# Patient Record
Sex: FEMALE | Race: BLACK OR AFRICAN AMERICAN | NOT HISPANIC OR LATINO | URBAN - METROPOLITAN AREA
[De-identification: names, ages, dates, MRNs, and addresses within clinical notes are randomized per-mention and may not be internally consistent; named-entity substitution may affect disease eponyms.]

---

## 2017-07-17 ENCOUNTER — EMERGENCY (EMERGENCY)
Facility: HOSPITAL | Age: 67
LOS: 0 days | Discharge: HOME | End: 2017-07-17
Attending: STUDENT IN AN ORGANIZED HEALTH CARE EDUCATION/TRAINING PROGRAM
Payer: COMMERCIAL

## 2017-07-17 VITALS
DIASTOLIC BLOOD PRESSURE: 60 MMHG | TEMPERATURE: 98 F | OXYGEN SATURATION: 97 % | HEART RATE: 62 BPM | RESPIRATION RATE: 20 BRPM | SYSTOLIC BLOOD PRESSURE: 103 MMHG

## 2017-07-17 VITALS
HEART RATE: 60 BPM | DIASTOLIC BLOOD PRESSURE: 83 MMHG | OXYGEN SATURATION: 100 % | HEIGHT: 65 IN | TEMPERATURE: 99 F | RESPIRATION RATE: 18 BRPM | WEIGHT: 179.02 LBS | SYSTOLIC BLOOD PRESSURE: 128 MMHG

## 2017-07-17 LAB
ALBUMIN SERPL ELPH-MCNC: 3.9 G/DL — SIGNIFICANT CHANGE UP (ref 3.3–5)
ALP SERPL-CCNC: 94 U/L — SIGNIFICANT CHANGE UP (ref 40–120)
ALT FLD-CCNC: 34 U/L — SIGNIFICANT CHANGE UP (ref 12–78)
ANION GAP SERPL CALC-SCNC: 8 MMOL/L — SIGNIFICANT CHANGE UP (ref 5–17)
APTT BLD: 31.7 SEC — SIGNIFICANT CHANGE UP (ref 27.5–37.4)
AST SERPL-CCNC: 35 U/L — SIGNIFICANT CHANGE UP (ref 15–37)
BASOPHILS # BLD AUTO: 0.1 K/UL — SIGNIFICANT CHANGE UP (ref 0–0.2)
BASOPHILS NFR BLD AUTO: 1.4 % — SIGNIFICANT CHANGE UP (ref 0–2)
BILIRUB SERPL-MCNC: 0.4 MG/DL — SIGNIFICANT CHANGE UP (ref 0.2–1.2)
BUN SERPL-MCNC: 15 MG/DL — SIGNIFICANT CHANGE UP (ref 7–23)
CALCIUM SERPL-MCNC: 10.2 MG/DL — HIGH (ref 8.5–10.1)
CHLORIDE SERPL-SCNC: 106 MMOL/L — SIGNIFICANT CHANGE UP (ref 96–108)
CK MB BLD-MCNC: 1.1 % — SIGNIFICANT CHANGE UP (ref 0–3.5)
CK MB BLD-MCNC: 1.2 % — SIGNIFICANT CHANGE UP (ref 0–3.5)
CK MB CFR SERPL CALC: 3.3 NG/ML — SIGNIFICANT CHANGE UP (ref 0.5–3.6)
CK MB CFR SERPL CALC: 3.6 NG/ML — SIGNIFICANT CHANGE UP (ref 0.5–3.6)
CK SERPL-CCNC: 288 U/L — HIGH (ref 26–192)
CK SERPL-CCNC: 298 U/L — HIGH (ref 26–192)
CO2 SERPL-SCNC: 27 MMOL/L — SIGNIFICANT CHANGE UP (ref 22–31)
CREAT SERPL-MCNC: 0.73 MG/DL — SIGNIFICANT CHANGE UP (ref 0.5–1.3)
D DIMER BLD IA.RAPID-MCNC: <150 NG/ML DDU — SIGNIFICANT CHANGE UP
EOSINOPHIL # BLD AUTO: 0 K/UL — SIGNIFICANT CHANGE UP (ref 0–0.5)
EOSINOPHIL NFR BLD AUTO: 0.7 % — SIGNIFICANT CHANGE UP (ref 0–6)
GLUCOSE SERPL-MCNC: 93 MG/DL — SIGNIFICANT CHANGE UP (ref 70–99)
HCT VFR BLD CALC: 35.8 % — SIGNIFICANT CHANGE UP (ref 34.5–45)
HGB BLD-MCNC: 13.1 G/DL — SIGNIFICANT CHANGE UP (ref 11.5–15.5)
INR BLD: 0.99 RATIO — SIGNIFICANT CHANGE UP (ref 0.88–1.16)
LYMPHOCYTES # BLD AUTO: 1.8 K/UL — SIGNIFICANT CHANGE UP (ref 1–3.3)
LYMPHOCYTES # BLD AUTO: 24.4 % — SIGNIFICANT CHANGE UP (ref 13–44)
MCHC RBC-ENTMCNC: 31.2 PG — SIGNIFICANT CHANGE UP (ref 27–34)
MCHC RBC-ENTMCNC: 36.7 GM/DL — HIGH (ref 32–36)
MCV RBC AUTO: 85.1 FL — SIGNIFICANT CHANGE UP (ref 80–100)
MONOCYTES # BLD AUTO: 0.6 K/UL — SIGNIFICANT CHANGE UP (ref 0–0.9)
MONOCYTES NFR BLD AUTO: 8.2 % — SIGNIFICANT CHANGE UP (ref 2–14)
NEUTROPHILS # BLD AUTO: 4.7 K/UL — SIGNIFICANT CHANGE UP (ref 1.8–7.4)
NEUTROPHILS NFR BLD AUTO: 65.4 % — SIGNIFICANT CHANGE UP (ref 43–77)
PLATELET # BLD AUTO: 153 K/UL — SIGNIFICANT CHANGE UP (ref 150–400)
POTASSIUM SERPL-MCNC: 4.2 MMOL/L — SIGNIFICANT CHANGE UP (ref 3.5–5.3)
POTASSIUM SERPL-SCNC: 4.2 MMOL/L — SIGNIFICANT CHANGE UP (ref 3.5–5.3)
PROT SERPL-MCNC: 8 GM/DL — SIGNIFICANT CHANGE UP (ref 6–8.3)
PROTHROM AB SERPL-ACNC: 10.8 SEC — SIGNIFICANT CHANGE UP (ref 9.8–12.7)
RBC # BLD: 4.2 M/UL — SIGNIFICANT CHANGE UP (ref 3.8–5.2)
RBC # FLD: 12.4 % — SIGNIFICANT CHANGE UP (ref 11–15)
SODIUM SERPL-SCNC: 141 MMOL/L — SIGNIFICANT CHANGE UP (ref 135–145)
TROPONIN I SERPL-MCNC: <.015 NG/ML — SIGNIFICANT CHANGE UP (ref 0.01–0.04)
TROPONIN I SERPL-MCNC: <.015 NG/ML — SIGNIFICANT CHANGE UP (ref 0.01–0.04)
WBC # BLD: 7.3 K/UL — SIGNIFICANT CHANGE UP (ref 3.8–10.5)
WBC # FLD AUTO: 7.3 K/UL — SIGNIFICANT CHANGE UP (ref 3.8–10.5)

## 2017-07-17 PROCEDURE — 71010: CPT | Mod: 26

## 2017-07-17 PROCEDURE — 93010 ELECTROCARDIOGRAM REPORT: CPT

## 2017-07-17 PROCEDURE — 99285 EMERGENCY DEPT VISIT HI MDM: CPT

## 2017-07-17 PROCEDURE — 93970 EXTREMITY STUDY: CPT | Mod: 26

## 2017-07-17 RX ORDER — SODIUM CHLORIDE 9 MG/ML
3 INJECTION INTRAMUSCULAR; INTRAVENOUS; SUBCUTANEOUS ONCE
Qty: 0 | Refills: 0 | Status: COMPLETED | OUTPATIENT
Start: 2017-07-17 | End: 2017-07-17

## 2017-07-17 RX ORDER — OXYCODONE HYDROCHLORIDE 5 MG/1
1 TABLET ORAL
Qty: 12 | Refills: 0 | OUTPATIENT
Start: 2017-07-17 | End: 2017-07-20

## 2017-07-17 RX ADMIN — SODIUM CHLORIDE 3 MILLILITER(S): 9 INJECTION INTRAMUSCULAR; INTRAVENOUS; SUBCUTANEOUS at 14:01

## 2017-07-17 NOTE — ED PROVIDER NOTE - OBJECTIVE STATEMENT
67 year old female with h/o LLE DVT (three years ago, treated with anticoagulation for six months) presents today c/o intermittent episodes of palpitations for the last one week, today pt c/o three episodes lasting ten minutes, pt concerned for new DVT, (-) chest pain (-) sob (-) recent travel +calf tenderness (L>R)

## 2017-07-17 NOTE — ED ADULT TRIAGE NOTE - CHIEF COMPLAINT QUOTE
c/o palpitations x 15 mins denies palpitations or chest pain at present states intermittant episodes over past week

## 2017-07-17 NOTE — ED ADULT NURSE REASSESSMENT NOTE - NS ED NURSE REASSESS COMMENT FT1
rcvd pt aaox3, pt stable, denies palpitations at this time.  remains on HR monitor.  family at bedside

## 2017-07-17 NOTE — ED ADULT NURSE NOTE - OBJECTIVE STATEMENT
received er bed 10 c/o intermittant episodes of palpitations over past week states about 3 episodes lasting 10 mins each had episode this morning x 15 mins resolved prior to arrival denies chest pain or palpitations at present denies sob at present lungs clear b/l hx dvt l leg 3 years ago no leg pain at present denies recent prolonged air or car travel

## 2017-07-18 DIAGNOSIS — Z86.718 PERSONAL HISTORY OF OTHER VENOUS THROMBOSIS AND EMBOLISM: ICD-10-CM

## 2017-07-18 DIAGNOSIS — R00.2 PALPITATIONS: ICD-10-CM

## 2018-04-30 ENCOUNTER — EMERGENCY (EMERGENCY)
Facility: HOSPITAL | Age: 68
LOS: 0 days | Discharge: HOME | End: 2018-04-30
Attending: STUDENT IN AN ORGANIZED HEALTH CARE EDUCATION/TRAINING PROGRAM
Payer: COMMERCIAL

## 2018-04-30 VITALS
DIASTOLIC BLOOD PRESSURE: 68 MMHG | OXYGEN SATURATION: 98 % | TEMPERATURE: 98 F | SYSTOLIC BLOOD PRESSURE: 118 MMHG | HEART RATE: 58 BPM | RESPIRATION RATE: 18 BRPM

## 2018-04-30 VITALS
OXYGEN SATURATION: 98 % | HEART RATE: 62 BPM | HEIGHT: 65 IN | SYSTOLIC BLOOD PRESSURE: 130 MMHG | RESPIRATION RATE: 16 BRPM | WEIGHT: 175.05 LBS | DIASTOLIC BLOOD PRESSURE: 86 MMHG | TEMPERATURE: 99 F

## 2018-04-30 PROBLEM — I82.409 ACUTE EMBOLISM AND THROMBOSIS OF UNSPECIFIED DEEP VEINS OF UNSPECIFIED LOWER EXTREMITY: Chronic | Status: ACTIVE | Noted: 2017-07-17

## 2018-04-30 PROCEDURE — 99283 EMERGENCY DEPT VISIT LOW MDM: CPT

## 2018-04-30 PROCEDURE — 73562 X-RAY EXAM OF KNEE 3: CPT | Mod: 26,50

## 2018-04-30 RX ORDER — TRAMADOL HYDROCHLORIDE 50 MG/1
50 TABLET ORAL ONCE
Qty: 0 | Refills: 0 | Status: DISCONTINUED | OUTPATIENT
Start: 2018-04-30 | End: 2018-04-30

## 2018-04-30 RX ORDER — TRAMADOL HYDROCHLORIDE 50 MG/1
1 TABLET ORAL
Qty: 12 | Refills: 0
Start: 2018-04-30 | End: 2018-05-02

## 2018-04-30 RX ADMIN — TRAMADOL HYDROCHLORIDE 50 MILLIGRAM(S): 50 TABLET ORAL at 16:59

## 2018-04-30 RX ADMIN — TRAMADOL HYDROCHLORIDE 50 MILLIGRAM(S): 50 TABLET ORAL at 11:43

## 2018-04-30 NOTE — ED PROVIDER NOTE - MEDICAL DECISION MAKING DETAILS
xray negative for acute fracture or dislocation, tramadol for pain, pt evaluation recommend home PT, pt told to follow up with her PMD to arrange for therapy

## 2018-04-30 NOTE — PHYSICAL THERAPY INITIAL EVALUATION ADULT - ASR EQUIP NEEDS DISCH PT EVAL
pt would benefit from rolling walker if pt continues to be limited with ambulation. Pt and daughter educated to get script from PCP

## 2018-04-30 NOTE — ED PROVIDER NOTE - OBJECTIVE STATEMENT
67 year old female presents today c/o tripping and falling at home one week ago landing with her lower extremities behind her, she describes her legs "being in a W position", since then she has been able to ambulate but with worsening pain, today she found it more difficult with weigh bearing, at baseline pt does use a cane

## 2018-04-30 NOTE — ED ADULT TRIAGE NOTE - CHIEF COMPLAINT QUOTE
slip and fall c/o bilateral knee pain slip and fall one week ago c/o bilateral knee pain. unable to put any weight on the knee.

## 2018-04-30 NOTE — ED ADULT NURSE NOTE - OBJECTIVE STATEMENT
Patient fell in bathroom and landed on hands and knees. Complaining of pain in the knees. Not on medication. Hx of dvt Denies hitting head.

## 2018-04-30 NOTE — PHYSICAL THERAPY INITIAL EVALUATION ADULT - ASSISTIVE DEVICE FOR TRANSFER: GAIT, REHAB EVAL
with rolling walker and attempted handheld assist to act as cane pt has at home. Pt educated on proper use of standard cane/rolling walker
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
## 2018-05-01 DIAGNOSIS — S80.01XA CONTUSION OF RIGHT KNEE, INITIAL ENCOUNTER: ICD-10-CM

## 2018-05-01 DIAGNOSIS — S80.02XA CONTUSION OF LEFT KNEE, INITIAL ENCOUNTER: ICD-10-CM

## 2018-05-01 DIAGNOSIS — M25.561 PAIN IN RIGHT KNEE: ICD-10-CM

## 2018-05-01 DIAGNOSIS — M25.562 PAIN IN LEFT KNEE: ICD-10-CM

## 2018-05-01 DIAGNOSIS — Z86.718 PERSONAL HISTORY OF OTHER VENOUS THROMBOSIS AND EMBOLISM: ICD-10-CM
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
## 2020-07-17 PROBLEM — Z00.00 ENCOUNTER FOR PREVENTIVE HEALTH EXAMINATION: Status: ACTIVE | Noted: 2020-07-17

## 2020-07-22 ENCOUNTER — APPOINTMENT (OUTPATIENT)
Dept: ORTHOPEDIC SURGERY | Facility: CLINIC | Age: 70
End: 2020-07-22
Payer: MEDICARE

## 2020-07-22 DIAGNOSIS — M79.604 PAIN IN RIGHT LEG: ICD-10-CM

## 2020-07-22 DIAGNOSIS — M21.6X9 OTHER ACQUIRED DEFORMITIES OF UNSPECIFIED FOOT: ICD-10-CM

## 2020-07-22 DIAGNOSIS — M21.42 FLAT FOOT [PES PLANUS] (ACQUIRED), RIGHT FOOT: ICD-10-CM

## 2020-07-22 DIAGNOSIS — R60.0 LOCALIZED EDEMA: ICD-10-CM

## 2020-07-22 DIAGNOSIS — M62.89 OTHER SPECIFIED DISORDERS OF MUSCLE: ICD-10-CM

## 2020-07-22 DIAGNOSIS — M79.605 PAIN IN RIGHT LEG: ICD-10-CM

## 2020-07-22 DIAGNOSIS — M79.672 PAIN IN RIGHT LEG: ICD-10-CM

## 2020-07-22 DIAGNOSIS — M79.671 PAIN IN RIGHT LEG: ICD-10-CM

## 2020-07-22 DIAGNOSIS — M21.41 FLAT FOOT [PES PLANUS] (ACQUIRED), RIGHT FOOT: ICD-10-CM

## 2020-07-22 PROCEDURE — 73630 X-RAY EXAM OF FOOT: CPT | Mod: LT

## 2020-07-22 PROCEDURE — 99203 OFFICE O/P NEW LOW 30 MIN: CPT

## 2020-07-26 PROBLEM — M21.41 ACQUIRED PES PLANUS OF BOTH FEET: Status: ACTIVE | Noted: 2020-07-26

## 2020-07-26 PROBLEM — M62.89 TIGHTNESS OF BOTH GASTROCNEMIUS MUSCLES: Status: ACTIVE | Noted: 2020-07-26

## 2020-07-26 PROBLEM — R60.0 PEDAL EDEMA: Status: ACTIVE | Noted: 2020-07-26

## 2022-08-23 ENCOUNTER — APPOINTMENT (OUTPATIENT)
Dept: VASCULAR SURGERY | Facility: CLINIC | Age: 72
End: 2022-08-23

## 2022-08-23 VITALS
BODY MASS INDEX: 28.82 KG/M2 | HEART RATE: 70 BPM | SYSTOLIC BLOOD PRESSURE: 114 MMHG | WEIGHT: 173 LBS | DIASTOLIC BLOOD PRESSURE: 68 MMHG | HEIGHT: 65 IN

## 2022-08-23 DIAGNOSIS — I87.2 VENOUS INSUFFICIENCY (CHRONIC) (PERIPHERAL): ICD-10-CM

## 2022-08-23 PROCEDURE — 99203 OFFICE O/P NEW LOW 30 MIN: CPT

## 2022-08-23 PROCEDURE — 93971 EXTREMITY STUDY: CPT

## 2022-08-23 NOTE — PHYSICAL EXAM
[2+] : left 2+ [Ankle Swelling (On Exam)] : present [] : of the left leg [Ankle Swelling On The Left] : moderate [No Rash or Lesion] : No rash or lesion [Alert] : alert [Calm] : calm [JVD] : no jugular venous distention  [Varicose Veins Of Lower Extremities] : not present [Skin Ulcer] : no ulcer [de-identified] : Appears well

## 2022-08-23 NOTE — HISTORY OF PRESENT ILLNESS
[FreeTextEntry1] : 72-year-old female with a past medical history of hyperlipidemia presents for evaluation of left lower extremity swelling and pain with skin discoloration.  Patient reports a history of DVT in 2013 was treated with Coumadin for 6 months then states was restarted on Coumadin 2 to 3 years after for an additional 6-month course, patient unsure of the reason for restarting Coumadin.  Patient states that she has been off of anticoagulation for the past 5 years.  Patient denies any history of open wounds or ulcers

## 2022-08-23 NOTE — ASSESSMENT
[FreeTextEntry1] : 72-year-old female with a past medical history of hyperlipidemia presents for evaluation of left lower extremity swelling and pain with skin discoloration\par \par Venous duplex shows no evidence of acute DVT or SVT, reflux noted in the femoral and popliteal vein with a chronic nonocclusive thrombus in the popliteal vein greater saphenous vein was not visualized of the left lower extremity\par \par At this time no surgical intervention was recommended compression stockings and elevation\par Patient to follow-up as needed

## 2023-01-01 ENCOUNTER — OUTPATIENT (OUTPATIENT)
Dept: OUTPATIENT SERVICES | Facility: HOSPITAL | Age: 73
LOS: 1 days | Discharge: HOME | End: 2023-01-01

## 2023-01-01 ENCOUNTER — RESULT REVIEW (OUTPATIENT)
Age: 73
End: 2023-01-01

## 2023-01-01 ENCOUNTER — OUTPATIENT (OUTPATIENT)
Dept: OUTPATIENT SERVICES | Facility: HOSPITAL | Age: 73
LOS: 1 days | End: 2023-01-01
Payer: COMMERCIAL

## 2023-01-01 ENCOUNTER — APPOINTMENT (OUTPATIENT)
Dept: UROLOGY | Facility: CLINIC | Age: 73
End: 2023-01-01
Payer: MEDICARE

## 2023-01-01 ENCOUNTER — NON-APPOINTMENT (OUTPATIENT)
Age: 73
End: 2023-01-01

## 2023-01-01 ENCOUNTER — APPOINTMENT (OUTPATIENT)
Dept: ULTRASOUND IMAGING | Facility: CLINIC | Age: 73
End: 2023-01-01

## 2023-01-01 ENCOUNTER — APPOINTMENT (OUTPATIENT)
Dept: HEMATOLOGY ONCOLOGY | Facility: CLINIC | Age: 73
End: 2023-01-01
Payer: MEDICARE

## 2023-01-01 ENCOUNTER — APPOINTMENT (OUTPATIENT)
Dept: ULTRASOUND IMAGING | Facility: CLINIC | Age: 73
End: 2023-01-01
Payer: MEDICARE

## 2023-01-01 ENCOUNTER — INPATIENT (INPATIENT)
Facility: HOSPITAL | Age: 73
LOS: 13 days | Discharge: HOME HEALTH | End: 2023-10-21
Attending: INTERNAL MEDICINE | Admitting: INTERNAL MEDICINE
Payer: MEDICARE

## 2023-01-01 ENCOUNTER — APPOINTMENT (OUTPATIENT)
Dept: CT IMAGING | Facility: IMAGING CENTER | Age: 73
End: 2023-01-01

## 2023-01-01 ENCOUNTER — TRANSCRIPTION ENCOUNTER (OUTPATIENT)
Age: 73
End: 2023-01-01

## 2023-01-01 ENCOUNTER — APPOINTMENT (OUTPATIENT)
Dept: CT IMAGING | Facility: CLINIC | Age: 73
End: 2023-01-01
Payer: MEDICARE

## 2023-01-01 ENCOUNTER — APPOINTMENT (OUTPATIENT)
Dept: SURGERY | Facility: CLINIC | Age: 73
End: 2023-01-01
Payer: MEDICARE

## 2023-01-01 VITALS
OXYGEN SATURATION: 98 % | HEART RATE: 78 BPM | DIASTOLIC BLOOD PRESSURE: 67 MMHG | SYSTOLIC BLOOD PRESSURE: 102 MMHG | BODY MASS INDEX: 27.82 KG/M2 | TEMPERATURE: 98.2 F | HEIGHT: 65 IN | WEIGHT: 167 LBS

## 2023-01-01 VITALS
OXYGEN SATURATION: 98 % | RESPIRATION RATE: 16 BRPM | TEMPERATURE: 97.7 F | HEART RATE: 85 BPM | WEIGHT: 160 LBS | BODY MASS INDEX: 26.63 KG/M2 | DIASTOLIC BLOOD PRESSURE: 73 MMHG | SYSTOLIC BLOOD PRESSURE: 146 MMHG

## 2023-01-01 VITALS
WEIGHT: 163.36 LBS | OXYGEN SATURATION: 97 % | TEMPERATURE: 97.2 F | SYSTOLIC BLOOD PRESSURE: 119 MMHG | BODY MASS INDEX: 27.22 KG/M2 | HEIGHT: 65 IN | DIASTOLIC BLOOD PRESSURE: 75 MMHG | HEART RATE: 73 BPM | RESPIRATION RATE: 19 BRPM

## 2023-01-01 VITALS
SYSTOLIC BLOOD PRESSURE: 107 MMHG | RESPIRATION RATE: 16 BRPM | HEART RATE: 69 BPM | TEMPERATURE: 98 F | DIASTOLIC BLOOD PRESSURE: 69 MMHG | OXYGEN SATURATION: 97 %

## 2023-01-01 VITALS
SYSTOLIC BLOOD PRESSURE: 202 MMHG | OXYGEN SATURATION: 100 % | HEART RATE: 63 BPM | RESPIRATION RATE: 18 BRPM | WEIGHT: 162.04 LBS | DIASTOLIC BLOOD PRESSURE: 95 MMHG | TEMPERATURE: 98 F | HEIGHT: 65 IN

## 2023-01-01 DIAGNOSIS — R31.0 GROSS HEMATURIA: ICD-10-CM

## 2023-01-01 DIAGNOSIS — I10 ESSENTIAL (PRIMARY) HYPERTENSION: ICD-10-CM

## 2023-01-01 DIAGNOSIS — N32.3 DIVERTICULUM OF BLADDER: ICD-10-CM

## 2023-01-01 DIAGNOSIS — R00.1 BRADYCARDIA, UNSPECIFIED: ICD-10-CM

## 2023-01-01 DIAGNOSIS — N13.30 UNSPECIFIED HYDRONEPHROSIS: ICD-10-CM

## 2023-01-01 DIAGNOSIS — I82.411 ACUTE EMBOLISM AND THROMBOSIS OF RIGHT FEMORAL VEIN: ICD-10-CM

## 2023-01-01 DIAGNOSIS — D63.0 ANEMIA IN NEOPLASTIC DISEASE: ICD-10-CM

## 2023-01-01 DIAGNOSIS — Z00.00 ENCOUNTER FOR GENERAL ADULT MEDICAL EXAMINATION WITHOUT ABNORMAL FINDINGS: ICD-10-CM

## 2023-01-01 DIAGNOSIS — C18.9 MALIGNANT NEOPLASM OF COLON, UNSPECIFIED: ICD-10-CM

## 2023-01-01 DIAGNOSIS — D72.829 ELEVATED WHITE BLOOD CELL COUNT, UNSPECIFIED: ICD-10-CM

## 2023-01-01 DIAGNOSIS — N32.0 BLADDER-NECK OBSTRUCTION: ICD-10-CM

## 2023-01-01 DIAGNOSIS — N39.0 URINARY TRACT INFECTION, SITE NOT SPECIFIED: ICD-10-CM

## 2023-01-01 DIAGNOSIS — Z86.718 PERSONAL HISTORY OF OTHER VENOUS THROMBOSIS AND EMBOLISM: ICD-10-CM

## 2023-01-01 DIAGNOSIS — N13.6 PYONEPHROSIS: ICD-10-CM

## 2023-01-01 DIAGNOSIS — Z80.1 FAMILY HISTORY OF MALIGNANT NEOPLASM OF TRACHEA, BRONCHUS AND LUNG: ICD-10-CM

## 2023-01-01 DIAGNOSIS — Z29.9 ENCOUNTER FOR PROPHYLACTIC MEASURES, UNSPECIFIED: ICD-10-CM

## 2023-01-01 DIAGNOSIS — N17.9 ACUTE KIDNEY FAILURE, UNSPECIFIED: ICD-10-CM

## 2023-01-01 DIAGNOSIS — I82.4Y1 ACUTE EMBOLISM AND THROMBOSIS OF UNSPECIFIED DEEP VEINS OF RIGHT PROXIMAL LOWER EXTREMITY: ICD-10-CM

## 2023-01-01 DIAGNOSIS — Z20.822 CONTACT WITH AND (SUSPECTED) EXPOSURE TO COVID-19: ICD-10-CM

## 2023-01-01 DIAGNOSIS — Z79.899 OTHER LONG TERM (CURRENT) DRUG THERAPY: ICD-10-CM

## 2023-01-01 DIAGNOSIS — E78.5 HYPERLIPIDEMIA, UNSPECIFIED: ICD-10-CM

## 2023-01-01 DIAGNOSIS — C67.9 MALIGNANT NEOPLASM OF BLADDER, UNSPECIFIED: ICD-10-CM

## 2023-01-01 DIAGNOSIS — C67.0 MALIGNANT NEOPLASM OF TRIGONE OF BLADDER: ICD-10-CM

## 2023-01-01 DIAGNOSIS — N32.89 OTHER SPECIFIED DISORDERS OF BLADDER: ICD-10-CM

## 2023-01-01 DIAGNOSIS — Z63.5 DISRUPTION OF FAMILY BY SEPARATION AND DIVORCE: ICD-10-CM

## 2023-01-01 DIAGNOSIS — Z28.9 IMMUNIZATION NOT CARRIED OUT FOR UNSPECIFIED REASON: ICD-10-CM

## 2023-01-01 DIAGNOSIS — R26.0 ATAXIC GAIT: ICD-10-CM

## 2023-01-01 DIAGNOSIS — R10.2 PELVIC AND PERINEAL PAIN: ICD-10-CM

## 2023-01-01 DIAGNOSIS — C18.2 MALIGNANT NEOPLASM OF ASCENDING COLON: ICD-10-CM

## 2023-01-01 DIAGNOSIS — E87.5 HYPERKALEMIA: ICD-10-CM

## 2023-01-01 DIAGNOSIS — R54 AGE-RELATED PHYSICAL DEBILITY: ICD-10-CM

## 2023-01-01 LAB
A1C WITH ESTIMATED AVERAGE GLUCOSE RESULT: 5.9 % — HIGH (ref 4–5.6)
ALBUMIN SERPL ELPH-MCNC: 1.7 G/DL — LOW (ref 3.3–5)
ALBUMIN SERPL ELPH-MCNC: 1.8 G/DL — LOW (ref 3.3–5)
ALBUMIN SERPL ELPH-MCNC: 1.8 G/DL — LOW (ref 3.3–5)
ALBUMIN SERPL ELPH-MCNC: 1.9 G/DL — LOW (ref 3.3–5)
ALBUMIN SERPL ELPH-MCNC: 2.6 G/DL — LOW (ref 3.3–5)
ALBUMIN SERPL ELPH-MCNC: 3.6 G/DL
ALP BLD-CCNC: 64 U/L
ALP SERPL-CCNC: 104 U/L — SIGNIFICANT CHANGE UP (ref 40–120)
ALP SERPL-CCNC: 81 U/L — SIGNIFICANT CHANGE UP (ref 40–120)
ALP SERPL-CCNC: 92 U/L — SIGNIFICANT CHANGE UP (ref 40–120)
ALP SERPL-CCNC: 97 U/L — SIGNIFICANT CHANGE UP (ref 40–120)
ALP SERPL-CCNC: 99 U/L — SIGNIFICANT CHANGE UP (ref 40–120)
ALT FLD-CCNC: 11 U/L — LOW (ref 12–78)
ALT FLD-CCNC: 11 U/L — LOW (ref 12–78)
ALT FLD-CCNC: 13 U/L — SIGNIFICANT CHANGE UP (ref 12–78)
ALT FLD-CCNC: 13 U/L — SIGNIFICANT CHANGE UP (ref 12–78)
ALT FLD-CCNC: 18 U/L — SIGNIFICANT CHANGE UP (ref 12–78)
ALT SERPL-CCNC: 7 U/L
ANION GAP SERPL CALC-SCNC: 1 MMOL/L — LOW (ref 5–17)
ANION GAP SERPL CALC-SCNC: 1 MMOL/L — LOW (ref 5–17)
ANION GAP SERPL CALC-SCNC: 10 MMOL/L — SIGNIFICANT CHANGE UP (ref 5–17)
ANION GAP SERPL CALC-SCNC: 10 MMOL/L — SIGNIFICANT CHANGE UP (ref 5–17)
ANION GAP SERPL CALC-SCNC: 11 MMOL/L
ANION GAP SERPL CALC-SCNC: 3 MMOL/L — LOW (ref 5–17)
ANION GAP SERPL CALC-SCNC: 4 MMOL/L — LOW (ref 5–17)
ANION GAP SERPL CALC-SCNC: 4 MMOL/L — LOW (ref 5–17)
ANION GAP SERPL CALC-SCNC: 6 MMOL/L — SIGNIFICANT CHANGE UP (ref 5–17)
ANION GAP SERPL CALC-SCNC: 7 MMOL/L — SIGNIFICANT CHANGE UP (ref 5–17)
ANION GAP SERPL CALC-SCNC: 8 MMOL/L — SIGNIFICANT CHANGE UP (ref 5–17)
ANION GAP SERPL CALC-SCNC: 9 MMOL/L — SIGNIFICANT CHANGE UP (ref 5–17)
ANION GAP SERPL CALC-SCNC: 9 MMOL/L — SIGNIFICANT CHANGE UP (ref 5–17)
APPEARANCE UR: ABNORMAL
APTT BLD: 30.8 SEC — SIGNIFICANT CHANGE UP (ref 24.5–35.6)
APTT BLD: 31.3 SEC — SIGNIFICANT CHANGE UP (ref 24.5–35.6)
AST SERPL-CCNC: 10 U/L — LOW (ref 15–37)
AST SERPL-CCNC: 11 U/L
AST SERPL-CCNC: 13 U/L — LOW (ref 15–37)
AST SERPL-CCNC: 14 U/L — LOW (ref 15–37)
AST SERPL-CCNC: 17 U/L — SIGNIFICANT CHANGE UP (ref 15–37)
AST SERPL-CCNC: 19 U/L — SIGNIFICANT CHANGE UP (ref 15–37)
BACTERIA # UR AUTO: ABNORMAL
BASOPHILS # BLD AUTO: 0.05 K/UL — SIGNIFICANT CHANGE UP (ref 0–0.2)
BASOPHILS # BLD AUTO: 0.06 K/UL — SIGNIFICANT CHANGE UP (ref 0–0.2)
BASOPHILS # BLD AUTO: 0.07 K/UL — SIGNIFICANT CHANGE UP (ref 0–0.2)
BASOPHILS NFR BLD AUTO: 0.3 % — SIGNIFICANT CHANGE UP (ref 0–2)
BASOPHILS NFR BLD AUTO: 0.4 % — SIGNIFICANT CHANGE UP (ref 0–2)
BASOPHILS NFR BLD AUTO: 0.5 % — SIGNIFICANT CHANGE UP (ref 0–2)
BASOPHILS NFR BLD AUTO: 0.5 % — SIGNIFICANT CHANGE UP (ref 0–2)
BASOPHILS NFR BLD AUTO: 0.6 % — SIGNIFICANT CHANGE UP (ref 0–2)
BILIRUB SERPL-MCNC: 0.2 MG/DL
BILIRUB SERPL-MCNC: 0.2 MG/DL — SIGNIFICANT CHANGE UP (ref 0.2–1.2)
BILIRUB SERPL-MCNC: 0.3 MG/DL — SIGNIFICANT CHANGE UP (ref 0.2–1.2)
BILIRUB SERPL-MCNC: 0.3 MG/DL — SIGNIFICANT CHANGE UP (ref 0.2–1.2)
BILIRUB UR-MCNC: NEGATIVE — SIGNIFICANT CHANGE UP
BLD GP AB SCN SERPL QL: SIGNIFICANT CHANGE UP
BLD GP AB SCN SERPL QL: SIGNIFICANT CHANGE UP
BUN SERPL-MCNC: 16 MG/DL — SIGNIFICANT CHANGE UP (ref 7–23)
BUN SERPL-MCNC: 18 MG/DL
BUN SERPL-MCNC: 18 MG/DL — SIGNIFICANT CHANGE UP (ref 7–23)
BUN SERPL-MCNC: 18 MG/DL — SIGNIFICANT CHANGE UP (ref 7–23)
BUN SERPL-MCNC: 22 MG/DL — SIGNIFICANT CHANGE UP (ref 7–23)
BUN SERPL-MCNC: 24 MG/DL — HIGH (ref 7–23)
BUN SERPL-MCNC: 25 MG/DL — HIGH (ref 7–23)
BUN SERPL-MCNC: 25 MG/DL — HIGH (ref 7–23)
BUN SERPL-MCNC: 42 MG/DL — HIGH (ref 7–23)
BUN SERPL-MCNC: 44 MG/DL — HIGH (ref 7–23)
BUN SERPL-MCNC: 47 MG/DL — HIGH (ref 7–23)
BUN SERPL-MCNC: 50 MG/DL — HIGH (ref 7–23)
BUN SERPL-MCNC: 55 MG/DL — HIGH (ref 7–23)
BUN SERPL-MCNC: 57 MG/DL — HIGH (ref 7–23)
BUN SERPL-MCNC: 77 MG/DL — HIGH (ref 7–23)
BUN SERPL-MCNC: 77 MG/DL — HIGH (ref 7–23)
CALCIUM SERPL-MCNC: 7.8 MG/DL — LOW (ref 8.5–10.1)
CALCIUM SERPL-MCNC: 7.9 MG/DL — LOW (ref 8.5–10.1)
CALCIUM SERPL-MCNC: 7.9 MG/DL — LOW (ref 8.5–10.1)
CALCIUM SERPL-MCNC: 8.1 MG/DL — LOW (ref 8.5–10.1)
CALCIUM SERPL-MCNC: 8.5 MG/DL — SIGNIFICANT CHANGE UP (ref 8.5–10.1)
CALCIUM SERPL-MCNC: 8.6 MG/DL — SIGNIFICANT CHANGE UP (ref 8.5–10.1)
CALCIUM SERPL-MCNC: 8.7 MG/DL — SIGNIFICANT CHANGE UP (ref 8.5–10.1)
CALCIUM SERPL-MCNC: 8.7 MG/DL — SIGNIFICANT CHANGE UP (ref 8.5–10.1)
CALCIUM SERPL-MCNC: 8.8 MG/DL — SIGNIFICANT CHANGE UP (ref 8.5–10.1)
CALCIUM SERPL-MCNC: 9 MG/DL — SIGNIFICANT CHANGE UP (ref 8.5–10.1)
CALCIUM SERPL-MCNC: 9.3 MG/DL — SIGNIFICANT CHANGE UP (ref 8.5–10.1)
CALCIUM SERPL-MCNC: 9.5 MG/DL — SIGNIFICANT CHANGE UP (ref 8.5–10.1)
CALCIUM SERPL-MCNC: 9.7 MG/DL
CALCIUM SERPL-MCNC: 9.8 MG/DL — SIGNIFICANT CHANGE UP (ref 8.5–10.1)
CEA SERPL-MCNC: 2 NG/ML — SIGNIFICANT CHANGE UP (ref 0–3.8)
CEA SERPL-MCNC: 2.4 NG/ML — SIGNIFICANT CHANGE UP (ref 0–3.8)
CHLORIDE SERPL-SCNC: 100 MMOL/L — SIGNIFICANT CHANGE UP (ref 96–108)
CHLORIDE SERPL-SCNC: 100 MMOL/L — SIGNIFICANT CHANGE UP (ref 96–108)
CHLORIDE SERPL-SCNC: 101 MMOL/L — SIGNIFICANT CHANGE UP (ref 96–108)
CHLORIDE SERPL-SCNC: 104 MMOL/L
CHLORIDE SERPL-SCNC: 104 MMOL/L — SIGNIFICANT CHANGE UP (ref 96–108)
CHLORIDE SERPL-SCNC: 105 MMOL/L — SIGNIFICANT CHANGE UP (ref 96–108)
CHLORIDE SERPL-SCNC: 106 MMOL/L — SIGNIFICANT CHANGE UP (ref 96–108)
CHLORIDE SERPL-SCNC: 106 MMOL/L — SIGNIFICANT CHANGE UP (ref 96–108)
CHLORIDE SERPL-SCNC: 107 MMOL/L — SIGNIFICANT CHANGE UP (ref 96–108)
CHLORIDE SERPL-SCNC: 108 MMOL/L — SIGNIFICANT CHANGE UP (ref 96–108)
CHLORIDE SERPL-SCNC: 108 MMOL/L — SIGNIFICANT CHANGE UP (ref 96–108)
CHLORIDE SERPL-SCNC: 109 MMOL/L — HIGH (ref 96–108)
CHLORIDE SERPL-SCNC: 109 MMOL/L — HIGH (ref 96–108)
CHLORIDE SERPL-SCNC: 110 MMOL/L — HIGH (ref 96–108)
CK SERPL-CCNC: 114 U/L — SIGNIFICANT CHANGE UP (ref 26–192)
CO2 SERPL-SCNC: 22 MMOL/L — SIGNIFICANT CHANGE UP (ref 22–31)
CO2 SERPL-SCNC: 22 MMOL/L — SIGNIFICANT CHANGE UP (ref 22–31)
CO2 SERPL-SCNC: 24 MMOL/L — SIGNIFICANT CHANGE UP (ref 22–31)
CO2 SERPL-SCNC: 24 MMOL/L — SIGNIFICANT CHANGE UP (ref 22–31)
CO2 SERPL-SCNC: 25 MMOL/L
CO2 SERPL-SCNC: 25 MMOL/L — SIGNIFICANT CHANGE UP (ref 22–31)
CO2 SERPL-SCNC: 25 MMOL/L — SIGNIFICANT CHANGE UP (ref 22–31)
CO2 SERPL-SCNC: 26 MMOL/L — SIGNIFICANT CHANGE UP (ref 22–31)
CO2 SERPL-SCNC: 26 MMOL/L — SIGNIFICANT CHANGE UP (ref 22–31)
CO2 SERPL-SCNC: 28 MMOL/L — SIGNIFICANT CHANGE UP (ref 22–31)
CO2 SERPL-SCNC: 29 MMOL/L — SIGNIFICANT CHANGE UP (ref 22–31)
CO2 SERPL-SCNC: 30 MMOL/L — SIGNIFICANT CHANGE UP (ref 22–31)
CO2 SERPL-SCNC: 31 MMOL/L — SIGNIFICANT CHANGE UP (ref 22–31)
CO2 SERPL-SCNC: 31 MMOL/L — SIGNIFICANT CHANGE UP (ref 22–31)
CO2 SERPL-SCNC: 32 MMOL/L — HIGH (ref 22–31)
COLOR SPEC: YELLOW — SIGNIFICANT CHANGE UP
COMMENT - URINE: SIGNIFICANT CHANGE UP
CREAT SERPL-MCNC: 0.69 MG/DL
CREAT SERPL-MCNC: 0.87 MG/DL — SIGNIFICANT CHANGE UP (ref 0.5–1.3)
CREAT SERPL-MCNC: 0.87 MG/DL — SIGNIFICANT CHANGE UP (ref 0.5–1.3)
CREAT SERPL-MCNC: 0.88 MG/DL — SIGNIFICANT CHANGE UP (ref 0.5–1.3)
CREAT SERPL-MCNC: 0.9 MG/DL — SIGNIFICANT CHANGE UP (ref 0.5–1.3)
CREAT SERPL-MCNC: 0.9 MG/DL — SIGNIFICANT CHANGE UP (ref 0.5–1.3)
CREAT SERPL-MCNC: 1.1 MG/DL — SIGNIFICANT CHANGE UP (ref 0.5–1.3)
CREAT SERPL-MCNC: 1.1 MG/DL — SIGNIFICANT CHANGE UP (ref 0.5–1.3)
CREAT SERPL-MCNC: 1.21 MG/DL — SIGNIFICANT CHANGE UP (ref 0.5–1.3)
CREAT SERPL-MCNC: 1.41 MG/DL — HIGH (ref 0.5–1.3)
CREAT SERPL-MCNC: 1.85 MG/DL — HIGH (ref 0.5–1.3)
CREAT SERPL-MCNC: 4.77 MG/DL — HIGH (ref 0.5–1.3)
CREAT SERPL-MCNC: 5.03 MG/DL — HIGH (ref 0.5–1.3)
CREAT SERPL-MCNC: 5.86 MG/DL — HIGH (ref 0.5–1.3)
CREAT SERPL-MCNC: 6.27 MG/DL — HIGH (ref 0.5–1.3)
CREAT SERPL-MCNC: 6.91 MG/DL — HIGH (ref 0.5–1.3)
CREAT SERPL-MCNC: 7.23 MG/DL — HIGH (ref 0.5–1.3)
CREAT SERPL-MCNC: 7.84 MG/DL — HIGH (ref 0.5–1.3)
CREAT SERPL-MCNC: 7.89 MG/DL — HIGH (ref 0.5–1.3)
CULTURE RESULTS: NO GROWTH — SIGNIFICANT CHANGE UP
CULTURE RESULTS: NO GROWTH — SIGNIFICANT CHANGE UP
CULTURE RESULTS: SIGNIFICANT CHANGE UP
DIFF PNL FLD: ABNORMAL
EGFR: 28 ML/MIN/1.73M2 — LOW
EGFR: 39 ML/MIN/1.73M2 — LOW
EGFR: 47 ML/MIN/1.73M2 — LOW
EGFR: 5 ML/MIN/1.73M2 — LOW
EGFR: 5 ML/MIN/1.73M2 — LOW
EGFR: 53 ML/MIN/1.73M2 — LOW
EGFR: 53 ML/MIN/1.73M2 — LOW
EGFR: 6 ML/MIN/1.73M2 — LOW
EGFR: 6 ML/MIN/1.73M2 — LOW
EGFR: 67 ML/MIN/1.73M2 — SIGNIFICANT CHANGE UP
EGFR: 67 ML/MIN/1.73M2 — SIGNIFICANT CHANGE UP
EGFR: 69 ML/MIN/1.73M2 — SIGNIFICANT CHANGE UP
EGFR: 7 ML/MIN/1.73M2 — LOW
EGFR: 7 ML/MIN/1.73M2 — LOW
EGFR: 70 ML/MIN/1.73M2 — SIGNIFICANT CHANGE UP
EGFR: 70 ML/MIN/1.73M2 — SIGNIFICANT CHANGE UP
EGFR: 9 ML/MIN/1.73M2 — LOW
EGFR: 9 ML/MIN/1.73M2 — LOW
EGFR: 91 ML/MIN/1.73M2
EOSINOPHIL # BLD AUTO: 0.07 K/UL — SIGNIFICANT CHANGE UP (ref 0–0.5)
EOSINOPHIL # BLD AUTO: 0.17 K/UL — SIGNIFICANT CHANGE UP (ref 0–0.5)
EOSINOPHIL # BLD AUTO: 0.2 K/UL — SIGNIFICANT CHANGE UP (ref 0–0.5)
EOSINOPHIL # BLD AUTO: 0.35 K/UL — SIGNIFICANT CHANGE UP (ref 0–0.5)
EOSINOPHIL # BLD AUTO: 0.37 K/UL — SIGNIFICANT CHANGE UP (ref 0–0.5)
EOSINOPHIL NFR BLD AUTO: 0.4 % — SIGNIFICANT CHANGE UP (ref 0–6)
EOSINOPHIL NFR BLD AUTO: 1.3 % — SIGNIFICANT CHANGE UP (ref 0–6)
EOSINOPHIL NFR BLD AUTO: 1.8 % — SIGNIFICANT CHANGE UP (ref 0–6)
EOSINOPHIL NFR BLD AUTO: 2.6 % — SIGNIFICANT CHANGE UP (ref 0–6)
EOSINOPHIL NFR BLD AUTO: 3.4 % — SIGNIFICANT CHANGE UP (ref 0–6)
EPI CELLS # UR: SIGNIFICANT CHANGE UP
EPO SERPL-MCNC: 15.8 MIU/ML — SIGNIFICANT CHANGE UP (ref 2.6–18.5)
ESTIMATED AVERAGE GLUCOSE: 123 MG/DL — HIGH (ref 68–114)
FERRITIN SERPL-MCNC: 146 NG/ML — SIGNIFICANT CHANGE UP (ref 13–330)
FOLATE SERPL-MCNC: 8.2 NG/ML — SIGNIFICANT CHANGE UP
GLUCOSE BLDC GLUCOMTR-MCNC: 105 MG/DL — HIGH (ref 70–99)
GLUCOSE BLDC GLUCOMTR-MCNC: 108 MG/DL — HIGH (ref 70–99)
GLUCOSE BLDC GLUCOMTR-MCNC: 161 MG/DL — HIGH (ref 70–99)
GLUCOSE BLDC GLUCOMTR-MCNC: 168 MG/DL — HIGH (ref 70–99)
GLUCOSE BLDC GLUCOMTR-MCNC: 208 MG/DL — HIGH (ref 70–99)
GLUCOSE BLDC GLUCOMTR-MCNC: 247 MG/DL — HIGH (ref 70–99)
GLUCOSE BLDC GLUCOMTR-MCNC: 92 MG/DL — SIGNIFICANT CHANGE UP (ref 70–99)
GLUCOSE BLDC GLUCOMTR-MCNC: 93 MG/DL — SIGNIFICANT CHANGE UP (ref 70–99)
GLUCOSE BLDC GLUCOMTR-MCNC: 93 MG/DL — SIGNIFICANT CHANGE UP (ref 70–99)
GLUCOSE SERPL-MCNC: 101 MG/DL
GLUCOSE SERPL-MCNC: 103 MG/DL — HIGH (ref 70–99)
GLUCOSE SERPL-MCNC: 103 MG/DL — HIGH (ref 70–99)
GLUCOSE SERPL-MCNC: 107 MG/DL — HIGH (ref 70–99)
GLUCOSE SERPL-MCNC: 115 MG/DL — HIGH (ref 70–99)
GLUCOSE SERPL-MCNC: 124 MG/DL — HIGH (ref 70–99)
GLUCOSE SERPL-MCNC: 125 MG/DL — HIGH (ref 70–99)
GLUCOSE SERPL-MCNC: 128 MG/DL — HIGH (ref 70–99)
GLUCOSE SERPL-MCNC: 134 MG/DL — HIGH (ref 70–99)
GLUCOSE SERPL-MCNC: 147 MG/DL — HIGH (ref 70–99)
GLUCOSE SERPL-MCNC: 87 MG/DL — SIGNIFICANT CHANGE UP (ref 70–99)
GLUCOSE SERPL-MCNC: 94 MG/DL — SIGNIFICANT CHANGE UP (ref 70–99)
GLUCOSE SERPL-MCNC: 97 MG/DL — SIGNIFICANT CHANGE UP (ref 70–99)
GLUCOSE SERPL-MCNC: 98 MG/DL — SIGNIFICANT CHANGE UP (ref 70–99)
GLUCOSE UR QL: 50 MG/DL
HAPTOGLOB SERPL-MCNC: 524 MG/DL — HIGH (ref 34–200)
HBV SURFACE AB SER-ACNC: <3 MIU/ML — LOW
HBV SURFACE AG SER-ACNC: SIGNIFICANT CHANGE UP
HCT VFR BLD CALC: 24.2 % — LOW (ref 34.5–45)
HCT VFR BLD CALC: 24.4 % — LOW (ref 34.5–45)
HCT VFR BLD CALC: 24.7 % — LOW (ref 34.5–45)
HCT VFR BLD CALC: 25.1 % — LOW (ref 34.5–45)
HCT VFR BLD CALC: 25.1 % — LOW (ref 34.5–45)
HCT VFR BLD CALC: 25.5 % — LOW (ref 34.5–45)
HCT VFR BLD CALC: 26 % — LOW (ref 34.5–45)
HCT VFR BLD CALC: 26 % — LOW (ref 34.5–45)
HCT VFR BLD CALC: 26.5 % — LOW (ref 34.5–45)
HCT VFR BLD CALC: 27.4 % — LOW (ref 34.5–45)
HCT VFR BLD CALC: 27.6 % — LOW (ref 34.5–45)
HCT VFR BLD CALC: 28.4 % — LOW (ref 34.5–45)
HCT VFR BLD CALC: 28.9 % — LOW (ref 34.5–45)
HCT VFR BLD CALC: 30.7 % — LOW (ref 34.5–45)
HCV AB S/CO SERPL IA: 0.09 S/CO — SIGNIFICANT CHANGE UP (ref 0–0.99)
HCV AB SERPL-IMP: SIGNIFICANT CHANGE UP
HGB BLD-MCNC: 7.8 G/DL — LOW (ref 11.5–15.5)
HGB BLD-MCNC: 7.9 G/DL — LOW (ref 11.5–15.5)
HGB BLD-MCNC: 7.9 G/DL — LOW (ref 11.5–15.5)
HGB BLD-MCNC: 8 G/DL — LOW (ref 11.5–15.5)
HGB BLD-MCNC: 8.1 G/DL — LOW (ref 11.5–15.5)
HGB BLD-MCNC: 8.2 G/DL — LOW (ref 11.5–15.5)
HGB BLD-MCNC: 8.7 G/DL — LOW (ref 11.5–15.5)
HGB BLD-MCNC: 8.8 G/DL — LOW (ref 11.5–15.5)
HGB BLD-MCNC: 8.8 G/DL — LOW (ref 11.5–15.5)
HGB BLD-MCNC: 9 G/DL — LOW (ref 11.5–15.5)
HGB BLD-MCNC: 9.7 G/DL — LOW (ref 11.5–15.5)
IMM GRANULOCYTES NFR BLD AUTO: 0.4 % — SIGNIFICANT CHANGE UP (ref 0–0.9)
IMM GRANULOCYTES NFR BLD AUTO: 0.5 % — SIGNIFICANT CHANGE UP (ref 0–0.9)
IMM GRANULOCYTES NFR BLD AUTO: 0.8 % — SIGNIFICANT CHANGE UP (ref 0–0.9)
INR BLD: 0.9 RATIO — SIGNIFICANT CHANGE UP (ref 0.85–1.18)
INR BLD: 0.99 RATIO — SIGNIFICANT CHANGE UP (ref 0.85–1.18)
INR BLD: 1.02 RATIO — SIGNIFICANT CHANGE UP (ref 0.85–1.18)
IRON SATN MFR SERPL: 16 % — SIGNIFICANT CHANGE UP (ref 14–50)
IRON SATN MFR SERPL: 38 UG/DL — SIGNIFICANT CHANGE UP (ref 30–160)
KETONES UR-MCNC: ABNORMAL
LACTATE SERPL-SCNC: 1.2 MMOL/L — SIGNIFICANT CHANGE UP (ref 0.7–2)
LACTATE SERPL-SCNC: 2.5 MMOL/L — HIGH (ref 0.7–2)
LDH SERPL L TO P-CCNC: 413 U/L — HIGH (ref 50–242)
LEUKOCYTE ESTERASE UR-ACNC: ABNORMAL
LYMPHOCYTES # BLD AUTO: 1.53 K/UL — SIGNIFICANT CHANGE UP (ref 1–3.3)
LYMPHOCYTES # BLD AUTO: 1.54 K/UL — SIGNIFICANT CHANGE UP (ref 1–3.3)
LYMPHOCYTES # BLD AUTO: 1.84 K/UL — SIGNIFICANT CHANGE UP (ref 1–3.3)
LYMPHOCYTES # BLD AUTO: 1.85 K/UL — SIGNIFICANT CHANGE UP (ref 1–3.3)
LYMPHOCYTES # BLD AUTO: 1.86 K/UL — SIGNIFICANT CHANGE UP (ref 1–3.3)
LYMPHOCYTES # BLD AUTO: 11.3 % — LOW (ref 13–44)
LYMPHOCYTES # BLD AUTO: 13.5 % — SIGNIFICANT CHANGE UP (ref 13–44)
LYMPHOCYTES # BLD AUTO: 13.6 % — SIGNIFICANT CHANGE UP (ref 13–44)
LYMPHOCYTES # BLD AUTO: 17.2 % — SIGNIFICANT CHANGE UP (ref 13–44)
LYMPHOCYTES # BLD AUTO: 9.7 % — LOW (ref 13–44)
MAGNESIUM SERPL-MCNC: 1.8 MG/DL — SIGNIFICANT CHANGE UP (ref 1.6–2.6)
MAGNESIUM SERPL-MCNC: 1.9 MG/DL — SIGNIFICANT CHANGE UP (ref 1.6–2.6)
MAGNESIUM SERPL-MCNC: 2.1 MG/DL — SIGNIFICANT CHANGE UP (ref 1.6–2.6)
MAGNESIUM SERPL-MCNC: 2.3 MG/DL — SIGNIFICANT CHANGE UP (ref 1.6–2.6)
MAGNESIUM SERPL-MCNC: 2.4 MG/DL — SIGNIFICANT CHANGE UP (ref 1.6–2.6)
MCHC RBC-ENTMCNC: 26 PG — LOW (ref 27–34)
MCHC RBC-ENTMCNC: 26.1 PG — LOW (ref 27–34)
MCHC RBC-ENTMCNC: 26.2 PG — LOW (ref 27–34)
MCHC RBC-ENTMCNC: 26.3 PG — LOW (ref 27–34)
MCHC RBC-ENTMCNC: 26.5 PG — LOW (ref 27–34)
MCHC RBC-ENTMCNC: 26.6 PG — LOW (ref 27–34)
MCHC RBC-ENTMCNC: 26.6 PG — LOW (ref 27–34)
MCHC RBC-ENTMCNC: 26.8 PG — LOW (ref 27–34)
MCHC RBC-ENTMCNC: 26.9 PG — LOW (ref 27–34)
MCHC RBC-ENTMCNC: 27.1 PG — SIGNIFICANT CHANGE UP (ref 27–34)
MCHC RBC-ENTMCNC: 27.1 PG — SIGNIFICANT CHANGE UP (ref 27–34)
MCHC RBC-ENTMCNC: 27.2 PG — SIGNIFICANT CHANGE UP (ref 27–34)
MCHC RBC-ENTMCNC: 30.2 G/DL — LOW (ref 32–36)
MCHC RBC-ENTMCNC: 30.8 G/DL — LOW (ref 32–36)
MCHC RBC-ENTMCNC: 30.8 G/DL — LOW (ref 32–36)
MCHC RBC-ENTMCNC: 31 G/DL — LOW (ref 32–36)
MCHC RBC-ENTMCNC: 31.1 G/DL — LOW (ref 32–36)
MCHC RBC-ENTMCNC: 31.5 G/DL — LOW (ref 32–36)
MCHC RBC-ENTMCNC: 31.6 G/DL — LOW (ref 32–36)
MCHC RBC-ENTMCNC: 31.6 G/DL — LOW (ref 32–36)
MCHC RBC-ENTMCNC: 31.8 G/DL — LOW (ref 32–36)
MCHC RBC-ENTMCNC: 32.1 G/DL — SIGNIFICANT CHANGE UP (ref 32–36)
MCHC RBC-ENTMCNC: 32.8 G/DL — SIGNIFICANT CHANGE UP (ref 32–36)
MCHC RBC-ENTMCNC: 33.9 G/DL — SIGNIFICANT CHANGE UP (ref 32–36)
MCV RBC AUTO: 80.1 FL — SIGNIFICANT CHANGE UP (ref 80–100)
MCV RBC AUTO: 81.6 FL — SIGNIFICANT CHANGE UP (ref 80–100)
MCV RBC AUTO: 82.6 FL — SIGNIFICANT CHANGE UP (ref 80–100)
MCV RBC AUTO: 82.8 FL — SIGNIFICANT CHANGE UP (ref 80–100)
MCV RBC AUTO: 83.2 FL — SIGNIFICANT CHANGE UP (ref 80–100)
MCV RBC AUTO: 84.3 FL — SIGNIFICANT CHANGE UP (ref 80–100)
MCV RBC AUTO: 84.5 FL — SIGNIFICANT CHANGE UP (ref 80–100)
MCV RBC AUTO: 84.7 FL — SIGNIFICANT CHANGE UP (ref 80–100)
MCV RBC AUTO: 85 FL — SIGNIFICANT CHANGE UP (ref 80–100)
MCV RBC AUTO: 86 FL — SIGNIFICANT CHANGE UP (ref 80–100)
MCV RBC AUTO: 86 FL — SIGNIFICANT CHANGE UP (ref 80–100)
MCV RBC AUTO: 86.1 FL — SIGNIFICANT CHANGE UP (ref 80–100)
MCV RBC AUTO: 86.1 FL — SIGNIFICANT CHANGE UP (ref 80–100)
MCV RBC AUTO: 86.3 FL — SIGNIFICANT CHANGE UP (ref 80–100)
MONOCYTES # BLD AUTO: 0.8 K/UL — SIGNIFICANT CHANGE UP (ref 0–0.9)
MONOCYTES # BLD AUTO: 0.93 K/UL — HIGH (ref 0–0.9)
MONOCYTES # BLD AUTO: 1 K/UL — HIGH (ref 0–0.9)
MONOCYTES # BLD AUTO: 1.24 K/UL — HIGH (ref 0–0.9)
MONOCYTES # BLD AUTO: 1.27 K/UL — HIGH (ref 0–0.9)
MONOCYTES NFR BLD AUTO: 6.5 % — SIGNIFICANT CHANGE UP (ref 2–14)
MONOCYTES NFR BLD AUTO: 6.9 % — SIGNIFICANT CHANGE UP (ref 2–14)
MONOCYTES NFR BLD AUTO: 7.4 % — SIGNIFICANT CHANGE UP (ref 2–14)
MONOCYTES NFR BLD AUTO: 8.8 % — SIGNIFICANT CHANGE UP (ref 2–14)
MONOCYTES NFR BLD AUTO: 9.3 % — SIGNIFICANT CHANGE UP (ref 2–14)
NEUTROPHILS # BLD AUTO: 10.07 K/UL — HIGH (ref 1.8–7.4)
NEUTROPHILS # BLD AUTO: 10.81 K/UL — HIGH (ref 1.8–7.4)
NEUTROPHILS # BLD AUTO: 15.68 K/UL — HIGH (ref 1.8–7.4)
NEUTROPHILS # BLD AUTO: 7.61 K/UL — HIGH (ref 1.8–7.4)
NEUTROPHILS # BLD AUTO: 8.47 K/UL — HIGH (ref 1.8–7.4)
NEUTROPHILS NFR BLD AUTO: 70.6 % — SIGNIFICANT CHANGE UP (ref 43–77)
NEUTROPHILS NFR BLD AUTO: 73.6 % — SIGNIFICANT CHANGE UP (ref 43–77)
NEUTROPHILS NFR BLD AUTO: 74.9 % — SIGNIFICANT CHANGE UP (ref 43–77)
NEUTROPHILS NFR BLD AUTO: 79.6 % — HIGH (ref 43–77)
NEUTROPHILS NFR BLD AUTO: 82.6 % — HIGH (ref 43–77)
NITRITE UR-MCNC: NEGATIVE — SIGNIFICANT CHANGE UP
NON-GYNECOLOGICAL CYTOLOGY STUDY: SIGNIFICANT CHANGE UP
NON-GYNECOLOGICAL CYTOLOGY STUDY: SIGNIFICANT CHANGE UP
NRBC # BLD: 0 /100 WBCS — SIGNIFICANT CHANGE UP (ref 0–0)
NT-PROBNP SERPL-SCNC: 2858 PG/ML — HIGH (ref 0–125)
PH UR: 8 — SIGNIFICANT CHANGE UP (ref 5–8)
PHOSPHATE SERPL-MCNC: 2.2 MG/DL — LOW (ref 2.5–4.5)
PHOSPHATE SERPL-MCNC: 2.7 MG/DL — SIGNIFICANT CHANGE UP (ref 2.5–4.5)
PHOSPHATE SERPL-MCNC: 3.2 MG/DL — SIGNIFICANT CHANGE UP (ref 2.5–4.5)
PHOSPHATE SERPL-MCNC: 3.8 MG/DL — SIGNIFICANT CHANGE UP (ref 2.5–4.5)
PHOSPHATE SERPL-MCNC: 4.1 MG/DL — SIGNIFICANT CHANGE UP (ref 2.5–4.5)
PHOSPHATE SERPL-MCNC: 5.1 MG/DL — HIGH (ref 2.5–4.5)
PHOSPHATE SERPL-MCNC: 5.2 MG/DL — HIGH (ref 2.5–4.5)
PHOSPHATE SERPL-MCNC: 5.9 MG/DL — HIGH (ref 2.5–4.5)
PLATELET # BLD AUTO: 216 K/UL — SIGNIFICANT CHANGE UP (ref 150–400)
PLATELET # BLD AUTO: 228 K/UL — SIGNIFICANT CHANGE UP (ref 150–400)
PLATELET # BLD AUTO: 278 K/UL — SIGNIFICANT CHANGE UP (ref 150–400)
PLATELET # BLD AUTO: 280 K/UL — SIGNIFICANT CHANGE UP (ref 150–400)
PLATELET # BLD AUTO: 297 K/UL — SIGNIFICANT CHANGE UP (ref 150–400)
PLATELET # BLD AUTO: 297 K/UL — SIGNIFICANT CHANGE UP (ref 150–400)
PLATELET # BLD AUTO: 298 K/UL — SIGNIFICANT CHANGE UP (ref 150–400)
PLATELET # BLD AUTO: 304 K/UL — SIGNIFICANT CHANGE UP (ref 150–400)
PLATELET # BLD AUTO: 304 K/UL — SIGNIFICANT CHANGE UP (ref 150–400)
PLATELET # BLD AUTO: 310 K/UL — SIGNIFICANT CHANGE UP (ref 150–400)
PLATELET # BLD AUTO: 322 K/UL — SIGNIFICANT CHANGE UP (ref 150–400)
PLATELET # BLD AUTO: 328 K/UL — SIGNIFICANT CHANGE UP (ref 150–400)
PLATELET # BLD AUTO: 330 K/UL — SIGNIFICANT CHANGE UP (ref 150–400)
PLATELET # BLD AUTO: 358 K/UL — SIGNIFICANT CHANGE UP (ref 150–400)
POTASSIUM SERPL-MCNC: 3.7 MMOL/L — SIGNIFICANT CHANGE UP (ref 3.5–5.3)
POTASSIUM SERPL-MCNC: 3.7 MMOL/L — SIGNIFICANT CHANGE UP (ref 3.5–5.3)
POTASSIUM SERPL-MCNC: 3.8 MMOL/L — SIGNIFICANT CHANGE UP (ref 3.5–5.3)
POTASSIUM SERPL-MCNC: 3.9 MMOL/L — SIGNIFICANT CHANGE UP (ref 3.5–5.3)
POTASSIUM SERPL-MCNC: 3.9 MMOL/L — SIGNIFICANT CHANGE UP (ref 3.5–5.3)
POTASSIUM SERPL-MCNC: 4.1 MMOL/L — SIGNIFICANT CHANGE UP (ref 3.5–5.3)
POTASSIUM SERPL-MCNC: 4.2 MMOL/L — SIGNIFICANT CHANGE UP (ref 3.5–5.3)
POTASSIUM SERPL-MCNC: 4.4 MMOL/L — SIGNIFICANT CHANGE UP (ref 3.5–5.3)
POTASSIUM SERPL-MCNC: 4.5 MMOL/L — SIGNIFICANT CHANGE UP (ref 3.5–5.3)
POTASSIUM SERPL-MCNC: 5.2 MMOL/L — SIGNIFICANT CHANGE UP (ref 3.5–5.3)
POTASSIUM SERPL-MCNC: 5.3 MMOL/L — SIGNIFICANT CHANGE UP (ref 3.5–5.3)
POTASSIUM SERPL-MCNC: 5.3 MMOL/L — SIGNIFICANT CHANGE UP (ref 3.5–5.3)
POTASSIUM SERPL-MCNC: 5.9 MMOL/L — HIGH (ref 3.5–5.3)
POTASSIUM SERPL-MCNC: 6 MMOL/L — HIGH (ref 3.5–5.3)
POTASSIUM SERPL-MCNC: 8.3 MMOL/L — CRITICAL HIGH (ref 3.5–5.3)
POTASSIUM SERPL-MCNC: 8.7 MMOL/L — CRITICAL HIGH (ref 3.5–5.3)
POTASSIUM SERPL-SCNC: 3.7 MMOL/L — SIGNIFICANT CHANGE UP (ref 3.5–5.3)
POTASSIUM SERPL-SCNC: 3.7 MMOL/L — SIGNIFICANT CHANGE UP (ref 3.5–5.3)
POTASSIUM SERPL-SCNC: 3.8 MMOL/L — SIGNIFICANT CHANGE UP (ref 3.5–5.3)
POTASSIUM SERPL-SCNC: 3.9 MMOL/L — SIGNIFICANT CHANGE UP (ref 3.5–5.3)
POTASSIUM SERPL-SCNC: 3.9 MMOL/L — SIGNIFICANT CHANGE UP (ref 3.5–5.3)
POTASSIUM SERPL-SCNC: 4.1 MMOL/L — SIGNIFICANT CHANGE UP (ref 3.5–5.3)
POTASSIUM SERPL-SCNC: 4.2 MMOL/L — SIGNIFICANT CHANGE UP (ref 3.5–5.3)
POTASSIUM SERPL-SCNC: 4.4 MMOL/L
POTASSIUM SERPL-SCNC: 4.4 MMOL/L — SIGNIFICANT CHANGE UP (ref 3.5–5.3)
POTASSIUM SERPL-SCNC: 4.5 MMOL/L — SIGNIFICANT CHANGE UP (ref 3.5–5.3)
POTASSIUM SERPL-SCNC: 5.2 MMOL/L — SIGNIFICANT CHANGE UP (ref 3.5–5.3)
POTASSIUM SERPL-SCNC: 5.3 MMOL/L — SIGNIFICANT CHANGE UP (ref 3.5–5.3)
POTASSIUM SERPL-SCNC: 5.3 MMOL/L — SIGNIFICANT CHANGE UP (ref 3.5–5.3)
POTASSIUM SERPL-SCNC: 5.9 MMOL/L — HIGH (ref 3.5–5.3)
POTASSIUM SERPL-SCNC: 6 MMOL/L — HIGH (ref 3.5–5.3)
POTASSIUM SERPL-SCNC: 8.3 MMOL/L — CRITICAL HIGH (ref 3.5–5.3)
POTASSIUM SERPL-SCNC: 8.7 MMOL/L — CRITICAL HIGH (ref 3.5–5.3)
PROCALCITONIN SERPL-MCNC: 0.23 NG/ML — HIGH (ref 0.02–0.1)
PROT SERPL-MCNC: 5.7 GM/DL — LOW (ref 6–8.3)
PROT SERPL-MCNC: 5.9 GM/DL — LOW (ref 6–8.3)
PROT SERPL-MCNC: 6.3 G/DL
PROT SERPL-MCNC: 6.5 GM/DL — SIGNIFICANT CHANGE UP (ref 6–8.3)
PROT SERPL-MCNC: 6.6 GM/DL — SIGNIFICANT CHANGE UP (ref 6–8.3)
PROT SERPL-MCNC: 7.5 GM/DL — SIGNIFICANT CHANGE UP (ref 6–8.3)
PROT UR-MCNC: 500 MG/DL
PROTHROM AB SERPL-ACNC: 10.8 SEC — SIGNIFICANT CHANGE UP (ref 9.5–13)
PROTHROM AB SERPL-ACNC: 11.9 SEC — SIGNIFICANT CHANGE UP (ref 9.5–13)
PROTHROM AB SERPL-ACNC: 12.2 SEC — SIGNIFICANT CHANGE UP (ref 9.5–13)
RAPID RVP RESULT: SIGNIFICANT CHANGE UP
RBC # BLD: 2.92 M/UL — LOW (ref 3.8–5.2)
RBC # BLD: 2.92 M/UL — LOW (ref 3.8–5.2)
RBC # BLD: 2.93 M/UL — LOW (ref 3.8–5.2)
RBC # BLD: 2.99 M/UL — LOW (ref 3.8–5.2)
RBC # BLD: 3.01 M/UL — LOW (ref 3.8–5.2)
RBC # BLD: 3.02 M/UL — LOW (ref 3.8–5.2)
RBC # BLD: 3.07 M/UL — LOW (ref 3.8–5.2)
RBC # BLD: 3.31 M/UL — LOW (ref 3.8–5.2)
RBC # BLD: 3.31 M/UL — LOW (ref 3.8–5.2)
RBC # BLD: 3.34 M/UL — LOW (ref 3.8–5.2)
RBC # BLD: 3.36 M/UL — LOW (ref 3.8–5.2)
RBC # BLD: 3.4 M/UL — LOW (ref 3.8–5.2)
RBC # BLD: 3.69 M/UL — LOW (ref 3.8–5.2)
RBC # FLD: 14.6 % — HIGH (ref 10.3–14.5)
RBC # FLD: 14.6 % — HIGH (ref 10.3–14.5)
RBC # FLD: 14.7 % — HIGH (ref 10.3–14.5)
RBC # FLD: 14.7 % — HIGH (ref 10.3–14.5)
RBC # FLD: 14.8 % — HIGH (ref 10.3–14.5)
RBC # FLD: 14.9 % — HIGH (ref 10.3–14.5)
RBC # FLD: 15 % — HIGH (ref 10.3–14.5)
RBC # FLD: 15.2 % — HIGH (ref 10.3–14.5)
RBC # FLD: 15.2 % — HIGH (ref 10.3–14.5)
RBC # FLD: 15.6 % — HIGH (ref 10.3–14.5)
RBC # FLD: 15.9 % — HIGH (ref 10.3–14.5)
RBC # FLD: 15.9 % — HIGH (ref 10.3–14.5)
RBC # FLD: 16.7 % — HIGH (ref 10.3–14.5)
RBC # FLD: 16.7 % — HIGH (ref 10.3–14.5)
RBC CASTS # UR COMP ASSIST: >50 /HPF (ref 0–4)
RETICS #: 34.1 K/UL — SIGNIFICANT CHANGE UP (ref 25–125)
RETICS/RBC NFR: 1 % — SIGNIFICANT CHANGE UP (ref 0.5–2.5)
SARS-COV-2 RNA SPEC QL NAA+PROBE: SIGNIFICANT CHANGE UP
SODIUM SERPL-SCNC: 133 MMOL/L — LOW (ref 135–145)
SODIUM SERPL-SCNC: 133 MMOL/L — LOW (ref 135–145)
SODIUM SERPL-SCNC: 134 MMOL/L — LOW (ref 135–145)
SODIUM SERPL-SCNC: 135 MMOL/L — SIGNIFICANT CHANGE UP (ref 135–145)
SODIUM SERPL-SCNC: 135 MMOL/L — SIGNIFICANT CHANGE UP (ref 135–145)
SODIUM SERPL-SCNC: 136 MMOL/L — SIGNIFICANT CHANGE UP (ref 135–145)
SODIUM SERPL-SCNC: 137 MMOL/L — SIGNIFICANT CHANGE UP (ref 135–145)
SODIUM SERPL-SCNC: 138 MMOL/L — SIGNIFICANT CHANGE UP (ref 135–145)
SODIUM SERPL-SCNC: 139 MMOL/L — SIGNIFICANT CHANGE UP (ref 135–145)
SODIUM SERPL-SCNC: 139 MMOL/L — SIGNIFICANT CHANGE UP (ref 135–145)
SODIUM SERPL-SCNC: 140 MMOL/L
SODIUM SERPL-SCNC: 140 MMOL/L — SIGNIFICANT CHANGE UP (ref 135–145)
SODIUM SERPL-SCNC: 141 MMOL/L — SIGNIFICANT CHANGE UP (ref 135–145)
SODIUM SERPL-SCNC: 142 MMOL/L — SIGNIFICANT CHANGE UP (ref 135–145)
SODIUM SERPL-SCNC: 143 MMOL/L — SIGNIFICANT CHANGE UP (ref 135–145)
SODIUM SERPL-SCNC: 143 MMOL/L — SIGNIFICANT CHANGE UP (ref 135–145)
SP GR SPEC: 1.01 — SIGNIFICANT CHANGE UP (ref 1.01–1.02)
SPECIMEN SOURCE: SIGNIFICANT CHANGE UP
SURGICAL PATHOLOGY STUDY: SIGNIFICANT CHANGE UP
TIBC SERPL-MCNC: 236 UG/DL — SIGNIFICANT CHANGE UP (ref 220–430)
TROPONIN I, HIGH SENSITIVITY RESULT: 36.2 NG/L — SIGNIFICANT CHANGE UP
TSH SERPL-MCNC: 1.18 UIU/ML — SIGNIFICANT CHANGE UP (ref 0.36–3.74)
UIBC SERPL-MCNC: 198 UG/DL — SIGNIFICANT CHANGE UP (ref 110–370)
UROBILINOGEN FLD QL: NEGATIVE MG/DL — SIGNIFICANT CHANGE UP
VIT B12 SERPL-MCNC: 840 PG/ML — SIGNIFICANT CHANGE UP (ref 232–1245)
WBC # BLD: 10.8 K/UL — HIGH (ref 3.8–10.5)
WBC # BLD: 10.82 K/UL — HIGH (ref 3.8–10.5)
WBC # BLD: 11.02 K/UL — HIGH (ref 3.8–10.5)
WBC # BLD: 11.02 K/UL — HIGH (ref 3.8–10.5)
WBC # BLD: 11.05 K/UL — HIGH (ref 3.8–10.5)
WBC # BLD: 11.11 K/UL — HIGH (ref 3.8–10.5)
WBC # BLD: 11.11 K/UL — HIGH (ref 3.8–10.5)
WBC # BLD: 11.32 K/UL — HIGH (ref 3.8–10.5)
WBC # BLD: 11.69 K/UL — HIGH (ref 3.8–10.5)
WBC # BLD: 11.77 K/UL — HIGH (ref 3.8–10.5)
WBC # BLD: 12.68 K/UL — HIGH (ref 3.8–10.5)
WBC # BLD: 13.57 K/UL — HIGH (ref 3.8–10.5)
WBC # BLD: 13.67 K/UL — HIGH (ref 3.8–10.5)
WBC # BLD: 19 K/UL — HIGH (ref 3.8–10.5)
WBC # FLD AUTO: 10.8 K/UL — HIGH (ref 3.8–10.5)
WBC # FLD AUTO: 10.82 K/UL — HIGH (ref 3.8–10.5)
WBC # FLD AUTO: 11.02 K/UL — HIGH (ref 3.8–10.5)
WBC # FLD AUTO: 11.02 K/UL — HIGH (ref 3.8–10.5)
WBC # FLD AUTO: 11.05 K/UL — HIGH (ref 3.8–10.5)
WBC # FLD AUTO: 11.11 K/UL — HIGH (ref 3.8–10.5)
WBC # FLD AUTO: 11.11 K/UL — HIGH (ref 3.8–10.5)
WBC # FLD AUTO: 11.32 K/UL — HIGH (ref 3.8–10.5)
WBC # FLD AUTO: 11.69 K/UL — HIGH (ref 3.8–10.5)
WBC # FLD AUTO: 11.77 K/UL — HIGH (ref 3.8–10.5)
WBC # FLD AUTO: 12.68 K/UL — HIGH (ref 3.8–10.5)
WBC # FLD AUTO: 13.57 K/UL — HIGH (ref 3.8–10.5)
WBC # FLD AUTO: 13.67 K/UL — HIGH (ref 3.8–10.5)
WBC # FLD AUTO: 19 K/UL — HIGH (ref 3.8–10.5)
WBC UR QL: ABNORMAL

## 2023-01-01 PROCEDURE — 99232 SBSQ HOSP IP/OBS MODERATE 35: CPT

## 2023-01-01 PROCEDURE — 88341 IMHCHEM/IMCYTCHM EA ADD ANTB: CPT | Mod: 26

## 2023-01-01 PROCEDURE — 74178 CT ABD&PLV WO CNTR FLWD CNTR: CPT | Mod: 26

## 2023-01-01 PROCEDURE — 88112 CYTOPATH CELL ENHANCE TECH: CPT | Mod: 26

## 2023-01-01 PROCEDURE — 99233 SBSQ HOSP IP/OBS HIGH 50: CPT

## 2023-01-01 PROCEDURE — 88307 TISSUE EXAM BY PATHOLOGIST: CPT | Mod: 26

## 2023-01-01 PROCEDURE — 74177 CT ABD & PELVIS W/CONTRAST: CPT | Mod: 26

## 2023-01-01 PROCEDURE — 99291 CRITICAL CARE FIRST HOUR: CPT

## 2023-01-01 PROCEDURE — 74178 CT ABD&PLV WO CNTR FLWD CNTR: CPT

## 2023-01-01 PROCEDURE — 99214 OFFICE O/P EST MOD 30 MIN: CPT

## 2023-01-01 PROCEDURE — 99231 SBSQ HOSP IP/OBS SF/LOW 25: CPT

## 2023-01-01 PROCEDURE — 74176 CT ABD & PELVIS W/O CONTRAST: CPT | Mod: 26

## 2023-01-01 PROCEDURE — 50433 PLMT NEPHROURETERAL CATHETER: CPT | Mod: LT

## 2023-01-01 PROCEDURE — 88305 TISSUE EXAM BY PATHOLOGIST: CPT | Mod: 26

## 2023-01-01 PROCEDURE — 71045 X-RAY EXAM CHEST 1 VIEW: CPT | Mod: 26

## 2023-01-01 PROCEDURE — 76705 ECHO EXAM OF ABDOMEN: CPT | Mod: 26

## 2023-01-01 PROCEDURE — 99223 1ST HOSP IP/OBS HIGH 75: CPT

## 2023-01-01 PROCEDURE — 71260 CT THORAX DX C+: CPT

## 2023-01-01 PROCEDURE — 99213 OFFICE O/P EST LOW 20 MIN: CPT

## 2023-01-01 PROCEDURE — 93010 ELECTROCARDIOGRAM REPORT: CPT

## 2023-01-01 PROCEDURE — 74177 CT ABD & PELVIS W/CONTRAST: CPT

## 2023-01-01 PROCEDURE — 99239 HOSP IP/OBS DSCHRG MGMT >30: CPT

## 2023-01-01 PROCEDURE — 50434 CONVERT NEPHROSTOMY CATHETER: CPT | Mod: RT

## 2023-01-01 PROCEDURE — 71250 CT THORAX DX C-: CPT | Mod: 26

## 2023-01-01 PROCEDURE — 99222 1ST HOSP IP/OBS MODERATE 55: CPT

## 2023-01-01 PROCEDURE — 71260 CT THORAX DX C+: CPT | Mod: 26

## 2023-01-01 PROCEDURE — 50432 PLMT NEPHROSTOMY CATHETER: CPT | Mod: RT

## 2023-01-01 PROCEDURE — 99205 OFFICE O/P NEW HI 60 MIN: CPT | Mod: 25

## 2023-01-01 PROCEDURE — 93306 TTE W/DOPPLER COMPLETE: CPT | Mod: 26

## 2023-01-01 PROCEDURE — 52240 CYSTOSCOPY AND TREATMENT: CPT

## 2023-01-01 PROCEDURE — G2212 PROLONG OUTPT/OFFICE VIS: CPT

## 2023-01-01 PROCEDURE — 88342 IMHCHEM/IMCYTCHM 1ST ANTB: CPT | Mod: 26

## 2023-01-01 PROCEDURE — 76705 ECHO EXAM OF ABDOMEN: CPT

## 2023-01-01 RX ORDER — SODIUM ZIRCONIUM CYCLOSILICATE 10 G/10G
10 POWDER, FOR SUSPENSION ORAL ONCE
Refills: 0 | Status: COMPLETED | OUTPATIENT
Start: 2023-01-01 | End: 2023-01-01

## 2023-01-01 RX ORDER — APIXABAN 5 MG/1
5 TABLET, FILM COATED ORAL
Qty: 180 | Refills: 0 | Status: ACTIVE | COMMUNITY
Start: 2023-01-01 | End: 1900-01-01

## 2023-01-01 RX ORDER — POLYETHYLENE GLYCOL 3350 17 G/17G
17 POWDER, FOR SOLUTION ORAL DAILY
Refills: 0 | Status: DISCONTINUED | OUTPATIENT
Start: 2023-01-01 | End: 2023-01-01

## 2023-01-01 RX ORDER — PIPERACILLIN AND TAZOBACTAM 4; .5 G/20ML; G/20ML
3.38 INJECTION, POWDER, LYOPHILIZED, FOR SOLUTION INTRAVENOUS ONCE
Refills: 0 | Status: COMPLETED | OUTPATIENT
Start: 2023-01-01 | End: 2023-01-01

## 2023-01-01 RX ORDER — ALBUTEROL 90 UG/1
10 AEROSOL, METERED ORAL ONCE
Refills: 0 | Status: COMPLETED | OUTPATIENT
Start: 2023-01-01 | End: 2023-01-01

## 2023-01-01 RX ORDER — PANTOPRAZOLE SODIUM 20 MG/1
40 TABLET, DELAYED RELEASE ORAL DAILY
Refills: 0 | Status: DISCONTINUED | OUTPATIENT
Start: 2023-01-01 | End: 2023-01-01

## 2023-01-01 RX ORDER — CHLORHEXIDINE GLUCONATE 213 G/1000ML
1 SOLUTION TOPICAL
Refills: 0 | Status: DISCONTINUED | OUTPATIENT
Start: 2023-01-01 | End: 2023-01-01

## 2023-01-01 RX ORDER — DEXTROSE 50 % IN WATER 50 %
50 SYRINGE (ML) INTRAVENOUS ONCE
Refills: 0 | Status: COMPLETED | OUTPATIENT
Start: 2023-01-01 | End: 2023-01-01

## 2023-01-01 RX ORDER — ONDANSETRON 8 MG/1
4 TABLET, FILM COATED ORAL ONCE
Refills: 0 | Status: DISCONTINUED | OUTPATIENT
Start: 2023-01-01 | End: 2023-01-01

## 2023-01-01 RX ORDER — SODIUM CHLORIDE 9 MG/ML
1000 INJECTION INTRAMUSCULAR; INTRAVENOUS; SUBCUTANEOUS
Refills: 0 | Status: COMPLETED | OUTPATIENT
Start: 2023-01-01 | End: 2023-01-01

## 2023-01-01 RX ORDER — FUROSEMIDE 40 MG/1
40 TABLET ORAL DAILY
Qty: 30 | Refills: 0 | Status: ACTIVE | COMMUNITY
Start: 2023-01-01 | End: 1900-01-01

## 2023-01-01 RX ORDER — POLYETHYLENE GLYCOL 3350 17 G/17G
17 POWDER, FOR SOLUTION ORAL
Qty: 0 | Refills: 0 | DISCHARGE
Start: 2023-01-01

## 2023-01-01 RX ORDER — SODIUM CHLORIDE 9 MG/ML
1000 INJECTION, SOLUTION INTRAVENOUS
Refills: 0 | Status: DISCONTINUED | OUTPATIENT
Start: 2023-01-01 | End: 2023-01-01

## 2023-01-01 RX ORDER — CALCIUM GLUCONATE 100 MG/ML
2 VIAL (ML) INTRAVENOUS ONCE
Refills: 0 | Status: COMPLETED | OUTPATIENT
Start: 2023-01-01 | End: 2023-01-01

## 2023-01-01 RX ORDER — FENTANYL CITRATE 50 UG/ML
50 INJECTION INTRAVENOUS
Refills: 0 | Status: DISCONTINUED | OUTPATIENT
Start: 2023-01-01 | End: 2023-01-01

## 2023-01-01 RX ORDER — INSULIN HUMAN 100 [IU]/ML
5 INJECTION, SOLUTION SUBCUTANEOUS ONCE
Refills: 0 | Status: COMPLETED | OUTPATIENT
Start: 2023-01-01 | End: 2023-01-01

## 2023-01-01 RX ORDER — FUROSEMIDE 20 MG/1
20 TABLET ORAL
Qty: 30 | Refills: 1 | Status: ACTIVE | COMMUNITY
Start: 2023-01-01 | End: 1900-01-01

## 2023-01-01 RX ORDER — SOD SULF/SODIUM/NAHCO3/KCL/PEG
4000 SOLUTION, RECONSTITUTED, ORAL ORAL ONCE
Refills: 0 | Status: COMPLETED | OUTPATIENT
Start: 2023-01-01 | End: 2023-01-01

## 2023-01-01 RX ORDER — CALCIUM GLUCONATE 100 MG/ML
1 VIAL (ML) INTRAVENOUS ONCE
Refills: 0 | Status: COMPLETED | OUTPATIENT
Start: 2023-01-01 | End: 2023-01-01

## 2023-01-01 RX ORDER — HEPARIN SODIUM 5000 [USP'U]/ML
5000 INJECTION INTRAVENOUS; SUBCUTANEOUS EVERY 8 HOURS
Refills: 0 | Status: DISCONTINUED | OUTPATIENT
Start: 2023-01-01 | End: 2023-01-01

## 2023-01-01 RX ORDER — OXYCODONE HYDROCHLORIDE 5 MG/1
5 TABLET ORAL EVERY 6 HOURS
Refills: 0 | Status: DISCONTINUED | OUTPATIENT
Start: 2023-01-01 | End: 2023-01-01

## 2023-01-01 RX ORDER — FENTANYL CITRATE 50 UG/ML
25 INJECTION INTRAVENOUS
Refills: 0 | Status: DISCONTINUED | OUTPATIENT
Start: 2023-01-01 | End: 2023-01-01

## 2023-01-01 RX ORDER — HYDRALAZINE HCL 50 MG
10 TABLET ORAL EVERY 4 HOURS
Refills: 0 | Status: DISCONTINUED | OUTPATIENT
Start: 2023-01-01 | End: 2023-01-01

## 2023-01-01 RX ORDER — ACETAMINOPHEN 500 MG
650 TABLET ORAL EVERY 6 HOURS
Refills: 0 | Status: DISCONTINUED | OUTPATIENT
Start: 2023-01-01 | End: 2023-01-01

## 2023-01-01 RX ORDER — INFLUENZA VIRUS VACCINE 15; 15; 15; 15 UG/.5ML; UG/.5ML; UG/.5ML; UG/.5ML
0.7 SUSPENSION INTRAMUSCULAR ONCE
Refills: 0 | Status: DISCONTINUED | OUTPATIENT
Start: 2023-01-01 | End: 2023-01-01

## 2023-01-01 RX ORDER — PIPERACILLIN AND TAZOBACTAM 4; .5 G/20ML; G/20ML
3.38 INJECTION, POWDER, LYOPHILIZED, FOR SOLUTION INTRAVENOUS EVERY 12 HOURS
Refills: 0 | Status: COMPLETED | OUTPATIENT
Start: 2023-01-01 | End: 2023-01-01

## 2023-01-01 RX ADMIN — HEPARIN SODIUM 5000 UNIT(S): 5000 INJECTION INTRAVENOUS; SUBCUTANEOUS at 22:09

## 2023-01-01 RX ADMIN — POLYETHYLENE GLYCOL 3350 17 GRAM(S): 17 POWDER, FOR SOLUTION ORAL at 12:38

## 2023-01-01 RX ADMIN — Medication 5 MILLIGRAM(S): at 13:24

## 2023-01-01 RX ADMIN — HEPARIN SODIUM 5000 UNIT(S): 5000 INJECTION INTRAVENOUS; SUBCUTANEOUS at 05:44

## 2023-01-01 RX ADMIN — Medication 50 MILLILITER(S): at 16:30

## 2023-01-01 RX ADMIN — SODIUM ZIRCONIUM CYCLOSILICATE 10 GRAM(S): 10 POWDER, FOR SUSPENSION ORAL at 17:40

## 2023-01-01 RX ADMIN — CHLORHEXIDINE GLUCONATE 1 APPLICATION(S): 213 SOLUTION TOPICAL at 05:19

## 2023-01-01 RX ADMIN — CHLORHEXIDINE GLUCONATE 1 APPLICATION(S): 213 SOLUTION TOPICAL at 06:01

## 2023-01-01 RX ADMIN — PIPERACILLIN AND TAZOBACTAM 25 GRAM(S): 4; .5 INJECTION, POWDER, LYOPHILIZED, FOR SOLUTION INTRAVENOUS at 12:00

## 2023-01-01 RX ADMIN — Medication 200 GRAM(S): at 16:16

## 2023-01-01 RX ADMIN — CHLORHEXIDINE GLUCONATE 1 APPLICATION(S): 213 SOLUTION TOPICAL at 06:15

## 2023-01-01 RX ADMIN — FENTANYL CITRATE 25 MICROGRAM(S): 50 INJECTION INTRAVENOUS at 16:53

## 2023-01-01 RX ADMIN — HEPARIN SODIUM 5000 UNIT(S): 5000 INJECTION INTRAVENOUS; SUBCUTANEOUS at 21:51

## 2023-01-01 RX ADMIN — CHLORHEXIDINE GLUCONATE 1 APPLICATION(S): 213 SOLUTION TOPICAL at 05:42

## 2023-01-01 RX ADMIN — HEPARIN SODIUM 5000 UNIT(S): 5000 INJECTION INTRAVENOUS; SUBCUTANEOUS at 15:54

## 2023-01-01 RX ADMIN — SODIUM CHLORIDE 75 MILLILITER(S): 9 INJECTION, SOLUTION INTRAVENOUS at 16:53

## 2023-01-01 RX ADMIN — SODIUM CHLORIDE 100 MILLILITER(S): 9 INJECTION INTRAMUSCULAR; INTRAVENOUS; SUBCUTANEOUS at 22:46

## 2023-01-01 RX ADMIN — HEPARIN SODIUM 5000 UNIT(S): 5000 INJECTION INTRAVENOUS; SUBCUTANEOUS at 05:03

## 2023-01-01 RX ADMIN — CHLORHEXIDINE GLUCONATE 1 APPLICATION(S): 213 SOLUTION TOPICAL at 10:00

## 2023-01-01 RX ADMIN — POLYETHYLENE GLYCOL 3350 17 GRAM(S): 17 POWDER, FOR SOLUTION ORAL at 11:54

## 2023-01-01 RX ADMIN — PIPERACILLIN AND TAZOBACTAM 200 GRAM(S): 4; .5 INJECTION, POWDER, LYOPHILIZED, FOR SOLUTION INTRAVENOUS at 19:33

## 2023-01-01 RX ADMIN — Medication 50 MILLILITER(S): at 19:26

## 2023-01-01 RX ADMIN — HEPARIN SODIUM 5000 UNIT(S): 5000 INJECTION INTRAVENOUS; SUBCUTANEOUS at 13:23

## 2023-01-01 RX ADMIN — PIPERACILLIN AND TAZOBACTAM 25 GRAM(S): 4; .5 INJECTION, POWDER, LYOPHILIZED, FOR SOLUTION INTRAVENOUS at 00:44

## 2023-01-01 RX ADMIN — Medication 650 MILLIGRAM(S): at 18:08

## 2023-01-01 RX ADMIN — HEPARIN SODIUM 5000 UNIT(S): 5000 INJECTION INTRAVENOUS; SUBCUTANEOUS at 15:03

## 2023-01-01 RX ADMIN — HEPARIN SODIUM 5000 UNIT(S): 5000 INJECTION INTRAVENOUS; SUBCUTANEOUS at 22:34

## 2023-01-01 RX ADMIN — HEPARIN SODIUM 5000 UNIT(S): 5000 INJECTION INTRAVENOUS; SUBCUTANEOUS at 05:32

## 2023-01-01 RX ADMIN — CHLORHEXIDINE GLUCONATE 1 APPLICATION(S): 213 SOLUTION TOPICAL at 05:04

## 2023-01-01 RX ADMIN — HEPARIN SODIUM 5000 UNIT(S): 5000 INJECTION INTRAVENOUS; SUBCUTANEOUS at 13:24

## 2023-01-01 RX ADMIN — PIPERACILLIN AND TAZOBACTAM 25 GRAM(S): 4; .5 INJECTION, POWDER, LYOPHILIZED, FOR SOLUTION INTRAVENOUS at 12:26

## 2023-01-01 RX ADMIN — CHLORHEXIDINE GLUCONATE 1 APPLICATION(S): 213 SOLUTION TOPICAL at 06:22

## 2023-01-01 RX ADMIN — INSULIN HUMAN 5 UNIT(S): 100 INJECTION, SOLUTION SUBCUTANEOUS at 16:30

## 2023-01-01 RX ADMIN — ALBUTEROL 10 MILLIGRAM(S): 90 AEROSOL, METERED ORAL at 16:54

## 2023-01-01 RX ADMIN — HEPARIN SODIUM 5000 UNIT(S): 5000 INJECTION INTRAVENOUS; SUBCUTANEOUS at 21:42

## 2023-01-01 RX ADMIN — HEPARIN SODIUM 5000 UNIT(S): 5000 INJECTION INTRAVENOUS; SUBCUTANEOUS at 21:58

## 2023-01-01 RX ADMIN — PIPERACILLIN AND TAZOBACTAM 25 GRAM(S): 4; .5 INJECTION, POWDER, LYOPHILIZED, FOR SOLUTION INTRAVENOUS at 23:41

## 2023-01-01 RX ADMIN — Medication 100 GRAM(S): at 17:40

## 2023-01-01 RX ADMIN — SODIUM ZIRCONIUM CYCLOSILICATE 10 GRAM(S): 10 POWDER, FOR SUSPENSION ORAL at 23:00

## 2023-01-01 RX ADMIN — PIPERACILLIN AND TAZOBACTAM 25 GRAM(S): 4; .5 INJECTION, POWDER, LYOPHILIZED, FOR SOLUTION INTRAVENOUS at 00:17

## 2023-01-01 RX ADMIN — CHLORHEXIDINE GLUCONATE 1 APPLICATION(S): 213 SOLUTION TOPICAL at 05:18

## 2023-01-01 RX ADMIN — PIPERACILLIN AND TAZOBACTAM 25 GRAM(S): 4; .5 INJECTION, POWDER, LYOPHILIZED, FOR SOLUTION INTRAVENOUS at 11:35

## 2023-01-01 RX ADMIN — HEPARIN SODIUM 5000 UNIT(S): 5000 INJECTION INTRAVENOUS; SUBCUTANEOUS at 06:22

## 2023-01-01 RX ADMIN — HEPARIN SODIUM 5000 UNIT(S): 5000 INJECTION INTRAVENOUS; SUBCUTANEOUS at 14:39

## 2023-01-01 RX ADMIN — CHLORHEXIDINE GLUCONATE 1 APPLICATION(S): 213 SOLUTION TOPICAL at 13:44

## 2023-01-01 RX ADMIN — OXYCODONE HYDROCHLORIDE 5 MILLIGRAM(S): 5 TABLET ORAL at 23:54

## 2023-01-01 RX ADMIN — HEPARIN SODIUM 5000 UNIT(S): 5000 INJECTION INTRAVENOUS; SUBCUTANEOUS at 05:41

## 2023-01-01 RX ADMIN — INSULIN HUMAN 5 UNIT(S): 100 INJECTION, SOLUTION SUBCUTANEOUS at 16:14

## 2023-01-01 RX ADMIN — HEPARIN SODIUM 5000 UNIT(S): 5000 INJECTION INTRAVENOUS; SUBCUTANEOUS at 13:52

## 2023-01-01 RX ADMIN — HEPARIN SODIUM 5000 UNIT(S): 5000 INJECTION INTRAVENOUS; SUBCUTANEOUS at 05:57

## 2023-01-01 RX ADMIN — POLYETHYLENE GLYCOL 3350 17 GRAM(S): 17 POWDER, FOR SOLUTION ORAL at 13:23

## 2023-01-01 RX ADMIN — HEPARIN SODIUM 5000 UNIT(S): 5000 INJECTION INTRAVENOUS; SUBCUTANEOUS at 21:28

## 2023-01-01 RX ADMIN — Medication 50 MILLILITER(S): at 16:15

## 2023-01-01 RX ADMIN — CHLORHEXIDINE GLUCONATE 1 APPLICATION(S): 213 SOLUTION TOPICAL at 05:37

## 2023-01-01 RX ADMIN — HEPARIN SODIUM 5000 UNIT(S): 5000 INJECTION INTRAVENOUS; SUBCUTANEOUS at 14:57

## 2023-01-01 RX ADMIN — CHLORHEXIDINE GLUCONATE 1 APPLICATION(S): 213 SOLUTION TOPICAL at 05:32

## 2023-01-01 RX ADMIN — PIPERACILLIN AND TAZOBACTAM 25 GRAM(S): 4; .5 INJECTION, POWDER, LYOPHILIZED, FOR SOLUTION INTRAVENOUS at 23:12

## 2023-01-01 RX ADMIN — OXYCODONE HYDROCHLORIDE 5 MILLIGRAM(S): 5 TABLET ORAL at 16:53

## 2023-01-01 RX ADMIN — HEPARIN SODIUM 5000 UNIT(S): 5000 INJECTION INTRAVENOUS; SUBCUTANEOUS at 06:23

## 2023-01-01 RX ADMIN — POLYETHYLENE GLYCOL 3350 17 GRAM(S): 17 POWDER, FOR SOLUTION ORAL at 17:54

## 2023-01-01 RX ADMIN — PIPERACILLIN AND TAZOBACTAM 25 GRAM(S): 4; .5 INJECTION, POWDER, LYOPHILIZED, FOR SOLUTION INTRAVENOUS at 11:38

## 2023-01-01 RX ADMIN — HEPARIN SODIUM 5000 UNIT(S): 5000 INJECTION INTRAVENOUS; SUBCUTANEOUS at 14:02

## 2023-01-01 RX ADMIN — HEPARIN SODIUM 5000 UNIT(S): 5000 INJECTION INTRAVENOUS; SUBCUTANEOUS at 05:18

## 2023-01-01 RX ADMIN — HEPARIN SODIUM 5000 UNIT(S): 5000 INJECTION INTRAVENOUS; SUBCUTANEOUS at 21:52

## 2023-01-01 RX ADMIN — HEPARIN SODIUM 5000 UNIT(S): 5000 INJECTION INTRAVENOUS; SUBCUTANEOUS at 21:32

## 2023-01-01 RX ADMIN — INSULIN HUMAN 5 UNIT(S): 100 INJECTION, SOLUTION SUBCUTANEOUS at 19:26

## 2023-01-01 RX ADMIN — CHLORHEXIDINE GLUCONATE 1 APPLICATION(S): 213 SOLUTION TOPICAL at 19:34

## 2023-01-01 RX ADMIN — Medication 4000 MILLILITER(S): at 15:50

## 2023-01-01 RX ADMIN — PIPERACILLIN AND TAZOBACTAM 25 GRAM(S): 4; .5 INJECTION, POWDER, LYOPHILIZED, FOR SOLUTION INTRAVENOUS at 00:09

## 2023-01-01 RX ADMIN — Medication 650 MILLIGRAM(S): at 19:08

## 2023-01-01 RX ADMIN — HEPARIN SODIUM 5000 UNIT(S): 5000 INJECTION INTRAVENOUS; SUBCUTANEOUS at 22:05

## 2023-01-01 RX ADMIN — HEPARIN SODIUM 5000 UNIT(S): 5000 INJECTION INTRAVENOUS; SUBCUTANEOUS at 05:38

## 2023-01-01 RX ADMIN — HEPARIN SODIUM 5000 UNIT(S): 5000 INJECTION INTRAVENOUS; SUBCUTANEOUS at 06:19

## 2023-01-01 RX ADMIN — HEPARIN SODIUM 5000 UNIT(S): 5000 INJECTION INTRAVENOUS; SUBCUTANEOUS at 05:13

## 2023-01-01 RX ADMIN — PIPERACILLIN AND TAZOBACTAM 25 GRAM(S): 4; .5 INJECTION, POWDER, LYOPHILIZED, FOR SOLUTION INTRAVENOUS at 23:00

## 2023-01-01 RX ADMIN — CHLORHEXIDINE GLUCONATE 1 APPLICATION(S): 213 SOLUTION TOPICAL at 05:50

## 2023-01-01 RX ADMIN — HEPARIN SODIUM 5000 UNIT(S): 5000 INJECTION INTRAVENOUS; SUBCUTANEOUS at 05:52

## 2023-01-01 RX ADMIN — PIPERACILLIN AND TAZOBACTAM 25 GRAM(S): 4; .5 INJECTION, POWDER, LYOPHILIZED, FOR SOLUTION INTRAVENOUS at 13:23

## 2023-01-01 RX ADMIN — OXYCODONE HYDROCHLORIDE 5 MILLIGRAM(S): 5 TABLET ORAL at 23:24

## 2023-01-01 RX ADMIN — CHLORHEXIDINE GLUCONATE 1 APPLICATION(S): 213 SOLUTION TOPICAL at 05:57

## 2023-01-01 RX ADMIN — PIPERACILLIN AND TAZOBACTAM 25 GRAM(S): 4; .5 INJECTION, POWDER, LYOPHILIZED, FOR SOLUTION INTRAVENOUS at 03:36

## 2023-01-01 RX ADMIN — PIPERACILLIN AND TAZOBACTAM 25 GRAM(S): 4; .5 INJECTION, POWDER, LYOPHILIZED, FOR SOLUTION INTRAVENOUS at 13:41

## 2023-01-01 RX ADMIN — HEPARIN SODIUM 5000 UNIT(S): 5000 INJECTION INTRAVENOUS; SUBCUTANEOUS at 22:57

## 2023-01-01 RX ADMIN — PIPERACILLIN AND TAZOBACTAM 25 GRAM(S): 4; .5 INJECTION, POWDER, LYOPHILIZED, FOR SOLUTION INTRAVENOUS at 12:16

## 2023-01-01 SDOH — SOCIAL STABILITY - SOCIAL INSECURITY: DISRUPTION OF FAMILY BY SEPARATION AND DIVORCE: Z63.5

## 2023-10-07 NOTE — ED PROVIDER NOTE - CLINICAL SUMMARY MEDICAL DECISION MAKING FREE TEXT BOX
Patient presents with daughters as detailed in HPI.  Found to have ARF with hyperkalemia and will likely require dialysis. outpatient CT showing possible cancer also noted and this was all discussed with patient and family. hyperkalemia addressed in ED, renal and ICU consulted, will admit to ICU

## 2023-10-07 NOTE — ED PROVIDER NOTE - OBJECTIVE STATEMENT
73-year-old female history of hyperlipidemia presents for hematuria x1 week worsening over the last few days accompanied by chills for 1 week and noted mucus and bowel movements over the last 9 days.  States that starting last night patient also began having chills.  Per family patient has also had increased leg swelling because patient has been noncompliant with her Lasix for the last 2 weeks.  Patient states that she had an ultrasound done which resulted in having a CAT scan done which was done on October 3 and resulted October 5 however they are not sure what the results are yet.  No fevers reported no chest pain or shortness of breath or other complaints.  Family states that the were mainly concerned that patient may have a UTI so brought patient to the ER.  Patient states that she has a follow-up urology appointment in 4 days.

## 2023-10-07 NOTE — PROVIDER CONTACT NOTE (EICU) - BACKGROUND
Following obtained via chart review and discussion with CC NP Loraine: 72 yo F with  history of hyperlipidemia presented to the ED for hematuria x1 week worsening over the last few days accompanied by chills for 1 week and noted mucus and bowel movements over the last 9 days. Labs with LANA Cr 7.89 and hyperkalemia K 8.3, confirmed on repeat. Imaging revealed severe bilateral hydronephrosis. Received insulin, dextrose, albuterol, calcium for hyperkalemia. No reported EKG changes. Seen by nephrology and will have urgent HD tonight.

## 2023-10-07 NOTE — ED PROVIDER NOTE - PHYSICAL EXAMINATION
GEN:   comfortable, in no apparent distress, AOx3  EYES:   PERRL, extra-occular movements intact  HEENT:   airway patent, moist mucosal membranes, uvula midline  CV:  RRR, Pulses- Radial: 2+ bilateral and equal, b/l +2 pitting edema.  RESP:   clear to auscultation bilaterally, non-labored, speaking in full sentences  ABD:   nonfocal lower abd ttp, no guarding  MSK:   no musculoskeletal tenderness, 5/5 strength, moving all extremities  SKIN:   dry, intact, no rash  NEURO:   AOx3, no focal weakness or loss of sensation, GCS 15  PSYCH: calm, cooperative, no apparent risk to self and others

## 2023-10-07 NOTE — ED PROVIDER NOTE - CRITICAL CARE ATTENDING CONTRIBUTION TO CARE
73 F presenting in acute renal failure w/ hyperkalemia  hx obtained from patient & daughter    alvarenga placed w/ minimal output  pt given IV calcium, insulin, nebulized albuterol  nephrology consulted for emergent dialysis  ICU consulted for admitting bed  will admit to ICU

## 2023-10-07 NOTE — ED PROVIDER NOTE - OBTAINED AND REVIEWED OLD RECORDS MULTI-SELECT OPTIONS
Prior Outpatient Radiology Xenograft Text: The defect edges were debeveled with a #15 scalpel blade. Given the location of the defect, shape of the defect and the proximity to free margins a xenograft was deemed most appropriate. The graft was then trimmed to fit the size of the defect. The graft was then placed in the primary defect and oriented appropriately.

## 2023-10-07 NOTE — ED ADULT TRIAGE NOTE - CHIEF COMPLAINT QUOTE
pt c/o hematuria, shivering x 1 week. pt c/o RLQ abdominal pain x 1 month ago. denies pmh. pt c/o hematuria, shivering x 1 week. pt c/o RLQ abdominal pain x 1 month ago. bilateral LE swelling noted. pt noncompliant with lasix.

## 2023-10-07 NOTE — H&P ADULT - NSHPLABSRESULTS_GEN_ALL_CORE
ICU Vital Signs Last 24 Hrs  T(C): 36.6 (07 Oct 2023 19:12), Max: 36.6 (07 Oct 2023 13:36)  T(F): 97.8 (07 Oct 2023 19:12), Max: 97.8 (07 Oct 2023 13:36)  HR: 66 (07 Oct 2023 19:30) (62 - 66)  BP: 160/78 (07 Oct 2023 19:30) (160/78 - 202/95)  BP(mean): 102 (07 Oct 2023 19:30) (102 - 102)  ABP: --  ABP(mean): --  RR: 19 (07 Oct 2023 19:30) (18 - 20)  SpO2: 100% (07 Oct 2023 19:30) (100% - 100%)    O2 Parameters below as of 07 Oct 2023 19:12  Patient On (Oxygen Delivery Method): room air          LABS:                          9.7    19.00 )-----------( 280      ( 07 Oct 2023 15:01 )             30.7     10-07    136  |  105  |  77<H>  ----------------------------<  103<H>  8.7<HH>   |  22  |  7.84<H>    Ca    9.8      07 Oct 2023 16:10    TPro  7.5  /  Alb  2.6<L>  /  TBili  0.3  /  DBili  x   /  AST  19  /  ALT  18  /  AlkPhos  104  10-07    LIVER FUNCTIONS - ( 07 Oct 2023 15:01 )  Alb: 2.6 g/dL / Pro: 7.5 gm/dL / ALK PHOS: 104 U/L / ALT: 18 U/L / AST: 19 U/L / GGT: x         thy  PT/INR - ( 07 Oct 2023 15:01 )   PT: 11.9 sec;   INR: 0.99 ratio         PTT - ( 07 Oct 2023 15:01 )  PTT:31.3 sec  Urinalysis Basic - ( 07 Oct 2023 16:10 )    Color: x / Appearance: x / SG: x / pH: x  Gluc: 103 mg/dL / Ketone: x  / Bili: x / Urobili: x   Blood: x / Protein: x / Nitrite: x   Leuk Esterase: x / RBC: x / WBC x   Sq Epi: x / Non Sq Epi: x / Bacteria: x      RADIOLOGY  CT scan -->  severe bilateral hydro, collapsed, very thick walled bladder concerning for urothelial cancer per Nephrology*     CARDIOLOGY  EKG sinus bradycardia at 58 with no ST/T wave changes

## 2023-10-07 NOTE — ED ADULT NURSE NOTE - NSFALLRISKINTERV_ED_ALL_ED

## 2023-10-07 NOTE — PROCEDURAL SAFETY CHECKLIST WITH OR WITHOUT SEDATION - NSSPECIMENRECONSD_GEN_ALL_CORE
Quadrants Reporting?: 0 Repair Hemostasis (Optional): Electrocautery Consent (Marginal Mandibular)/Introductory Paragraph: The rationale for Mohs was explained to the patient and consent was obtained. The risks, benefits and alternatives to therapy were discussed in detail. Specifically, the risks of damage to the marginal mandibular branch of the facial nerve, infection, scarring, bleeding, prolonged wound healing, incomplete removal, allergy to anesthesia, and recurrence were addressed. Prior to the procedure, the treatment site was clearly identified and confirmed by the patient. All components of Universal Protocol/PAUSE Rule completed. Complex Repair And Flap Additional Text (Will Appearing After The Standard Complex Repair Text): The complex repair was not sufficient to completely close the primary defect. The remaining additional defect was repaired with the flap mentioned below. Previous Accession (Optional): [id number] Stage 11: Additional Anesthesia Type: 1% lidocaine with epinephrine Anesthesia Type: 1% lidocaine with epinephrine and a 1:10 solution of 8.4% sodium bicarbonate Tarsorrhaphy Text: A tarsorrhaphy was performed using Frost sutures. Surgical Defect Width In Cm (Optional): 1.7 Cheek-To-Nose Interpolation Flap Text: A decision was made to reconstruct the defect utilizing an interpolation axial flap and a staged reconstruction.  A telfa template was made of the defect.  This telfa template was then used to outline the Cheek-To-Nose Interpolation flap.  The donor area for the pedicle flap was then injected with anesthesia.  The flap was excised through the skin and subcutaneous tissue down to the layer of the underlying musculature.  The interpolation flap was carefully excised within this deep plane to maintain its blood supply.  The edges of the donor site were undermined.   The donor site was closed in a primary fashion.  The pedicle was then rotated into position and sutured.  Once the tube was sutured into place, adequate blood supply was confirmed with blanching and refill.  The pedicle was then wrapped with xeroform gauze and dressed appropriately with a telfa and gauze bandage to ensure continued blood supply and protect the attached pedicle. Display The Frozen Section And Histology As A Separate Paragraph: No Xenograft Text: The defect edges were debeveled with a #15 scalpel blade.  Given the location of the defect, shape of the defect and the proximity to free margins a xenograft was deemed most appropriate.  The graft was then trimmed to fit the size of the defect.  The graft was then placed in the primary defect and oriented appropriately. Medical Necessity Statement: Based on my medical judgement, Mohs surgery is the most appropriate treatment for this cancer compared to other treatments. Alternatives Discussed Intro (Do Not Add Period): I discussed alternative treatments to Mohs surgery and specifically discussed the risks and benefits of Stage 3: Number Of Blocks?: 1 Purse String (Simple) Text: Given the location of the defect and the characteristics of the surrounding skin a pursestring closure was deemed most appropriate.  Undermining was performed circumfirentially around the surgical defect.  A purstring suture was then placed and tightened. done Star Wedge Flap Text: The defect edges were debeveled with a #15 scalpel blade.  Given the location of the defect, shape of the defect and the proximity to free margins a star wedge flap was deemed most appropriate.  Using a sterile surgical marker, an appropriate rotation flap was drawn incorporating the defect and placing the expected incisions within the relaxed skin tension lines where possible. The area thus outlined was incised deep to adipose tissue with a #15 scalpel blade.  The skin margins were undermined to an appropriate distance in all directions utilizing iris scissors. Wound Check: 7 days Closure 2 Information: This tab is for additional flaps and grafts, including complex repair and grafts and complex repair and flaps. You can also specify a different location for the additional defect, if the location is the same you do not need to select a new one. We will insert the automated text for the repair you select below just as we do for solitary flaps and grafts. Please note that at this time if you select a location with a different insurance zone you will need to override the ICD10 and CPT if appropriate. Location Indication Override (Is Already Calculated Based On Selected Body Location): Area M Anesthesia Volume In Cc: 6 Stage 1: Number Of Blocks?: 2 Melolabial Transposition Flap Text: The defect edges were debeveled with a #15 scalpel blade.  Given the location of the defect and the proximity to free margins a melolabial flap was deemed most appropriate.  Using a sterile surgical marker, an appropriate melolabial transposition flap was drawn incorporating the defect.    The area thus outlined was incised deep to adipose tissue with a #15 scalpel blade.  The skin margins were undermined to an appropriate distance in all directions utilizing iris scissors. Bcc Infiltrative Histology Text: There were numerous aggregates of basaloid cells demonstrating an infiltrative pattern. Closure 4 Information: This tab is for additional flaps and grafts above and beyond our usual structured repairs.  Please note if you enter information here it will not currently bill and you will need to add the billing information manually. Postop Diagnosis: same Referred To Otolaryngology For Closure Text (Leave Blank If You Do Not Want): After obtaining clear surgical margins the patient was sent to otolaryngology for surgical repair.  The patient understands they will receive post-surgical care and follow-up from the referring physician's office. Scc Well Differentiated Histology Text: There were numerous aggregates of squamous cells. Primary Defect Width In Cm (Final Defect Size - Required For Flaps/Grafts): 1.8 Tissue Cultured Epidermal Autograft Text: The defect edges were debeveled with a #15 scalpel blade.  Given the location of the defect, shape of the defect and the proximity to free margins a tissue cultured epidermal autograft was deemed most appropriate.  The graft was then trimmed to fit the size of the defect.  The graft was then placed in the primary defect and oriented appropriately. Island Pedicle Flap With Canthal Suspension Text: The defect edges were debeveled with a #15 scalpel blade.  Given the location of the defect, shape of the defect and the proximity to free margins an island pedicle advancement flap was deemed most appropriate.  Using a sterile surgical marker, an appropriate advancement flap was drawn incorporating the defect, outlining the appropriate donor tissue and placing the expected incisions within the relaxed skin tension lines where possible. The area thus outlined was incised deep to adipose tissue with a #15 scalpel blade.  The skin margins were undermined to an appropriate distance in all directions around the primary defect and laterally outward around the island pedicle utilizing iris scissors.  There was minimal undermining beneath the pedicle flap. A suspension suture was placed in the canthal tendon to prevent tension and prevent ectropion. A-T Advancement Flap Text: The defect edges were debeveled with a #15 scalpel blade.  Given the location of the defect, shape of the defect and the proximity to free margins an A-T advancement flap was deemed most appropriate.  Using a sterile surgical marker, an appropriate advancement flap was drawn incorporating the defect and placing the expected incisions within the relaxed skin tension lines where possible.    The area thus outlined was incised deep to adipose tissue with a #15 scalpel blade.  The skin margins were undermined to an appropriate distance in all directions utilizing iris scissors. Burow's Advancement Flap Text: The defect edges were debeveled with a #15 scalpel blade.  Given the location of the defect and the proximity to free margins a Burow's advancement flap was deemed most appropriate.  Using a sterile surgical marker, the appropriate advancement flap was drawn incorporating the defect and placing the expected incisions within the relaxed skin tension lines where possible.    The area thus outlined was incised deep to adipose tissue with a #15 scalpel blade.  The skin margins were undermined to an appropriate distance in all directions utilizing iris scissors. Trilobed Flap Text: The defect edges were debeveled with a #15 scalpel blade.  Given the location of the defect and the proximity to free margins a trilobed flap was deemed most appropriate.  Using a sterile surgical marker, an appropriate trilobed flap drawn around the defect.    The area thus outlined was incised deep to adipose tissue with a #15 scalpel blade.  The skin margins were undermined to an appropriate distance in all directions utilizing iris scissors. Paramedian Forehead Flap Text: A decision was made to reconstruct the defect utilizing an interpolation axial flap and a staged reconstruction.  A telfa template was made of the defect.  This telfa template was then used to outline the paramedian forehead pedicle flap.  The donor area for the pedicle flap was then injected with anesthesia.  The flap was excised through the skin and subcutaneous tissue down to the layer of the underlying musculature.  The pedicle flap was carefully excised within this deep plane to maintain its blood supply.  The edges of the donor site were undermined.   The donor site was closed in a primary fashion.  The pedicle was then rotated into position and sutured.  Once the tube was sutured into place, adequate blood supply was confirmed with blanching and refill.  The pedicle was then wrapped with xeroform gauze and dressed appropriately with a telfa and gauze bandage to ensure continued blood supply and protect the attached pedicle. Home Suture Removal Text: Patient was provided instructions on removing sutures and will remove their sutures at home.  If they have any questions or difficulties they will call the office. Area L Indication Text: Tumors in this location are included in Area L (trunk and extremities).  Mohs surgery is indicated for larger tumors, or tumors with aggressive histologic features, in these anatomic locations. Z Plasty Text: The lesion was extirpated to the level of the fat with a #15 scalpel blade.  Given the location of the defect, shape of the defect and the proximity to free margins a Z-plasty was deemed most appropriate for repair.  Using a sterile surgical marker, the appropriate transposition arms of the Z-plasty were drawn incorporating the defect and placing the expected incisions within the relaxed skin tension lines where possible.    The area thus outlined was incised deep to adipose tissue with a #15 scalpel blade.  The skin margins were undermined to an appropriate distance in all directions utilizing iris scissors.  The opposing transposition arms were then transposed into place in opposite direction and anchored with interrupted buried subcutaneous sutures. Consent Type: Consent 1 (Standard) Lazy S Intermediate Repair Preamble Text (Leave Blank If You Do Not Want): Undermining was performed with blunt dissection. Dorsal Nasal Flap Text: The defect edges were debeveled with a #15 scalpel blade.  Given the location of the defect and the proximity to free margins a dorsal nasal flap was deemed most appropriate.  Using a sterile surgical marker, an appropriate dorsal nasal flap was drawn around the defect.    The area thus outlined was incised deep to adipose tissue with a #15 scalpel blade.  The skin margins were undermined to an appropriate distance in all directions utilizing iris scissors. Wound Care: Vaseline Referred To Oculoplastics For Closure Text (Leave Blank If You Do Not Want): After obtaining clear surgical margins the patient was sent to oculoplastics for surgical repair.  The patient understands they will receive post-surgical care and follow-up from the referring physician's office. Simple / Intermediate / Complex Repair - Final Wound Length In Cm: 4 Unna Boot Text: An Unna boot was placed to help immobilize the limb and facilitate more rapid healing. Repair Anesthesia Method: local infiltration Muscle Hinge Flap Text: The defect edges were debeveled with a #15 scalpel blade.  Given the size, depth and location of the defect and the proximity to free margins a muscle hinge flap was deemed most appropriate.  Using a sterile surgical marker, an appropriate hinge flap was drawn incorporating the defect. The area thus outlined was incised with a #15 scalpel blade.  The skin margins were undermined to an appropriate distance in all directions utilizing iris scissors. Referred To Mid-Level For Closure Text (Leave Blank If You Do Not Want): After obtaining clear surgical margins the patient was sent to a mid-level provider for surgical repair.  The patient understands they will receive post-surgical care and follow-up from the mid-level provider. Dermal Autograft Text: The defect edges were debeveled with a #15 scalpel blade.  Given the location of the defect, shape of the defect and the proximity to free margins a dermal autograft was deemed most appropriate.  Using a sterile surgical marker, the primary defect shape was transferred to the donor site. The area thus outlined was incised deep to adipose tissue with a #15 scalpel blade.  The harvested graft was then trimmed of adipose and epidermal tissue until only dermis was left.  The skin graft was then placed in the primary defect and oriented appropriately. Keystone Flap Text: The defect edges were debeveled with a #15 scalpel blade.  Given the location of the defect, shape of the defect a keystone flap was deemed most appropriate.  Using a sterile surgical marker, an appropriate keystone flap was drawn incorporating the defect, outlining the appropriate donor tissue and placing the expected incisions within the relaxed skin tension lines where possible. The area thus outlined was incised deep to adipose tissue with a #15 scalpel blade.  The skin margins were undermined to an appropriate distance in all directions around the primary defect and laterally outward around the flap utilizing iris scissors. No Repair - Repaired With Adjacent Surgical Defect Text (Leave Blank If You Do Not Want): After obtaining clear surgical margins the defect was repaired concurrently with another surgical defect which was in close approximation. Consent (Ear)/Introductory Paragraph: The rationale for Mohs was explained to the patient and consent was obtained. The risks, benefits and alternatives to therapy were discussed in detail. Specifically, the risks of ear deformity, infection, scarring, bleeding, prolonged wound healing, incomplete removal, allergy to anesthesia, nerve injury and recurrence were addressed. Prior to the procedure, the treatment site was clearly identified and confirmed by the patient. All components of Universal Protocol/PAUSE Rule completed. Display The Individual Mohs Indications As Separate Paragraphs: Yes Cartilage Graft Text: The defect edges were debeveled with a #15 scalpel blade.  Given the location of the defect, shape of the defect, the fact the defect involved a full thickness cartilage defect a cartilage graft was deemed most appropriate.  An appropriate donor site was identified, cleansed, and anesthetized. The cartilage graft was then harvested and transferred to the recipient site, oriented appropriately and then sutured into place.  The secondary defect was then repaired using a primary closure. Mohs Case Number: [id number] Epidermal Closure Graft Donor Site (Optional): simple interrupted Crescentic Advancement Flap Text: The defect edges were debeveled with a #15 scalpel blade.  Given the location of the defect and the proximity to free margins a crescentic advancement flap was deemed most appropriate.  Using a sterile surgical marker, the appropriate advancement flap was drawn incorporating the defect and placing the expected incisions within the relaxed skin tension lines where possible.    The area thus outlined was incised deep to adipose tissue with a #15 scalpel blade.  The skin margins were undermined to an appropriate distance in all directions utilizing iris scissors. Date Of Previous Biopsy (Optional): 04/10/17 Graft Donor Site Bandage (Optional-Leave Blank If You Don't Want In Note): Steri-strips and a pressure bandage were applied to the donor site. Mucosal Advancement Flap Text: Given the location of the defect, shape of the defect and the proximity to free margins a mucosal advancement flap was deemed most appropriate. Incisions were made with a 15 blade scalpel in the appropriate fashion along the cutaneous vermilion border and the mucosal lip. The remaining actinically damaged mucosal tissue was excised.  The mucosal advancement flap was then elevated to the gingival sulcus with care taken to preserve the neurovascular structures and advanced into the primary defect. Care was taken to ensure that precise realignment of the vermilion border was achieved. Bilobed Transposition Flap Text: The defect edges were debeveled with a #15 scalpel blade.  Given the location of the defect and the proximity to free margins a bilobed transposition flap was deemed most appropriate.  Using a sterile surgical marker, an appropriate bilobe flap drawn around the defect.    The area thus outlined was incised deep to adipose tissue with a #15 scalpel blade.  The skin margins were undermined to an appropriate distance in all directions utilizing iris scissors. Consent (Scalp)/Introductory Paragraph: The rationale for Mohs was explained to the patient and consent was obtained. The risks, benefits and alternatives to therapy were discussed in detail. Specifically, the risks of changes in hair growth pattern secondary to repair, infection, scarring, bleeding, prolonged wound healing, incomplete removal, allergy to anesthesia, nerve injury and recurrence were addressed. Prior to the procedure, the treatment site was clearly identified and confirmed by the patient. All components of Universal Protocol/PAUSE Rule completed. Bcc Histology Text: There were numerous aggregates of basaloid cells. Purse String (Intermediate) Text: Given the location of the defect and the characteristics of the surrounding skin a pursestring intermediate closure was deemed most appropriate.  Undermining was performed circumfirentially around the surgical defect.  A purstring suture was then placed and tightened. Surgeon/Pathologist Verbiage (Will Incorporate Name Of Surgeon From Intro If Not Blank): operated in two distinct and integrated capacities as the surgeon and pathologist. Consent (Nose)/Introductory Paragraph: The rationale for Mohs was explained to the patient and consent was obtained. The risks, benefits and alternatives to therapy were discussed in detail. Specifically, the risks of nasal deformity, changes in the flow of air through the nose, infection, scarring, bleeding, prolonged wound healing, incomplete removal, allergy to anesthesia, nerve injury and recurrence were addressed. Prior to the procedure, the treatment site was clearly identified and confirmed by the patient. All components of Universal Protocol/PAUSE Rule completed. Bi-Rhombic Flap Text: The defect edges were debeveled with a #15 scalpel blade.  Given the location of the defect and the proximity to free margins a bi-rhombic flap was deemed most appropriate.  Using a sterile surgical marker, an appropriate rhombic flap was drawn incorporating the defect. The area thus outlined was incised deep to adipose tissue with a #15 scalpel blade.  The skin margins were undermined to an appropriate distance in all directions utilizing iris scissors. Complex Repair Preamble Text (Leave Blank If You Do Not Want): Extensive wide undermining was performed. Rhombic Flap Text: The defect edges were debeveled with a #15 scalpel blade.  Given the location of the defect and the proximity to free margins a rhombic flap was deemed most appropriate.  Using a sterile surgical marker, an appropriate rhombic flap was drawn incorporating the defect.    The area thus outlined was incised deep to adipose tissue with a #15 scalpel blade.  The skin margins were undermined to an appropriate distance in all directions utilizing iris scissors. O-L Flap Text: The defect edges were debeveled with a #15 scalpel blade.  Given the location of the defect, shape of the defect and the proximity to free margins an O-L flap was deemed most appropriate.  Using a sterile surgical marker, an appropriate advancement flap was drawn incorporating the defect and placing the expected incisions within the relaxed skin tension lines where possible.    The area thus outlined was incised deep to adipose tissue with a #15 scalpel blade.  The skin margins were undermined to an appropriate distance in all directions utilizing iris scissors. X Size Of Lesion In Cm (Optional): 1.1 Transposition Flap Text: The defect edges were debeveled with a #15 scalpel blade.  Given the location of the defect and the proximity to free margins a transposition flap was deemed most appropriate.  Using a sterile surgical marker, an appropriate transposition flap was drawn incorporating the defect.    The area thus outlined was incised deep to adipose tissue with a #15 scalpel blade.  The skin margins were undermined to an appropriate distance in all directions utilizing iris scissors. No Residual Tumor Seen Histology Text: There were no malignant cells seen in the sections examined. Melolabial Interpolation Flap Text: A decision was made to reconstruct the defect utilizing an interpolation axial flap and a staged reconstruction.  A telfa template was made of the defect.  This telfa template was then used to outline the melolabial interpolation flap.  The donor area for the pedicle flap was then injected with anesthesia.  The flap was excised through the skin and subcutaneous tissue down to the layer of the underlying musculature.  The pedicle flap was carefully excised within this deep plane to maintain its blood supply.  The edges of the donor site were undermined.   The donor site was closed in a primary fashion.  The pedicle was then rotated into position and sutured.  Once the tube was sutured into place, adequate blood supply was confirmed with blanching and refill.  The pedicle was then wrapped with xeroform gauze and dressed appropriately with a telfa and gauze bandage to ensure continued blood supply and protect the attached pedicle. Surgical Defect Length In Cm (Optional): 0.9 Island Pedicle Flap-Requiring Vessel Identification Text: The defect edges were debeveled with a #15 scalpel blade.  Given the location of the defect, shape of the defect and the proximity to free margins an island pedicle advancement flap was deemed most appropriate.  Using a sterile surgical marker, an appropriate advancement flap was drawn, based on the axial vessel mentioned above, incorporating the defect, outlining the appropriate donor tissue and placing the expected incisions within the relaxed skin tension lines where possible.    The area thus outlined was incised deep to adipose tissue with a #15 scalpel blade.  The skin margins were undermined to an appropriate distance in all directions around the primary defect and laterally outward around the island pedicle utilizing iris scissors.  There was minimal undermining beneath the pedicle flap. Advancement Flap (Double) Text: The defect edges were debeveled with a #15 scalpel blade.  Given the location of the defect and the proximity to free margins a double advancement flap was deemed most appropriate.  Using a sterile surgical marker, the appropriate advancement flaps were drawn incorporating the defect and placing the expected incisions within the relaxed skin tension lines where possible.    The area thus outlined was incised deep to adipose tissue with a #15 scalpel blade.  The skin margins were undermined to an appropriate distance in all directions utilizing iris scissors. Spiral Flap Text: The defect edges were debeveled with a #15 scalpel blade.  Given the location of the defect, shape of the defect and the proximity to free margins a spiral flap was deemed most appropriate.  Using a sterile surgical marker, an appropriate rotation flap was drawn incorporating the defect and placing the expected incisions within the relaxed skin tension lines where possible. The area thus outlined was incised deep to adipose tissue with a #15 scalpel blade.  The skin margins were undermined to an appropriate distance in all directions utilizing iris scissors. Dressing: pressure dressing with telfa Cheiloplasty (Complex) Text: A decision was made to reconstruct the defect with a  cheiloplasty.  The defect was undermined extensively.  Additional obicularis oris muscle was excised with a 15 blade scalpel.  The defect was converted into a full thickness wedge to facilite a better cosmetic result.  Small vessels were then tied off with 5-0 monocyrl. The obicularis oris, superficial fascia, adipose and dermis were then reapproximated.  After the deeper layers were approximated the epidermis was reapproximated with particular care given to realign the vermilion border. Hatchet Flap Text: The defect edges were debeveled with a #15 scalpel blade.  Given the location of the defect, shape of the defect and the proximity to free margins a hatchet flap was deemed most appropriate.  Using a sterile surgical marker, an appropriate hatchet flap was drawn incorporating the defect and placing the expected incisions within the relaxed skin tension lines where possible.    The area thus outlined was incised deep to adipose tissue with a #15 scalpel blade.  The skin margins were undermined to an appropriate distance in all directions utilizing iris scissors. Advancement-Rotation Flap Text: The defect edges were debeveled with a #15 scalpel blade.  Given the location of the defect, shape of the defect and the proximity to free margins an advancement-rotation flap was deemed most appropriate.  Using a sterile surgical marker, an appropriate flap was drawn incorporating the defect and placing the expected incisions within the relaxed skin tension lines where possible. The area thus outlined was incised deep to adipose tissue with a #15 scalpel blade.  The skin margins were undermined to an appropriate distance in all directions utilizing iris scissors. Epidermal Sutures: 5-0 Surgipro Consent 1/Introductory Paragraph: The rationale for Mohs was explained to the patient and consent was obtained. The risks, benefits and alternatives to therapy were discussed in detail. Specifically, the risks of infection, scarring, bleeding, prolonged wound healing, incomplete removal, allergy to anesthesia, nerve injury and recurrence were addressed. Prior to the procedure, the treatment site was clearly identified and confirmed by the patient. All components of Universal Protocol/PAUSE Rule completed. Manual Repair Warning Statement: We plan on removing the manually selected variable below in favor of our much easier automatic structured text blocks found in the previous tab. We decided to do this to help make the flow better and give you the full power of structured data. Manual selection is never going to be ideal in our platform and I would encourage you to avoid using manual selection from this point on, especially since I will be sunsetting this feature. It is important that you do one of two things with the customized text below. First, you can save all of the text in a word file so you can have it for future reference. Second, transfer the text to the appropriate area in the Library tab. Lastly, if there is a flap or graft type which we do not have you need to let us know right away so I can add it in before the variable is hidden. No need to panic, we plan to give you roughly 6 months to make the change. Modified Advancement Flap Text: The defect edges were debeveled with a #15 scalpel blade.  Given the location of the defect, shape of the defect and the proximity to free margins a modified advancement flap was deemed most appropriate.  Using a sterile surgical marker, an appropriate advancement flap was drawn incorporating the defect and placing the expected incisions within the relaxed skin tension lines where possible.    The area thus outlined was incised deep to adipose tissue with a #15 scalpel blade.  The skin margins were undermined to an appropriate distance in all directions utilizing iris scissors. S Plasty Text: Given the location and shape of the defect, and the orientation of relaxed skin tension lines, an S-plasty was deemed most appropriate for repair.  Using a sterile surgical marker, the appropriate outline of the S-plasty was drawn, incorporating the defect and placing the expected incisions within the relaxed skin tension lines where possible.  The area thus outlined was incised deep to adipose tissue with a #15 scalpel blade.  The skin margins were undermined to an appropriate distance in all directions utilizing iris scissors. The skin flaps were advanced over the defect.  The opposing margins were then approximated with interrupted buried subcutaneous sutures. Area M Indication Text: Tumors in this location are included in Area M (cheek, forehead, scalp, neck, jawline and pretibial skin).  Mohs surgery is indicated for tumors in these anatomic locations. Surgeon: Viet Holm MD Tumor Debulked?: curette Ftsg Text: The defect edges were debeveled with a #15 scalpel blade.  Given the location of the defect, shape of the defect and the proximity to free margins a full thickness skin graft was deemed most appropriate.  Using a sterile surgical marker, the primary defect shape was transferred to the donor site. The area thus outlined was incised deep to adipose tissue with a #15 scalpel blade.  The harvested graft was then trimmed of adipose tissue until only dermis and epidermis was left.  The skin margins of the secondary defect were undermined to an appropriate distance in all directions utilizing iris scissors.  The secondary defect was closed with interrupted buried subcutaneous sutures.  The skin edges were then re-apposed with running  sutures.  The skin graft was then placed in the primary defect and oriented appropriately. Body Location Override (Optional - Billing Will Still Be Based On Selected Body Map Location If Applicable): right central forehead Rotation Flap Text: The defect edges were debeveled with a #15 scalpel blade.  Given the location of the defect, shape of the defect and the proximity to free margins a rotation flap was deemed most appropriate.  Using a sterile surgical marker, an appropriate rotation flap was drawn incorporating the defect and placing the expected incisions within the relaxed skin tension lines where possible.    The area thus outlined was incised deep to adipose tissue with a #15 scalpel blade.  The skin margins were undermined to an appropriate distance in all directions utilizing iris scissors. O-T Plasty Text: The defect edges were debeveled with a #15 scalpel blade.  Given the location of the defect, shape of the defect and the proximity to free margins an O-T plasty was deemed most appropriate.  Using a sterile surgical marker, an appropriate O-T plasty was drawn incorporating the defect and placing the expected incisions within the relaxed skin tension lines where possible.    The area thus outlined was incised deep to adipose tissue with a #15 scalpel blade.  The skin margins were undermined to an appropriate distance in all directions utilizing iris scissors. Initial Size Of Lesion: 0.7 Posterior Auricular Interpolation Flap Text: A decision was made to reconstruct the defect utilizing an interpolation axial flap and a staged reconstruction.  A telfa template was made of the defect.  This telfa template was then used to outline the posterior auricular interpolation flap.  The donor area for the pedicle flap was then injected with anesthesia.  The flap was excised through the skin and subcutaneous tissue down to the layer of the underlying musculature.  The pedicle flap was carefully excised within this deep plane to maintain its blood supply.  The edges of the donor site were undermined.   The donor site was closed in a primary fashion.  The pedicle was then rotated into position and sutured.  Once the tube was sutured into place, adequate blood supply was confirmed with blanching and refill.  The pedicle was then wrapped with xeroform gauze and dressed appropriately with a telfa and gauze bandage to ensure continued blood supply and protect the attached pedicle. Deep Sutures: 4-0 Maxon Consent (Lip)/Introductory Paragraph: The rationale for Mohs was explained to the patient and consent was obtained. The risks, benefits and alternatives to therapy were discussed in detail. Specifically, the risks of lip deformity, changes in the oral aperture, infection, scarring, bleeding, prolonged wound healing, incomplete removal, allergy to anesthesia, nerve injury and recurrence were addressed. Prior to the procedure, the treatment site was clearly identified and confirmed by the patient. All components of Universal Protocol/PAUSE Rule completed. Ear Star Wedge Flap Text: The defect edges were debeveled with a #15 blade scalpel.  Given the location of the defect and the proximity to free margins (helical rim) an ear star wedge flap was deemed most appropriate.  Using a sterile surgical marker, the appropriate flap was drawn incorporating the defect and placing the expected incisions between the helical rim and antihelix where possible.  The area thus outlined was incised through and through with a #15 scalpel blade. Eye Protection Verbiage: Before proceeding with the stage, a plastic scleral shield was inserted. The globe was anesthetized with a few drops of 1% lidocaine with 1:100,000 epinephrine. Then, an appropriate sized scleral shield was chosen and coated with lacrilube ointment. The shield was gently inserted and left in place for the duration of each stage. After the stage was completed, the shield was gently removed. Composite Graft Text: The defect edges were debeveled with a #15 scalpel blade.  Given the location of the defect, shape of the defect, the proximity to free margins and the fact the defect was full thickness a composite graft was deemed most appropriate.  The defect was outline and then transferred to the donor site.  A full thickness graft was then excised from the donor site. The graft was then placed in the primary defect, oriented appropriately and then sutured into place.  The secondary defect was then repaired using a primary closure. Helical Rim Advancement Flap Text: The defect edges were debeveled with a #15 blade scalpel.  Given the location of the defect and the proximity to free margins (helical rim) a double helical rim advancement flap was deemed most appropriate.  Using a sterile surgical marker, the appropriate advancement flaps were drawn incorporating the defect and placing the expected incisions between the helical rim and antihelix where possible.  The area thus outlined was incised through and through with a #15 scalpel blade.  With a skin hook and iris scissors, the flaps were gently and sharply undermined and freed up. Cheiloplasty (Less Than 50%) Text: A decision was made to reconstruct the defect with a  cheiloplasty.  The defect was undermined extensively.  Additional obicularis oris muscle was excised with a 15 blade scalpel.  The defect was converted into a full thickness wedge, of less than 50% of the vertical height of the lip, to facilite a better cosmetic result.  Small vessels were then tied off with 5-0 monocyrl. The obicularis oris, superficial fascia, adipose and dermis were then reapproximated.  After the deeper layers were approximated the epidermis was reapproximated with particular care given to realign the vermilion border. Consent (Near Eyelid Margin)/Introductory Paragraph: The rationale for Mohs was explained to the patient and consent was obtained. The risks, benefits and alternatives to therapy were discussed in detail. Specifically, the risks of ectropion or eyelid deformity, infection, scarring, bleeding, prolonged wound healing, incomplete removal, allergy to anesthesia, nerve injury and recurrence were addressed. Prior to the procedure, the treatment site was clearly identified and confirmed by the patient. All components of Universal Protocol/PAUSE Rule completed. Detail Level: Detailed Island Pedicle Flap Text: The defect edges were debeveled with a #15 scalpel blade.  Given the location of the defect, shape of the defect and the proximity to free margins an island pedicle advancement flap was deemed most appropriate.  Using a sterile surgical marker, an appropriate advancement flap was drawn incorporating the defect, outlining the appropriate donor tissue and placing the expected incisions within the relaxed skin tension lines where possible.    The area thus outlined was incised deep to adipose tissue with a #15 scalpel blade.  The skin margins were undermined to an appropriate distance in all directions around the primary defect and laterally outward around the island pedicle utilizing iris scissors.  There was minimal undermining beneath the pedicle flap. Partial Purse String (Simple) Text: Given the location of the defect and the characteristics of the surrounding skin a simple purse string closure was deemed most appropriate.  Undermining was performed circumfirentially around the surgical defect.  A purse string suture was then placed and tightened. Wound tension only allowed a partial closure of the circular defect. O-T Advancement Flap Text: The defect edges were debeveled with a #15 scalpel blade.  Given the location of the defect, shape of the defect and the proximity to free margins an O-T advancement flap was deemed most appropriate.  Using a sterile surgical marker, an appropriate advancement flap was drawn incorporating the defect and placing the expected incisions within the relaxed skin tension lines where possible.    The area thus outlined was incised deep to adipose tissue with a #15 scalpel blade.  The skin margins were undermined to an appropriate distance in all directions utilizing iris scissors. Same Histology In Subsequent Stages Text: The pattern and morphology of the tumor is as described in the first stage. Surgical Defect Width In Cm (Optional): 1.5 Mohs Histo Method Verbiage: Each section was then chromacoded and processed in the Mohs lab using the Mohs protocol and submitted for frozen section. Repair Type: Complex Repair Full Thickness Lip Wedge Repair (Flap) Text: Given the location of the defect and the proximity to free margins a full thickness wedge repair was deemed most appropriate.  Using a sterile surgical marker, the appropriate repair was drawn incorporating the defect and placing the expected incisions perpendicular to the vermilion border.  The vermilion border was also meticulously outlined to ensure appropriate reapproximation during the repair.  The area thus outlined was incised through and through with a #15 scalpel blade.  The muscularis and dermis were reaproximated with deep sutures following hemostasis. Care was taken to realign the vermilion border before proceeding with the superficial closure.  Once the vermilion was realigned the superfical and mucosal closure was finished. Inflammation Suggestive Of Cancer Camouflage Histology Text: There was a dense lymphocytic infiltrate which prevented adequate histologic evaluation of adjacent structures. Localized Dermabrasion With Wire Brush Text: The patient was draped in routine manner.  Localized dermabrasion using 3 x 17 mm wire brush was performed in routine manner to papillary dermis. This spot dermabrasion is being performed to complete skin cancer reconstruction. It also will eliminate the other sun damaged precancerous cells that are known to be part of the regional effect of a lifetime's worth of sun exposure. This localized dermabrasion is therapeutic and should not be considered cosmetic in any regard. Mauc Instructions: By selecting yes to the question below the MAUC number will be added into the note.  This will be calculated automatically based on the diagnosis chosen, the size entered, the body zone selected (H,M,L) and the specific indications you chose. You will also have the option to override the Mohs AUC if you disagree with the automatically calculated number and this option is found in the Case Summary tab. Ear Wedge Repair Text: A wedge excision was completed by carrying down an excision through the full thickness of the ear and cartilage with an inward facing Burow's triangle. The wound was then closed in a layered fashion. Repair Performed By Another Provider Text (Leave Blank If You Do Not Want): After obtaining clear surgical margins the defect was repaired by another provider. Consent (Temporal Branch)/Introductory Paragraph: The rationale for Mohs was explained to the patient and consent was obtained. The risks, benefits and alternatives to therapy were discussed in detail. Specifically, the risks of damage to the temporal branch of the facial nerve, infection, scarring, bleeding, prolonged wound healing, incomplete removal, allergy to anesthesia, and recurrence were addressed. Prior to the procedure, the treatment site was clearly identified and confirmed by the patient. All components of Universal Protocol/PAUSE Rule completed. Bilateral Helical Rim Advancement Flap Text: The defect edges were debeveled with a #15 blade scalpel.  Given the location of the defect and the proximity to free margins (helical rim) a bilateral helical rim advancement flap was deemed most appropriate.  Using a sterile surgical marker, the appropriate advancement flaps were drawn incorporating the defect and placing the expected incisions between the helical rim and antihelix where possible.  The area thus outlined was incised through and through with a #15 scalpel blade.  With a skin hook and iris scissors, the flaps were gently and sharply undermined and freed up. Partial Purse String (Intermediate) Text: Given the location of the defect and the characteristics of the surrounding skin an intermediate purse string closure was deemed most appropriate.  Undermining was performed circumfirentially around the surgical defect.  A purse string suture was then placed and tightened. Wound tension only allowed a partial closure of the circular defect. Post-Care Instructions: I reviewed with the patient in detail post-care instructions. Patient is not to engage in any heavy lifting, exercise, or swimming for the next 14 days. Should the patient develop any fevers, chills, bleeding, severe pain patient will contact the office immediately. Subsequent Stages Histo Method Verbiage: Using a similar technique to that described above, a thin layer of tissue was removed from all areas where tumor was visible on the previous stage.  The tissue was again oriented, mapped, dyed, and processed as above. Consent (Spinal Accessory)/Introductory Paragraph: The rationale for Mohs was explained to the patient and consent was obtained. The risks, benefits and alternatives to therapy were discussed in detail. Specifically, the risks of damage to the spinal accessory nerve, infection, scarring, bleeding, prolonged wound healing, incomplete removal, allergy to anesthesia, and recurrence were addressed. Prior to the procedure, the treatment site was clearly identified and confirmed by the patient. All components of Universal Protocol/PAUSE Rule completed. Secondary Intention Text (Leave Blank If You Do Not Want): The defect will heal with secondary intention. Number Of Stages: 3 W Plasty Text: The lesion was extirpated to the level of the fat with a #15 scalpel blade.  Given the location of the defect, shape of the defect and the proximity to free margins a W-plasty was deemed most appropriate for repair.  Using a sterile surgical marker, the appropriate transposition arms of the W-plasty were drawn incorporating the defect and placing the expected incisions within the relaxed skin tension lines where possible.    The area thus outlined was incised deep to adipose tissue with a #15 scalpel blade.  The skin margins were undermined to an appropriate distance in all directions utilizing iris scissors.  The opposing transposition arms were then transposed into place in opposite direction and anchored with interrupted buried subcutaneous sutures. Interpolation Flap Text: A decision was made to reconstruct the defect utilizing an interpolation axial flap and a staged reconstruction.  A telfa template was made of the defect.  This telfa template was then used to outline the interpolation flap.  The donor area for the pedicle flap was then injected with anesthesia.  The flap was excised through the skin and subcutaneous tissue down to the layer of the underlying musculature.  The interpolation flap was carefully excised within this deep plane to maintain its blood supply.  The edges of the donor site were undermined.   The donor site was closed in a primary fashion.  The pedicle was then rotated into position and sutured.  Once the tube was sutured into place, adequate blood supply was confirmed with blanching and refill.  The pedicle was then wrapped with xeroform gauze and dressed appropriately with a telfa and gauze bandage to ensure continued blood supply and protect the attached pedicle. Epidermal Closure: running Referred To Plastics For Closure Text (Leave Blank If You Do Not Want): After obtaining clear surgical margins the patient was sent to plastics for surgical repair.  The patient understands they will receive post-surgical care and follow-up from the referring physician's office. Bilobed Flap Text: The defect edges were debeveled with a #15 scalpel blade.  Given the location of the defect and the proximity to free margins a bilobe flap was deemed most appropriate.  Using a sterile surgical marker, an appropriate bilobe flap drawn around the defect.    The area thus outlined was incised deep to adipose tissue with a #15 scalpel blade.  The skin margins were undermined to an appropriate distance in all directions utilizing iris scissors. Mohs Method Verbiage: An incision at a 45 degree angle following the standard Mohs approach was done and the specimen was harvested as a microscopic controlled layer. Complex Repair And Graft Additional Text (Will Appearing After The Standard Complex Repair Text): The complex repair was not sufficient to completely close the primary defect. The remaining additional defect was repaired with the graft mentioned below. Double Island Pedicle Flap Text: The defect edges were debeveled with a #15 scalpel blade.  Given the location of the defect, shape of the defect and the proximity to free margins a double island pedicle advancement flap was deemed most appropriate.  Using a sterile surgical marker, an appropriate advancement flap was drawn incorporating the defect, outlining the appropriate donor tissue and placing the expected incisions within the relaxed skin tension lines where possible.    The area thus outlined was incised deep to adipose tissue with a #15 scalpel blade.  The skin margins were undermined to an appropriate distance in all directions around the primary defect and laterally outward around the island pedicle utilizing iris scissors.  There was minimal undermining beneath the pedicle flap. Advancement Flap (Single) Text: The defect edges were debeveled with a #15 scalpel blade.  Given the location of the defect and the proximity to free margins a single advancement flap was deemed most appropriate.  Using a sterile surgical marker, an appropriate advancement flap was drawn incorporating the defect and placing the expected incisions within the relaxed skin tension lines where possible.    The area thus outlined was incised deep to adipose tissue with a #15 scalpel blade.  The skin margins were undermined to an appropriate distance in all directions utilizing iris scissors. Skin Substitute Text: The defect edges were debeveled with a #15 scalpel blade.  Given the location of the defect, shape of the defect and the proximity to free margins a skin substitute graft was deemed most appropriate.  The graft material was trimmed to fit the size of the defect. The graft was then placed in the primary defect and oriented appropriately. Epidermal Autograft Text: The defect edges were debeveled with a #15 scalpel blade.  Given the location of the defect, shape of the defect and the proximity to free margins an epidermal autograft was deemed most appropriate.  Using a sterile surgical marker, the primary defect shape was transferred to the donor site. The epidermal graft was then harvested.  The skin graft was then placed in the primary defect and oriented appropriately. V-Y Flap Text: The defect edges were debeveled with a #15 scalpel blade.  Given the location of the defect, shape of the defect and the proximity to free margins a V-Y flap was deemed most appropriate.  Using a sterile surgical marker, an appropriate advancement flap was drawn incorporating the defect and placing the expected incisions within the relaxed skin tension lines where possible.    The area thus outlined was incised deep to adipose tissue with a #15 scalpel blade.  The skin margins were undermined to an appropriate distance in all directions utilizing iris scissors. Estimated Blood Loss (Cc): minimal Consent 2/Introductory Paragraph: Mohs surgery was explained to the patient and consent was obtained. The risks, benefits and alternatives to therapy were discussed in detail. Specifically, the risks of infection, scarring, bleeding, prolonged wound healing, incomplete removal, allergy to anesthesia, nerve injury and recurrence were addressed. Prior to the procedure, the treatment site was clearly identified and confirmed by the patient. All components of Universal Protocol/PAUSE Rule completed. Mohs Rapid Report Verbiage: The area of clinically evident tumor was marked with skin marking ink and appropriately hatched.  The initial incision was made following the Mohs approach through the skin.  The specimen was taken to the lab, divided into the necessary number of pieces, chromacoded and processed according to the Mohs protocol.  This was repeated in successive stages until a tumor free defect was achieved. Referred To Asc For Closure Text (Leave Blank If You Do Not Want): After obtaining clear surgical margins the patient was sent to an ASC for surgical repair.  The patient understands they will receive post-surgical care and follow-up from the ASC physician. O-Z Plasty Text: The defect edges were debeveled with a #15 scalpel blade.  Given the location of the defect, shape of the defect and the proximity to free margins an O-Z plasty (double transposition flap) was deemed most appropriate.  Using a sterile surgical marker, the appropriate transposition flaps were drawn incorporating the defect and placing the expected incisions within the relaxed skin tension lines where possible.    The area thus outlined was incised deep to adipose tissue with a #15 scalpel blade.  The skin margins were undermined to an appropriate distance in all directions utilizing iris scissors.  Hemostasis was achieved with electrocautery.  The flaps were then transposed into place, one clockwise and the other counterclockwise, and anchored with interrupted buried subcutaneous sutures. Alar Island Pedicle Flap Text: The defect edges were debeveled with a #15 scalpel blade.  Given the location of the defect, shape of the defect and the proximity to the alar rim an island pedicle advancement flap was deemed most appropriate.  Using a sterile surgical marker, an appropriate advancement flap was drawn incorporating the defect, outlining the appropriate donor tissue and placing the expected incisions within the nasal ala running parallel to the alar rim. The area thus outlined was incised with a #15 scalpel blade.  The skin margins were undermined minimally to an appropriate distance in all directions around the primary defect and laterally outward around the island pedicle utilizing iris scissors.  There was minimal undermining beneath the pedicle flap. Mastoid Interpolation Flap Text: A decision was made to reconstruct the defect utilizing an interpolation axial flap and a staged reconstruction.  A telfa template was made of the defect.  This telfa template was then used to outline the mastoid interpolation flap.  The donor area for the pedicle flap was then injected with anesthesia.  The flap was excised through the skin and subcutaneous tissue down to the layer of the underlying musculature.  The pedicle flap was carefully excised within this deep plane to maintain its blood supply.  The edges of the donor site were undermined.   The donor site was closed in a primary fashion.  The pedicle was then rotated into position and sutured.  Once the tube was sutured into place, adequate blood supply was confirmed with blanching and refill.  The pedicle was then wrapped with xeroform gauze and dressed appropriately with a telfa and gauze bandage to ensure continued blood supply and protect the attached pedicle. Consent 3/Introductory Paragraph: I gave the patient a chance to ask questions they had about the procedure.  Following this I explained the Mohs procedure and consent was obtained. The risks, benefits and alternatives to therapy were discussed in detail. Specifically, the risks of infection, scarring, bleeding, prolonged wound healing, incomplete removal, allergy to anesthesia, nerve injury and recurrence were addressed. Prior to the procedure, the treatment site was clearly identified and confirmed by the patient. All components of Universal Protocol/PAUSE Rule completed. Area H Indication Text: Tumors in this location are included in Area H (eyelids, eyebrows, nose, lips, chin, ear, pre-auricular, post-auricular, temple, genitalia, hands, feet, ankles and areola).  Tissue conservation is critical in these anatomic locations. V-Y Plasty Text: The defect edges were debeveled with a #15 scalpel blade.  Given the location of the defect, shape of the defect and the proximity to free margins an V-Y advancement flap was deemed most appropriate.  Using a sterile surgical marker, an appropriate advancement flap was drawn incorporating the defect and placing the expected incisions within the relaxed skin tension lines where possible.    The area thus outlined was incised deep to adipose tissue with a #15 scalpel blade.  The skin margins were undermined to an appropriate distance in all directions utilizing iris scissors. H Plasty Text: Given the location of the defect, shape of the defect and the proximity to free margins a H-plasty was deemed most appropriate for repair.  Using a sterile surgical marker, the appropriate advancement arms of the H-plasty were drawn incorporating the defect and placing the expected incisions within the relaxed skin tension lines where possible. The area thus outlined was incised deep to adipose tissue with a #15 scalpel blade. The skin margins were undermined to an appropriate distance in all directions utilizing iris scissors.  The opposing advancement arms were then advanced into place in opposite direction and anchored with interrupted buried subcutaneous sutures. Cheek Interpolation Flap Text: A decision was made to reconstruct the defect utilizing an interpolation axial flap and a staged reconstruction.  A telfa template was made of the defect.  This telfa template was then used to outline the Cheek Interpolation flap.  The donor area for the pedicle flap was then injected with anesthesia.  The flap was excised through the skin and subcutaneous tissue down to the layer of the underlying musculature.  The interpolation flap was carefully excised within this deep plane to maintain its blood supply.  The edges of the donor site were undermined.   The donor site was closed in a primary fashion.  The pedicle was then rotated into position and sutured.  Once the tube was sutured into place, adequate blood supply was confirmed with blanching and refill.  The pedicle was then wrapped with xeroform gauze and dressed appropriately with a telfa and gauze bandage to ensure continued blood supply and protect the attached pedicle. Split-Thickness Skin Graft Text: The defect edges were debeveled with a #15 scalpel blade.  Given the location of the defect, shape of the defect and the proximity to free margins a split thickness skin graft was deemed most appropriate.  Using a sterile surgical marker, the primary defect shape was transferred to the donor site. The split thickness graft was then harvested.  The skin graft was then placed in the primary defect and oriented appropriately.

## 2023-10-07 NOTE — CONSULT NOTE ADULT - SUBJECTIVE AND OBJECTIVE BOX
NEPHROLOGY CONSULTATION    CHIEF COMPLAINT:  LANA      HPI:  Presents with weakness, hematuria and noted to be in severe renal failure with hyperkalemia.  Had CT performed earlier in the week which showed severe bilateral hydronephrosis.  Denies any prior hx of CKD or HTN.  Only takes a statin and low dose lasix.        ROS:  gross hematuria      PAST MEDICAL & SURGICAL HISTORY:  DVT (deep venous thrombosis)      No significant past surgical history      Social History:  NA      FAMILY HISTORY:  NA    Home Medications:  lasix 20 mg BID  simvastatin      MEDICATIONS  (STANDING):      PHYSICAL EXAMINATION:    Conversant, no apparent distress  PERRLA, pink conjunctivae, no ptosis  Good dentition, no pharyngeal erythema  Neck non tender, no mass, no thyromegaly or nodules  Normal respiratory effort, lungs clear to auscultation  Heart with RRR, no murmurs or rubs, 1-2+ peripheral edema  Abdomen soft, no masses, no organomegaly  Skin no rashes, ulcers or lesions, normal turgor and temperature  Appropriate affect, AO x 3    LABS:                        9.7    19.00 )-----------( 280      ( 07 Oct 2023 15:01 )             30.7     10-07    136  |  105  |  77<H>  ----------------------------<  103<H>  8.7<HH>   |  22  |  7.84<H>    Ca    9.8      07 Oct 2023 16:10    TPro  7.5  /  Alb  2.6<L>  /  TBili  0.3  /  DBili  x   /  AST  19  /  ALT  18  /  AlkPhos  104  10-07    Urinalysis Basic - ( 07 Oct 2023 16:10 )    Color: x / Appearance: x / SG: x / pH: x  Gluc: 103 mg/dL / Ketone: x  / Bili: x / Urobili: x   Blood: x / Protein: x / Nitrite: x   Leuk Esterase: x / RBC: x / WBC x   Sq Epi: x / Non Sq Epi: x / Bacteria: x        RADIOLOGY  CT scan personally reviewed - preserved renal sizes, severe bilateral hydro, collapsed, very thick walled bladder concerning for urothelial cancer    CARDIOLOGY  EKG personally reviewed shows sinus bradycardia at 58 with no ST/T wave changes        ASSESSMENT:  1.  LANA - due to post renal azotemia - suspect bilateral upper tract obstruction possibly due to bladder cancer  2.  Severe hyperkalemia due to above    PLAN:  Explained rationale, risks/benefits of hemodialysis with patient and her daughters and informed consent was obtained  Will dialyze 2.5 hrs tonight against 0K bath x 1 hour  Urology consult;  will need percutaneous nephrostomies         NEPHROLOGY CONSULTATION    CHIEF COMPLAINT:  LANA      HPI:  Presents with weakness, hematuria and noted to be in severe renal failure with hyperkalemia.  Had CT performed earlier in the week which showed severe bilateral hydronephrosis.  Denies any prior hx of CKD or HTN.  Only takes a statin and low dose lasix.        ROS:  gross hematuria      PAST MEDICAL & SURGICAL HISTORY:  DVT (deep venous thrombosis)      No significant past surgical history      Social History:  NA      FAMILY HISTORY:  NA    Home Medications:  lasix 20 mg BID  simvastatin      MEDICATIONS  (STANDING):      PHYSICAL EXAMINATION:    Conversant, no apparent distress  PERRLA, pink conjunctivae, no ptosis  Good dentition, no pharyngeal erythema  Neck non tender, no mass, no thyromegaly or nodules  Normal respiratory effort, lungs clear to auscultation  Heart with RRR, no murmurs or rubs, 1-2+ peripheral edema  Abdomen soft, no masses, no organomegaly  Skin no rashes, ulcers or lesions, normal turgor and temperature  Appropriate affect, AO x 3    LABS:                        9.7    19.00 )-----------( 280      ( 07 Oct 2023 15:01 )             30.7     10-07    136  |  105  |  77<H>  ----------------------------<  103<H>  8.7<HH>   |  22  |  7.84<H>    Ca    9.8      07 Oct 2023 16:10    TPro  7.5  /  Alb  2.6<L>  /  TBili  0.3  /  DBili  x   /  AST  19  /  ALT  18  /  AlkPhos  104  10-07    Urinalysis Basic - ( 07 Oct 2023 16:10 )    Color: x / Appearance: x / SG: x / pH: x  Gluc: 103 mg/dL / Ketone: x  / Bili: x / Urobili: x   Blood: x / Protein: x / Nitrite: x   Leuk Esterase: x / RBC: x / WBC x   Sq Epi: x / Non Sq Epi: x / Bacteria: x        RADIOLOGY  CT scan personally reviewed - preserved renal sizes, severe bilateral hydro, collapsed, very thick walled bladder concerning for urothelial cancer    CARDIOLOGY  EKG personally reviewed shows sinus bradycardia at 58 with no ST/T wave changes        ASSESSMENT:  1.  LANA - due to post renal azotemia - suspect bilateral upper tract obstruction possibly due to bladder cancer  2.  Severe hyperkalemia due to above    PLAN:  Ricks placed with scant bloody urine  Treat hyperkalemia with IV calcium gluconate, insulin/D50  Explained rationale, risks/benefits of hemodialysis with patient and her daughters and informed consent was obtained  Will dialyze 2.5 hrs tonight against 0K bath x 1 hour  Urology consult;  will need percutaneous nephrostomies

## 2023-10-07 NOTE — PROVIDER CONTACT NOTE (EICU) - ASSESSMENT
Patient to be seen by TeleICU team using two-way TeleHealth real-time HIPAA-compliant AV technology once in ICU bed

## 2023-10-07 NOTE — H&P ADULT - ASSESSMENT
Assessment: 73 yr old F with hx of HLD and ? HF on lasix presents to the ER with hematuria. Per family patient has had increasing edema of lower extremities, rigors, and worsening hematuria. Pt found to be hyperkalemic in acute renal failure with severe bilateral hydronephrosis. Admitted to ICU for emergent HD in the setting of ARF r/t suspected b/l upper tract obstruction ?bladder CA with electrolyte derangements.     Plan:   Neuro: pleasant at baseline mental status. Avoid sedative agents. Monitor for mental status changes due to uremia given severe renal failure   Resp: No active issues on RA. Maintain o2 sat > 90%.   Cardiac: Upon arrival to ER patient hypertensive but now currently hemodynamically stable. Denies hx of HTN. Maintain BP with MAP >65. Home statin. On home lasix will hold for now and assess post HD need for diuresis. No EKG changes despite severe electrolyte lab normalities.   GI: Diet advance as tolerates, may keep NPO overnight for possible transfer for IR in AM?   Renal: LANA - due to post renal azotemia - suspect bilateral upper tract obstruction possibly due to bladder cancer per Nephrology. Hyperkalemic cocktail x 2 with calcium & albuterol. Plan for emergent HD. Ricks placed with scant bloody urine, though suspect large obstruction will not get urine. Strict I&Os. Lytes as warranted. Avoid nephrotoxin agents. Renally dose meds. Pending Urology consult. May need transfer to tertiary center for IR in AM for perc nephrostomies.   Endo: FS q 6rh while NPO  ID: leukocytosis with rigors- treating empirically for UTI with zosyn. f/u with culture data  Heme: subq heparin dvt proph   Dispo: ICU. Plan of care discussed with eICU attending MD Carlton

## 2023-10-07 NOTE — PATIENT PROFILE ADULT - FALL HARM RISK - RISK INTERVENTIONS

## 2023-10-07 NOTE — ED ADULT NURSE NOTE - CHIEF COMPLAINT QUOTE
pt c/o hematuria, shivering x 1 week. pt c/o RLQ abdominal pain x 1 month ago. bilateral LE swelling noted. pt noncompliant with lasix.

## 2023-10-07 NOTE — H&P ADULT - NSHPPHYSICALEXAM_GEN_ALL_CORE
General: No acute distress. Chills & Rigors present  HEENT: Pupils equal and symmetrically reactive to light.  PULM: Clear to auscultation bilaterally.  CVS: Regular rate and rhythm, no murmurs, rubs, or gallops.  ABD: Soft, nondistended, no masses.  EXT: + edema   SKIN: Warm and well perfused, no rashes.

## 2023-10-07 NOTE — ED PROVIDER NOTE - PROGRESS NOTE DETAILS
findings noted including outpatinet CT showing possible cancer and new onset renal failure / hyperkalemia and this was all discussed with patient and family. renal and ICU consulted, will admit to ICU will admit to icu

## 2023-10-07 NOTE — ED ADULT TRIAGE NOTE - AS TEMP SITE
x2 attempts for residents to give sign out. Report given to Mary Kate MATHIS. Awaiting transport and callback from resident.    Parents informed of plan for admission. Patient is resting comfortably in stretcher with parents at bedside. VSS, no acute distress noted. Environment checked for safety. Call bell within reach. Purposeful rounding completed. Awaiting further plan per MD. Patient is resting comfortably in stretcher with parents at bedside. VSS, no acute distress noted. Environment checked for safety. Call bell within reach. Purposeful rounding completed. Awaiting inpatient bed. oral

## 2023-10-07 NOTE — PROVIDER CONTACT NOTE (EICU) - RECOMMENDATIONS
-admit to ICU for urgent HD for hyperkalemia, access to be placed by CC NP Titor  -patient likely to require transfer after HD for IR procedure for severe hydronephrosis  -plan discussed with CC NP Titor  -teleICU team to evaluate patient once in ICU and to monitor and provide support for patient and bedside team as needed

## 2023-10-07 NOTE — PATIENT PROFILE ADULT - FALL HARM RISK - FALLEN IN PAST
[FreeTextEntry1] : 54 yo F s/p panniculectomy abdominoplasty with hernia repair, liposuction DOS: 08/21/19. \par \par Doing well; incision is CDI no sign of infection drains are functioning well serosang\par - Right abdominal drain removed w/o any issues\par - Continue binder\par - Recommended icing\par - Percocet refilled\par - Pt may shower normally\par - Cont restrictions\par - F/u 1 week
Accidental fall

## 2023-10-07 NOTE — H&P ADULT - HISTORY OF PRESENT ILLNESS
HPI: 73 yr old F with hx of HLD and ? HF on lasix presents to the ER with hematuria. Per family patient has had increasing edema of lower extremities and worsening hematuria. Last night she had rigors but no fever and was very weak. Patient had outpatient imaging done earlier this week, but did not know results.     In ER, patient hyperkalemic to 8.7, in acute renal failure with Cr 7.89. CT showed performed earlier in the week which showed severe bilateral hydronephrosis.  Denies any prior hx of CKD or HTN.

## 2023-10-07 NOTE — ED ADULT NURSE NOTE - OBJECTIVE STATEMENT
patient alert and oriented x4, came in for hematuria. pt daughters at bedside state that for the past week pt has been having hematuria associated with shivering and abdominal pain. abdominal pain RLQ has been present for the past 1x month. upon assessment pt has bilateral leg swelling, in no acute respiratory distress or discomfort at time of arrival. pt placed on continuos cardiac monitor and pulse ox, 2 IV placed. pt denies chest pain, SOB, dizziness, nausea, vomiting.

## 2023-10-07 NOTE — H&P ADULT - NS ATTEND AMEND GEN_ALL_CORE FT
Reviewed hx and findings with patient who was consented again and understood rationale and expectations of what we might find during surgery.  Aware that I will unlikely be able to see that rt UO during surgery.    Consent obtained and she demonstrated understanding of all of the above.  HgB 9.0  Cr 1.17

## 2023-10-08 NOTE — PROGRESS NOTE ADULT - ASSESSMENT
73 yr old F with hx of HLD and ? HF on lasix presents to the ER with hematuria. Per family patient has had increasing edema of lower extremities, rigors, and worsening hematuria. Pt found to be hyperkalemic in acute renal failure with severe bilateral hydronephrosis. Admitted to ICU for emergent HD in the setting of ARF r/t suspected b/l upper tract obstruction ?bladder CA with electrolyte derangements.     Plan:   neuro: awake and alert.  pulm: stable.  CV: Stable  GI: normal renal diet  Renal: Trend potassium. s/p HD x 1 session. Will reeval for repeat session today.  : Needs further evaluation for b/l hydro. PCN's likely required. IR eval here tomorrow  PPX: GI/DVT  Likely downgradeable.

## 2023-10-08 NOTE — PROGRESS NOTE ADULT - SUBJECTIVE AND OBJECTIVE BOX
INTERVAL HPI/OVERNIGHT EVENTS:   HPI:  HPI: 73 yr old F with hx of HLD and ? HF on lasix presents to the ER with hematuria. Per family patient has had increasing edema of lower extremities and worsening hematuria. Last night she had rigors but no fever and was very weak. Patient had outpatient imaging done earlier this week, but did not know results.     In ER, patient hyperkalemic to 8.7, in acute renal failure with Cr 7.89. CT showed performed earlier in the week which showed severe bilateral hydronephrosis.  Denies any prior hx of CKD or HTN. (07 Oct 2023 20:11)      CENTRAL LINE: [ ] YES [ ] NO  LOCATION:   DATE INSERTED:  REMOVE: [ ] YES [ ] NO  EXPLAIN:    HILL: [ ] YES [ ] NO    DATE INSERTED:  REMOVE:  [ ] YES [ ] NO  EXPLAIN:    A-LINE:  [ ] YES [ ] NO  LOCATION:   DATE INSERTED:  REMOVE:  [ ] YES [ ] NO  EXPLAIN:    PAST MEDICAL & SURGICAL HISTORY:  DVT (deep venous thrombosis)      No significant past surgical history          REVIEW OF SYSTEMS:    Negative ROS aside from HPI/Interval events above.    ICU Vital Signs Last 24 Hrs  T(C): 36.2 (08 Oct 2023 05:45), Max: 36.9 (08 Oct 2023 04:00)  T(F): 97.2 (08 Oct 2023 05:45), Max: 98.5 (08 Oct 2023 04:00)  HR: 69 (08 Oct 2023 08:00) (62 - 77)  BP: 130/77 (08 Oct 2023 08:00) (124/70 - 202/95)  BP(mean): 94 (08 Oct 2023 08:00) (86 - 112)  ABP: --  ABP(mean): --  RR: 8 (08 Oct 2023 08:00) (8 - 29)  SpO2: 99% (08 Oct 2023 08:00) (96% - 100%)    O2 Parameters below as of 08 Oct 2023 02:00  Patient On (Oxygen Delivery Method): room air                I&O's Detail    07 Oct 2023 07:01  -  08 Oct 2023 07:00  --------------------------------------------------------  IN:    IV PiggyBack: 100 mL    Oral Fluid: 100 mL    Other (mL): 1000 mL  Total IN: 1200 mL    OUT:    Indwelling Catheter - Urethral (mL): 28 mL    Other (mL): 1000 mL  Total OUT: 1028 mL    Total NET: 172 mL      08 Oct 2023 07:01  -  08 Oct 2023 10:47  --------------------------------------------------------  IN:  Total IN: 0 mL    OUT:    Indwelling Catheter - Urethral (mL): 0 mL  Total OUT: 0 mL    Total NET: 0 mL            CAPILLARY BLOOD GLUCOSE      POCT Blood Glucose.: 108 mg/dL (08 Oct 2023 07:14)  POCT Blood Glucose.: 95 mg/dL (07 Oct 2023 16:03)        PHYSICAL EXAM:    General: No acute distress. Chills & Rigors present  HEENT: Pupils equal and symmetrically reactive to light.  PULM: Clear to auscultation bilaterally.  CVS: Regular rate and rhythm, no murmurs, rubs, or gallops.  ABD: Soft, nondistended, no masses.  EXT: + edema   SKIN: Warm and well perfused, no rashes.      LABS:                        8.2    13.57 )-----------( 216      ( 08 Oct 2023 03:45 )             24.2      10-08    134<L>  |  101  |  42<H>  ----------------------------<  125<H>  5.2   |  26  |  5.03<H>    Ca    8.1<L>      08 Oct 2023 03:45  Phos  3.2     10-08  Mg     2.3     10-08    TPro  5.9<L>  /  Alb  1.8<L>  /  TBili  0.3  /  DBili  x   /  AST  17  /  ALT  13  /  AlkPhos  81  10-08    PT/INR - ( 08 Oct 2023 03:45 )   PT: 12.2 sec;   INR: 1.02 ratio         PTT - ( 08 Oct 2023 03:45 )  PTT:30.8 sec  Urinalysis Basic - ( 08 Oct 2023 03:45 )    Color: x / Appearance: x / SG: x / pH: x  Gluc: 125 mg/dL / Ketone: x  / Bili: x / Urobili: x   Blood: x / Protein: x / Nitrite: x   Leuk Esterase: x / RBC: x / WBC x   Sq Epi: x / Non Sq Epi: x / Bacteria: x

## 2023-10-08 NOTE — CONSULT NOTE ADULT - SUBJECTIVE AND OBJECTIVE BOX
Chief Complaint:  Patient is a 73y old  Female who presents with a chief complaint of Acute Renal Failure/Hyperkalemia (08 Oct 2023 13:56)      HPI:  HPI: 73 yr old F with hx of HLD and ? HF on lasix presents to the ER with hematuria. Per family patient has had increasing edema of lower extremities and worsening hematuria. Last night she had rigors but no fever and was very weak. Patient had outpatient imaging done earlier this week, but did not know results.     In ER, patient hyperkalemic to 8.7, in acute renal failure with Cr 7.89. CT showed performed earlier in the week which showed severe bilateral hydronephrosis.  Denies any prior hx of CKD or HTN. (07 Oct 2023 20:11)      PMH/PSH:PAST MEDICAL & SURGICAL HISTORY:  DVT (deep venous thrombosis)      No significant past surgical history          Allergies:  No Known Allergies      Medications:  acetaminophen     Tablet .. 650 milliGRAM(s) Oral every 6 hours PRN  chlorhexidine 2% Cloths 1 Application(s) Topical <User Schedule>  heparin   Injectable 5000 Unit(s) SubCutaneous every 8 hours  hydrALAZINE Injectable 10 milliGRAM(s) IV Push every 4 hours PRN  influenza  Vaccine (HIGH DOSE) 0.7 milliLiter(s) IntraMuscular once  piperacillin/tazobactam IVPB.. 3.375 Gram(s) IV Intermittent every 12 hours      Review of Systems:    Negative exept for HPI    Relevant Family History:   FAMILY HISTORY:      Relevant Social History: Alcohol ( -) , Tobacco ( -) , Illicit drugs (- )     Physical Exam:    Vital Signs:  Vital Signs Last 24 Hrs  T(C): 38.6 (08 Oct 2023 18:00), Max: 38.6 (08 Oct 2023 18:00)  T(F): 101.5 (08 Oct 2023 18:00), Max: 101.5 (08 Oct 2023 18:00)  HR: 90 (08 Oct 2023 18:00) (64 - 96)  BP: 129/80 (08 Oct 2023 18:00) (113/68 - 166/90)  BP(mean): 93 (08 Oct 2023 18:00) (81 - 112)  RR: 21 (08 Oct 2023 18:00) (0 - 33)  SpO2: 98% (08 Oct 2023 18:00) (95% - 100%)    Parameters below as of 08 Oct 2023 02:00  Patient On (Oxygen Delivery Method): room air      Daily     Daily Weight in k (08 Oct 2023 02:00)    General:  Appears stated age, well-groomed, well-nourished, no distress  HEENT:  NC/AT,  conjunctivae clear and pink, no thyromegaly, nodules, adenopathy, no JVD, anicteric sclera  Chest:  Full & symmetric excursion, no increased effort, breath sounds clear  Cardiovascular:  Regular rhythm, S1, S2, no murmur/rub/S3/S4, no abdominal bruit, no edema  Abdomen:  Soft, non tender, non distended, normoactive bowel sounds,  no masses , no hepatosplenomegaly, no signs of chronic liver disease  Extremities:  no cyanosis, clubbing or edema  Skin:  No rash/erythema/ecchymoses/petechiae/wounds/abscess/warm/dry  Neuro/Psych:  Alert, oriented, no asterixis, no tremor, no encephalopathy  : No CVA or SP tenderness, adequate meatus, bladder nonpalpable    Laboratory:                          8.2    13.57 )-----------( 216      ( 08 Oct 2023 03:45 )             24.2     10-08    133<L>  |  100  |  47<H>  ----------------------------<  128<H>  6.0<H>   |  25  |  5.86<H>    Ca    7.8<L>      08 Oct 2023 14:15  Phos  3.8     10-08  Mg     2.3     10-08    TPro  5.9<L>  /  Alb  1.8<L>  /  TBili  0.3  /  DBili  x   /  AST  17  /  ALT  13  /  AlkPhos  81  10-08    LIVER FUNCTIONS - ( 08 Oct 2023 03:45 )  Alb: 1.8 g/dL / Pro: 5.9 gm/dL / ALK PHOS: 81 U/L / ALT: 13 U/L / AST: 17 U/L / GGT: x           PT/INR - ( 08 Oct 2023 03:45 )   PT: 12.2 sec;   INR: 1.02 ratio         PTT - ( 08 Oct 2023 03:45 )  PTT:30.8 sec  Urinalysis Basic - ( 08 Oct 2023 14:15 )    Color: x / Appearance: x / SG: x / pH: x  Gluc: 128 mg/dL / Ketone: x  / Bili: x / Urobili: x   Blood: x / Protein: x / Nitrite: x   Leuk Esterase: x / RBC: x / WBC x   Sq Epi: x / Non Sq Epi: x / Bacteria: x          Intake and Output    10-07-23 @ 07:01  -  10-08-23 @ 07:00  --------------------------------------------------------  IN: 1200 mL / OUT: 1028 mL / NET: 172 mL    10-08-23 @ 07:01  -  10-08-23 @ 19:18  --------------------------------------------------------  IN: 100 mL / OUT: 10 mL / NET: 90 mL        Imaging:

## 2023-10-08 NOTE — CONSULT NOTE ADULT - ASSESSMENT
73 yr old F with Bilateral hydronephrosis with bladder Lesion, likely bladder outlet obstruction, on HD for ARF  hx of HLD and HF on lasix     - IR consult for percutaneous nephrostomy tube placement   - When medically stable with Need TURB  - Discussed with Dr Catherine 73 yr old F with Bilateral hydronephrosis with bladder Lesion, likely bladder outlet obstruction, on HD for ARF. initially presented with Hematuria  hx of HLD and HF on lasix     - IR consult for percutaneous nephrostomy tube placement   - When medically stable with Need TURB  - Discussed with Dr Catherine

## 2023-10-08 NOTE — PROGRESS NOTE ADULT - SUBJECTIVE AND OBJECTIVE BOX
NEPHROLOGY PROGRESS NOTE    CHIEF COMPLAINT:  LANA    HPI:  Feels well.  No urine output.    EXAM:  Vital Signs Last 24 Hrs  T(C): 37.8 (08 Oct 2023 11:00), Max: 37.8 (08 Oct 2023 11:00)  T(F): 100 (08 Oct 2023 11:00), Max: 100 (08 Oct 2023 11:00)  HR: 84 (08 Oct 2023 11:00) (62 - 96)  BP: 113/68 (08 Oct 2023 11:00) (113/68 - 169/81)  BP(mean): 81 (08 Oct 2023 11:) (81 - 112)  RR: 25 (08 Oct 2023 11:) (8 - 29)  SpO2: 98% (08 Oct 2023 11:) (95% - 100%)    Parameters below as of 08 Oct 2023 02:00  Patient On (Oxygen Delivery Method): room air      I&O's Summary    07 Oct 2023 07:01  -  08 Oct 2023 07:00  --------------------------------------------------------  IN: 1200 mL / OUT: 1028 mL / NET: 172 mL    08 Oct 2023 07:01  -  08 Oct 2023 13:56  --------------------------------------------------------  IN: 100 mL / OUT: 0 mL / NET: 100 mL      Daily     Daily Weight in k (08 Oct 2023 02:00)    Conversant, in no apparent distress  Normal respiratory effort, lungs clear bilaterally  Heart RRR with no murmur, no peripheral edema    LABS                        8.2    13.57 )-----------( 216      ( 08 Oct 2023 03:45 )             24.2     10-08    134<L>  |  101  |  42<H>  ----------------------------<  125<H>  5.2   |  26  |  5.03<H>    Ca    8.1<L>      08 Oct 2023 03:45  Phos  3.2     10-  Mg     2.3     10-    TPro  5.9<L>  /  Alb  1.8<L>  /  TBili  0.3  /  DBili  x   /  AST  17  /  ALT  13  /  AlkPhos  81  10-08        ASSESSMENT:  1.  LANA - due to post renal azotemia - suspect bilateral upper tract obstruction possibly due to bladder cancer  2.  Severe hyperkalemia due to above, resolved after HD last night    PLAN:  Resume dialysis in AM  Percutaneous nephrostomies pending

## 2023-10-09 NOTE — CONSULT NOTE ADULT - SUBJECTIVE AND OBJECTIVE BOX
Interventional Radiology    Evaluate for Procedure: bilateral PCNs    HPI: 73y Female with hx of HLD and HF on lasix, presenting with hematuria. Pt found to be hyperkalemic in acute renal failure with severe bilateral hydronephrosis. Admitted to ICU for emergent HD in the setting of ARF r/t suspected b/l upper tract obstruction ?bladder CA with electrolyte derangements. IR consulted for bilateral PCNs.       Allergies: No Known Allergies    Medications (Abx/Cardiac/Anticoagulation/Blood Products)  heparin   Injectable: 5000 Unit(s) SubCutaneous (10-08 @ 22:57)  piperacillin/tazobactam IVPB.: 200 mL/Hr IV Intermittent (10-07 @ 19:33)  piperacillin/tazobactam IVPB.-: 25 mL/Hr IV Intermittent (10-08 @ 03:36)  piperacillin/tazobactam IVPB..: 25 mL/Hr IV Intermittent (10-08 @ 23:00)    Data:    T(C): 37.2  HR: 70  BP: 130/89  RR: 18  SpO2: 94%    -WBC 13.67 / HgB 8.0 / Hct 24.4 / Plt 228  -Na 133 / Cl 100 / BUN 55 / Glucose 124  -K 5.3 / CO2 25 / Cr 6.91  -ALT 13 / Alk Phos 97 / T.Bili 0.2  -INR 1.02 / PTT 30.8    Radiology: Reviewed with Dr. Halaibeh      FINDINGS:    LOWER CHEST: Within normal limits.    LIVER: Question cirrhotic liver. The abnormality on the ultrasound may correspond to focal fat deposition by the falciform ligament.  BILE DUCTS: Normal caliber.  GALLBLADDER: Within normal limits.  SPLEEN: Within normal limits.  PANCREAS: Within normal limits.  ADRENALS: Within normal limits.  KIDNEYS/URETERS: There is severe bilateral hydronephrosis with delayed nephrogram. There is minimal excretion of contrast into the collecting systems. Right renal cyst.    BLADDER: Masslike thickening involving the posterior bladder.  REPRODUCTIVE ORGANS: Hysterectomy. No adnexal mass.    BOWEL: No bowel obstruction. The appendix is normal. Masslike thickening of the ascending colon.  PERITONEUM: No ascites.  VESSELS: Within normal limits.  RETROPERITONEUM/LYMPH NODES: No lymphadenopathy.  ABDOMINAL WALL: Within normal limits.  BONES: Within normal limits.    IMPRESSION: Masslike thickening involving the posterior bladder suspicious for neoplasm. Severe bilateral hydronephrosis.    Masslike thickening of the ascending colon is indeterminate; recommend colonoscopy.    Question cirrhotic liver.    Assessment/Plan:   -73y Female with hx of HLD and HF on lasix, presenting with hematuria. Pt found to be hyperkalemic in acute renal failure with severe bilateral hydronephrosis. Admitted to ICU for emergent HD in the setting of ARF r/t suspected b/l upper tract obstruction ?bladder CA with electrolyte derangements. IR consulted for bilateral PCNs.       - Case and imaging reviewed with Dr. Halaibeh  - Will plan for bilateral percutaneous nephrostomy placement today. Awaiting anesthesia availability.   - IR procedure order placed  - STAT labs in AM (cbc,coags, bmp, T&S)  - hold heparin today  - NPO  - d/w primary team

## 2023-10-09 NOTE — PROGRESS NOTE ADULT - SUBJECTIVE AND OBJECTIVE BOX
Patient seen and examined bedside resting comfortably.  complaining of suprapubic pressure/urge to urinate. has a alvarenga catheter  Denies nausea, vomiting, diarrhea, fevers, chills.         T(F): 99 (10-09-23 @ 04:00), Max: 101.5 (10-08-23 @ 18:00)  HR: 73 (10-09-23 @ 10:00) (70 - 94)  BP: 146/80 (10-09-23 @ 10:00) (113/69 - 148/91)  RR: 19 (10-09-23 @ 10:00) (0 - 33)  SpO2: 94% (10-09-23 @ 10:00) (93% - 100%)  Wt(kg): --  CAPILLARY BLOOD GLUCOSE          PHYSICAL EXAM:  General: NAD  Neuro:  Alert & oriented x 3  HEENT: NCAT, EOMI, conjunctiva clear  CV: +S1+S2 regular rate and rhythm  Lung:respirations nonlabored, good inspiratory effort  Abdomen: soft, nondistended, nontender  : mild suprapubic tenderness. minimal blood tinged urinary output 47 cc. RN at bedside to irrigate alvarenga  Extremities: no pedal edema or calf tenderness noted     LABS:                        8.0    13.67 )-----------( 228      ( 09 Oct 2023 04:00 )             24.4     10-09    133<L>  |  100  |  55<H>  ----------------------------<  124<H>  5.3   |  25  |  6.91<H>    Ca    7.9<L>      09 Oct 2023 04:00  Phos  5.2     10-09  Mg     2.3     10-09    TPro  5.7<L>  /  Alb  1.7<L>  /  TBili  0.2  /  DBili  x   /  AST  14<L>  /  ALT  13  /  AlkPhos  97  10-09    PT/INR - ( 08 Oct 2023 03:45 )   PT: 12.2 sec;   INR: 1.02 ratio         PTT - ( 08 Oct 2023 03:45 )  PTT:30.8 sec    I&O:     Culture Results:   No growth at 24 hours (10-07 @ 21:45)  Culture Results:   No growth at 24 hours (10-07 @ 21:30)  Culture Results:   <10,000 CFU/mL Normal Urogenital Theodora (10-07 @ 20:30)      A/P: 74 yo F presented with hematuria found to have bilateral hydronephrosis with bladder lesion, likely bladder outlet obstruction, on HD for ARF.   leukocytosis downtrending. Ucx normal urogenital theodora. blood cx pending.  -appreciate IR consult, plan for bilateral PCN placement today  -c/w zosyn   -c/w alvarenga care, monitor output. RN to irrigate   -will eventually need TURB when stable   -case discussed with urologist Dr Catherine

## 2023-10-09 NOTE — DIETITIAN INITIAL EVALUATION ADULT - PERTINENT MEDS FT
MEDICATIONS  (STANDING):  chlorhexidine 2% Cloths 1 Application(s) Topical <User Schedule>  heparin   Injectable 5000 Unit(s) SubCutaneous every 8 hours  influenza  Vaccine (HIGH DOSE) 0.7 milliLiter(s) IntraMuscular once  piperacillin/tazobactam IVPB.. 3.375 Gram(s) IV Intermittent every 12 hours    MEDICATIONS  (PRN):  acetaminophen     Tablet .. 650 milliGRAM(s) Oral every 6 hours PRN Temp greater or equal to 38C (100.4F)  hydrALAZINE Injectable 10 milliGRAM(s) IV Push every 4 hours PRN SBP > 180

## 2023-10-09 NOTE — PROGRESS NOTE ADULT - SUBJECTIVE AND OBJECTIVE BOX
HPI:  Pt is a 74 yo F with h/o HLD and ? HF on Lasix presents to the ER with hematuria. Per family pt has had increasing edema of lower extremities, rigors, and worsening hematuria. Pt found to be hyperkalemic in LANA with severe bilateral hydronephrosis possibly due to bladder cancer. Pt admitted to the ICU for emergent HD. Today pt is s/p placement of a left NUS and right nephrostomy tube.    ## Labs:  CBC:                        8.0    13.67 )-----------( 228      ( 09 Oct 2023 04:00 )             24.4     Chem:  10-09    135  |  101  |  57<H>  ----------------------------<  87  5.3   |  24  |  7.23<H>    Ca    8.7      09 Oct 2023 15:10  Phos  5.9     10-09  Mg     2.3     10-09    TPro  5.7<L>  /  Alb  1.7<L>  /  TBili  0.2  /  DBili  x   /  AST  14<L>  /  ALT  13  /  AlkPhos  97  10-09    Coags:  PT/INR - ( 08 Oct 2023 03:45 )   PT: 12.2 sec;   INR: 1.02 ratio         PTT - ( 08 Oct 2023 03:45 )  PTT:30.8 sec        ## Imaging:    ## Medications:  piperacillin/tazobactam IVPB.. 3.375 Gram(s) IV Intermittent every 12 hours    hydrALAZINE Injectable 10 milliGRAM(s) IV Push every 4 hours PRN        heparin   Injectable 5000 Unit(s) SubCutaneous every 8 hours      acetaminophen     Tablet .. 650 milliGRAM(s) Oral every 6 hours PRN      ## Vitals:  T(C): 36.7 (10-09-23 @ 20:00), Max: 37.2 (10-09-23 @ 04:00)  HR: 88 (10-09-23 @ 22:00) (65 - 92)  BP: 127/63 (10-09-23 @ 22:00) (118/76 - 148/91)  BP(mean): 81 (10-09-23 @ 22:00) (81 - 127)  RR: 15 (10-09-23 @ 22:00) (12 - 24)  SpO2: 97% (10-09-23 @ 22:00) (88% - 100%)  Wt(kg): --  Vent:   ABG:       10-08 @ 07:01  -  10-09 @ 07:00  --------------------------------------------------------  IN: 600 mL / OUT: 47 mL / NET: 553 mL    10-09 @ 07:01  -  10-09 @ 22:13  --------------------------------------------------------  IN: 100 mL / OUT: 5403 mL / NET: -5303 mL          ## P/E:  Gen: lying comfortably in bed in no apparent distress  Neck: R IJ HD  Lungs: CTA   Heart: RRR  Abd: Soft/+BS/ Non-tender  Ext: No edema  Neuro: AAO x3    CENTRAL LINE: [ ] YES [ ] NO  LOCATION:   DATE INSERTED:  REMOVE: [ ] YES [ ] NO      SERGIO: [ ] YES [ ] NO    DATE INSERTED:  REMOVE:  [ ] YES [ ] NO      A-LINE:  [ ] YES [ ] NO  LOCATION:   DATE INSERTED:  REMOVE:  [ ] YES [ ] NO  EXPLAIN:    CODE STATUS: [x ] full code  [ ] DNR  [ ] DNI  [ ] [facility]  Goals of care discussion: [ ] yes

## 2023-10-09 NOTE — PROGRESS NOTE ADULT - ASSESSMENT
Pt is a 72 yo F with h/o HLD and ? HF on Lasix presents to the ER with hematuria. Per family pt has had increasing edema of lower extremities, rigors, and worsening hematuria. Pt found to be hyperkalemic in LANA with severe bilateral hydronephrosis possibly due to bladder cancer. Pt admitted to the ICU for emergent HD. Today pt is s/p placement of a left NUS and right nephrostomy tube.    ID: Cont Zosyn for now  HEME: DVT prophylaxis with sq Heparin  FEN: s/p hyperkalemia/ Renal po diet  Renal: Follow BUN/Cr and UO/ F/u as per Renal and Uro  Social: Possible transfer to [facility] tomorrow

## 2023-10-09 NOTE — PROGRESS NOTE ADULT - SUBJECTIVE AND OBJECTIVE BOX
[facility] NEPHROLOG[facility], [facility]  NEPHROLOGY AND HYPERTENSION  [address]  [address]  [place] [address]  [phone]    MD JERMAN EDWARDS, MD ELLEN CHERRY MD CHRISTOPHER CAPUTO, MD SAY BRAN MD          Patient events noted  Post Right PCN and left NUS  Increasing urine output    MEDICATIONS  (STANDING):  chlorhexidine 2% Cloths 1 Application(s) Topical <User Schedule>  heparin   Injectable 5000 Unit(s) SubCutaneous every 8 hours  influenza  Vaccine (HIGH DOSE) 0.7 milliLiter(s) IntraMuscular once  piperacillin/tazobactam IVPB.. 3.375 Gram(s) IV Intermittent every 12 hours    MEDICATIONS  (PRN):  acetaminophen     Tablet .. 650 milliGRAM(s) Oral every 6 hours PRN Temp greater or equal to 38C (100.4F)  hydrALAZINE Injectable 10 milliGRAM(s) IV Push every 4 hours PRN SBP > 180      10-08-23 @ 07:01  -  10-09-23 @ 07:00  --------------------------------------------------------  IN: 600 mL / OUT: 47 mL / NET: 553 mL    10-09-23 @ 07:01  -  10-09-23 @ 17:08  --------------------------------------------------------  IN: 100 mL / OUT: 2405 mL / NET: -2305 mL      PHYSICAL EXAM:      T(C): 37.2 (10-09-23 @ 04:00), Max: 38.6 (10-08-23 @ 18:00)  HR: 71 (10-09-23 @ 17:00) (65 - 90)  BP: 134/85 (10-09-23 @ 17:00) (113/69 - 148/91)  RR: 21 (10-09-23 @ 17:00) (12 - 29)  SpO2: 92% (10-09-23 @ 17:00) (92% - 100%)  Wt(kg): --  Lungs clear  Heart S1S2  Abd soft NT ND  Extremities:   tr edema                                    8.0    13.67 )-----------( 228      ( 09 Oct 2023 04:00 )             24.4     10-09    135  |  101  |  57<H>  ----------------------------<  87  5.3   |  24  |  7.23<H>    Ca    8.7      09 Oct 2023 15:10  Phos  5.2     10-09  Mg     2.3     10-09    TPro  5.7<L>  /  Alb  1.7<L>  /  TBili  0.2  /  DBili  x   /  AST  14<L>  /  ALT  13  /  AlkPhos  97  10-09      LIVER FUNCTIONS - ( 09 Oct 2023 04:00 )  Alb: 1.7 g/dL / Pro: 5.7 gm/dL / ALK PHOS: 97 U/L / ALT: 13 U/L / AST: 14 U/L / GGT: x           Creatinine Trend: 7.23<--, 6.91<--, 6.27<--, 5.86<--, 5.03<--, 7.84<--          ASSESSMENT:  1.  LANA - due to post renal azotemia - suspect bilateral upper tract obstruction possibly due to bladder cancer  2.  Severe hyperkalemia due to above, resolved after HD last night  3. Post right PCN and left NUS  Increasing UO    PLAN:  Hold HD, trend Cr        Emanuel Francisco MD

## 2023-10-09 NOTE — DIETITIAN INITIAL EVALUATION ADULT - PERTINENT LABORATORY DATA
10-09    133<L>  |  100  |  55<H>  ----------------------------<  124<H>  5.3   |  25  |  6.91<H>    Ca    7.9<L>      09 Oct 2023 04:00  Phos  5.2     10-09  Mg     2.3     10-09    TPro  5.7<L>  /  Alb  1.7<L>  /  TBili  0.2  /  DBili  x   /  AST  14<L>  /  ALT  13  /  AlkPhos  97  10-09  A1C with Estimated Average Glucose Result: 5.9 % <H-pre-diabetic range>(10-08-23 @ 03:45)

## 2023-10-10 NOTE — PROGRESS NOTE ADULT - SUBJECTIVE AND OBJECTIVE BOX
Patient seen and  examined at bedside resting comfortably.   Offers no complaints, states she feels much better today.   PCNs in place, alvarenga catheter draining.   Denies fever, chills, N/V/D, CP, SOB.     Vital Signs Last 24 Hrs  T(F): 97.7 (10-10-23 @ 11:05), Max: 99.9 (10-10-23 @ 00:00)  HR: 82 (10-10-23 @ 12:00)  BP: 124/77 (10-10-23 @ 12:00)  RR: 24 (10-10-23 @ 12:00)  SpO2: 98% (10-10-23 @ 12:00)    PHYSICAL EXAM:  GENERAL: Alert, NAD  CHEST/LUNG: Clear to auscultation bilaterally, respirations nonlabored  HEART: Regular rate and rhythm; S1 & S2 appreciated  ABDOMEN: + Bowel sounds, soft, Nontender, Nondistended  : no suprapubic tenderness or distention. Alvarenga catheter with clear, yellow urine. B/l PCNs with blood tinged output   EXTREMITIES:  no calf tenderness, No edema    I&O's Detail    09 Oct 2023 07:01  -  10 Oct 2023 07:00  --------------------------------------------------------  IN:    IV PiggyBack: 200 mL    Oral Fluid: 750 mL  Total IN: 950 mL    OUT:    Indwelling Catheter - Urethral (mL): 1628 mL    Nephrostomy Tube (mL): 3025 mL    Nephrostomy Tube (mL): 2500 mL  Total OUT: 7153 mL    Total NET: -6203 mL      10 Oct 2023 07:01  -  10 Oct 2023 12:35  --------------------------------------------------------  IN:  Total IN: 0 mL    OUT:    Nephrostomy Tube (mL): 300 mL    Nephrostomy Tube (mL): 375 mL  Total OUT: 675 mL    Total NET: -675 mL          LABS:                        8.7    11.32 )-----------( 278      ( 10 Oct 2023 03:00 )             27.6     10-10    141  |  107  |  44<H>  ----------------------------<  147<H>  4.4   |  26  |  4.77<H>    Ca    8.6      10 Oct 2023 03:00  Phos  5.1     10-10  Mg     2.1     10-10    TPro  6.5  /  Alb  1.8<L>  /  TBili  0.2  /  DBili  x   /  AST  10<L>  /  ALT  11<L>  /  AlkPhos  99  10-10        RADIOLOGY & ADDITIONAL STUDIES:    A/P  72 yo F presented with hematuria found to have bilateral hydronephrosis with bladder lesion, likely bladder outlet obstruction, on HD for ARF.   leukocytosis downtrending. Ucx normal urogenital theodora. blood cx pending.  B/l PCNs placed by IR on 10/9  Leukocytosis and Cr improving     - continue abx   - PCN and alvarenga care, monitor output   - plan for repeat imaging (US) on Thursday  - eventual TURB when stable, will need cardio clearance   - continue care per primary team  - d/w urology attendings

## 2023-10-10 NOTE — CONSULT NOTE ADULT - ASSESSMENT
73 yr old F with hx of HLD and ? HF on lasix presents to the ER with hematuria. Found to be in acute renal failure, admitted to ICU for urgent HD.    #Malig w/u  -patient being w/u OP for hematuria  -CT A/P 10/3 done OP (not showing up anywhere but PACS) shpwing Masslike thickening involving the posterior bladder suspicious for neoplasm. Severe bilateral hydronephrosis, and Masslike thickening of the ascending colon is indeterminate; recommend colonoscopy.  -urology onboard patient s/p b/l nephrectomy plan for TURB when cleared by cardiology  -rec GI eval for possible colonoscopy   -pending TM  -will need tissue BX for definitive DX   -rec CT-C for further staging     #Normocytic anemia  -patient presenting w/ anemia hx of hematuria  -unknown baseline  -patient also w/ LANA, new onsent of RF   -TSH-NL  -pending anemia/ hemolysis w/u   -maintain hgb >7 or if symptomatic        Thank you for the referral. Will continue to monitor the patient.  Please call with any questions [phone]  Above reviewed with Attending Dr. Cardona   [facility]  [address] [facility], [address], [place], [place]  [phone]  *Note not finalized until signed by Attending Physician    73 yr old F with hx of HLD and ? HF on lasix presents to the ER with hematuria. Found to be in acute renal failure, admitted to ICU for urgent HD.    #Malig w/u  -patient being w/u OP for hematuria  -CT A/P 10/3 done OP (not showing up anywhere but PACS) shpwing Masslike thickening involving the posterior bladder suspicious for neoplasm. Severe bilateral hydronephrosis, and Masslike thickening of the ascending colon is indeterminate; recommend colonoscopy.  -urology onboard patient s/p b/l nephrectomy plan for TURB when cleared by cardiology  -rec GI eval for possible colonoscopy   -pending TM  -will need tissue BX for definitive DX   -rec CT-C for further staging     #Normocytic anemia  -patient presenting w/ anemia hx of hematuria  -unknown baseline  -patient also w/ LANA, new onsent of RF   -TSH-NL  -pending anemia/ hemolysis w/u   -maintain hgb >7 or if symptomatic        Thank you for the referral. Will continue to monitor the patient.  Please call with any questions [phone]  Above reviewed with Attending Dr. Cardona   [facility]  [address] [facility], [address], [place], [place]  [phone]  *Note not finalized until signed by Attending Physician    73 yr old F with hx of HLD and ? HF on lasix presents to the ER with hematuria. Found to be in acute renal failure, admitted to ICU for urgent HD.    #Malig w/u  -patient being w/u OP for hematuria  -CT A/P 10/3 done OP (not showing up anywhere but PACS) shpwing Masslike thickening involving the posterior bladder suspicious for neoplasm. Severe bilateral hydronephrosis, and Masslike thickening of the ascending colon is indeterminate; recommend colonoscopy.  -urology onboard patient s/p b/l nephrostomy plan for TURB when cleared by cardiology  -rec GI eval for possible colonoscopy   -pending TM  -will need tissue BX for definitive DX   -rec CT-C for further staging     #Normocytic anemia  -patient presenting w/ anemia hx of hematuria  -unknown baseline  -patient also w/ LANA, new onsent of RF   -TSH-NL  -pending anemia/ hemolysis w/u   -maintain hgb >7 or if symptomatic        Thank you for the referral. Will continue to monitor the patient.  Please call with any questions [phone]  Above reviewed with Attending Dr. Cardona   [facility]  [address] [facility], [address], [place], [place]  [phone]  *Note not finalized until signed by Attending Physician

## 2023-10-10 NOTE — PROGRESS NOTE ADULT - ASSESSMENT
Pt is a 72 yo F with h/o HLD and ? HF on Lasix presents to the ER with hematuria. Per family pt has had increasing edema of lower extremities, rigors, and worsening hematuria. Pt found to be hyperkalemic in LANA with severe bilateral hydronephrosis possibly due to bladder cancer. Pt admitted to the ICU for emergent HD. Today pt is s/p placement of a left NUS and right nephrostomy tube.    ID: Cont Zosyn for now  HEME: DVT prophylaxis with sq Heparin  FEN: s/p hyperkalemia/ Renal po diet  Renal: Follow BUN/Cr and UO/ F/u as per Renal and Uro  Social: Possible transfer to [facility] tomorrow       Pt is a 74 yo F with h/o HLD and ? HF on Lasix presents to the ER with hematuria. Per family pt has had increasing edema of lower extremities, rigors, and worsening hematuria. Pt found to be hyperkalemic in LANA with severe bilateral hydronephrosis possibly due to bladder cancer. Pt admitted to the ICU for emergent HD. 10/9 pt is s/p placement of a left NUS and right nephrostomy tube.    ID: Cont Zosyn for a total of 5 days  HEME: DVT prophylaxis with sq Heparin/ Heme/Onc consult for malignancy work up  FEN: s/p hyperkalemia/ Renal po diet  Renal: Follow BUN/Cr and UO; since placement of left NUS and right nephrostomy tube Cr is decreasing/ May dc HD cath as per Renal/ F/u as per Uro  Social: May transfer to F/ OOB->chair

## 2023-10-10 NOTE — CHART NOTE - NSCHARTNOTEFT_GEN_A_CORE
MICU DOWN GRADE NOTE      Patient is a 73y old  Female who presents with a chief complaint of Acute Renal Failure/Hyperkalemia (10 Oct 2023 13:29)      HPI:  HPI: 73 yr old F with hx of HLD and ? HF on lasix presents to the ER with hematuria. Per family patient has had increasing edema of lower extremities and worsening hematuria. Last night she had rigors but no fever and was very weak. Patient had outpatient imaging done earlier this week, but did not know results. In ER, patient hyperkalemic to 8.7, in acute renal failure with Cr 7.89. CT showed performed earlier in the week which showed severe bilateral hydronephrosis possibly d/t bladder cancer. Pt admitted to the ICU for emergent HD. Pt is now s/p placement of a left NUS and right nephrostomy tube.      INTERVAL HPI/OVERNIGHT EVENTS:  Pt examined at bedside this morning. Pt is sitting upright in chair HD ana laura       REVIEW OF SYSTEMS:  CONSTITUTIONAL: No fever, chills  HEENT:  No blurry vision No sinus or throat pain  NECK: No pain or stiffness  RESPIRATORY: No cough, wheezing, chills or hemoptysis; No shortness of breath  CARDIOVASCULAR: No chest pain, palpitations  GASTROINTESTINAL: No abdominal pain. No nausea, vomiting, or diarrhea  GENITOURINARY: No dysuria  NEUROLOGICAL: No HA, No focal weakness  SKIN: No itching, burning, rashes, or lesions   MUSCULOSKELETAL: No joint pain or swelling; No muscle, back, or extremity pain    MEDICATIONS:  acetaminophen     Tablet .. 650 milliGRAM(s) Oral every 6 hours PRN  chlorhexidine 2% Cloths 1 Application(s) Topical <User Schedule>  heparin   Injectable 5000 Unit(s) SubCutaneous every 8 hours  hydrALAZINE Injectable 10 milliGRAM(s) IV Push every 4 hours PRN  influenza  Vaccine (HIGH DOSE) 0.7 milliLiter(s) IntraMuscular once  piperacillin/tazobactam IVPB.. 3.375 Gram(s) IV Intermittent every 12 hours      T(C): 36.5 (10-10-23 @ 11:05), Max: 37.7 (10-10-23 @ 00:00)  HR: 82 (10-10-23 @ 12:00) (65 - 92)  BP: 124/77 (10-10-23 @ 12:00) (110/90 - 145/61)  RR: 24 (10-10-23 @ 12:00) (15 - 27)  SpO2: 98% (10-10-23 @ 12:00) (88% - 99%)  Wt(kg): --Vital Signs Last 24 Hrs  T(C): 36.5 (10 Oct 2023 11:05), Max: 37.7 (10 Oct 2023 00:00)  T(F): 97.7 (10 Oct 2023 11:05), Max: 99.9 (10 Oct 2023 00:00)  HR: 82 (10 Oct 2023 12:00) (65 - 92)  BP: 124/77 (10 Oct 2023 12:00) (110/90 - 145/61)  BP(mean): 92 (10 Oct 2023 12:00) (81 - 127)  RR: 24 (10 Oct 2023 12:00) (15 - 27)  SpO2: 98% (10 Oct 2023 12:00) (88% - 99%)    Parameters below as of 10 Oct 2023 04:00  Patient On (Oxygen Delivery Method): room air        PHYSICAL EXAM:  GENERAL: NAD, well-groomed, well-developed  HEAD:  Atraumatic, Normocephalic  EYES: EOMI, PERRLA, conjunctiva and sclera clear  ENMT:  Moist mucous membranes  NECK: Supple, No JVD,  CHEST/LUNG: Clear to auscultation bilaterally; No rales, rhonchi, wheezing, or rubs  HEART: Regular rate and rhythm; No murmurs, rubs, or gallops  ABDOMEN: Soft, Nontender, Nondistended; Bowel sounds present  NEURO: Alert & Oriented X3  EXTREMITIES: No LE edema, no calf tenderness  LYMPH: No lymphadenopathy noted  SKIN: No rashes or lesions    Consultant(s) Notes Reviewed:  [x ] YES  [ ] NO  Care Discussed with Consultants/Other Providers [ x] YES  [ ] NO    LABS:                        8.7    11.32 )-----------( 278      ( 10 Oct 2023 03:00 )             27.6     10-10    141  |  107  |  44<H>  ----------------------------<  147<H>  4.4   |  26  |  4.77<H>    Ca    8.6      10 Oct 2023 03:00  Phos  5.1     10-10  Mg     2.1     10-10    TPro  6.5  /  Alb  1.8<L>  /  TBili  0.2  /  DBili  x   /  AST  10<L>  /  ALT  11<L>  /  AlkPhos  99  10-10      Urinalysis Basic - ( 10 Oct 2023 03:00 )    Color: x / Appearance: x / SG: x / pH: x  Gluc: 147 mg/dL / Ketone: x  / Bili: x / Urobili: x   Blood: x / Protein: x / Nitrite: x   Leuk Esterase: x / RBC: x / WBC x   Sq Epi: x / Non Sq Epi: x / Bacteria: x      CAPILLARY BLOOD GLUCOSE            Urinalysis Basic - ( 10 Oct 2023 03:00 )    Color: x / Appearance: x / SG: x / pH: x  Gluc: 147 mg/dL / Ketone: x  / Bili: x / Urobili: x   Blood: x / Protein: x / Nitrite: x   Leuk Esterase: x / RBC: x / WBC x   Sq Epi: x / Non Sq Epi: x / Bacteria: x        RADIOLOGY & ADDITIONAL TESTS:    Imaging Personally Reviewed:  [x ] YES  [ ] NO    To follow up: MICU DOWN GRADE NOTE      Patient is a 73y old  Female who presents with a chief complaint of Acute Renal Failure/Hyperkalemia (10 Oct 2023 13:29)      HPI:  HPI: 73 yr old F with hx of HLD and ? HF on lasix presents to the ER with hematuria. Per family patient has had increasing edema of lower extremities and worsening hematuria. Last night she had rigors but no fever and was very weak. Patient had outpatient imaging done earlier this week, but did not know results. In ER, patient hyperkalemic to 8.7, in acute renal failure with Cr 7.89. CT showed performed earlier in the week which showed severe bilateral hydronephrosis possibly d/t bladder cancer. Pt admitted to the ICU for emergent HD. Pt is now s/p placement of a left NUS and right nephrostomy tube.      INTERVAL HPI/OVERNIGHT EVENTS:  Pt examined at bedside this morning. Pt is sitting upright in chair comfortably, speaking in full sentences, with no complaints at this time. HD shiley removed.       REVIEW OF SYSTEMS:  CONSTITUTIONAL: No fever, chills  HEENT:  No blurry vision  NECK: No pain or stiffness  RESPIRATORY: No cough, wheezing, chills or hemoptysis; No shortness of breath  CARDIOVASCULAR: No chest pain  GASTROINTESTINAL: No abdominal pain. No nausea, vomiting, or diarrhea  GENITOURINARY: No dysuria  NEUROLOGICAL: No HA, No focal weakness  SKIN: No itching, burning, rashes, or lesions   MUSCULOSKELETAL: No joint pain or swelling; No muscle, back, or extremity pain    MEDICATIONS:  acetaminophen     Tablet .. 650 milliGRAM(s) Oral every 6 hours PRN  chlorhexidine 2% Cloths 1 Application(s) Topical <User Schedule>  heparin   Injectable 5000 Unit(s) SubCutaneous every 8 hours  hydrALAZINE Injectable 10 milliGRAM(s) IV Push every 4 hours PRN  influenza  Vaccine (HIGH DOSE) 0.7 milliLiter(s) IntraMuscular once  piperacillin/tazobactam IVPB.. 3.375 Gram(s) IV Intermittent every 12 hours      T(C): 36.5 (10-10-23 @ 11:05), Max: 37.7 (10-10-23 @ 00:00)  HR: 82 (10-10-23 @ 12:00) (65 - 92)  BP: 124/77 (10-10-23 @ 12:00) (110/90 - 145/61)  RR: 24 (10-10-23 @ 12:00) (15 - 27)  SpO2: 98% (10-10-23 @ 12:00) (88% - 99%)  Wt(kg): --Vital Signs Last 24 Hrs  T(C): 36.5 (10 Oct 2023 11:05), Max: 37.7 (10 Oct 2023 00:00)  T(F): 97.7 (10 Oct 2023 11:05), Max: 99.9 (10 Oct 2023 00:00)  HR: 82 (10 Oct 2023 12:00) (65 - 92)  BP: 124/77 (10 Oct 2023 12:00) (110/90 - 145/61)  BP(mean): 92 (10 Oct 2023 12:00) (81 - 127)  RR: 24 (10 Oct 2023 12:00) (15 - 27)  SpO2: 98% (10 Oct 2023 12:00) (88% - 99%)    Parameters below as of 10 Oct 2023 04:00  Patient On (Oxygen Delivery Method): room air        PHYSICAL EXAM:  GENERAL: Sitting comfortably in chair in no acute distress  HEAD:  Atraumatic  EYES: EOMI, PERRLA, conjunctiva and sclera clear  NECK: R IJ HD  CHEST/LUNG: Clear to auscultation bilaterally  HEART: Regular rate and rhythm  ABDOMEN: Soft, Nontender, Nondistended; Bowel sounds present  NEURO: Alert & Oriented X3  EXTREMITIES: No LE edema, no calf tenderness    Consultant(s) Notes Reviewed:  [x ] YES  [ ] NO  Care Discussed with Consultants/Other Providers [ x] YES  [ ] NO    LABS:                        8.7    11.32 )-----------( 278      ( 10 Oct 2023 03:00 )             27.6     10-10    141  |  107  |  44<H>  ----------------------------<  147<H>  4.4   |  26  |  4.77<H>    Ca    8.6      10 Oct 2023 03:00  Phos  5.1     10-10  Mg     2.1     10-10    TPro  6.5  /  Alb  1.8<L>  /  TBili  0.2  /  DBili  x   /  AST  10<L>  /  ALT  11<L>  /  AlkPhos  99  10-10      Urinalysis Basic - ( 10 Oct 2023 03:00 )    Color: x / Appearance: x / SG: x / pH: x  Gluc: 147 mg/dL / Ketone: x  / Bili: x / Urobili: x   Blood: x / Protein: x / Nitrite: x   Leuk Esterase: x / RBC: x / WBC x   Sq Epi: x / Non Sq Epi: x / Bacteria: x      CAPILLARY BLOOD GLUCOSE    Urinalysis Basic - ( 10 Oct 2023 03:00 )    Color: x / Appearance: x / SG: x / pH: x  Gluc: 147 mg/dL / Ketone: x  / Bili: x / Urobili: x   Blood: x / Protein: x / Nitrite: x   Leuk Esterase: x / RBC: x / WBC x   Sq Epi: x / Non Sq Epi: x / Bacteria: x      RADIOLOGY & ADDITIONAL TESTS:    Imaging Personally Reviewed:  [x ] YES  [ ] NO    To follow up:  Patient is hemodynamically stable at this time at her baseline. MICU DOWN GRADE NOTE      Patient is a 73y old  Female who presents with a chief complaint of Acute Renal Failure/Hyperkalemia (10 Oct 2023 13:29)      HPI:  HPI: 73 yr old F with hx of HLD and ? HF on lasix presents to the ER with hematuria. Per family patient has had increasing edema of lower extremities and worsening hematuria. Last night she had rigors but no fever and was very weak. Patient had outpatient imaging done earlier this week, but did not know results. In ER, patient hyperkalemic to 8.7, in acute renal failure with Cr 7.89. CT showed performed earlier in the week which showed severe bilateral hydronephrosis possibly d/t bladder cancer. Pt admitted to the ICU for emergent HD. Pt is now s/p placement of a left NUS and right nephrostomy tube.      INTERVAL HPI/OVERNIGHT EVENTS:  Pt examined at bedside this morning. Pt is sitting upright in chair comfortably, speaking in full sentences, with no complaints at this time. HD shiley removed.       REVIEW OF SYSTEMS:  CONSTITUTIONAL: No fever, chills  HEENT:  No blurry vision  NECK: No pain or stiffness  RESPIRATORY: No cough, wheezing, chills or hemoptysis; No shortness of breath  CARDIOVASCULAR: No chest pain  GASTROINTESTINAL: No abdominal pain. No nausea, vomiting, or diarrhea  GENITOURINARY: No dysuria  NEUROLOGICAL: No HA, No focal weakness  SKIN: No itching, burning, rashes, or lesions   MUSCULOSKELETAL: No joint pain or swelling; No muscle, back, or extremity pain    MEDICATIONS:  acetaminophen     Tablet .. 650 milliGRAM(s) Oral every 6 hours PRN  chlorhexidine 2% Cloths 1 Application(s) Topical <User Schedule>  heparin   Injectable 5000 Unit(s) SubCutaneous every 8 hours  hydrALAZINE Injectable 10 milliGRAM(s) IV Push every 4 hours PRN  influenza  Vaccine (HIGH DOSE) 0.7 milliLiter(s) IntraMuscular once  piperacillin/tazobactam IVPB.. 3.375 Gram(s) IV Intermittent every 12 hours      T(C): 36.5 (10-10-23 @ 11:05), Max: 37.7 (10-10-23 @ 00:00)  HR: 82 (10-10-23 @ 12:00) (65 - 92)  BP: 124/77 (10-10-23 @ 12:00) (110/90 - 145/61)  RR: 24 (10-10-23 @ 12:00) (15 - 27)  SpO2: 98% (10-10-23 @ 12:00) (88% - 99%)  Wt(kg): --Vital Signs Last 24 Hrs  T(C): 36.5 (10 Oct 2023 11:05), Max: 37.7 (10 Oct 2023 00:00)  T(F): 97.7 (10 Oct 2023 11:05), Max: 99.9 (10 Oct 2023 00:00)  HR: 82 (10 Oct 2023 12:00) (65 - 92)  BP: 124/77 (10 Oct 2023 12:00) (110/90 - 145/61)  BP(mean): 92 (10 Oct 2023 12:00) (81 - 127)  RR: 24 (10 Oct 2023 12:00) (15 - 27)  SpO2: 98% (10 Oct 2023 12:00) (88% - 99%)    Parameters below as of 10 Oct 2023 04:00  Patient On (Oxygen Delivery Method): room air        PHYSICAL EXAM:  GENERAL: Sitting comfortably in chair in no acute distress  HEAD:  Atraumatic  EYES: EOMI, PERRLA, conjunctiva and sclera clear  NECK: R IJ HD  CHEST/LUNG: Clear to auscultation bilaterally  HEART: Regular rate and rhythm  ABDOMEN: Soft, Nontender, Nondistended; Bowel sounds present  NEURO: Alert & Oriented X3  EXTREMITIES: No LE edema, no calf tenderness    Consultant(s) Notes Reviewed:  [x ] YES  [ ] NO  Care Discussed with Consultants/Other Providers [ x] YES  [ ] NO    LABS:                        8.7    11.32 )-----------( 278      ( 10 Oct 2023 03:00 )             27.6     10-10    141  |  107  |  44<H>  ----------------------------<  147<H>  4.4   |  26  |  4.77<H>    Ca    8.6      10 Oct 2023 03:00  Phos  5.1     10-10  Mg     2.1     10-10    TPro  6.5  /  Alb  1.8<L>  /  TBili  0.2  /  DBili  x   /  AST  10<L>  /  ALT  11<L>  /  AlkPhos  99  10-10      Urinalysis Basic - ( 10 Oct 2023 03:00 )    Color: x / Appearance: x / SG: x / pH: x  Gluc: 147 mg/dL / Ketone: x  / Bili: x / Urobili: x   Blood: x / Protein: x / Nitrite: x   Leuk Esterase: x / RBC: x / WBC x   Sq Epi: x / Non Sq Epi: x / Bacteria: x      CAPILLARY BLOOD GLUCOSE    Urinalysis Basic - ( 10 Oct 2023 03:00 )    Color: x / Appearance: x / SG: x / pH: x  Gluc: 147 mg/dL / Ketone: x  / Bili: x / Urobili: x   Blood: x / Protein: x / Nitrite: x   Leuk Esterase: x / RBC: x / WBC x   Sq Epi: x / Non Sq Epi: x / Bacteria: x      RADIOLOGY & ADDITIONAL TESTS:    Imaging Personally Reviewed:  [x ] YES  [ ] NO    To follow up:  73 yr old F with hx of HLD and ? HF on lasix presents to the ER with hematuria. In ER, patient hyperkalemic to 8.7, in acute renal failure with Cr 7.89. CT showed performed earlier in the week which showed severe bilateral hydronephrosis possibly d/t bladder cancer. Pt admitted to the ICU for emergent HD. Pt is now s/p placement of a left NUS and right nephrostomy tube. Pt is s/p 1 session of HD, hyperkalemia resolving. Pt is hemodynamically stable for medicine floor.     - Continue to trend potassium, s/p HD x1 session, nephorlogy following, continue with recs.  - MICU DOWN GRADE NOTE      Patient is a 73y old  Female who presents with a chief complaint of Acute Renal Failure/Hyperkalemia (10 Oct 2023 13:29)      HPI:  HPI: 73 yr old F with hx of HLD and ? HF on lasix presents to the ER with hematuria. Per family patient has had increasing edema of lower extremities and worsening hematuria. Last night she had rigors but no fever and was very weak. Patient had outpatient imaging done earlier this week, but did not know results. In ER, patient hyperkalemic to 8.7, in acute renal failure with Cr 7.89. CT showed performed earlier in the week which showed severe bilateral hydronephrosis possibly d/t bladder cancer. Pt admitted to the ICU for emergent HD. Pt is now s/p placement of a left NUS and right nephrostomy tube.      INTERVAL HPI/OVERNIGHT EVENTS:  Pt examined at bedside this morning. Pt is sitting upright in chair comfortably, speaking in full sentences, with no complaints at this time. HD shiley removed.       REVIEW OF SYSTEMS:  all negative unless listed within interval HPI    MEDICATIONS:  acetaminophen     Tablet .. 650 milliGRAM(s) Oral every 6 hours PRN  chlorhexidine 2% Cloths 1 Application(s) Topical <User Schedule>  heparin   Injectable 5000 Unit(s) SubCutaneous every 8 hours  hydrALAZINE Injectable 10 milliGRAM(s) IV Push every 4 hours PRN  influenza  Vaccine (HIGH DOSE) 0.7 milliLiter(s) IntraMuscular once  piperacillin/tazobactam IVPB.. 3.375 Gram(s) IV Intermittent every 12 hours      T(C): 36.5 (10-10-23 @ 11:05), Max: 37.7 (10-10-23 @ 00:00)  HR: 82 (10-10-23 @ 12:00) (65 - 92)  BP: 124/77 (10-10-23 @ 12:00) (110/90 - 145/61)  RR: 24 (10-10-23 @ 12:00) (15 - 27)  SpO2: 98% (10-10-23 @ 12:00) (88% - 99%)  Wt(kg): --Vital Signs Last 24 Hrs  T(C): 36.5 (10 Oct 2023 11:05), Max: 37.7 (10 Oct 2023 00:00)  T(F): 97.7 (10 Oct 2023 11:05), Max: 99.9 (10 Oct 2023 00:00)  HR: 82 (10 Oct 2023 12:00) (65 - 92)  BP: 124/77 (10 Oct 2023 12:00) (110/90 - 145/61)  BP(mean): 92 (10 Oct 2023 12:00) (81 - 127)  RR: 24 (10 Oct 2023 12:00) (15 - 27)  SpO2: 98% (10 Oct 2023 12:00) (88% - 99%)    Parameters below as of 10 Oct 2023 04:00  Patient On (Oxygen Delivery Method): room air        PHYSICAL EXAM:  GENERAL: Sitting comfortably in chair in no acute distress  HEAD: NCAT  EYES: PERRL, conjunctiva and sclera clear  NECK: clean dry dressing to right IJ site  CHEST/LUNG: Clear to auscultation bilaterally  HEART: Regular rate and rhythm  ABDOMEN: Soft, Nontender, Nondistended; Bowel sounds present  NEURO: Alert & Oriented X3  EXTREMITIES: No LE edema, no calf tenderness    Consultant(s) Notes Reviewed:  [x ] YES  [ ] NO  Care Discussed with Consultants/Other Providers [ x] YES  [ ] NO    LABS:                        8.7    11.32 )-----------( 278      ( 10 Oct 2023 03:00 )             27.6     10-10    141  |  107  |  44<H>  ----------------------------<  147<H>  4.4   |  26  |  4.77<H>    Ca    8.6      10 Oct 2023 03:00  Phos  5.1     10-10  Mg     2.1     10-10    TPro  6.5  /  Alb  1.8<L>  /  TBili  0.2  /  DBili  x   /  AST  10<L>  /  ALT  11<L>  /  AlkPhos  99  10-10      Urinalysis Basic - ( 10 Oct 2023 03:00 )    Color: x / Appearance: x / SG: x / pH: x  Gluc: 147 mg/dL / Ketone: x  / Bili: x / Urobili: x   Blood: x / Protein: x / Nitrite: x   Leuk Esterase: x / RBC: x / WBC x   Sq Epi: x / Non Sq Epi: x / Bacteria: x      CAPILLARY BLOOD GLUCOSE    Urinalysis Basic - ( 10 Oct 2023 03:00 )    Color: x / Appearance: x / SG: x / pH: x  Gluc: 147 mg/dL / Ketone: x  / Bili: x / Urobili: x   Blood: x / Protein: x / Nitrite: x   Leuk Esterase: x / RBC: x / WBC x   Sq Epi: x / Non Sq Epi: x / Bacteria: x      RADIOLOGY & ADDITIONAL TESTS:    Imaging Personally Reviewed:  [x ] YES  [ ] NO    To follow up:  73 yr old F with hx of HLD and ? HF on lasix presents to the ER with hematuria. In ER, patient hyperkalemic to 8.7, in acute renal failure with Cr 7.89. CT showed performed earlier in the week which showed severe bilateral hydronephrosis possibly d/t bladder cancer. Pt admitted to the ICU for emergent HD. Pt is now s/p placement of a left NUS and right nephrostomy tube. Pt is s/p 1 session of HD for hyperkalemia. Pt is hemodynamically stable for medicine floor.     -Pt is renally improving, nephrology following, would recommend continuing to monitor for HD indications.   -Wound care as per surgery team.   -Zosyn empirically for obstructive uropathy   -Heme/onc following for metastatic work up  -PT/OT  -No longer requires ICU level of care and is stable for transfer to medicine service     Case discussed with Dr. Lowery and hospitalist. Verbal sign out discussed with MD Mosley.

## 2023-10-10 NOTE — PROGRESS NOTE ADULT - SUBJECTIVE AND OBJECTIVE BOX
[facility] NEPHROLOGY SERVICES, [facility]  NEPHROLOGY AND HYPERTENSION  [address]  [address]  [place] [address]  [phone]    MD JERMAN EDWARDS, MD ELLEN CHERRY MD CHRISTOPHER CAPUTO, MD SAY BRAN MD          Patient events noted  No distress    MEDICATIONS  (STANDING):  chlorhexidine 2% Cloths 1 Application(s) Topical <User Schedule>  heparin   Injectable 5000 Unit(s) SubCutaneous every 8 hours  influenza  Vaccine (HIGH DOSE) 0.7 milliLiter(s) IntraMuscular once  piperacillin/tazobactam IVPB.. 3.375 Gram(s) IV Intermittent every 12 hours    MEDICATIONS  (PRN):  acetaminophen     Tablet .. 650 milliGRAM(s) Oral every 6 hours PRN Temp greater or equal to 38C (100.4F)  hydrALAZINE Injectable 10 milliGRAM(s) IV Push every 4 hours PRN SBP > 180      10-09-23 @ 07:01  -  10-10-23 @ 07:00  --------------------------------------------------------  IN: 950 mL / OUT: 7153 mL / NET: -6203 mL    10-10-23 @ 07:01  -  10-10-23 @ 20:23  --------------------------------------------------------  IN: 0 mL / OUT: 1575 mL / NET: -1575 mL      PHYSICAL EXAM:      T(C): 37.2 (10-10-23 @ 20:00), Max: 37.7 (10-10-23 @ 00:00)  HR: 88 (10-10-23 @ 20:00) (71 - 90)  BP: 114/65 (10-10-23 @ 20:00) (110/90 - 142/77)  RR: 20 (10-10-23 @ 20:00) (15 - 27)  SpO2: 96% (10-10-23 @ 20:00) (93% - 99%)  Wt(kg): --  Lungs clear  Heart S1S2  Abd soft NT ND  Extremities:   tr edema                                    8.7    11.32 )-----------( 278      ( 10 Oct 2023 03:00 )             27.6     10-10    141  |  107  |  44<H>  ----------------------------<  147<H>  4.4   |  26  |  4.77<H>    Ca    8.6      10 Oct 2023 03:00  Phos  5.1     10-10  Mg     2.1     10-10    TPro  6.5  /  Alb  1.8<L>  /  TBili  0.2  /  DBili  x   /  AST  10<L>  /  ALT  11<L>  /  AlkPhos  99  10-10      LIVER FUNCTIONS - ( 10 Oct 2023 03:00 )  Alb: 1.8 g/dL / Pro: 6.5 gm/dL / ALK PHOS: 99 U/L / ALT: 11 U/L / AST: 10 U/L / GGT: x           Creatinine Trend: 4.77<--, 7.23<--, 6.91<--, 6.27<--, 5.86<--, 5.03<--        ASSESSMENT:  1.  LANA - due to post renal azotemia - suspect bilateral upper tract obstruction possibly due to bladder cancer  2.  Severe hyperkalemia due to above, resolved after HD last night  3. Post right PCN and left NUS  Increasing UO      Plan   D/C jim  Off HD      Emanuel Francisco MD

## 2023-10-10 NOTE — PROGRESS NOTE ADULT - SUBJECTIVE AND OBJECTIVE BOX
Interventional Radiology Follow-Up Note    This is a 73y Female s/p left percutaneous nephroureteral stent placement and right percutaneous nephrostomy tube placement on 10/9 in Interventional Radiology with Dr. Halaibeh.     S: Patient seen and examined @ bedside. No complaints offered.     Medication:   heparin   Injectable: (10-10)  piperacillin/tazobactam IVPB..: (10-09)    Vitals:   T(F): 98.7, Max: 99.9 (00:00)  HR: 77  BP: 116/73  RR: 21  SpO2: 97%    Physical Exam:  General: Nontoxic, in NAD.  Abdomen: soft, NTND.  Drain Device: B/l drains intact attached to gravity drainage bags. Dressing clean, dry, intact. Flushed with 5cc NS each, no leakage or resistant noted + fluid return in tubing.     24hr Drain output:   Left PCNU: 3025cc, clear yellow  Right PCN: 2500, clear yellow  Ricks: 1628cc    LABS:  WBC 11.32 / Hgb 8.7 / Hct 27.6 / Plt 278  Na 141 / K 4.4 / CO2 26 / Cl 107 / BUN 44 / Cr 4.77 / Glucose 147  ALT 11 / AST 10 / Alk Phos 99 / Tbili 0.2  Ptt -- / Pt -- / INR --      Assessment/Plan:  [age] Female with hx of HLD and HF on lasix, presenting with hematuria. Pt found to be hyperkalemic in acute renal failure with severe bilateral hydronephrosis. Admitted to ICU for emergent HD in the setting of ARF r/t suspected b/l upper tract obstruction ?bladder CA with electrolyte derangements. Patient now s/p left PCNU and right PCN on 10/9 with Dr. Halaibeh.     -H/H stable, continue to monitor  -trend vs/labs  -Leukocytosis downtrending, Cr/BUN down today.   -flush bilateral drain with 5cc NS daily forward only; DO NOT aspirate  -change dressing q3 days or when dressing is saturated  -regarding outpatient follow up with IR, if the patient is d/c home with drainage catheter they can make an appointment with IR by calling the IR booking office at [phone]; recommend IR follow in 3 months for tube evaluation.  - Greater than 50% of the encounter was spent counseling and/or coordination of care on drain management and follow up.   - they will benefit from VNS service to help with drainage catheter care; they should continue same drainage catheter care as an outpatient.  - continue global management per primary team       Please call IR at extension [phone] with any questions, concerns, or issues regarding above.

## 2023-10-10 NOTE — CONSULT NOTE ADULT - NS ATTEND AMEND GEN_ALL_CORE FT
# HEMATURIA  # B/L HYDRO  #  LANA  # BLADDER MASS/COLON THICKENING suspicious for malignancy   s/p HD   on the case   pt had initial w/u as an outpt and was sched for  cystoscopy as an oputpt  and was admitted b/o hyperkalemia  LANA  s/p PCN b/l  for poss TURB as per   GI eval  for poss colonoscopy     further recommendations will depend on findings       # LEUKOCYTOSIS  MOSTLY NEUTROPHILIA  likely reactive   cont monitoring    # NCNC  ANEMIA  In pt with hematuria/LANA  likely multifactorial  w/u as above  f/u CBC, PRBC TX prn for HB<7   DVT PPX    call with questions [phone]     Thank you for the consult

## 2023-10-10 NOTE — PROGRESS NOTE ADULT - NS ATTEND AMEND GEN_ALL_CORE FT
feeling better today; still w mild flank discomfort though minimal  No n/v    s/p Lt NU stent & Rt PCN on 10/9/23  UO 7.2 L total overnight    K from over 8 to 4.4 and Cr down to 4.7 from >7  10/7/23 blood and urine cx neg    reviewed imaging: very thickened concerning post bladder wall w GH    Eventual TURB once more medically stable

## 2023-10-10 NOTE — CONSULT NOTE ADULT - SUBJECTIVE AND OBJECTIVE BOX
incomplete       Hematology Consult Note    HPI:  HPI: 73 yr old F with hx of HLD and ? HF on lasix presents to the ER with hematuria. Per family patient has had increasing edema of lower extremities and worsening hematuria. Last night she had rigors but no fever and was very weak. Patient had outpatient imaging done earlier this week, but did not know results.     In ER, patient hyperkalemic to 8.7, in acute renal failure with Cr 7.89. CT showed performed earlier in the week which showed severe bilateral hydronephrosis.  Denies any prior hx of CKD or HTN. (07 Oct 2023 20:11)      Allergies    No Known Allergies    Intolerances        MEDICATIONS  (STANDING):  chlorhexidine 2% Cloths 1 Application(s) Topical <User Schedule>  heparin   Injectable 5000 Unit(s) SubCutaneous every 8 hours  influenza  Vaccine (HIGH DOSE) 0.7 milliLiter(s) IntraMuscular once  piperacillin/tazobactam IVPB.. 3.375 Gram(s) IV Intermittent every 12 hours    MEDICATIONS  (PRN):  acetaminophen     Tablet .. 650 milliGRAM(s) Oral every 6 hours PRN Temp greater or equal to 38C (100.4F)  hydrALAZINE Injectable 10 milliGRAM(s) IV Push every 4 hours PRN SBP > 180      PAST MEDICAL & SURGICAL HISTORY:  DVT (deep venous thrombosis)      No significant past surgical history          FAMILY HISTORY:      SOCIAL HISTORY: No EtOH, no tobacco    REVIEW OF SYSTEMS:    CONSTITUTIONAL: No weakness, fevers or chills  EYES/ENT: No visual changes;  No vertigo or throat pain   NECK: No pain or stiffness  RESPIRATORY: No cough, wheezing, hemoptysis; No shortness of breath  CARDIOVASCULAR: No chest pain or palpitations  GASTROINTESTINAL: No abdominal or epigastric pain. No nausea, vomiting, or hematemesis; No diarrhea or constipation. No melena or hematochezia.  GENITOURINARY: No dysuria, frequency or hematuria  NEUROLOGICAL: No numbness or weakness  SKIN: No itching, burning, rashes, or lesions   All other review of systems is negative unless indicated above.        T(F): 97.7 (10-10-23 @ 11:05), Max: 99.9 (10-10-23 @ 00:00)  HR: 82 (10-10-23 @ 12:00)  BP: 124/77 (10-10-23 @ 12:00)  RR: 24 (10-10-23 @ 12:00)  SpO2: 98% (10-10-23 @ 12:00)  Wt(kg): --    GENERAL: NAD, well-developed  HEAD:  Atraumatic, Normocephalic  EYES: EOMI, PERRLA, conjunctiva and sclera clear  NECK: Supple, No JVD  CHEST/LUNG: Clear to auscultation bilaterally; No wheeze  HEART: Regular rate and rhythm; No murmurs, rubs, or gallops  ABDOMEN: Soft, Nontender, Nondistended; Bowel sounds present  EXTREMITIES:  2+ Peripheral Pulses, No clubbing, cyanosis, or edema  NEUROLOGY: non-focal  SKIN: No rashes or lesions                          8.7    11.32 )-----------( 278      ( 10 Oct 2023 03:00 )             27.6       10-10    141  |  107  |  44<H>  ----------------------------<  147<H>  4.4   |  26  |  4.77<H>    Ca    8.6      10 Oct 2023 03:00  Phos  5.1     10-10  Mg     2.1     10-10    TPro  6.5  /  Alb  1.8<L>  /  TBili  0.2  /  DBili  x   /  AST  10<L>  /  ALT  11<L>  /  AlkPhos  99  10-10      Magnesium: 2.1 mg/dL (10-10 @ 03:00)  Phosphorus: 5.1 mg/dL (10-10 @ 03:00)  Magnesium: 2.3 mg/dL (10-09 @ 15:10)  Phosphorus: 5.9 mg/dL (10-09 @ 15:10)               Hematology Consult Note    HPI:  HPI: 73 yr old F with hx of HLD and ? HF on lasix presents to the ER with hematuria. Per family patient has had increasing edema of lower extremities and worsening hematuria. Last night she had rigors but no fever and was very weak. Patient had outpatient imaging done earlier this week, but did not know results.     In ER, patient hyperkalemic to 8.7, in acute renal failure with Cr 7.89. CT showed performed earlier in the week which showed severe bilateral hydronephrosis.  Denies any prior hx of CKD or HTN. (07 Oct 2023 20:11)      Allergies    No Known Allergies    Intolerances        MEDICATIONS  (STANDING):  chlorhexidine 2% Cloths 1 Application(s) Topical <User Schedule>  heparin   Injectable 5000 Unit(s) SubCutaneous every 8 hours  influenza  Vaccine (HIGH DOSE) 0.7 milliLiter(s) IntraMuscular once  piperacillin/tazobactam IVPB.. 3.375 Gram(s) IV Intermittent every 12 hours    MEDICATIONS  (PRN):  acetaminophen     Tablet .. 650 milliGRAM(s) Oral every 6 hours PRN Temp greater or equal to 38C (100.4F)  hydrALAZINE Injectable 10 milliGRAM(s) IV Push every 4 hours PRN SBP > 180      PAST MEDICAL & SURGICAL HISTORY:  DVT (deep venous thrombosis)      No significant past surgical history          FAMILY HISTORY:      SOCIAL HISTORY: No EtOH, no tobacco    REVIEW OF SYSTEMS:    CONSTITUTIONAL: c/o hematuria   EYES/ENT: No visual changes;  No vertigo or throat pain   NECK: No pain or stiffness  RESPIRATORY: No cough, wheezing, hemoptysis; No shortness of breath  CARDIOVASCULAR: No chest pain or palpitations  GASTROINTESTINAL: No abdominal or epigastric pain. No nausea, vomiting, or hematemesis; No diarrhea or constipation. No melena or hematochezia.  GENITOURINARY:  hematuria  NEUROLOGICAL: No numbness or weakness  SKIN: No itching, burning, rashes, or lesions   All other review of systems is negative unless indicated above.        T(F): 97.7 (10-10-23 @ 11:05), Max: 99.9 (10-10-23 @ 00:00)  HR: 82 (10-10-23 @ 12:00)  BP: 124/77 (10-10-23 @ 12:00)  RR: 24 (10-10-23 @ 12:00)  SpO2: 98% (10-10-23 @ 12:00)  Wt(kg): --    GENERAL: NAD, well-developed  HEAD:  Atraumatic, Normocephalic  EYES: EOMI, PERRLA, conjunctiva and sclera clear  NECK: Supple, No JVD  CHEST/LUNG: Clear to auscultation bilaterally; No wheeze  HEART: Regular rate and rhythm; No murmurs, rubs, or gallops  ABDOMEN: b/l nephrostomy tubes, and Soft, Nontender, Nondistended; Bowel sounds present  EXTREMITIES:  2+ Peripheral Pulses, No clubbing, cyanosis, or edema  NEUROLOGY: non-focal  SKIN: No rashes or lesions                          8.7    11.32 )-----------( 278      ( 10 Oct 2023 03:00 )             27.6       10-10    141  |  107  |  44<H>  ----------------------------<  147<H>  4.4   |  26  |  4.77<H>    Ca    8.6      10 Oct 2023 03:00  Phos  5.1     10-10  Mg     2.1     10-10    TPro  6.5  /  Alb  1.8<L>  /  TBili  0.2  /  DBili  x   /  AST  10<L>  /  ALT  11<L>  /  AlkPhos  99  10-10      Magnesium: 2.1 mg/dL (10-10 @ 03:00)  Phosphorus: 5.1 mg/dL (10-10 @ 03:00)  Magnesium: 2.3 mg/dL (10-09 @ 15:10)  Phosphorus: 5.9 mg/dL (10-09 @ 15:10)

## 2023-10-10 NOTE — PROGRESS NOTE ADULT - SUBJECTIVE AND OBJECTIVE BOX
HPI:  HPI: 73 yr old F with hx of HLD and ? HF on lasix presents to the ER with hematuria. Per family patient has had increasing edema of lower extremities and worsening hematuria. Last night she had rigors but no fever and was very weak. Patient had outpatient imaging done earlier this week, but did not know results.     In ER, patient hyperkalemic to 8.7, in acute renal failure with Cr 7.89. CT showed performed earlier in the week which showed severe bilateral hydronephrosis.  Denies any prior hx of CKD or HTN. (07 Oct 2023 20:11)      24 hr events:      ## Labs:  CBC:                        8.7    11.32 )-----------( 278      ( 10 Oct 2023 03:00 )             27.6     Chem:  10-10    141  |  107  |  44<H>  ----------------------------<  147<H>  4.4   |  26  |  4.77<H>    Ca    8.6      10 Oct 2023 03:00  Phos  5.1     10-10  Mg     2.1     10-10    TPro  6.5  /  Alb  1.8<L>  /  TBili  0.2  /  DBili  x   /  AST  10<L>  /  ALT  11<L>  /  AlkPhos  99  10-10    Coags:          ## Imaging:    ## Medications:  piperacillin/tazobactam IVPB.. 3.375 Gram(s) IV Intermittent every 12 hours    hydrALAZINE Injectable 10 milliGRAM(s) IV Push every 4 hours PRN        heparin   Injectable 5000 Unit(s) SubCutaneous every 8 hours      acetaminophen     Tablet .. 650 milliGRAM(s) Oral every 6 hours PRN      ## Vitals:  T(C): 36.5 (10-10-23 @ 11:05), Max: 37.7 (10-10-23 @ 00:00)  HR: 82 (10-10-23 @ 12:00) (65 - 92)  BP: 124/77 (10-10-23 @ 12:00) (110/90 - 145/61)  BP(mean): 92 (10-10-23 @ 12:00) (81 - 127)  RR: 24 (10-10-23 @ 12:00) (15 - 27)  SpO2: 98% (10-10-23 @ 12:00) (88% - 99%)  Wt(kg): --  Vent:   ABG:       10-09 @ 07:01  -  10-10 @ 07:00  --------------------------------------------------------  IN: 950 mL / OUT: 7153 mL / NET: -6203 mL    10-10 @ 07:01  -  10-10 @ 14:14  --------------------------------------------------------  IN: 0 mL / OUT: 675 mL / NET: -675 mL          ## P/E:  Gen: sitting comfortably in chair in no apparent distress  Neck: R IJ HD  Lungs: CTA   Heart: RRR  Abd: Soft/+BS/ Non-tender/ B/l flanks with nephrostomy bag with blood tinged urine  Ext: No sign edema  Neuro: AAO x3    CENTRAL LINE: [x ] YES [ ] NO  LOCATION: R IJ HD  DATE INSERTED:  REMOVE: [x ] YES [ ] NO      HILL: [ ] YES [ ] NO    DATE INSERTED:  REMOVE:  [ ] YES [ ] NO      A-LINE:  [ ] YES [ ] NO  LOCATION:   DATE INSERTED:  REMOVE:  [ ] YES [ ] NO  EXPLAIN:    CODE STATUS: [x ] full code  [ ] DNR  [ ] DNI  [ ] [facility]  Goals of care discussion: [ ] yes    HPI:  Pt is a 74 yo F with h/o HLD and ? HF on Lasix presents to the ER with hematuria. Per family pt has had increasing edema of lower extremities, rigors, and worsening hematuria. Pt found to be hyperkalemic in LANA with severe bilateral hydronephrosis possibly due to bladder cancer. Pt admitted to the ICU for emergent HD. 10/9 pt is s/p placement of a left NUS and right nephrostomy tube      ## Labs:  CBC:                        8.7    11.32 )-----------( 278      ( 10 Oct 2023 03:00 )             27.6     Chem:  10-10    141  |  107  |  44<H>  ----------------------------<  147<H>  4.4   |  26  |  4.77<H>    Ca    8.6      10 Oct 2023 03:00  Phos  5.1     10-10  Mg     2.1     10-10    TPro  6.5  /  Alb  1.8<L>  /  TBili  0.2  /  DBili  x   /  AST  10<L>  /  ALT  11<L>  /  AlkPhos  99  10-10    Coags:          ## Imaging:    ## Medications:  piperacillin/tazobactam IVPB.. 3.375 Gram(s) IV Intermittent every 12 hours    hydrALAZINE Injectable 10 milliGRAM(s) IV Push every 4 hours PRN        heparin   Injectable 5000 Unit(s) SubCutaneous every 8 hours      acetaminophen     Tablet .. 650 milliGRAM(s) Oral every 6 hours PRN      ## Vitals:  T(C): 36.5 (10-10-23 @ 11:05), Max: 37.7 (10-10-23 @ 00:00)  HR: 82 (10-10-23 @ 12:00) (65 - 92)  BP: 124/77 (10-10-23 @ 12:00) (110/90 - 145/61)  BP(mean): 92 (10-10-23 @ 12:00) (81 - 127)  RR: 24 (10-10-23 @ 12:00) (15 - 27)  SpO2: 98% (10-10-23 @ 12:00) (88% - 99%)  Wt(kg): --  Vent:   ABG:       10-09 @ 07:01  -  10-10 @ 07:00  --------------------------------------------------------  IN: 950 mL / OUT: 7153 mL / NET: -6203 mL    10-10 @ 07:01  -  10-10 @ 14:14  --------------------------------------------------------  IN: 0 mL / OUT: 675 mL / NET: -675 mL          ## P/E:  Gen: sitting comfortably in chair in no apparent distress  Neck: R IJ HD  Lungs: CTA   Heart: RRR  Abd: Soft/+BS/ Non-tender/ B/l flanks with nephrostomy bag with blood tinged urine  Ext: No sign edema  Neuro: AAO x3    CENTRAL LINE: [x ] YES [ ] NO  LOCATION: R IJ HD  DATE INSERTED:  REMOVE: [x ] YES [ ] NO      HILL: [ ] YES [ ] NO    DATE INSERTED:  REMOVE:  [ ] YES [ ] NO      A-LINE:  [ ] YES [ ] NO  LOCATION:   DATE INSERTED:  REMOVE:  [ ] YES [ ] NO  EXPLAIN:    CODE STATUS: [x ] full code  [ ] DNR  [ ] DNI  [ ] [facility]  Goals of care discussion: [ ] yes

## 2023-10-11 NOTE — PROGRESS NOTE ADULT - SUBJECTIVE AND OBJECTIVE BOX
Patient seen and examined bedside with Dr. Llanes resting comfortably in chair.  No complaints offered.   Voiding without difficulty.  Denies hematuria and dysuria.  Tolerating diet. Denies nausea and vomiting.   Denies chest pain, dyspnea, cough.    T(F): 98.2 (10-11-23 @ 12:28), Max: 99.3 (10-11-23 @ 00:00)  HR: 81 (10-11-23 @ 12:28) (76 - 90)  BP: 116/82 (10-11-23 @ 12:28) (113/77 - 142/77)  RR: 17 (10-11-23 @ 12:28) (17 - 26)  SpO2: 99% (10-11-23 @ 12:28) (96% - 99%)  Wt(kg): --  CAPILLARY BLOOD GLUCOSE          PHYSICAL EXAM:  General: NAD, alert and awake  HEENT: NCAT, EOMI, conjunctiva clear  Chest: nonlabored respirations, good inspiratory effort  Abdomen: soft, NTND.   Extremities: no pedal edema or calf tenderness noted   : No CVA or SP tenderness; R and L nephrostomy tubes in place with clear yellow urine in bags     LABS:                        8.8    10.80 )-----------( 298      ( 11 Oct 2023 08:25 )             27.4   10-11    143  |  110<H>  |  25<H>  ----------------------------<  97  4.1   |  29  |  1.85<H>    Ca    8.7      11 Oct 2023 08:25  Phos  2.7     10-11  Mg     1.8     10-11    TPro  6.6  /  Alb  1.9<L>  /  TBili  0.2  /  DBili  x   /  AST  13<L>  /  ALT  11<L>  /  AlkPhos  92  10-11    I&O's Detail    10 Oct 2023 07:01  -  11 Oct 2023 07:00  --------------------------------------------------------  IN:  Total IN: 0 mL    OUT:    Indwelling Catheter - Urethral (mL): 0 mL    Nephrostomy Tube (mL): 1675 mL    Nephrostomy Tube (mL): 1550 mL  Total OUT: 3225 mL    Total NET: -3225 mL      11 Oct 2023 07:01  -  11 Oct 2023 13:59  --------------------------------------------------------  IN:    Oral Fluid: 720 mL  Total IN: 720 mL    OUT:    Nephrostomy Tube (mL): 200 mL    Nephrostomy Tube (mL): 425 mL  Total OUT: 625 mL    Total NET: 95 mL

## 2023-10-11 NOTE — PROGRESS NOTE ADULT - ASSESSMENT
Patient is a 73F with a PMH of HLD and ?CHF who presented to the ED with hematuria.  Found to have severe bilateral hydronephrosis due to bladder outlet obstruction - suspicion secondary to bladder malignancy.  Patient was admitted to the ICU for emergent HD.  Patient had bilateral nephrostomy tubes placed.  Patient is currently off HD and transferred to [facility] for further care.

## 2023-10-11 NOTE — CONSULT NOTE ADULT - SUBJECTIVE AND OBJECTIVE BOX
CHIEF COMPLAINT:  Patient is a 73y old  Female who presents with a chief complaint of Acute Renal Failure/Hyperkalemia (11 Oct 2023 17:04)      HPI:  HPI: 73 yr old F with hx of HLD and ? HF on lasix presents to the ER with hematuria. Per family patient has had increasing edema of lower extremities and worsening hematuria. Last night she had rigors but no fever and was very weak. Patient had outpatient imaging done earlier this week, but did not know results.     In ER, patient hyperkalemic to 8.7, in acute renal failure with Cr 7.89. CT showed performed earlier in the week which showed severe bilateral hydronephrosis.  Denies any prior hx of CKD or HTN. (07 Oct 2023 20:11)    ALLERGIES:  No Known Allergies        Home Medications:    PAST MEDICAL & SURGICAL HISTORY:  DVT (deep venous thrombosis)      No significant past surgical history            FAMILY HISTORY:      SOCIAL HISTORY:    REVIEW OF SYSTEMS:  General:  No wt loss, fevers, chills, night sweats  Eyes:  Good vision, no reported pain  ENT:  No sore throat, pain, runny nose, dysphagia  CV:  No pain, palpitations, hypo/hypertension  Resp:  No dyspnea, cough, tachypnea, wheezing  GI:  No pain, nausea, vomiting, diarrhea, constipation  :  No pain, bleeding, incontinence, nocturia  Muscle:  No pain, weakness  Breast:  No pain, abscess, mass, discharge  Neuro:  No weakness, tingling, memory problems  Psych:  No fatigue, insomnia, mood problems, depression  Endocrine:  No polyuria, polydipsia, cold/heat intolerance  Heme:  No petechiae, ecchymosis, easy bruisability  Skin:  No rash, edema    PHYSICAL EXAM:  Vital Signs:  Vital Signs Last 24 Hrs  T(C): 36.8 (11 Oct 2023 12:28), Max: 37.4 (11 Oct 2023 00:00)  T(F): 98.2 (11 Oct 2023 12:28), Max: 99.3 (11 Oct 2023 00:00)  HR: 81 (11 Oct 2023 12:28) (81 - 88)  BP: 116/82 (11 Oct 2023 12:28) (113/77 - 116/82)  BP(mean): 79 (10 Oct 2023 20:00) (79 - 79)  RR: 17 (11 Oct 2023 12:28) (17 - 20)  SpO2: 99% (11 Oct 2023 12:28) (96% - 99%)    Parameters below as of 11 Oct 2023 12:28  Patient On (Oxygen Delivery Method): room air      I&O's Summary    10 Oct 2023 07:01  -  11 Oct 2023 07:00  --------------------------------------------------------  IN: 0 mL / OUT: 3225 mL / NET: -3225 mL    11 Oct 2023 07:01  -  11 Oct 2023 17:21  --------------------------------------------------------  IN: 720 mL / OUT: 625 mL / NET: 95 mL      I&O's Detail    10 Oct 2023 07:01  -  11 Oct 2023 07:00  --------------------------------------------------------  IN:  Total IN: 0 mL    OUT:    Indwelling Catheter - Urethral (mL): 0 mL    Nephrostomy Tube (mL): 1675 mL    Nephrostomy Tube (mL): 1550 mL  Total OUT: 3225 mL    Total NET: -3225 mL      11 Oct 2023 07:01  -  11 Oct 2023 17:21  --------------------------------------------------------  IN:    Oral Fluid: 720 mL  Total IN: 720 mL    OUT:    Nephrostomy Tube (mL): 200 mL    Nephrostomy Tube (mL): 425 mL  Total OUT: 625 mL    Total NET: 95 mL        Constitutional: well developed, normal appearance, well groomed, well nourished, no deformities and no acute distress.   Eyes: the conjunctiva exhibited no abnormalities and the eyelids demonstrated no xanthelasmas.   HEENT: normal oral mucosa, no oral pallor and no oral cyanosis.   Neck: normal jugular venous A waves present, normal jugular venous V waves present and no jugular venous rush A waves.   Pulmonary: no respiratory distress, normal respiratory rhythm and effort, no accessory muscle use and lungs were clear to auscultation bilaterally.   Cardiovascular: heart rate and rhythm were normal, normal S1 and S2 and no murmur, gallop, rub, heave or thrill are present.   Abdomen: soft, non-tender, no hepato-splenomegaly and no abdominal mass palpated.   Musculoskeletal: the gait could not be assessed..   Extremities: no clubbing of the fingernails, no localized cyanosis, no petechial hemorrhages and no ischemic changes.   Skin: normal skin color and pigmentation, no rash, no venous stasis, no skin lesions, no skin ulcer and no xanthoma was observed.   Psychiatric: oriented to person, place, and time, the affect was normal, the mood was normal and not feeling anxious.      LABORATORY:                          8.8    10.80 )-----------( 298      ( 11 Oct 2023 08:25 )             27.4     10-11    143  |  110<H>  |  25<H>  ----------------------------<  97  4.1   |  29  |  1.85<H>    Ca    8.7      11 Oct 2023 08:25  Phos  2.7     10-11  Mg     1.8     10-11    TPro  6.6  /  Alb  1.9<L>  /  TBili  0.2  /  DBili  x   /  AST  13<L>  /  ALT  11<L>  /  AlkPhos  92  10-11      LIVER FUNCTIONS - ( 11 Oct 2023 08:25 )  Alb: 1.9 g/dL / Pro: 6.6 gm/dL / ALK PHOS: 92 U/L / ALT: 11 U/L / AST: 13 U/L / GGT: x             Urinalysis Basic - ( 11 Oct 2023 08:25 )    Color: x / Appearance: x / SG: x / pH: x  Gluc: 97 mg/dL / Ketone: x  / Bili: x / Urobili: x   Blood: x / Protein: x / Nitrite: x   Leuk Esterase: x / RBC: x / WBC x   Sq Epi: x / Non Sq Epi: x / Bacteria: x      BNP    IMAGING:  < from: 12 Lead ECG (10.08.23 @ 08:02) >   Normal sinus rhythm  Normal ECG  When compared with ECG of 07-OCT-2023 14:00,  QRS duration has decreased  T wave inversion now evident in Anterior leads    < end of copied text >    < from: TTE Echo Complete w/o Contrast w/ Doppler (10.09.23 @ 20:36) >   1. Left ventricular ejection fraction, by visual estimation, is 55 to   60%.   2. Technically adequate study.   3. Normal global left ventricular systolic function.   4. Normal left ventricular internal cavity size.   5. Spectral Doppler shows impaired relaxation pattern of left   ventricular myocardial filling (Grade I diastolic dysfunction).   6. Normal right ventricular size and function.   7. Normal left atrial size.   8. Normal right atrial size.   9. There is no evidence of pericardial effusion.  10. Structurally normal mitral valve, with normal leaflet excursion.  11. Trace mitral valve regurgitation.  12. Sclerotic aortic valve with normal opening.    < end of copied text >    < from: CT Chest No Cont (10.11.23 @ 15:09) >    IMPRESSION:  4 mm indeterminate right apical nodule.    Biapical scarring.    < end of copied text >    ASSESSMENT:  HPI: 73 yr old F with hx of HLD and ? HF on lasix presents to the ER with hematuria. Per family patient has had increasing edema of lower extremities and worsening hematuria. Last night she had rigors but no fever and was very weak. Patient had outpatient imaging done earlier this week, but did not know results.     In ER, patient hyperkalemic to 8.7, in acute renal failure with Cr 7.89. CT showed performed earlier in the week which showed severe bilateral hydronephrosis.  Denies any prior hx of CKD or HTN. (07 Oct 2023 20:11)    PLAN:     bisacodyl 10 milliGRAM(s) Oral once  heparin   Injectable 5000 Unit(s) SubCutaneous every 8 hours  piperacillin/tazobactam IVPB.. 3.375 Gram(s) IV Intermittent every 12 hours      Lauri Baez MD, FACC, FASE, FASNC, FACP  Director, Heart Failure Services  [facility]  , Department of Cardiology  [facility]     CHIEF COMPLAINT:  Patient is a 73y old  Female who presents with a chief complaint of Acute Renal Failure/Hyperkalemia (11 Oct 2023 17:04)      HPI:  73-year-old with dyslipidemia admitted with hematuria and increasing bilateral lower extremity edema.  Found to be hyperkalemic and acute renal insufficiency with bilateral hydronephrosis she is status post bilateral renal nephrostomy tubes.  I understand there is a plan for possible colonoscopy on Friday.  EF is preserved with no significant valvular heart disease on recent hospital echo.  She is seen resting comfortably in bed in no distress.  Denies previous cardiac history.    ALLERGIES:  No Known Allergies        Home Medications:    PAST MEDICAL & SURGICAL HISTORY:  DVT (deep venous thrombosis)      No significant past surgical history            FAMILY HISTORY:  n/a    SOCIAL HISTORY:  nonsmoker    REVIEW OF SYSTEMS:  General:  No wt loss, fevers, chills, night sweats  Eyes:  Good vision, no reported pain  ENT:  No sore throat, pain, runny nose, dysphagia  CV:  No pain, palpitations, hypo/hypertension  Resp:  No dyspnea, cough, tachypnea, wheezing  GI:  No pain, nausea, vomiting, diarrhea, constipation  :  No pain, bleeding, incontinence, nocturia  Muscle:  No pain, weakness  Breast:  No pain, abscess, mass, discharge  Neuro:  No weakness, tingling, memory problems  Psych:  No fatigue, insomnia, mood problems, depression  Endocrine:  No polyuria, polydipsia, cold/heat intolerance  Heme:  No petechiae, ecchymosis, easy bruisability  Skin:  No rash, edema    PHYSICAL EXAM:  Vital Signs:  Vital Signs Last 24 Hrs  T(C): 36.8 (11 Oct 2023 12:28), Max: 37.4 (11 Oct 2023 00:00)  T(F): 98.2 (11 Oct 2023 12:28), Max: 99.3 (11 Oct 2023 00:00)  HR: 81 (11 Oct 2023 12:28) (81 - 88)  BP: 116/82 (11 Oct 2023 12:28) (113/77 - 116/82)  BP(mean): 79 (10 Oct 2023 20:00) (79 - 79)  RR: 17 (11 Oct 2023 12:28) (17 - 20)  SpO2: 99% (11 Oct 2023 12:28) (96% - 99%)    Parameters below as of 11 Oct 2023 12:28  Patient On (Oxygen Delivery Method): room air      I&O's Summary    10 Oct 2023 07:01  -  11 Oct 2023 07:00  --------------------------------------------------------  IN: 0 mL / OUT: 3225 mL / NET: -3225 mL    11 Oct 2023 07:01  -  11 Oct 2023 17:21  --------------------------------------------------------  IN: 720 mL / OUT: 625 mL / NET: 95 mL      I&O's Detail    10 Oct 2023 07:01  -  11 Oct 2023 07:00  --------------------------------------------------------  IN:  Total IN: 0 mL    OUT:    Indwelling Catheter - Urethral (mL): 0 mL    Nephrostomy Tube (mL): 1675 mL    Nephrostomy Tube (mL): 1550 mL  Total OUT: 3225 mL    Total NET: -3225 mL      11 Oct 2023 07:01  -  11 Oct 2023 17:21  --------------------------------------------------------  IN:    Oral Fluid: 720 mL  Total IN: 720 mL    OUT:    Nephrostomy Tube (mL): 200 mL    Nephrostomy Tube (mL): 425 mL  Total OUT: 625 mL    Total NET: 95 mL        Constitutional: well developed, normal appearance, well groomed, well nourished, no deformities and no acute distress.   Eyes: the conjunctiva exhibited no abnormalities and the eyelids demonstrated no xanthelasmas.   HEENT: normal oral mucosa, no oral pallor and no oral cyanosis.   Neck: normal jugular venous A waves present, normal jugular venous V waves present and no jugular venous rush A waves.   Pulmonary: no respiratory distress, normal respiratory rhythm and effort, no accessory muscle use.  Cardiovascular: heart rate and rhythm were normal, normal S1 and S2 and no murmur.  Abdomen: soft, non-tender.  Musculoskeletal: the gait could not be assessed.   Extremities: no clubbing of the fingernails, no localized cyanosis, no petechial hemorrhages and no ischemic changes.   Skin: normal skin color and pigmentation, no rash, no venous stasis, no skin lesions, no skin ulcer and no xanthoma was observed.   Psychiatric: oriented to person, place, and time, the affect was normal, the mood was normal and not feeling anxious.      LABORATORY:                          8.8    10.80 )-----------( 298      ( 11 Oct 2023 08:25 )             27.4     10-11    143  |  110<H>  |  25<H>  ----------------------------<  97  4.1   |  29  |  1.85<H>    Ca    8.7      11 Oct 2023 08:25  Phos  2.7     10-11  Mg     1.8     10-11    TPro  6.6  /  Alb  1.9<L>  /  TBili  0.2  /  DBili  x   /  AST  13<L>  /  ALT  11<L>  /  AlkPhos  92  10-11      LIVER FUNCTIONS - ( 11 Oct 2023 08:25 )  Alb: 1.9 g/dL / Pro: 6.6 gm/dL / ALK PHOS: 92 U/L / ALT: 11 U/L / AST: 13 U/L / GGT: x             Urinalysis Basic - ( 11 Oct 2023 08:25 )    Color: x / Appearance: x / SG: x / pH: x  Gluc: 97 mg/dL / Ketone: x  / Bili: x / Urobili: x   Blood: x / Protein: x / Nitrite: x   Leuk Esterase: x / RBC: x / WBC x   Sq Epi: x / Non Sq Epi: x / Bacteria: x      IMAGING:  < from: 12 Lead ECG (10.08.23 @ 08:02) >   Normal sinus rhythm  Normal ECG  When compared with ECG of 07-OCT-2023 14:00,  QRS duration has decreased  T wave inversion now evident in Anterior leads    < end of copied text >    < from: TTE Echo Complete w/o Contrast w/ Doppler (10.09.23 @ 20:36) >   1. Left ventricular ejection fraction, by visual estimation, is 55 to   60%.   2. Technically adequate study.   3. Normal global left ventricular systolic function.   4. Normal left ventricular internal cavity size.   5. Spectral Doppler shows impaired relaxation pattern of left   ventricular myocardial filling (Grade I diastolic dysfunction).   6. Normal right ventricular size and function.   7. Normal left atrial size.   8. Normal right atrial size.   9. There is no evidence of pericardial effusion.  10. Structurally normal mitral valve, with normal leaflet excursion.  11. Trace mitral valve regurgitation.  12. Sclerotic aortic valve with normal opening.    < end of copied text >    < from: CT Chest No Cont (10.11.23 @ 15:09) >    IMPRESSION:  4 mm indeterminate right apical nodule.    Biapical scarring.    < end of copied text >    ASSESSMENT:  73-year-old with dyslipidemia admitted with hematuria and increasing bilateral lower extremity edema.  Found to be hyperkalemic and acute renal insufficiency with bilateral hydronephrosis she is status post bilateral renal nephrostomy tubes.  I understand there is a plan for possible colonoscopy on Friday.  EF is preserved with no significant valvular heart disease on recent hospital echo.  She is seen resting comfortably in bed in no distress.  Denies previous cardiac history.    PLAN:     Continue antibiotics and renal management.  DVT prevention with heparin subcu.  From a current clinical and cardiac standpoint she may proceed ahead with upcoming GI/colonoscopy with routine hemodynamic monitoring.  Signing off.  Call back if needed.    Lauri Baez MD, FACC, FASRAMON, LIANA, FACP  Director, Heart Failure Services  [facility]  , Department of Cardiology  [facility]

## 2023-10-11 NOTE — CONSULT NOTE ADULT - SUBJECTIVE AND OBJECTIVE BOX
Full consult dictated    plan:  72 yo F with h/o HLD and ?CHF admitted with hematuria --> Found to have severe bilateral hydronephrosis due to bladder outlet obstruction - suspicion secondary to bladder malignancy.  Admitted to the ICU for emergent HD.  Patient had bilateral nephrostomy tubes placed.  Patient is currently off HD. Gi consulted for colonoscopy --> had colonoscopy as outpatient on 10/3 showing suspicious lesion in Ascending Colon. Colonoscopy tentative for tomorrow, Check CEA.

## 2023-10-11 NOTE — PROGRESS NOTE ADULT - PROBLEM SELECTOR PLAN 1
Requiring HD upon admission - Renal on board  Shiley cath removed, Cr 1.8 today (highest ~7)  Continue to monitor

## 2023-10-11 NOTE — PROGRESS NOTE ADULT - ASSESSMENT
74 Y/O female with previous bilateral PCN placement done by IR on 10/9. Bilateral hydronephrosis present with a possible bladder lesion and bladder outlet obstruction.   PMHx of HF on lasix and HLD      PLAN:     - CT scan with contrast through PCN tubes   - OR planning for Friday   - Continue IV ABx   - Multimodal pain control  - OOB as tolerated, walking as tolerated  - Discussed with Dr. Llanes

## 2023-10-11 NOTE — PHYSICAL THERAPY INITIAL EVALUATION ADULT - PERTINENT HX OF CURRENT PROBLEM, REHAB EVAL
As per H& P notes"73 yr old F with hx of HLD and ? HF on lasix presents to the ER with hematuria. Per family patient has had increasing edema of lower extremities and worsening hematuria. Last night she had rigors but no fever and was very weak. Patient had outpatient imaging done earlier this week, but did not know results"   As per Heme/onc notes "73 yr old F with hx of HLD and ? HF on lasix presents to the ER with hematuria. Per family patient has had increasing edema of lower extremities and worsening hematuria. Last night she had rigors but no fever and was very weak. Patient had outpatient imaging done earlier this week, but did not know results"

## 2023-10-11 NOTE — PROGRESS NOTE ADULT - SUBJECTIVE AND OBJECTIVE BOX
Patient is a 73F with a PMH of HLD and ?CHF who presented to the ED with hematuria.  Found to have severe bilateral hydronephrosis due to bladder outlet obstruction - suspicion secondary to bladder malignancy.  Patient was admitted to the ICU for emergent HD.  Patient had bilateral nephrostomy tubes placed.  Patient is currently off HD and transferred to [facility] for further care.      Patient seen and examined at the bedside.    No acute complaints  Patient notes that at times she still has the urge to urinate and she develops suprapubic pain.    Denies history of fever, chills, nausea, vomiting  Further denies CP or dyspnea.    Nephrostomies in place - draining urine (sanguinous urine on R side)    Pending CT A/P to eval obstruction   Pending TURB with urology after cardio clearance  Cardio, Hem/Onc on board     Physical exam:  General: patient in no acute distress, resting comfortably  Head:  Atraumatic, Normocephalic  Eyes: EOMI, PERRLA, clear sclera  Neck: Supple, dressing over R IJ site  Cardio: S1/S2 +ve, regular rate and rhythm, no M/G/R  Resp: clear to ausculation bilaterally, no rales or wheezes  GI: abdomen soft, nontender, non distended, no guarding, BS +ve x 4  : bilateral nephrostomies in place - draining urine  Ext: no significant pedal edema  Neuro: CN 2-12 intact, no significant motor or sensory deficits.  Skin: No rashes or lesions     Recent Vitals  T(C): 36.8 (10-11-23 @ 05:03), Max: 37.4 (10-11-23 @ 00:00)  HR: 82 (10-11-23 @ 05:03) (76 - 90)  BP: 115/73 (10-11-23 @ 05:03) (113/77 - 142/77)  RR: 18 (10-11-23 @ 05:03) (18 - 26)  SpO2: 98% (10-11-23 @ 05:03) (96% - 99%)                        8.8    10.80 )-----------( 298      ( 11 Oct 2023 08:25 )             27.4     10-11    143  |  110<H>  |  25<H>  ----------------------------<  97  4.1   |  29  |  1.85<H>    Ca    8.7      11 Oct 2023 08:25  Phos  2.7     10-11  Mg     1.8     10-11    TPro  6.6  /  Alb  1.9<L>  /  TBili  0.2  /  DBili  x   /  AST  13<L>  /  ALT  11<L>  /  AlkPhos  92  10-11      LIVER FUNCTIONS - ( 11 Oct 2023 08:25 )  Alb: 1.9 g/dL / Pro: 6.6 gm/dL / ALK PHOS: 92 U/L / ALT: 11 U/L / AST: 13 U/L / GGT: x           Urinalysis Basic - ( 11 Oct 2023 08:25 )    Color: x / Appearance: x / SG: x / pH: x  Gluc: 97 mg/dL / Ketone: x  / Bili: x / Urobili: x   Blood: x / Protein: x / Nitrite: x   Leuk Esterase: x / RBC: x / WBC x   Sq Epi: x / Non Sq Epi: x / Bacteria: x        Home Medications:   Patient is a 73F with a PMH of HLD and ?CHF who presented to the ED with hematuria.  Found to have severe bilateral hydronephrosis due to bladder outlet obstruction - suspicion secondary to bladder malignancy.  Patient was admitted to the ICU for emergent HD.  Patient had bilateral nephrostomy tubes placed.  Patient is currently off HD and transferred to [facility] for further care.      Patient seen and examined at the bedside.    No acute complaints  Patient notes that at times she still has the urge to urinate and she develops suprapubic pain.    Denies history of fever, chills, nausea, vomiting  Further denies CP or dyspnea.    Bilateral tubes nn place - draining urine (sanguinous urine on R side)    R nephrostomy and L NUS    Pending CT A/P to eval obstruction   Pending TURB with urology after cardio clearance  Cardio, Hem/Onc on board     Physical exam:  General: patient in no acute distress, resting comfortably  Head:  Atraumatic, Normocephalic  Eyes: EOMI, PERRLA, clear sclera  Neck: Supple, dressing over R IJ site  Cardio: S1/S2 +ve, regular rate and rhythm, no M/G/R  Resp: clear to ausculation bilaterally, no rales or wheezes  GI: abdomen soft, nontender, non distended, no guarding, BS +ve x 4  : bilateral nephrostomies in place - draining urine  Ext: no significant pedal edema  Neuro: CN 2-12 intact, no significant motor or sensory deficits.  Skin: No rashes or lesions     Recent Vitals  T(C): 36.8 (10-11-23 @ 05:03), Max: 37.4 (10-11-23 @ 00:00)  HR: 82 (10-11-23 @ 05:03) (76 - 90)  BP: 115/73 (10-11-23 @ 05:03) (113/77 - 142/77)  RR: 18 (10-11-23 @ 05:03) (18 - 26)  SpO2: 98% (10-11-23 @ 05:03) (96% - 99%)                        8.8    10.80 )-----------( 298      ( 11 Oct 2023 08:25 )             27.4     10-11    143  |  110<H>  |  25<H>  ----------------------------<  97  4.1   |  29  |  1.85<H>    Ca    8.7      11 Oct 2023 08:25  Phos  2.7     10-11  Mg     1.8     10-11    TPro  6.6  /  Alb  1.9<L>  /  TBili  0.2  /  DBili  x   /  AST  13<L>  /  ALT  11<L>  /  AlkPhos  92  10-11      LIVER FUNCTIONS - ( 11 Oct 2023 08:25 )  Alb: 1.9 g/dL / Pro: 6.6 gm/dL / ALK PHOS: 92 U/L / ALT: 11 U/L / AST: 13 U/L / GGT: x           Urinalysis Basic - ( 11 Oct 2023 08:25 )    Color: x / Appearance: x / SG: x / pH: x  Gluc: 97 mg/dL / Ketone: x  / Bili: x / Urobili: x   Blood: x / Protein: x / Nitrite: x   Leuk Esterase: x / RBC: x / WBC x   Sq Epi: x / Non Sq Epi: x / Bacteria: x        Home Medications:

## 2023-10-11 NOTE — PROGRESS NOTE ADULT - ASSESSMENT
73 yr old F with hx of HLD and ? HF on lasix presents to the ER with hematuria. Found to be in acute renal failure, admitted to ICU for urgent HD.    #Malig w/u  -patient being w/u OP for hematuria  -CT A/P 10/3 done OP (not showing up anywhere but PACS) shpwing Masslike thickening involving the posterior bladder suspicious for neoplasm. Severe bilateral hydronephrosis, and Masslike thickening of the ascending colon is indeterminate; recommend colonoscopy.  -urology onboard patient s/p b/l nephrostomy plan for TURB when cleared by cardiology  -rec GI eval for possible colonoscopy   -pending TM  -will need tissue BX for definitive DX   -rec CT-C for further staging     #Normocytic anemia  -patient presenting w/ anemia hx of hematuria  -unknown baseline  -patient also w/ LANA, new onsent of RF   -TSH-NL  -pending anemia/ hemolysis w/u   -maintain hgb >7 or if symptomatic        Will continue to monitor the patient.  Please call with any questions [phone]  Above reviewed with Attending Dr. Cardona   [facility] Hem[facility]Onc  [id number] [facility], [address], [place], [place]  [phone]  *Note not finalized until signed by Attending Physician

## 2023-10-11 NOTE — PROGRESS NOTE ADULT - SUBJECTIVE AND OBJECTIVE BOX
[facility] NEPHROLOGY SERVICES, [facility]  NEPHROLOGY AND HYPERTENSION  [address]  [address]  [place] [address]  [phone]    MD JERMAN EDWARDS, MD ELLEN CHERRY MD CHRISTOPHER CAPUTO, MD EDWARD BOVER, MD          Patient events noted  No distress      MEDICATIONS  (STANDING):  bisacodyl 10 milliGRAM(s) Oral once  chlorhexidine 2% Cloths 1 Application(s) Topical <User Schedule>  heparin   Injectable 5000 Unit(s) SubCutaneous every 8 hours  influenza  Vaccine (HIGH DOSE) 0.7 milliLiter(s) IntraMuscular once  piperacillin/tazobactam IVPB.. 3.375 Gram(s) IV Intermittent every 12 hours    MEDICATIONS  (PRN):  acetaminophen     Tablet .. 650 milliGRAM(s) Oral every 6 hours PRN Temp greater or equal to 38C (100.4F)  hydrALAZINE Injectable 10 milliGRAM(s) IV Push every 4 hours PRN SBP > 180      10-10-23 @ 07:01  -  10-11-23 @ 07:00  --------------------------------------------------------  IN: 0 mL / OUT: 3225 mL / NET: -3225 mL    10-11-23 @ 07:01  -  10-11-23 @ 17:04  --------------------------------------------------------  IN: 720 mL / OUT: 625 mL / NET: 95 mL      PHYSICAL EXAM:      T(C): 36.8 (10-11-23 @ 12:28), Max: 37.4 (10-11-23 @ 00:00)  HR: 81 (10-11-23 @ 12:28) (81 - 88)  BP: 116/82 (10-11-23 @ 12:28) (113/77 - 116/82)  RR: 17 (10-11-23 @ 12:28) (17 - 20)  SpO2: 99% (10-11-23 @ 12:28) (96% - 99%)  Wt(kg): --  Lungs clear  Heart S1S2  Abd soft NT ND  Extremities:   tr edema                                    8.8    10.80 )-----------( 298      ( 11 Oct 2023 08:25 )             27.4     10-11    143  |  110<H>  |  25<H>  ----------------------------<  97  4.1   |  29  |  1.85<H>    Ca    8.7      11 Oct 2023 08:25  Phos  2.7     10-11  Mg     1.8     10-11    TPro  6.6  /  Alb  1.9<L>  /  TBili  0.2  /  DBili  x   /  AST  13<L>  /  ALT  11<L>  /  AlkPhos  92  10-11      LIVER FUNCTIONS - ( 11 Oct 2023 08:25 )  Alb: 1.9 g/dL / Pro: 6.6 gm/dL / ALK PHOS: 92 U/L / ALT: 11 U/L / AST: 13 U/L / GGT: x           Creatinine Trend: 1.85<--, 4.77<--, 7.23<--, 6.91<--, 6.27<--, 5.86<--      ASSESSMENT:  1.  LANA - due to post renal azotemia - suspect bilateral upper tract obstruction possibly due to bladder cancer  2.  Severe hyperkalemia due to above, resolved after HD last night  3. Post right PCN and left NUS  Increasing UO      Plan     Continue current plan    Emanuel Francisco MD

## 2023-10-11 NOTE — PROGRESS NOTE ADULT - SUBJECTIVE AND OBJECTIVE BOX
Heme/Onc Progress note    INTERVAL HPI/OVERNIGHT EVENTS:  Patient S&E at bedside. No o/n events, patient resting comfortably. No complaints at this time. b/l nephrostomy tubes noted. Patient denies fever, chills, dizziness, weakness, CP, palpitations, SOB, cough, N/V/D/C, dysuria, changes in bowel movements, LE edema.    VITAL SIGNS:  T(F): 98.3 (10-11-23 @ 05:03)  HR: 82 (10-11-23 @ 05:03)  BP: 115/73 (10-11-23 @ 05:03)  RR: 18 (10-11-23 @ 05:03)  SpO2: 98% (10-11-23 @ 05:03)  Wt(kg): --    PHYSICAL EXAM:    Constitutional: NAD  Eyes: EOMI, sclera non-icteric  Neck: supple, no masses, no JVD  Respiratory: CTA b/l, good air entry b/l  Cardiovascular: RRR, no M/R/G  Gastrointestinal: soft, NTND, no masses palpable, + BS,  Extremities: no c/c/e  Neurological: AAOx3      MEDICATIONS  (STANDING):  chlorhexidine 2% Cloths 1 Application(s) Topical <User Schedule>  heparin   Injectable 5000 Unit(s) SubCutaneous every 8 hours  influenza  Vaccine (HIGH DOSE) 0.7 milliLiter(s) IntraMuscular once  piperacillin/tazobactam IVPB.. 3.375 Gram(s) IV Intermittent every 12 hours    MEDICATIONS  (PRN):  acetaminophen     Tablet .. 650 milliGRAM(s) Oral every 6 hours PRN Temp greater or equal to 38C (100.4F)  hydrALAZINE Injectable 10 milliGRAM(s) IV Push every 4 hours PRN SBP > 180      Allergies    No Known Allergies    Intolerances        LABS:                        8.8    10.80 )-----------( 298      ( 11 Oct 2023 08:25 )             27.4     10-10    141  |  107  |  44<H>  ----------------------------<  147<H>  4.4   |  26  |  4.77<H>    Ca    8.6      10 Oct 2023 03:00  Phos  5.1     10-10  Mg     2.1     10-10    TPro  6.5  /  Alb  1.8<L>  /  TBili  0.2  /  DBili  x   /  AST  10<L>  /  ALT  11<L>  /  AlkPhos  99  10-10      Urinalysis Basic - ( 10 Oct 2023 03:00 )    Color: x / Appearance: x / SG: x / pH: x  Gluc: 147 mg/dL / Ketone: x  / Bili: x / Urobili: x   Blood: x / Protein: x / Nitrite: x   Leuk Esterase: x / RBC: x / WBC x   Sq Epi: x / Non Sq Epi: x / Bacteria: x        RADIOLOGY & ADDITIONAL TESTS:  Studies reviewed.    ASSESSMENT & PLAN:

## 2023-10-11 NOTE — PROGRESS NOTE ADULT - PROBLEM SELECTOR PLAN 3
s/p bilateral nephrostomies in place  Repeat imaging pending today s/p R nephrostomy and L NUS in place  Repeat imaging pending today

## 2023-10-11 NOTE — PROGRESS NOTE ADULT - NS ATTEND AMEND GEN_ALL_CORE FT
I agree with the statements above.  Asking for bilateral nefrostogram and possible anterograde stent placement.  Bladder planned to be resected for staging.

## 2023-10-11 NOTE — PHYSICAL THERAPY INITIAL EVALUATION ADULT - ADDITIONAL COMMENTS
As per pt, she lives with her daughter in a house with 1 step wide enough to place RW, she uses rollator outdoors, cane indoors, hold waln one hand at times, pt started having RUE weakness gradually for last 4 weeks

## 2023-10-11 NOTE — PHYSICAL THERAPY INITIAL EVALUATION ADULT - BALANCE TRAINING, PT EVAL
Pt will improve static & dynamic standing balance to Good using [Rolling walker]   to perform ADL, Gait independently in 2 weeks

## 2023-10-12 NOTE — PROGRESS NOTE ADULT - SUBJECTIVE AND OBJECTIVE BOX
Patient seen and examined at bedside with Dr. Llanes, patient without complaints.   Plan for colonoscopy today      MEDICATIONS  (STANDING):  chlorhexidine 2% Cloths 1 Application(s) Topical <User Schedule>  heparin   Injectable 5000 Unit(s) SubCutaneous every 8 hours  influenza  Vaccine (HIGH DOSE) 0.7 milliLiter(s) IntraMuscular once  piperacillin/tazobactam IVPB.. 3.375 Gram(s) IV Intermittent every 12 hours    MEDICATIONS  (PRN):  acetaminophen     Tablet .. 650 milliGRAM(s) Oral every 6 hours PRN Temp greater or equal to 38C (100.4F)  hydrALAZINE Injectable 10 milliGRAM(s) IV Push every 4 hours PRN SBP > 180      Vital Signs Last 24 Hrs  T(C): 37 (12 Oct 2023 12:09), Max: 37 (12 Oct 2023 12:09)  T(F): 98.6 (12 Oct 2023 12:09), Max: 98.6 (12 Oct 2023 12:09)  HR: 63 (12 Oct 2023 12:09) (63 - 80)  BP: 121/80 (12 Oct 2023 12:09) (121/80 - 151/62)  RR: 18 (12 Oct 2023 12:09) (17 - 18)  SpO2: 99% (12 Oct 2023 12:09) (97% - 99%)    Parameters below as of 12 Oct 2023 12:09  Patient On (Oxygen Delivery Method): room air      PHYSICAL EXAM:  General: NAD  Neuro:  Alert & oriented x 3  HEENT: NCAT, EOMI, conjunctiva clear  CV: +S1+S2  Lung: Respirations nonlabored, good inspiratory effort  Abdomen: soft, NTND  : bilateral nephrostomy tubes draining with clear yellow urine. 1225cc and 1100cc/24 hours  Extremities: no pedal edema or calf tenderness noted           LABS:                        8.8    10.82 )-----------( 330      ( 12 Oct 2023 07:25 )             28.4     10-12    141  |  106  |  16  ----------------------------<  103<H>  4.2   |  32<H>  |  1.21    Ca    9.3      12 Oct 2023 07:25  Phos  2.2     10-12  Mg     1.9     10-12    TPro  6.6  /  Alb  1.9<L>  /  TBili  0.2  /  DBili  x   /  AST  13<L>  /  ALT  11<L>  /  AlkPhos  92  10-11    PT/INR - ( 12 Oct 2023 07:25 )   PT: 10.8 sec;   INR: 0.90 ratio           Urinalysis Basic - ( 12 Oct 2023 07:25 )    Color: x / Appearance: x / SG: x / pH: x  Gluc: 103 mg/dL / Ketone: x  / Bili: x / Urobili: x   Blood: x / Protein: x / Nitrite: x   Leuk Esterase: x / RBC: x / WBC x   Sq Epi: x / Non Sq Epi: x / Bacteria: x      < from: CT Abdomen and Pelvis No Cont (10.11.23 @ 15:11) >    ACC: [id number] EXAM:  CT ABDOMEN AND PELVIS   ORDERED BY: KIRT TELLO     PROCEDURE DATE:  10/11/2023          INTERPRETATION:  CLINICAL INFORMATION: 73 years  Female with Right sided   Trans PCN contrast to evaluate obstruction    evaluate rightureter.   History of bladder mass. Status post left nephroureteral catheter   placement and a right nephrostomy tube placement 10/9/2023.    COMPARISON: CT urogram 10/3/2023    CONTRAST/COMPLICATIONS:  IV Contrast: None  Right Nephrostogram Contrast:20 cc Omnipaque 350  Complications: None reported at time of study completion    PROCEDURE:  CT of the Abdomen and Pelvis was performed in the prone position before   and after iodinated contrast was instilled via the right nephrostomy tube.  Sagittal and coronal reformats were performed.    LIMITATIONS: Evaluation of the solid organs, vascular structures and GI   tract is limited without oral and IV contrast.    FINDINGS:  LOWER CHEST: Within normal limits.    LIVER: Within normal limits.  BILE DUCTS: Normal caliber.  GALLBLADDER: Within normal limits.  SPLEEN: Within normal limits.  PANCREAS: Within normal limits.  ADRENALS: Within normal limits.  KIDNEYS/URETERS:  RIGHT KIDNEY: Right percutaneous nephrostomy tube via lower pole approach   with pigtail coiled in the renal pelvis. No hydronephrosis. 3.9 cm right   midpole cyst. Following contrast administration via the nephrostomy tube,   the collecting systems and right ureter are opacified to the level of the   distal ureter approximately 2.8 cm above the UVJ where there is   thickening of the distal ureter and tapering of the contrast column. The   ureterovesical junction is not opacified, concerning for obstructing   distal ureteral mass or extension of bladder mass.  LEFT KIDNEY: Left percutaneous nephroureteral stent via a lower pole   approach. Pigtail coiled in the renal pelvis. Distal end of the   nephroureteral stent coiled in the bladder.    BLADDER: Collapsed around Ricks catheter. Left ureteral stent pigtail in   situ. No hydroureteronephrosis. No contrast in the bladder.  REPRODUCTIVE ORGANS: Hysterectomy.    BOWEL: No bowel obstruction. Appendix is not visualized. No evidence of   inflammation in the pericecal region. Masslike thickening of the   ascending colon (2:58 and 602:44).  PERITONEUM: No ascites.  VESSELS: Within normal limits.  RETROPERITONEUM/LYMPH NODES: No lymphadenopathy.  ABDOMINAL WALL: Within normal limits.  BONES: Within normal limits.    IMPRESSION:  Status post right nephrostomy and leftnephroureteral stent placement. No   hydronephrosis.    Distal ureteral thickening and tapering of the contrast column   approximately 2.8 cm above the UVJ suspicious for obstructing ureteral   mass or extension of bladder mass. No contrast is present in the urinary   bladder.    Masslike thickening of the ascending colon suspicious for malignancy.   Recommend colonoscopy.

## 2023-10-12 NOTE — PROGRESS NOTE ADULT - ASSESSMENT
73F with a PMH of HLD and ?CHF who presented to the ED with hematuria.  Found to have severe bilateral hydronephrosis due to bladder outlet obstruction - suspicion secondary to bladder malignancy.  Patient was admitted to the ICU for emergent HD.  Patient had bilateral nephrostomy tubes placed.  Patient is currently off HD and transferred to [facility] for further care.    < from: CT Abdomen and Pelvis No Cont (10.11.23 @ 15:11) >  Status post right nephrostomy and leftnephroureteral stent placement. No hydronephrosis.  Distal ureteral thickening and tapering of the contrast column approximately 2.8 cm above the UVJ suspicious for obstructing ureteral  mass or extension of bladder mass. No contrast is present in the urinary  bladder.  Masslike thickening of the ascending colon suspicious for malignancy.  Recommend colonoscopy.   < end of copied text >    # Acute renal failure - due to obstructive uropathy.  s/p R nephrostomy and L NUS, Cr improving.   # Bladder malignancy. - , Oncology following.  Planning TURB.   # UTI - s/p Zosyn  # Essential HTN - Monitor BP.  Continue antihypertensives.  # Ascending Colon Mass - s/p Colonoscopy 10/12 with findings concerning for malignancy.  d/w Dr. Granados.  Surgery input.  # Inpatient DVT prophylaxis - subcutaneous Heparin

## 2023-10-12 NOTE — PROGRESS NOTE ADULT - SUBJECTIVE AND OBJECTIVE BOX
See colonoscopy report    Imp: Colon Mass - Ascending Colon (r/o CA)    Plan: check path; clear liquids; CBC in AM; CEA; Surgical consult - Dr Whitney

## 2023-10-12 NOTE — PROGRESS NOTE ADULT - SUBJECTIVE AND OBJECTIVE BOX
Patient: FRANSICO GUZMAN [id number] 73y Female                            Hospitalist Attending Note    No complaints.      ____________________PHYSICAL EXAM:  GENERAL:  NAD Alert and Oriented x 3   HEENT: NCAT  CARDIOVASCULAR:  S1, S2  LUNGS: CTAB  ABDOMEN:  soft, (-) tenderness, (-) distension, (+) bowel sounds, (-) guarding, (-) rebound (-) rigidity  BACK: b/l nephrostomy tubes in place with clear yellow urine draining.   EXTREMITIES:  no cyanosis / clubbing / edema.   ____________________     VITALS:  Vital Signs Last 24 Hrs  T(C): 37 (12 Oct 2023 12:09), Max: 37 (12 Oct 2023 12:09)  T(F): 98.6 (12 Oct 2023 12:09), Max: 98.6 (12 Oct 2023 12:09)  HR: 63 (12 Oct 2023 12:09) (63 - 80)  BP: 121/80 (12 Oct 2023 12:09) (121/80 - 133/76)  BP(mean): --  RR: 18 (12 Oct 2023 12:09) (17 - 18)  SpO2: 99% (12 Oct 2023 12:09) (97% - 99%)    Parameters below as of 12 Oct 2023 12:09  Patient On (Oxygen Delivery Method): room air     Daily     Daily Weight in k.5 (11 Oct 2023 23:44)  CAPILLARY BLOOD GLUCOSE        I&O's Summary    11 Oct 2023 07:01  -  12 Oct 2023 07:00  --------------------------------------------------------  IN: 720 mL / OUT: 2325 mL / NET: -1605 mL        HISTORY:  PAST MEDICAL & SURGICAL HISTORY:  DVT (deep venous thrombosis)      No significant past surgical history      Allergies    No Known Allergies    Intolerances       LABS:                        8.8    10.82 )-----------( 330      ( 12 Oct 2023 07:25 )             28.4     1012    141  |  106  |  16  ----------------------------<  103<H>  4.2   |  32<H>  |  1.21    Ca    9.3      12 Oct 2023 07:25  Phos  2.2     10-12  Mg     1.9     10-12    TPro  6.6  /  Alb  1.9<L>  /  TBili  0.2  /  DBili  x   /  AST  13<L>  /  ALT  11<L>  /  AlkPhos  92  10-11    PT/INR - ( 12 Oct 2023 07:25 )   PT: 10.8 sec;   INR: 0.90 ratio           LIVER FUNCTIONS - ( 11 Oct 2023 08:25 )  Alb: 1.9 g/dL / Pro: 6.6 gm/dL / ALK PHOS: 92 U/L / ALT: 11 U/L / AST: 13 U/L / GGT: x           Urinalysis Basic - ( 12 Oct 2023 07:25 )    Color: x / Appearance: x / SG: x / pH: x  Gluc: 103 mg/dL / Ketone: x  / Bili: x / Urobili: x   Blood: x / Protein: x / Nitrite: x   Leuk Esterase: x / RBC: x / WBC x   Sq Epi: x / Non Sq Epi: x / Bacteria: x              MEDICATIONS:  MEDICATIONS  (STANDING):  chlorhexidine 2% Cloths 1 Application(s) Topical <User Schedule>  heparin   Injectable 5000 Unit(s) SubCutaneous every 8 hours  influenza  Vaccine (HIGH DOSE) 0.7 milliLiter(s) IntraMuscular once  piperacillin/tazobactam IVPB.. 3.375 Gram(s) IV Intermittent every 12 hours    MEDICATIONS  (PRN):  acetaminophen     Tablet .. 650 milliGRAM(s) Oral every 6 hours PRN Temp greater or equal to 38C (100.4F)  hydrALAZINE Injectable 10 milliGRAM(s) IV Push every 4 hours PRN SBP > 180

## 2023-10-12 NOTE — PROGRESS NOTE ADULT - NS ATTEND AMEND GEN_ALL_CORE FT
I agree with the statements above.  on CT scan, RIGHT KIDNEY: Right percutaneous nephrostomy tube via lower pole approach   with pigtail coiled in the renal pelvis. No hydronephrosis. 3.9 cm right   midpole cyst. Following contrast administration via the nephrostomy tube,   the collecting systems and right ureter are opacified to the level of the   distal ureter approximately 2.8 cm above the UVJ where there is   thickening of the distal ureter and tapering of the contrast column. The   ureterovesical junction is not opacified, concerning for obstructing   distal ureteral mass or extension of bladder mass.    bladder resection planned for friday

## 2023-10-12 NOTE — PROGRESS NOTE ADULT - ASSESSMENT
74 Y/O female with previous R PCN and L PCNU placement done by IR on 10/9. Bilateral hydronephrosis present with a possible bladder lesion and bladder outlet obstruction.   PMHx of HF on lasix and HLD      PLAN:   CT scan reviewed with Dr. Llanes  OR planning for Friday with Dr. Bradford for TURBT, possible stent placement  Continue IV ABx   Multimodal pain control  OOB as tolerated, walking as tolerated  - Discussed and seen with Dr. Llanes

## 2023-10-12 NOTE — PRE-OP CHECKLIST - COMMENTS
last time drinking clears was 1330 on 10/12/23 (bowel prep). Patient states she has not had solids since tuesday

## 2023-10-13 NOTE — PROGRESS NOTE ADULT - SUBJECTIVE AND OBJECTIVE BOX
SUBJECTIVE:  Patient seen and examined at bedside.  No overnight events.  Patient offers no complaints at this time.    VITALS  Vital Signs Last 24 Hrs  T(C): 36.7 (13 Oct 2023 05:23), Max: 37 (12 Oct 2023 12:09)  T(F): 98.1 (13 Oct 2023 05:23), Max: 98.6 (12 Oct 2023 12:09)  HR: 78 (13 Oct 2023 05:23) (57 - 78)  BP: 119/65 (13 Oct 2023 05:23) (108/71 - 149/91)  RR: 18 (13 Oct 2023 05:23) (16 - 18)  SpO2: 98% (13 Oct 2023 05:23) (97% - 100%)    Parameters below as of 12 Oct 2023 12:09  Patient On (Oxygen Delivery Method): room air    PHYSICAL EXAM  GENERAL:  Well-nourished, well-developed female lying comfortably in bed in [facility].  HEENT:  NC/AT. Sclera white.   CARDIO:  Regular rate and rhythm.   RESPIRATORY:  Nonlabored breathing, no accessory muscle use.  ABDOMEN:  Soft, nondistended, nontender. No rebound tenderness or guarding.  : Ricks in place with minimal blood-tinged urine in tubing. Left and Right PCN tubes with approximately 200cc clear yellow urine in each.  SKIN:  No jaundice, pallor, or cyanosis  NEURO:  A&O x 3    INTAKE & OUTPUT  I&O's Summary    12 Oct 2023 07:01  -  13 Oct 2023 07:00  --------------------------------------------------------  IN: 0 mL / OUT: 1650 mL / NET: -1650 mL    I&O's Detail    12 Oct 2023 07:01  -  13 Oct 2023 07:00  --------------------------------------------------------  IN:  Total IN: 0 mL    OUT:    Indwelling Catheter - Urethral (mL): 250 mL    Nephrostomy Tube (mL): 550 mL    Nephrostomy Tube (mL): 850 mL  Total OUT: 1650 mL    Total NET: -1650 mL    MEDICATIONS  MEDICATIONS  (STANDING):  chlorhexidine 2% Cloths 1 Application(s) Topical <User Schedule>  heparin   Injectable 5000 Unit(s) SubCutaneous every 8 hours  influenza  Vaccine (HIGH DOSE) 0.7 milliLiter(s) IntraMuscular once  piperacillin/tazobactam IVPB.. 3.375 Gram(s) IV Intermittent every 12 hours    MEDICATIONS  (PRN):  acetaminophen     Tablet .. 650 milliGRAM(s) Oral every 6 hours PRN Temp greater or equal to 38C (100.4F)  hydrALAZINE Injectable 10 milliGRAM(s) IV Push every 4 hours PRN SBP > 180    LABS:                        9.0    11.05 )-----------( 358      ( 13 Oct 2023 07:15 )             28.9     10-13    143  |  107  |  16  ----------------------------<  98  4.5   |  30  |  1.10    Ca    9.0      13 Oct 2023 07:15  Phos  2.2     10-12  Mg     1.9     10-12    PT/INR - ( 12 Oct 2023 07:25 )   PT: 10.8 sec;   INR: 0.90 ratio    RADIOLOGY  < from: CT Abdomen and Pelvis No Cont (10.11.23 @ 15:11) >  IMPRESSION:  Status post right nephrostomy and leftnephroureteral stent placement. No   hydronephrosis.    Distal ureteral thickening and tapering of the contrast column   approximately 2.8 cm above the UVJ suspicious for obstructing ureteral   mass or extension of bladder mass. No contrast is present in the urinary   bladder.    Masslike thickening of the ascending colon suspicious for malignancy.   Recommend colonoscopy.    ASSESSMENT & PLAN  73 year old female with obstructing ureteral vs bladder mass, as well as suspicion for colonic malignancy.  S/p bilateral nephrostomy tube placement 10/9/23.    - Plan for TURBT today pending medical/cardiac clearance  - Recommend general surgery consult for evaluation of possible colonic mass  - Is & Os  - DVT prophylaxis with SQH  - Case discussed with Dr. Bradford

## 2023-10-13 NOTE — BRIEF OPERATIVE NOTE - NSICDXBRIEFPOSTOP_GEN_ALL_CORE_FT
POST-OP DIAGNOSIS:  Bladder mass 13-Oct-2023 16:29:17  Sobia Bradford  Gross hematuria 13-Oct-2023 16:29:28  Sobia Bradford  Bilateral hydronephrosis 13-Oct-2023 16:29:39  Sobia Bradford  Acute renal failure 13-Oct-2023 16:29:48  Sobia Bradford

## 2023-10-13 NOTE — BRIEF OPERATIVE NOTE - NSICDXBRIEFPROCEDURE_GEN_ALL_CORE_FT
PROCEDURES:  Cystourethroscopy with resection of large tumor of bladder 13-Oct-2023 16:28:13  Sobia Bradford

## 2023-10-13 NOTE — PROGRESS NOTE ADULT - SUBJECTIVE AND OBJECTIVE BOX
Patient: FRANSICO GUZMAN [id number] 73y Female                            Hospitalist Attending Note    No complaints.      ____________________PHYSICAL EXAM:  GENERAL:  NAD Alert and Oriented x 3   HEENT: NCAT  CARDIOVASCULAR:  S1, S2  LUNGS: CTAB  ABDOMEN:  soft, (-) tenderness, (-) distension, (+) bowel sounds, (-) guarding, (-) rebound (-) rigidity  BACK: b/l nephrostomy tubes in place with clear yellow urine draining.   EXTREMITIES:  no cyanosis / clubbing / edema.   ____________________    VITALS:  Vital Signs Last 24 Hrs  T(C): 36.7 (13 Oct 2023 17:40), Max: 37 (13 Oct 2023 16:25)  T(F): 98.1 (13 Oct 2023 17:40), Max: 98.6 (13 Oct 2023 16:25)  HR: 60 (13 Oct 2023 17:40) (60 - 78)  BP: 138/87 (13 Oct 2023 17:40) (108/71 - 138/87)  BP(mean): --  RR: 16 (13 Oct 2023 17:40) (16 - 18)  SpO2: 96% (13 Oct 2023 17:40) (96% - 100%)    Parameters below as of 13 Oct 2023 17:40  Patient On (Oxygen Delivery Method): room air     Daily     Daily   CAPILLARY BLOOD GLUCOSE      POCT Blood Glucose.: 208 mg/dL (12 Oct 2023 21:12)    I&O's Summary    12 Oct 2023 07:01  -  13 Oct 2023 07:00  --------------------------------------------------------  IN: 0 mL / OUT: 1650 mL / NET: -1650 mL    13 Oct 2023 07:01  -  13 Oct 2023 17:54  --------------------------------------------------------  IN: 0 mL / OUT: 1150 mL / NET: -1150 mL        LABS:                        9.0    11.05 )-----------( 358      ( 13 Oct 2023 07:15 )             28.9     10-13    143  |  107  |  16  ----------------------------<  98  4.5   |  30  |  1.10    Ca    9.0      13 Oct 2023 07:15  Phos  2.2     10-12  Mg     1.9     10-12      PT/INR - ( 12 Oct 2023 07:25 )   PT: 10.8 sec;   INR: 0.90 ratio             Urinalysis Basic - ( 13 Oct 2023 07:15 )    Color: x / Appearance: x / SG: x / pH: x  Gluc: 98 mg/dL / Ketone: x  / Bili: x / Urobili: x   Blood: x / Protein: x / Nitrite: x   Leuk Esterase: x / RBC: x / WBC x   Sq Epi: x / Non Sq Epi: x / Bacteria: x              MEDICATIONS:  acetaminophen     Tablet .. 650 milliGRAM(s) Oral every 6 hours PRN  chlorhexidine 2% Cloths 1 Application(s) Topical <User Schedule>  heparin   Injectable 5000 Unit(s) SubCutaneous every 8 hours  hydrALAZINE Injectable 10 milliGRAM(s) IV Push every 4 hours PRN  influenza  Vaccine (HIGH DOSE) 0.7 milliLiter(s) IntraMuscular once  oxyCODONE    IR 5 milliGRAM(s) Oral every 6 hours PRN  piperacillin/tazobactam IVPB.. 3.375 Gram(s) IV Intermittent every 12 hours

## 2023-10-13 NOTE — PROGRESS NOTE ADULT - NS ATTEND AMEND GEN_ALL_CORE FT
Monitor h/h and Cr  f/u cytology and f/u pathology    S/p large TURBT  mostly trigone and bladder neck, causing b/l obstruction and BNO obstruction with trabeculated bladder.  Also, posterior base involvement bilaterally  Very thickened nodular, vesico-vaginal wall on bimanual.    f/u path from colonoscopy as well      ? metastatic disease or two primaries.    Keep all tubes open for now.    Spoke to daughter at length following her surgery, on 1 E

## 2023-10-13 NOTE — BRIEF OPERATIVE NOTE - NSICDXBRIEFPREOP_GEN_ALL_CORE_FT
PRE-OP DIAGNOSIS:  Bladder mass 13-Oct-2023 16:28:24  Sobia Bradford  Gross hematuria 13-Oct-2023 16:28:32  Sobia Bradford  Bilateral hydronephrosis 13-Oct-2023 16:28:40  Sobia Bradford  Acute renal failure 13-Oct-2023 16:28:46  Sobia Bradford

## 2023-10-13 NOTE — PROGRESS NOTE ADULT - SUBJECTIVE AND OBJECTIVE BOX
urology post op check  Pt is s/p Cystourethroscopy with resection of large tumor of bladder   Pt seen and examined at bedside. Pt denies complaints. Pain is well controlled, pt denies chest pain, SOB, dizziness, fever, chills.      Vital Signs Last 24 Hrs  T(C): 36.4 (13 Oct 2023 19:40), Max: 37 (13 Oct 2023 16:25)  T(F): 97.5 (13 Oct 2023 19:40), Max: 98.6 (13 Oct 2023 16:25)  HR: 78 (13 Oct 2023 19:40) (60 - 78)  BP: 100/65 (13 Oct 2023 19:40) (100/65 - 138/87)  BP(mean): --  RR: 18 (13 Oct 2023 19:40) (16 - 18)  SpO2: 97% (13 Oct 2023 19:40) (96% - 100%)    Parameters below as of 13 Oct 2023 19:40  Patient On (Oxygen Delivery Method): room air          PHYSICAL EXAM:    GENERAL: NAD  HEAD:  Atraumatic, Normocephalic  CHEST/LUNG: Clear to ausculation, bilaterally   HEART: RRR   ABDOMEN: non distended, soft, non tender, no guarding, b/l nephrostomies with clear output.  CBI off, clear yellow output from alvarenga  EXTREMITIES:  calf soft, non tender b/l    I&O's Detail    12 Oct 2023 07:01  -  13 Oct 2023 07:00  --------------------------------------------------------  IN:  Total IN: 0 mL    OUT:    Indwelling Catheter - Urethral (mL): 250 mL    Nephrostomy Tube (mL): 550 mL    Nephrostomy Tube (mL): 850 mL  Total OUT: 1650 mL    Total NET: -1650 mL      13 Oct 2023 07:01  -  13 Oct 2023 21:22  --------------------------------------------------------  IN:  Total IN: 0 mL    OUT:    Nephrostomy Tube (mL): 1150 mL    Nephrostomy Tube (mL): 450 mL  Total OUT: 1600 mL    Total NET: -1600 mL          LABS:                        9.0    11.05 )-----------( 358      ( 13 Oct 2023 07:15 )             28.9     10-13    143  |  107  |  16  ----------------------------<  98  4.5   |  30  |  1.10    Ca    9.0      13 Oct 2023 07:15  Phos  2.2     10-12  Mg     1.9     10-12      PT/INR - ( 12 Oct 2023 07:25 )   PT: 10.8 sec;   INR: 0.90 ratio           Urinalysis Basic - ( 13 Oct 2023 07:15 )    Color: x / Appearance: x / SG: x / pH: x  Gluc: 98 mg/dL / Ketone: x  / Bili: x / Urobili: x   Blood: x / Protein: x / Nitrite: x   Leuk Esterase: x / RBC: x / WBC x   Sq Epi: x / Non Sq Epi: x / Bacteria: x

## 2023-10-13 NOTE — PROGRESS NOTE ADULT - ASSESSMENT
Assessment: Pt is s/p   Cystourethroscopy with resection of large tumor of bladder    Plan:  -pain management prn  -continue DVT prophylaxis, OOB, Ambulating  -continue incentive spirometer  -CBI off now, monitor nephrostomy and alvarenga output  -continue zosyn  -f/u labs  -will discuss pt with surgical attending

## 2023-10-13 NOTE — PROGRESS NOTE ADULT - SUBJECTIVE AND OBJECTIVE BOX
Pt tolerated colonoscopy well  +R Colon Mass  awaiting path      MEDICATIONS  (STANDING):  chlorhexidine 2% Cloths 1 Application(s) Topical <User Schedule>  heparin   Injectable 5000 Unit(s) SubCutaneous every 8 hours  influenza  Vaccine (HIGH DOSE) 0.7 milliLiter(s) IntraMuscular once  piperacillin/tazobactam IVPB.. 3.375 Gram(s) IV Intermittent every 12 hours    MEDICATIONS  (PRN):  acetaminophen     Tablet .. 650 milliGRAM(s) Oral every 6 hours PRN Temp greater or equal to 38C (100.4F)  hydrALAZINE Injectable 10 milliGRAM(s) IV Push every 4 hours PRN SBP > 180      Allergies    No Known Allergies    Intolerances        Vital Signs Last 24 Hrs  T(C): 36.7 (13 Oct 2023 05:23), Max: 37 (12 Oct 2023 12:09)  T(F): 98.1 (13 Oct 2023 05:23), Max: 98.6 (12 Oct 2023 12:09)  HR: 78 (13 Oct 2023 05:23) (57 - 78)  BP: 119/65 (13 Oct 2023 05:23) (108/71 - 149/91)  BP(mean): --  RR: 18 (13 Oct 2023 05:23) (16 - 18)  SpO2: 98% (13 Oct 2023 05:23) (97% - 100%)    Parameters below as of 12 Oct 2023 12:09  Patient On (Oxygen Delivery Method): room air        PHYSICAL EXAM:  General: NAD.  CVS: S1, S2  Chest: air entry bilaterally present  Abd: BS present, soft, non-tender      LABS:                        9.0    11.05 )-----------( 358      ( 13 Oct 2023 07:15 )             28.9     10-13    143  |  107  |  16  ----------------------------<  98  4.5   |  30  |  1.10    Ca    9.0      13 Oct 2023 07:15  Phos  2.2     10-12  Mg     1.9     10-12      PT/INR - ( 12 Oct 2023 07:25 )   PT: 10.8 sec;   INR: 0.90 ratio      surgery to see patient - case discussed with Dr Whitney  awaiting path report

## 2023-10-13 NOTE — CONSULT NOTE ADULT - SUBJECTIVE AND OBJECTIVE BOX
Patient is a 73y year old female with PMHx of   HPI:  HPI: 73 yr old F with hx of HLD and ? HF on lasix presents to the ER with hematuria. Per family patient has had increasing edema of lower extremities and worsening hematuria. Last night she had rigors but no fever and was very weak. Patient had outpatient imaging done earlier this week, but did not know results.     In ER, patient hyperkalemic to 8.7, in acute renal failure with Cr 7.89. CT showed performed earlier in the week which showed severe bilateral hydronephrosis.  Denies any prior hx of CKD or HTN. (07 Oct 2023 20:11)      Surgery consulted for colonoscopy findings of ascending colon mass. Patient at this time, denies any abdominal pain, denies any nausea, vomiting, fever or chills. NO chest pain or shortness of breath. States she had colonoscopies in the past, last one maybe 3 years ago which she reports was normal.     PMH  DVT (deep venous thrombosis)      PSH  No significant past surgical history      MEDS    Allergies    No Known Allergies    Intolerances        Social    Physical Exam  T(C): 36.7 (10-13-23 @ 05:23), Max: 37 (10-12-23 @ 12:09)  HR: 78 (10-13-23 @ 05:23) (57 - 78)  BP: 119/65 (10-13-23 @ 05:23) (108/71 - 149/91)  RR: 18 (10-13-23 @ 05:23) (16 - 18)  SpO2: 98% (10-13-23 @ 05:23) (97% - 100%)  Wt(kg): --  Tmax: T(C): , Max: 37 (10-12-23 @ 12:09)  Wt(kg): --    Gen: No apparent distress, alert and oriented x3  HEENT: normocephalic, atraumatic, no scleral icterus  Pulm: Airway patent, breathing comfortably on room air, no cough  Abd: Soft, not tender and not distended. Sanon in place. PCN in place.       10-12-23  -  10-13-23  --------------------------------------------------------  IN:  Total IN: 0 mL    OUT:    Indwelling Catheter - Urethral (mL): 250 mL    Nephrostomy Tube (mL): 550 mL    Nephrostomy Tube (mL): 850 mL  Total OUT: 1650 mL    Total NET: -1650 mL          Labs:                        9.0    11.05 )-----------( 358      ( 13 Oct 2023 07:15 )             28.9     10-13    143  |  107  |  16  ----------------------------<  98  4.5   |  30  |  1.10    Ca    9.0      13 Oct 2023 07:15  Phos  2.2     10-12  Mg     1.9     10-12      PT/INR - ( 12 Oct 2023 07:25 )   PT: 10.8 sec;   INR: 0.90 ratio           Urinalysis Basic - ( 13 Oct 2023 07:15 )    Color: x / Appearance: x / SG: x / pH: x  Gluc: 98 mg/dL / Ketone: x  / Bili: x / Urobili: x   Blood: x / Protein: x / Nitrite: x   Leuk Esterase: x / RBC: x / WBC x   Sq Epi: x / Non Sq Epi: x / Bacteria: x        < from: CT Abdomen and Pelvis No Cont (10.11.23 @ 15:11) >    ACC: [id number] EXAM:  CT ABDOMEN AND PELVIS   ORDERED BY: KIRT TELLO     PROCEDURE DATE:  10/11/2023          INTERPRETATION:  CLINICAL INFORMATION: 73 years  Female with Right sided   Trans PCN contrast to evaluate obstruction    evaluate rightureter.   History of bladder mass. Status post left nephroureteral catheter   placement and a right nephrostomy tube placement 10/9/2023.    COMPARISON: CT urogram 10/3/2023    CONTRAST/COMPLICATIONS:  IV Contrast: None  Right Nephrostogram Contrast:20 cc Omnipaque 350  Complications: None reported at time of study completion    PROCEDURE:  CT of the Abdomen and Pelvis was performed in the prone position before   and after iodinated contrast was instilled via the right nephrostomy tube.  Sagittal and coronal reformats were performed.    LIMITATIONS: Evaluation of the solid organs, vascular structures and GI   tract is limited without oral and IV contrast.    FINDINGS:  LOWER CHEST: Within normal limits.    LIVER: Within normal limits.  BILE DUCTS: Normal caliber.  GALLBLADDER: Within normal limits.  SPLEEN: Within normal limits.  PANCREAS: Within normal limits.  ADRENALS: Within normal limits.  KIDNEYS/URETERS:  RIGHT KIDNEY: Right percutaneous nephrostomy tube via lower pole approach   with pigtail coiled in the renal pelvis. No hydronephrosis. 3.9 cm right   midpole cyst. Following contrast administration via the nephrostomy tube,   the collecting systems and right ureter are opacified to the level of the   distal ureter approximately 2.8 cm above the UVJ where there is   thickening of the distal ureter and tapering of the contrast column. The   ureterovesical junction is not opacified, concerning for obstructing   distal ureteral mass or extension of bladder mass.  LEFT KIDNEY: Left percutaneous nephroureteral stent via a lower pole   approach. Pigtail coiled in the renal pelvis. Distal end of the   nephroureteral stent coiled in the bladder.    BLADDER: Collapsed around Sanon catheter. Left ureteral stent pigtail in   situ. No hydroureteronephrosis. No contrast in the bladder.  REPRODUCTIVE ORGANS: Hysterectomy.    BOWEL: No bowel obstruction. Appendix is not visualized. No evidence of   inflammation in the pericecal region. Masslike thickening of the   ascending colon (2:58 and 602:44).  PERITONEUM: No ascites.  VESSELS: Within normal limits.  RETROPERITONEUM/LYMPH NODES: No lymphadenopathy.  ABDOMINAL WALL: Within normal limits.  BONES: Within normal limits.    IMPRESSION:  Status post right nephrostomy and leftnephroureteral stent placement. No   hydronephrosis.    Distal ureteral thickening and tapering of the contrast column   approximately 2.8 cm above the UVJ suspicious for obstructing ureteral   mass or extension of bladder mass. No contrast is present in the urinary   bladder.    Masslike thickening of the ascending colon suspicious for malignancy.   Recommend colonoscopy.        --- End of Report ---            DOLLY GARCIA MD; Attending Radiologist  This document has been electronically signed.Oct 11 2023  6:07PM    < end of copied text >  Imaging  < from: CT Chest No Cont (10.11.23 @ 15:09) >    ACC: [id number] EXAM:  CT CHEST   ORDERED BY: MIRELLA TAYLOR     PROCEDURE DATE:  10/11/2023          INTERPRETATION:  CLINICAL INFORMATION: 73 years  Female with malig w/u.    COMPARISON: CT abdomen and pelvis immediately prior.    CONTRAST/COMPLICATIONS:  IV Contrast: NONE  Oral Contrast: NONE  Complications: None reported at time of study completion    PROCEDURE:  CT of the Chest was performed.  Sagittal and coronal reformats were performed.    FINDINGS:    LUNGS AND AIRWAYS: Patent central airways.  Biapical bibasilar dependent   atelectasis. Scarring. 4 mm right apical nodule (2:10)  PLEURA: No pleural effusion.  MEDIASTINUM AND CALEB: No lymphadenopathy.  VESSELS: Mild aortic atherosclerosis.  HEART: Cardiomegaly. No pericardial effusion.  CHEST WALL AND LOWER NECK: Within normal limits.  VISUALIZED UPPER ABDOMEN: Contrast in the right renal collecting system   from previous study. Right nephrostomy partially visualized. Left   nephroureteral stent partially visualized.  BONES: Within normal limits.    IMPRESSION:  4 mm indeterminate right apical nodule.    Biapical scarring.        --- End of Report ---            DOLLY GARCIA MD; Attending Radiologist  This document has been electronically signed. Oct 11 2023  4:32PM    < end of copied text >        Carcinoembryonic Antigen (10.11.23 @ 08:25)    Carcinoembryonic Antigen: 2.0: Method: Roche Electrochemiluminescence Immuno Assay  Values obtained with different assay methods or kits cannot be  used interchangeably.  Results cannot be interpreted as absolute evidence of the presence  or absence of malignant disease.   CEA Normal Ranges   _________________   Non-smoker: less than 3.9 ng/mL       Smoker: less than 5.5 ng/mL ng/mL

## 2023-10-13 NOTE — CONSULT NOTE ADULT - ASSESSMENT
73 year old woman with colon mass, and bladder mass. Scheduled for procedure with urology today. Had colonoscopy yesterday, biopsies of ascending colon mass, pathology pending.  - no emergent general surgery intervention at this time  - follow up colonoscopy pathology results - if malignant, will plan for colectomy  - no contra-indication from general surgery regarding urology interventions planned for today  - continue supportive care per primary team  - will continue to follow

## 2023-10-13 NOTE — CHART NOTE - NSCHARTNOTEFT_GEN_A_CORE
Cardiology NP     73-year-old with dyslipidemia admitted with hematuria and increasing bilateral lower extremity edema.  Found to be hyperkalemic and acute renal insufficiency with bilateral hydronephrosis, obstructing ureteral vs bladder mass, as well as suspicion for colonic malignancy, s/p bilateral renal nephrostomy tube placement 10/9/23.  EF is preserved with no significant valvular heart disease on recent hospital echo.  She is seen resting comfortably in bed in no distress.  Denies previous cardiac history. Seen and evaluated by Cardiology service with documented cardiac clearance for Colonoscopy possibly today.     Contacted by Urology service with request for cardiac clearance for planned for TURBT today.   From a current clinical and cardiac standpoint she may proceed ahead with upcoming TURBT today with routine hemodynamic monitoring.    Signing off.  Call back if needed.    KRYSTAL Wilson NP-BC

## 2023-10-13 NOTE — PROGRESS NOTE ADULT - ASSESSMENT
73F with a PMH of HLD and ?CHF who presented to the ED with hematuria.  Found to have severe bilateral hydronephrosis due to bladder outlet obstruction - suspicion secondary to bladder malignancy.  Patient was admitted to the ICU for emergent HD.  Patient had bilateral nephrostomy tubes placed.  Patient is currently off HD and transferred to [facility] for further care.    < from: CT Abdomen and Pelvis No Cont (10.11.23 @ 15:11) >  Status post right nephrostomy and leftnephroureteral stent placement. No hydronephrosis.  Distal ureteral thickening and tapering of the contrast column approximately 2.8 cm above the UVJ suspicious for obstructing ureteral  mass or extension of bladder mass. No contrast is present in the urinary  bladder.  Masslike thickening of the ascending colon suspicious for malignancy.  Recommend colonoscopy.   < end of copied text >    # Acute renal failure - due to obstructive uropathy.  s/p R nephrostomy and L NUS, Cr improving.   # Bladder malignancy. - s/p cystoscopy 10/13 showing large tumor s/p resection.  , Oncology following.    # UTI - s/p Zosyn  # Essential HTN - Monitor BP.  Continue antihypertensives.  # Ascending Colon Mass - s/p Colonoscopy 10/12 with findings concerning for malignancy.  Surgery input Dr. Whitney.  Pending pathology from colonoscopy, will consider resection.    # Inpatient DVT prophylaxis - subcutaneous Heparin

## 2023-10-14 NOTE — PROGRESS NOTE ADULT - SUBJECTIVE AND OBJECTIVE BOX
Ms. Chen is comfortable in bed  No nausea, vomiting, fever or chills  Had some abdominal pain overnight, but now resolved  s/p TURBT     ICU Vital Signs Last 24 Hrs  T(C): 36.9 (14 Oct 2023 04:40), Max: 37 (13 Oct 2023 16:25)  T(F): 98.4 (14 Oct 2023 04:40), Max: 98.6 (13 Oct 2023 16:25)  HR: 69 (14 Oct 2023 04:40) (60 - 78)  BP: 111/72 (14 Oct 2023 04:40) (100/65 - 138/87)  BP(mean): --  ABP: --  ABP(mean): --  RR: 18 (14 Oct 2023 04:40) (16 - 18)  SpO2: 99% (14 Oct 2023 04:40) (96% - 100%)    O2 Parameters below as of 14 Oct 2023 04:40  Patient On (Oxygen Delivery Method): room air        On exam: awake, alert  Breathing comfortably on room air  Abd is soft, minimal distended, and not tender  No rebound, no guarding  Nephrostomy tubes in place    \                      8.1    12.68 )-----------( 328      ( 14 Oct 2023 07:59 )             25.5   10-14    138  |  104  |  25<H>  ----------------------------<  115<H>  3.9   |  28  |  1.41<H>    Ca    8.6      14 Oct 2023 07:59

## 2023-10-14 NOTE — PROGRESS NOTE ADULT - SUBJECTIVE AND OBJECTIVE BOX
[facility] NEPHROLOGY SERVICES, [facility]  NEPHROLOGY AND HYPERTENSION  [address]  [address]  [place] [address]  [phone]    MD JERMAN EDWARDS, MD ELLEN CHERRY MD CHRISTOPHER CAPUTO, MD SAY BRAN MD          Patient events noted    MEDICATIONS  (STANDING):  chlorhexidine 2% Cloths 1 Application(s) Topical <User Schedule>  heparin   Injectable 5000 Unit(s) SubCutaneous every 8 hours  influenza  Vaccine (HIGH DOSE) 0.7 milliLiter(s) IntraMuscular once  piperacillin/tazobactam IVPB.. 3.375 Gram(s) IV Intermittent every 12 hours    MEDICATIONS  (PRN):  acetaminophen     Tablet .. 650 milliGRAM(s) Oral every 6 hours PRN Temp greater or equal to 38C (100.4F)  hydrALAZINE Injectable 10 milliGRAM(s) IV Push every 4 hours PRN SBP > 180  oxyCODONE    IR 5 milliGRAM(s) Oral every 6 hours PRN Moderate Pain (4 - 6)      10-13-23 @ 07:01  -  10-14-23 @ 07:00  --------------------------------------------------------  IN: 0 mL / OUT: 2100 mL / NET: -2100 mL      PHYSICAL EXAM:      T(C): 37.1 (10-14-23 @ 16:59), Max: 37.1 (10-14-23 @ 16:59)  HR: 76 (10-14-23 @ 16:59) (64 - 80)  BP: 89/57 (10-14-23 @ 16:59) (89/57 - 111/73)  RR: 19 (10-14-23 @ 16:59) (17 - 19)  SpO2: 98% (10-14-23 @ 16:59) (96% - 99%)  Wt(kg): --  Lungs clear  Heart S1S2  Abd soft NT ND  Extremities:   tr edema                                    8.1    12.68 )-----------( 328      ( 14 Oct 2023 07:59 )             25.5     10-14    138  |  104  |  25<H>  ----------------------------<  115<H>  3.9   |  28  |  1.41<H>    Ca    8.6      14 Oct 2023 07:59          Creatinine Trend: 1.41<--, 1.10<--, 1.21<--, 1.85<--, 4.77<--, 7.23<--      Assessment   LANA pre /post renal;   Post Right PCN and Left NUS  Post op 1 TURB large bladder tumor  Noted ascending colonic mass       Plan:  Continue supportive care   and surgical follow up  IVF for 24 hrs;         Eamnuel Francisco MD

## 2023-10-14 NOTE — PROGRESS NOTE ADULT - SUBJECTIVE AND OBJECTIVE BOX
Patient: FRANSICO GUZMAN [id number] 73y Female                            Hospitalist Attending Note    No complaints.      ____________________PHYSICAL EXAM:  GENERAL:  NAD Alert and Oriented x 3   HEENT: NCAT  CARDIOVASCULAR:  S1, S2  LUNGS: CTAB  ABDOMEN:  soft, (-) tenderness, (-) distension, (+) bowel sounds, (-) guarding, (-) rebound (-) rigidity  BACK: b/l nephrostomy tubes in place with clear yellow urine draining.   EXTREMITIES:  no cyanosis / clubbing / edema.   ____________________    VITALS:  Vital Signs Last 24 Hrs  T(C): 37.1 (14 Oct 2023 16:59), Max: 37.1 (14 Oct 2023 16:59)  T(F): 98.7 (14 Oct 2023 16:59), Max: 98.7 (14 Oct 2023 16:59)  HR: 76 (14 Oct 2023 16:59) (60 - 80)  BP: 89/57 (14 Oct 2023 16:59) (89/57 - 138/87)  BP(mean): --  RR: 19 (14 Oct 2023 16:59) (16 - 19)  SpO2: 98% (14 Oct 2023 16:59) (96% - 99%)    Parameters below as of 14 Oct 2023 04:40  Patient On (Oxygen Delivery Method): room air     Daily     Daily   CAPILLARY BLOOD GLUCOSE      POCT Blood Glucose.: 92 mg/dL (14 Oct 2023 15:44)  POCT Blood Glucose.: 247 mg/dL (13 Oct 2023 21:02)    I&O's Summary    13 Oct 2023 07:01  -  14 Oct 2023 07:00  --------------------------------------------------------  IN: 0 mL / OUT: 2100 mL / NET: -2100 mL        LABS:                        8.1    12.68 )-----------( 328      ( 14 Oct 2023 07:59 )             25.5     10-14    138  |  104  |  25<H>  ----------------------------<  115<H>  3.9   |  28  |  1.41<H>    Ca    8.6      14 Oct 2023 07:59          Urinalysis Basic - ( 14 Oct 2023 07:59 )    Color: x / Appearance: x / SG: x / pH: x  Gluc: 115 mg/dL / Ketone: x  / Bili: x / Urobili: x   Blood: x / Protein: x / Nitrite: x   Leuk Esterase: x / RBC: x / WBC x   Sq Epi: x / Non Sq Epi: x / Bacteria: x              MEDICATIONS:  acetaminophen     Tablet .. 650 milliGRAM(s) Oral every 6 hours PRN  chlorhexidine 2% Cloths 1 Application(s) Topical <User Schedule>  heparin   Injectable 5000 Unit(s) SubCutaneous every 8 hours  hydrALAZINE Injectable 10 milliGRAM(s) IV Push every 4 hours PRN  influenza  Vaccine (HIGH DOSE) 0.7 milliLiter(s) IntraMuscular once  oxyCODONE    IR 5 milliGRAM(s) Oral every 6 hours PRN  piperacillin/tazobactam IVPB.. 3.375 Gram(s) IV Intermittent every 12 hours

## 2023-10-14 NOTE — PROGRESS NOTE ADULT - SUBJECTIVE AND OBJECTIVE BOX
Patient seen and examined bedside resting comfortably.  No complaints offered.   Alvarenga catheter, Right PCN, and Left PCN.   Denies hematuria and dysuria.  Tolerating regular diet. Denies nausea and vomiting.   Denies chest pain, dyspnea, cough.    T(F): 98.4 (10-14-23 @ 04:40), Max: 98.6 (10-13-23 @ 16:25)  HR: 69 (10-14-23 @ 04:40) (60 - 78)  BP: 111/72 (10-14-23 @ 04:40) (100/65 - 138/87)  RR: 18 (10-14-23 @ 04:40) (16 - 18)  SpO2: 99% (10-14-23 @ 04:40) (96% - 100%)  Wt(kg): --  CAPILLARY BLOOD GLUCOSE      POCT Blood Glucose.: 247 mg/dL (13 Oct 2023 21:02)      PHYSICAL EXAM:  General: NAD, alert and awake  HEENT: NCAT, EOMI, conjunctiva clear  Chest: nonlabored respirations, good inspiratory effort  Abdomen: soft, NTND.   Extremities: no pedal edema or calf tenderness noted   : No CVA or SP tenderness, alvarenga catheter in place with CBI clamped, urine is yellow and clear, Right PCN in place with yellow clear urine, Left PCN in place with yellow clear urine    LABS:                        8.1    12.68 )-----------( 328      ( 14 Oct 2023 07:59 )             25.5   10-14    138  |  104  |  25<H>  ----------------------------<  115<H>  3.9   |  28  |  1.41<H>    Ca    8.6      14 Oct 2023 07:59      I&O's Detail    13 Oct 2023 07:01  -  14 Oct 2023 07:00  --------------------------------------------------------  IN:  Total IN: 0 mL    OUT:    Indwelling Catheter - Urethral (mL): 0 mL    Nephrostomy Tube (mL): 1450 mL    Nephrostomy Tube (mL): 650 mL  Total OUT: 2100 mL    Total NET: -2100 mL

## 2023-10-14 NOTE — PROGRESS NOTE ADULT - SUBJECTIVE AND OBJECTIVE BOX
Pt stable  Appreciate surgical consult  awaiting path report5    Carcinoembryonic Antigen: 2.4      MEDICATIONS  (STANDING):  chlorhexidine 2% Cloths 1 Application(s) Topical <User Schedule>  heparin   Injectable 5000 Unit(s) SubCutaneous every 8 hours  influenza  Vaccine (HIGH DOSE) 0.7 milliLiter(s) IntraMuscular once  piperacillin/tazobactam IVPB.. 3.375 Gram(s) IV Intermittent every 12 hours    MEDICATIONS  (PRN):  acetaminophen     Tablet .. 650 milliGRAM(s) Oral every 6 hours PRN Temp greater or equal to 38C (100.4F)  hydrALAZINE Injectable 10 milliGRAM(s) IV Push every 4 hours PRN SBP > 180  oxyCODONE    IR 5 milliGRAM(s) Oral every 6 hours PRN Moderate Pain (4 - 6)      Allergies    No Known Allergies    Intolerances        Vital Signs Last 24 Hrs  T(C): 36.5 (14 Oct 2023 12:11), Max: 36.9 (14 Oct 2023 04:40)  T(F): 97.7 (14 Oct 2023 12:11), Max: 98.4 (14 Oct 2023 04:40)  HR: 80 (14 Oct 2023 12:11) (60 - 80)  BP: 107/67 (14 Oct 2023 12:11) (100/65 - 138/87)  BP(mean): --  RR: 17 (14 Oct 2023 12:11) (16 - 18)  SpO2: 98% (14 Oct 2023 12:11) (96% - 100%)    Parameters below as of 14 Oct 2023 04:40  Patient On (Oxygen Delivery Method): room air        PHYSICAL EXAM:  General: NAD.  CVS: S1, S2  Chest: air entry bilaterally present  Abd: BS present, soft, non-tender      LABS:                        8.1    12.68 )-----------( 328      ( 14 Oct 2023 07:59 )             25.5     10-14    138  |  104  |  25<H>  ----------------------------<  115<H>  3.9   |  28  |  1.41<H>    Ca    8.6      14 Oct 2023 07:59        Pt will need R hemicolectomy for R colon mass  await colon path from colonoscopy

## 2023-10-14 NOTE — PROGRESS NOTE ADULT - ASSESSMENT
72 Y/O female POD 1 s/p TURB. LANA at present.   PMHx of HLD and HF on lasix      PLAN:       - Stop CBI; F/U urine quality   - ABx to stop today   - Multimodal pain control  - Continue care per primary team   - Will discuss with Dr. Llanes

## 2023-10-14 NOTE — PROGRESS NOTE ADULT - ASSESSMENT
73F with a PMH of HLD and ?CHF who presented to the ED with hematuria.  Found to have severe bilateral hydronephrosis due to bladder outlet obstruction - suspicion secondary to bladder malignancy.  Patient was admitted to the ICU for emergent HD.  Patient had bilateral nephrostomy tubes placed.  Patient is currently off HD and transferred to [facility] for further care.    < from: CT Abdomen and Pelvis No Cont (10.11.23 @ 15:11) >  Status post right nephrostomy and leftnephroureteral stent placement. No hydronephrosis.  Distal ureteral thickening and tapering of the contrast column approximately 2.8 cm above the UVJ suspicious for obstructing ureteral  mass or extension of bladder mass. No contrast is present in the urinary  bladder.  Masslike thickening of the ascending colon suspicious for malignancy.  Recommend colonoscopy.   < end of copied text >    # Acute renal failure - due to obstructive uropathy.  s/p R nephrostomy and L NUS, Cr improving.   # Bladder malignancy. - s/p cystoscopy 10/13 showing large tumor s/p resection.  , Oncology following.    # UTI - s/p Zosyn  # Essential HTN - Monitor BP.  Continue antihypertensives.  # Ascending Colon Mass - s/p Colonoscopy 10/12 with findings concerning for malignancy.  Seen by GI, Surgery.  Pending pathology from colonoscopy, surgery will consider resection.    # Inpatient DVT prophylaxis - subcutaneous Heparin

## 2023-10-15 NOTE — PROGRESS NOTE ADULT - SUBJECTIVE AND OBJECTIVE BOX
Ms. Chen is comfortable in bed  No nausea, vomiting, fever or chills. TOlerating PO intake. NO blood per rectum    ICU Vital Signs Last 24 Hrs  T(C): 36.6 (15 Oct 2023 05:29), Max: 37.1 (14 Oct 2023 16:59)  T(F): 97.9 (15 Oct 2023 05:29), Max: 98.7 (14 Oct 2023 16:59)  HR: 71 (15 Oct 2023 05:29) (71 - 80)  BP: 108/71 (15 Oct 2023 05:29) (89/57 - 108/71)  BP(mean): --  ABP: --  ABP(mean): --  RR: 18 (15 Oct 2023 05:29) (17 - 19)  SpO2: 97% (15 Oct 2023 05:29) (97% - 98%)    O2 Parameters below as of 15 Oct 2023 05:29  Patient On (Oxygen Delivery Method): room air        On exam: awake, alert  Breathing comfortably on room air  ABd is soft, not distended, and not tender  Nephrostomy tubes in place  Ricks in place                          7.8    11.69 )-----------( 310      ( 15 Oct 2023 08:17 )             24.7     10-15    140  |  108  |  24<H>  ----------------------------<  94  3.8   |  28  |  1.10    Ca    8.5      15 Oct 2023 08:17

## 2023-10-15 NOTE — PROGRESS NOTE ADULT - ASSESSMENT
73F with a PMH of HLD and ?CHF who presented to the ED with hematuria.  Found to have severe bilateral hydronephrosis due to bladder outlet obstruction - suspicion secondary to bladder malignancy.  Patient was admitted to the ICU for emergent HD.  Patient had bilateral nephrostomy tubes placed.  Patient is currently off HD and transferred to [facility] for further care.    < from: CT Abdomen and Pelvis No Cont (10.11.23 @ 15:11) >  Status post right nephrostomy and leftnephroureteral stent placement. No hydronephrosis.  Distal ureteral thickening and tapering of the contrast column approximately 2.8 cm above the UVJ suspicious for obstructing ureteral  mass or extension of bladder mass. No contrast is present in the urinary  bladder.  Masslike thickening of the ascending colon suspicious for malignancy.  Recommend colonoscopy.   < end of copied text >    # Acute renal failure - due to obstructive uropathy.  s/p R nephrostomy and L NUS, Cr improving.   # Ascending Colon Mass - s/p Colonoscopy 10/12 with findings concerning for malignancy.  Seen by GI, Surgery.  Pending pathology from colonoscopy, surgery will likely perform laparoscopic hemicolectomy.   # Bladder malignancy. - s/p cystoscopy 10/13 showing large tumor s/p resection.  , Oncology following.    # UTI - s/p Zosyn  # Essential HTN - Monitor BP.  Continue antihypertensives.  # Inpatient DVT prophylaxis - subcutaneous Heparin

## 2023-10-15 NOTE — PROGRESS NOTE ADULT - SUBJECTIVE AND OBJECTIVE BOX
Pt stable   +R Colon mass  awaiting path report      MEDICATIONS  (STANDING):  chlorhexidine 2% Cloths 1 Application(s) Topical <User Schedule>  heparin   Injectable 5000 Unit(s) SubCutaneous every 8 hours  influenza  Vaccine (HIGH DOSE) 0.7 milliLiter(s) IntraMuscular once  polyethylene glycol 3350 17 Gram(s) Oral daily    MEDICATIONS  (PRN):  acetaminophen     Tablet .. 650 milliGRAM(s) Oral every 6 hours PRN Temp greater or equal to 38C (100.4F)  bisacodyl 5 milliGRAM(s) Oral every 12 hours PRN Constipation  hydrALAZINE Injectable 10 milliGRAM(s) IV Push every 4 hours PRN SBP > 180  oxyCODONE    IR 5 milliGRAM(s) Oral every 6 hours PRN Moderate Pain (4 - 6)      Allergies    No Known Allergies    Intolerances        Vital Signs Last 24 Hrs  T(C): 36.8 (15 Oct 2023 11:50), Max: 37.1 (14 Oct 2023 16:59)  T(F): 98.2 (15 Oct 2023 11:50), Max: 98.7 (14 Oct 2023 16:59)  HR: 88 (15 Oct 2023 11:50) (71 - 88)  BP: 104/60 (15 Oct 2023 11:50) (89/57 - 108/71)  BP(mean): --  RR: 18 (15 Oct 2023 11:50) (18 - 19)  SpO2: 98% (15 Oct 2023 11:50) (97% - 98%)    Parameters below as of 15 Oct 2023 05:29  Patient On (Oxygen Delivery Method): room air        PHYSICAL EXAM:  General: NAD.  CVS: S1, S2  Chest: air entry bilaterally present  Abd: BS present, soft, non-tender      LABS:                        7.8    11.69 )-----------( 310      ( 15 Oct 2023 08:17 )             24.7     10-15    140  |  108  |  24<H>  ----------------------------<  94  3.8   |  28  |  1.10    Ca    8.5      15 Oct 2023 08:17        colon path pending  will likely need R ursula-colectomy  Dr Whitney (surgery) following

## 2023-10-15 NOTE — PROGRESS NOTE ADULT - ASSESSMENT
73 year old woman with ascending colon mass s/p colonoscopy and biopsy  - pathology pending; if malignant would benefit from laparoscopic right hemicolectomy  - will continue to follow

## 2023-10-15 NOTE — PROGRESS NOTE ADULT - SUBJECTIVE AND OBJECTIVE BOX
Patient: FRANSICO GUZMAN [id number] 73y Female                            Hospitalist Attending Note    No complaints.      ____________________PHYSICAL EXAM:  GENERAL:  NAD Alert and Oriented x 3   HEENT: NCAT  CARDIOVASCULAR:  S1, S2  LUNGS: CTAB  ABDOMEN:  soft, (-) tenderness, (-) distension, (+) bowel sounds, (-) guarding, (-) rebound (-) rigidity  BACK: b/l nephrostomy tubes in place with clear yellow urine draining.   EXTREMITIES:  no cyanosis / clubbing / edema.   ____________________    VITALS:  Vital Signs Last 24 Hrs  T(C): 36.8 (15 Oct 2023 11:50), Max: 37 (14 Oct 2023 23:42)  T(F): 98.2 (15 Oct 2023 11:50), Max: 98.6 (14 Oct 2023 23:42)  HR: 88 (15 Oct 2023 11:50) (71 - 88)  BP: 104/60 (15 Oct 2023 11:50) (102/64 - 108/71)  BP(mean): --  RR: 18 (15 Oct 2023 11:50) (18 - 18)  SpO2: 98% (15 Oct 2023 11:50) (97% - 98%)    Parameters below as of 15 Oct 2023 05:29  Patient On (Oxygen Delivery Method): room air     Daily     Daily   CAPILLARY BLOOD GLUCOSE      POCT Blood Glucose.: 105 mg/dL (15 Oct 2023 15:43)  POCT Blood Glucose.: 168 mg/dL (14 Oct 2023 21:09)    I&O's Summary    14 Oct 2023 07:01  -  15 Oct 2023 07:00  --------------------------------------------------------  IN: 0 mL / OUT: 1150 mL / NET: -1150 mL        LABS:                        7.8    11.69 )-----------( 310      ( 15 Oct 2023 08:17 )             24.7     10-15    140  |  108  |  24<H>  ----------------------------<  94  3.8   |  28  |  1.10    Ca    8.5      15 Oct 2023 08:17          Urinalysis Basic - ( 15 Oct 2023 08:17 )    Color: x / Appearance: x / SG: x / pH: x  Gluc: 94 mg/dL / Ketone: x  / Bili: x / Urobili: x   Blood: x / Protein: x / Nitrite: x   Leuk Esterase: x / RBC: x / WBC x   Sq Epi: x / Non Sq Epi: x / Bacteria: x              MEDICATIONS:  acetaminophen     Tablet .. 650 milliGRAM(s) Oral every 6 hours PRN  bisacodyl 5 milliGRAM(s) Oral every 12 hours PRN  chlorhexidine 2% Cloths 1 Application(s) Topical <User Schedule>  heparin   Injectable 5000 Unit(s) SubCutaneous every 8 hours  hydrALAZINE Injectable 10 milliGRAM(s) IV Push every 4 hours PRN  influenza  Vaccine (HIGH DOSE) 0.7 milliLiter(s) IntraMuscular once  oxyCODONE    IR 5 milliGRAM(s) Oral every 6 hours PRN  polyethylene glycol 3350 17 Gram(s) Oral daily

## 2023-10-15 NOTE — PROGRESS NOTE ADULT - SUBJECTIVE AND OBJECTIVE BOX
SURGERY PROGRESS HPI:  Pt seen and examined at bedside.  Pt denies complaints. No acute events overnight.      Vital Signs Last 24 Hrs  T(C): 37 (14 Oct 2023 23:42), Max: 37.1 (14 Oct 2023 16:59)  T(F): 98.6 (14 Oct 2023 23:42), Max: 98.7 (14 Oct 2023 16:59)  HR: 78 (14 Oct 2023 23:42) (69 - 80)  BP: 102/64 (14 Oct 2023 23:42) (89/57 - 111/72)  BP(mean): --  RR: 18 (14 Oct 2023 23:42) (17 - 19)  SpO2: 97% (14 Oct 2023 23:42) (97% - 99%)    Parameters below as of 14 Oct 2023 23:42  Patient On (Oxygen Delivery Method): room air          PHYSICAL EXAM:    GENERAL: NAD  HEAD:  Atraumatic, Normocephalic  CHEST/LUNG: Normal work of breathing   HEART: RRR   ABDOMEN: non distended, soft, non tender, no guarding, b/l Perc nephrostomies  EXTREMITIES:  calf soft, non tender b/l  : Ricks 2 way with clear yellow output      I&O's Detail    13 Oct 2023 07:01  -  14 Oct 2023 07:00  --------------------------------------------------------  IN:  Total IN: 0 mL    OUT:    Indwelling Catheter - Urethral (mL): 0 mL    Nephrostomy Tube (mL): 1450 mL    Nephrostomy Tube (mL): 650 mL  Total OUT: 2100 mL    Total NET: -2100 mL          LABS:                        8.1    12.68 )-----------( 328      ( 14 Oct 2023 07:59 )             25.5     10-14    138  |  104  |  25<H>  ----------------------------<  115<H>  3.9   |  28  |  1.41<H>    Ca    8.6      14 Oct 2023 07:59        Urinalysis Basic - ( 14 Oct 2023 07:59 )    Color: x / Appearance: x / SG: x / pH: x  Gluc: 115 mg/dL / Ketone: x  / Bili: x / Urobili: x   Blood: x / Protein: x / Nitrite: x   Leuk Esterase: x / RBC: x / WBC x   Sq Epi: x / Non Sq Epi: x / Bacteria: x

## 2023-10-15 NOTE — PROGRESS NOTE ADULT - ASSESSMENT
73F POD 2 s/p TURB.   b/l percutaneous nephrostomies draining clear yellow urine  alvarenga-CBI off, output clear yellow    DC alvarenga today  monitor nephrostomy output   73F POD 2 s/p TURB.   b/l percutaneous nephrostomies draining clear yellow urine  alvarenga-CBI off, output clear yellow    DC alvarenga today  monitor nephrostomy output  contact IR tomorrow for antegrade right ureteral stent placement

## 2023-10-16 NOTE — PROGRESS NOTE ADULT - SUBJECTIVE AND OBJECTIVE BOX
Patient: FRANSICO GUZMAN [id number] 73y Female                            Hospitalist Attending Note    No complaints.      ____________________PHYSICAL EXAM:  GENERAL:  NAD Alert and Oriented x 3   HEENT: NCAT  CARDIOVASCULAR:  S1, S2  LUNGS: CTAB  ABDOMEN:  soft, (-) tenderness, (-) distension, (+) bowel sounds, (-) guarding, (-) rebound (-) rigidity  BACK: b/l nephrostomy tubes in place with clear yellow urine draining.   EXTREMITIES:  no cyanosis / clubbing / edema.   ____________________    VITALS:  Vital Signs Last 24 Hrs  T(C): 36.8 (16 Oct 2023 17:38), Max: 36.8 (16 Oct 2023 05:54)  T(F): 98.3 (16 Oct 2023 17:38), Max: 98.3 (16 Oct 2023 17:38)  HR: 75 (16 Oct 2023 17:38) (60 - 79)  BP: 105/70 (16 Oct 2023 17:38) (105/68 - 122/80)  BP(mean): --  RR: 18 (16 Oct 2023 17:38) (17 - 19)  SpO2: 99% (16 Oct 2023 17:38) (95% - 99%)    Parameters below as of 16 Oct 2023 16:05  Patient On (Oxygen Delivery Method): room air     Daily     Daily   CAPILLARY BLOOD GLUCOSE      POCT Blood Glucose.: 161 mg/dL (15 Oct 2023 21:28)    I&O's Summary    15 Oct 2023 07:01  -  16 Oct 2023 07:00  --------------------------------------------------------  IN: 0 mL / OUT: 1350 mL / NET: -1350 mL    16 Oct 2023 07:01  -  16 Oct 2023 18:02  --------------------------------------------------------  IN: 0 mL / OUT: 750 mL / NET: -750 mL        LABS:                        8.0    11.77 )-----------( 322      ( 16 Oct 2023 06:15 )             26.5     10-16    142  |  108  |  22  ----------------------------<  97  3.9   |  28  |  0.88    Ca    8.8      16 Oct 2023 06:15          Urinalysis Basic - ( 16 Oct 2023 06:15 )    Color: x / Appearance: x / SG: x / pH: x  Gluc: 97 mg/dL / Ketone: x  / Bili: x / Urobili: x   Blood: x / Protein: x / Nitrite: x   Leuk Esterase: x / RBC: x / WBC x   Sq Epi: x / Non Sq Epi: x / Bacteria: x              MEDICATIONS:  acetaminophen     Tablet .. 650 milliGRAM(s) Oral every 6 hours PRN  bisacodyl 5 milliGRAM(s) Oral every 12 hours PRN  chlorhexidine 2% Cloths 1 Application(s) Topical <User Schedule>  heparin   Injectable 5000 Unit(s) SubCutaneous every 8 hours  hydrALAZINE Injectable 10 milliGRAM(s) IV Push every 4 hours PRN  influenza  Vaccine (HIGH DOSE) 0.7 milliLiter(s) IntraMuscular once  oxyCODONE    IR 5 milliGRAM(s) Oral every 6 hours PRN  polyethylene glycol 3350 17 Gram(s) Oral daily

## 2023-10-16 NOTE — CONSULT NOTE ADULT - SUBJECTIVE AND OBJECTIVE BOX
Interventional Radiology    Evaluate for Procedure:     HPI: 73y Female with hx of HLD and HF on lasix, presenting with hematuria. Pt found to be hyperkalemic in acute renal failure with severe bilateral hydronephrosis. Admitted to ICU for emergent HD in the setting of ARF r/t suspected b/l upper tract obstruction ?bladder CA with electrolyte derangements. Patient now s/p left PCNU and right PCN on 10/9 with Dr. Halaibeh. s/p TURB POD3. IR consulted for conversion of left PCN to ureteral stent.     Allergies: No Known Allergies    Medications (Abx/Cardiac/Anticoagulation/Blood Products)  heparin   Injectable: 5000 Unit(s) SubCutaneous (10-16 @ 05:57)  piperacillin/tazobactam IVPB..: 25 mL/Hr IV Intermittent (10-15 @ 00:09)    Data:    T(C): 36.8  HR: 68  BP: 106/70  RR: 19  SpO2: 98%    -WBC 11.77 / HgB 8.0 / Hct 26.5 / Plt 322  -Na 142 / Cl 108 / BUN 22 / Glucose 97  -K 3.9 / CO2 28 / Cr 0.88  -ALT -- / Alk Phos -- / T.Bili --  -INR 0.90 / PTT --    Radiology: Reviewed    Assessment/Plan:   -73y Female with hx of HLD and HF on lasix, presenting with hematuria. Pt found to be hyperkalemic in acute renal failure with severe bilateral hydronephrosis. Admitted to ICU for emergent HD in the setting of ARF r/t suspected b/l upper tract obstruction ?bladder CA with electrolyte derangements. Patient now s/p left PCNU and right PCN on 10/9 with Dr. Halaibeh. s/p TURB POD3. IR consulted for conversion of left PCN to ureteral stent.       - case reviewed and approved by Dr. Halaibeh  - Will plan for conversion of left PCN to PCNU today  - IR procedure order placed.   - STAT labs in AM (cbc,coags, bmp, T&S)  - hold heparin on day of procedure.   - NPO   - d/w primary team

## 2023-10-16 NOTE — CONSULT NOTE ADULT - CONSULT REASON
LANA
BLadder Ca with Jomar Hydronephrosis
Colon mass
antegrade ureteral stent placement
r/o colon mass
Cardiac assessment
bilateral PCNs
?bladder and colon mass

## 2023-10-16 NOTE — PROGRESS NOTE ADULT - NSPROGADDITIONALINFOA_GEN_ALL_CORE
Attg.    It is possible she is refluxing up the left nephro - u  Possible antegrade stent at some point.  Await path  F/u PVR  Cr continues to come down.

## 2023-10-16 NOTE — CONSULT NOTE ADULT - CONSULT REQUESTED DATE/TIME
11-Oct-2023 17:21
11-Oct-2023 13:14
09-Oct-2023 09:58
10-Oct-2023 13:30
07-Oct-2023 16:55
08-Oct-2023 19:15
16-Oct-2023 12:37
13-Oct-2023

## 2023-10-16 NOTE — PROGRESS NOTE ADULT - SUBJECTIVE AND OBJECTIVE BOX
incomplete     Heme/Onc Progress note    INTERVAL HPI/OVERNIGHT EVENTS:  Patient S&E at bedside. No o/n events, patient resting comfortably. No complaints at this time. Patient denies fever, chills, dizziness, weakness, CP, palpitations, SOB, cough, N/V/D/C, dysuria, changes in bowel movements, LE edema.    VITAL SIGNS:  T(F): 98.2 (10-16-23 @ 05:54)  HR: 68 (10-16-23 @ 05:54)  BP: 106/70 (10-16-23 @ 05:54)  RR: 19 (10-16-23 @ 05:54)  SpO2: 98% (10-16-23 @ 05:54)  Wt(kg): --    PHYSICAL EXAM:    Constitutional: NAD  Eyes: EOMI, sclera non-icteric  Neck: supple, no masses, no JVD  Respiratory: CTA b/l, good air entry b/l  Cardiovascular: RRR, no M/R/G  Gastrointestinal: soft, NTND, no masses palpable, + BS, no hepatosplenomegaly  Extremities: no c/c/e  Neurological: AAOx3      MEDICATIONS  (STANDING):  chlorhexidine 2% Cloths 1 Application(s) Topical <User Schedule>  heparin   Injectable 5000 Unit(s) SubCutaneous every 8 hours  influenza  Vaccine (HIGH DOSE) 0.7 milliLiter(s) IntraMuscular once  polyethylene glycol 3350 17 Gram(s) Oral daily    MEDICATIONS  (PRN):  acetaminophen     Tablet .. 650 milliGRAM(s) Oral every 6 hours PRN Temp greater or equal to 38C (100.4F)  bisacodyl 5 milliGRAM(s) Oral every 12 hours PRN Constipation  hydrALAZINE Injectable 10 milliGRAM(s) IV Push every 4 hours PRN SBP > 180  oxyCODONE    IR 5 milliGRAM(s) Oral every 6 hours PRN Moderate Pain (4 - 6)      Allergies    No Known Allergies    Intolerances        LABS:                        8.0    11.77 )-----------( 322      ( 16 Oct 2023 06:15 )             26.5     10-15    140  |  108  |  24<H>  ----------------------------<  94  3.8   |  28  |  1.10    Ca    8.5      15 Oct 2023 08:17        Urinalysis Basic - ( 15 Oct 2023 08:17 )    Color: x / Appearance: x / SG: x / pH: x  Gluc: 94 mg/dL / Ketone: x  / Bili: x / Urobili: x   Blood: x / Protein: x / Nitrite: x   Leuk Esterase: x / RBC: x / WBC x   Sq Epi: x / Non Sq Epi: x / Bacteria: x    Erythropoietin Level (10.11.23 @ 08:25)   Erythropoietin Level: 15.8  : Iron with Total Binding Capacity in AM (10.11.23 @ 08:25)   Iron - Total Binding Capacity.: 236 ug/dL  % Saturation, Iron: 16 %  Iron Total: 38 ug/dL  Unsaturated Iron Binding Capacity: 198 ug/dL  Ferritin in AM (10.11.23 @ 08:25)   Ferritin: 146 ng/mL  Vitamin B12, Serum in AM (10.11.23 @ 08:25)   Vitamin B12, Serum: 840 pg/mL  Folate, Serum in AM (10.11.23 @ 08:25)   Folate, Serum: 8.2 ng/mL  Haptoglobin, Serum in AM (10.11.23 @ 08:25)   Haptoglobin, Serum: 524 mg/dL      RADIOLOGY & ADDITIONAL TESTS:  Studies reviewed.    ASSESSMENT & PLAN:   < from: CT Chest No Cont (10.11.23 @ 15:09) >  ACC: [id number] EXAM:  CT CHEST   ORDERED BY: MIRELLA TAYLOR     PROCEDURE DATE:  10/11/2023          INTERPRETATION:  CLINICAL INFORMATION: 73 years  Female with malig w/u.    COMPARISON: CT abdomen and pelvis immediately prior.    CONTRAST/COMPLICATIONS:  IV Contrast: NONE  Oral Contrast: NONE  Complications: None reported at time of study completion    PROCEDURE:  CT of the Chest was performed.  Sagittal and coronal reformats were performed.    FINDINGS:    LUNGS AND AIRWAYS: Patent central airways.  Biapical bibasilar dependent   atelectasis. Scarring. 4 mm right apical nodule (2:10)  PLEURA: No pleural effusion.  MEDIASTINUM AND CALEB: No lymphadenopathy.  VESSELS: Mild aortic atherosclerosis.  HEART: Cardiomegaly. No pericardial effusion.  CHEST WALL AND LOWER NECK: Within normal limits.  VISUALIZED UPPER ABDOMEN: Contrast in the right renal collecting system   from previous study. Right nephrostomy partially visualized. Left   nephroureteral stent partially visualized.  BONES: Within normal limits.    IMPRESSION:  4 mm indeterminate right apical nodule.    Biapical scarring.        --- End of Report ---               Heme/Onc Progress note    INTERVAL HPI/OVERNIGHT EVENTS:  Patient S&E at bedside. No o/n events, patient resting comfortably. No complaints at this time. Patient denies fever, chills, dizziness, weakness, CP, palpitations, SOB, cough, N/V/D/C, dysuria, changes in bowel movements, LE edema.    VITAL SIGNS:  T(F): 98.2 (10-16-23 @ 05:54)  HR: 68 (10-16-23 @ 05:54)  BP: 106/70 (10-16-23 @ 05:54)  RR: 19 (10-16-23 @ 05:54)  SpO2: 98% (10-16-23 @ 05:54)  Wt(kg): --    PHYSICAL EXAM:    Constitutional: NAD  Eyes: EOMI, sclera non-icteric  Neck: supple, no masses, no JVD  Respiratory: CTA b/l, good air entry b/l  Cardiovascular: RRR, no M/R/G  Gastrointestinal: soft, NTND, no masses palpable, + BS,  :- b/l nephrostomy tubes noted   Extremities: no c/c/e  Neurological: AAOx3      MEDICATIONS  (STANDING):  chlorhexidine 2% Cloths 1 Application(s) Topical <User Schedule>  heparin   Injectable 5000 Unit(s) SubCutaneous every 8 hours  influenza  Vaccine (HIGH DOSE) 0.7 milliLiter(s) IntraMuscular once  polyethylene glycol 3350 17 Gram(s) Oral daily    MEDICATIONS  (PRN):  acetaminophen     Tablet .. 650 milliGRAM(s) Oral every 6 hours PRN Temp greater or equal to 38C (100.4F)  bisacodyl 5 milliGRAM(s) Oral every 12 hours PRN Constipation  hydrALAZINE Injectable 10 milliGRAM(s) IV Push every 4 hours PRN SBP > 180  oxyCODONE    IR 5 milliGRAM(s) Oral every 6 hours PRN Moderate Pain (4 - 6)      Allergies    No Known Allergies    Intolerances        LABS:                        8.0    11.77 )-----------( 322      ( 16 Oct 2023 06:15 )             26.5     10-15    140  |  108  |  24<H>  ----------------------------<  94  3.8   |  28  |  1.10    Ca    8.5      15 Oct 2023 08:17        Urinalysis Basic - ( 15 Oct 2023 08:17 )    Color: x / Appearance: x / SG: x / pH: x  Gluc: 94 mg/dL / Ketone: x  / Bili: x / Urobili: x   Blood: x / Protein: x / Nitrite: x   Leuk Esterase: x / RBC: x / WBC x   Sq Epi: x / Non Sq Epi: x / Bacteria: x    Erythropoietin Level (10.11.23 @ 08:25)   Erythropoietin Level: 15.8  : Iron with Total Binding Capacity in AM (10.11.23 @ 08:25)   Iron - Total Binding Capacity.: 236 ug/dL  % Saturation, Iron: 16 %  Iron Total: 38 ug/dL  Unsaturated Iron Binding Capacity: 198 ug/dL  Ferritin in AM (10.11.23 @ 08:25)   Ferritin: 146 ng/mL  Vitamin B12, Serum in AM (10.11.23 @ 08:25)   Vitamin B12, Serum: 840 pg/mL  Folate, Serum in AM (10.11.23 @ 08:25)   Folate, Serum: 8.2 ng/mL  Haptoglobin, Serum in AM (10.11.23 @ 08:25)   Haptoglobin, Serum: 524 mg/dL      RADIOLOGY & ADDITIONAL TESTS:  Studies reviewed.    ASSESSMENT & PLAN:   < from: CT Chest No Cont (10.11.23 @ 15:09) >  ACC: [id number] EXAM:  CT CHEST   ORDERED BY: MIRELLA TAYLOR     PROCEDURE DATE:  10/11/2023          INTERPRETATION:  CLINICAL INFORMATION: 73 years  Female with malig w/u.    COMPARISON: CT abdomen and pelvis immediately prior.    CONTRAST/COMPLICATIONS:  IV Contrast: NONE  Oral Contrast: NONE  Complications: None reported at time of study completion    PROCEDURE:  CT of the Chest was performed.  Sagittal and coronal reformats were performed.    FINDINGS:    LUNGS AND AIRWAYS: Patent central airways.  Biapical bibasilar dependent   atelectasis. Scarring. 4 mm right apical nodule (2:10)  PLEURA: No pleural effusion.  MEDIASTINUM AND CALEB: No lymphadenopathy.  VESSELS: Mild aortic atherosclerosis.  HEART: Cardiomegaly. No pericardial effusion.  CHEST WALL AND LOWER NECK: Within normal limits.  VISUALIZED UPPER ABDOMEN: Contrast in the right renal collecting system   from previous study. Right nephrostomy partially visualized. Left   nephroureteral stent partially visualized.  BONES: Within normal limits.    IMPRESSION:  4 mm indeterminate right apical nodule.    Biapical scarring.        --- End of Report ---               Heme/Onc Progress note    INTERVAL HPI/OVERNIGHT EVENTS:  Patient S&E at bedside. No o/n events, patient resting comfortably. No complaints at this time. Patient denies fever, chills, dizziness, weakness, CP, palpitations, SOB, cough, N/V/D/C, dysuria, changes in bowel movements, LE edema.    VITAL SIGNS:  T(F): 98.2 (10-16-23 @ 05:54)  HR: 68 (10-16-23 @ 05:54)  BP: 106/70 (10-16-23 @ 05:54)  RR: 19 (10-16-23 @ 05:54)  SpO2: 98% (10-16-23 @ 05:54)  Wt(kg): --    PHYSICAL EXAM:    Constitutional: NAD  Eyes: EOMI, sclera non-icteric  Neck: supple, no masses, no JVD  Respiratory: CTA b/l, good air entry b/l  Cardiovascular: RRR, no M/R/G  Gastrointestinal: soft, NTND, no masses palpable, + BS,  :- b/l nephrostomy tubes noted   Extremities: no c/c/e  Neurological: AAOx3      MEDICATIONS  (STANDING):  chlorhexidine 2% Cloths 1 Application(s) Topical <User Schedule>  heparin   Injectable 5000 Unit(s) SubCutaneous every 8 hours  influenza  Vaccine (HIGH DOSE) 0.7 milliLiter(s) IntraMuscular once  polyethylene glycol 3350 17 Gram(s) Oral daily    MEDICATIONS  (PRN):  acetaminophen     Tablet .. 650 milliGRAM(s) Oral every 6 hours PRN Temp greater or equal to 38C (100.4F)  bisacodyl 5 milliGRAM(s) Oral every 12 hours PRN Constipation  hydrALAZINE Injectable 10 milliGRAM(s) IV Push every 4 hours PRN SBP > 180  oxyCODONE    IR 5 milliGRAM(s) Oral every 6 hours PRN Moderate Pain (4 - 6)      Allergies    No Known Allergies    Intolerances        LABS:                        8.0    11.77 )-----------( 322      ( 16 Oct 2023 06:15 )             26.5     10-15    140  |  108  |  24<H>  ----------------------------<  94  3.8   |  28  |  1.10    Ca    8.5      15 Oct 2023 08:17        Urinalysis Basic - ( 15 Oct 2023 08:17 )    Color: x / Appearance: x / SG: x / pH: x  Gluc: 94 mg/dL / Ketone: x  / Bili: x / Urobili: x   Blood: x / Protein: x / Nitrite: x   Leuk Esterase: x / RBC: x / WBC x   Sq Epi: x / Non Sq Epi: x / Bacteria: x    Erythropoietin Level (10.11.23 @ 08:25)   Erythropoietin Level: 15.8  : Iron with Total Binding Capacity in AM (10.11.23 @ 08:25)   Iron - Total Binding Capacity.: 236 ug/dL  % Saturation, Iron: 16 %  Iron Total: 38 ug/dL  Unsaturated Iron Binding Capacity: 198 ug/dL  Ferritin in AM (10.11.23 @ 08:25)   Ferritin: 146 ng/mL  Vitamin B12, Serum in AM (10.11.23 @ 08:25)   Vitamin B12, Serum: 840 pg/mL  Folate, Serum in AM (10.11.23 @ 08:25)   Folate, Serum: 8.2 ng/mL  Haptoglobin, Serum in AM (10.11.23 @ 08:25)   Haptoglobin, Serum: 524 mg/dL      Surgical Pathology Report (10.12.23 @ 17:50)   Surgical Pathology Report:   ACCESSION No: [id number]   Patient: FRANSICO GUZMAN   Accession: [id number]   Collected Date/Time: 10/12/2023 17:50 EDT   Received Date/Time: 10/13/2023 05:45 EDT   Surgical Pathology Report - Auth (Verified)   Specimen(s) Submitted   1 Ascending Colon Mass   Final Diagnosis   Ascending colon mass; biopsy:   - Fragments of adenocarcinoma, well differentiated   Verified by: Selena Wiley MD   (Electronic Signature)   Reported on: 10/16/23 12:52 EDT, [facility], [address] [place] [id number]   _________________________________________________________________   Comment   Intra departmental review: PD   Dr. Granados notified on 10/16/2023 at 12.45 pm   Clinical Information   Abnormal CT scan   Gross Description   Received: In formalin labeled "ascending colon mass"   Size: Multiple fragments measuring 1.3 x 0.3 x 0.2 cm in aggregate   Description: Tan soft tissue fragments   Submitted: Entirely in two cassettes: 1A-1B   Denise Bone 10/13/2023 02:17 PM       RADIOLOGY & ADDITIONAL TESTS:  Studies reviewed.    ASSESSMENT & PLAN:   < from: CT Chest No Cont (10.11.23 @ 15:09) >  ACC: [id number] EXAM:  CT CHEST   ORDERED BY: MIRELLA TAYLOR     PROCEDURE DATE:  10/11/2023          INTERPRETATION:  CLINICAL INFORMATION: 73 years  Female with malig w/u.    COMPARISON: CT abdomen and pelvis immediately prior.    CONTRAST/COMPLICATIONS:  IV Contrast: NONE  Oral Contrast: NONE  Complications: None reported at time of study completion    PROCEDURE:  CT of the Chest was performed.  Sagittal and coronal reformats were performed.    FINDINGS:    LUNGS AND AIRWAYS: Patent central airways.  Biapical bibasilar dependent   atelectasis. Scarring. 4 mm right apical nodule (2:10)  PLEURA: No pleural effusion.  MEDIASTINUM AND CALEB: No lymphadenopathy.  VESSELS: Mild aortic atherosclerosis.  HEART: Cardiomegaly. No pericardial effusion.  CHEST WALL AND LOWER NECK: Within normal limits.  VISUALIZED UPPER ABDOMEN: Contrast in the right renal collecting system   from previous study. Right nephrostomy partially visualized. Left   nephroureteral stent partially visualized.  BONES: Within normal limits.    IMPRESSION:  4 mm indeterminate right apical nodule.    Biapical scarring.        --- End of Report ---

## 2023-10-16 NOTE — PROCEDURE NOTE - PROCEDURE FINDINGS AND DETAILS
Successful placement of a left NUS and right nephrostomy tube. We weren't able to cross into the bladder on the right side due to likely obstruction at the level of the bladder.
Successful crossing from the right ureter into the bladder with ureteroplasty and placement of 10Fr nephroureteral stent.

## 2023-10-16 NOTE — PROGRESS NOTE ADULT - ASSESSMENT
73 yr old F with hx of HLD and ? HF on lasix presents to the ER with hematuria. Found to be in acute renal failure, admitted to ICU for urgent HD.    #Malig w/u  -patient being w/u OP for hematuria  -CT A/P 10/3 done OP (not showing up anywhere but PACS) shpwing Masslike thickening involving the posterior bladder suspicious for neoplasm. Severe bilateral hydronephrosis, and Masslike thickening of the ascending colon is indeterminate  CT-C  10/11 done for staging showing 4 mm indeterminate right apical nodule.  -GI onboard s/p colonoscopy (10/12) findings suspicious of malig -rec sx eval if malig rec. R hemicolectomies   -urology onboard   -s/p cystourethroscopy with resection of large bladder mass 10/13  -f/u pathology   -Cea 2.4      #Normocytic anemia  -patient presenting w/ anemia hx of hematuria  -unknown baseline  -patient also w/ LANA, new onsent of RF   -TSH-NL, EPO-15.8-neprology onboard   -hemolysis (-), iron/B12/fol noted   -maintain hgb >7 or if symptomatic        Will continue to monitor the patient.  Please call with any questions [phone]  Above reviewed with Attending Dr. Jone COPELAND/[facility]  [address] [facility], [address], [place], [place]  [phone]  *Note not finalized until signed by Attending Physician    73 yr old F with hx of HLD and ? HF on lasix presents to the ER with hematuria. Found to be in acute renal failure, admitted to ICU for urgent HD.    #Malig w/u  -patient being w/u OP for hematuria  -CT A/P 10/3 done OP (not showing up anywhere but PACS) shpwing Masslike thickening involving the posterior bladder suspicious for neoplasm. Severe bilateral hydronephrosis, and Masslike thickening of the ascending colon is indeterminate  CT-C  10/11 done for staging showing 4 mm indeterminate right apical nodule.  -GI onboard s/p colonoscopy (10/12) findings suspicious of malig- path (+) for fragment cells of  adenocarcinoma well differentiated    -rec sx eval if malig rec. R hemicolectomies   -urology onboard   -s/p cystourethroscopy with resection of large bladder mass 10/13  -f/u pathology   -Cea 2.4      #Normocytic anemia  -patient presenting w/ anemia hx of hematuria  -unknown baseline  -patient also w/ LANA, new onsent of RF   -TSH-NL, EPO-15.8-neprology onboard   -hemolysis (-), iron/B12/fol noted   -maintain hgb >7 or if symptomatic        Will continue to monitor the patient.  Please call with any questions [phone]  Above reviewed with Attending Dr. Jone COPELAND/[facility]  [address] [facility], [address], [place], [place]  [phone]  *Note not finalized until signed by Attending Physician

## 2023-10-16 NOTE — CONSULT NOTE ADULT - PROVIDER SPECIALTY LIST ADULT
Nephrology
Urology
Cardiology
Intervent Radiology
Gastroenterology
Surgery
Heme/Onc
Intervent Radiology

## 2023-10-16 NOTE — PROGRESS NOTE ADULT - ASSESSMENT
73F with a PMH of HLD and ?CHF who presented to the ED with hematuria.  Found to have severe bilateral hydronephrosis due to bladder outlet obstruction - suspicion secondary to bladder malignancy.  Patient was admitted to the ICU for emergent HD.  Patient had bilateral nephrostomy tubes placed.  Patient is currently off HD and transferred to [facility] for further care.    < from: CT Abdomen and Pelvis No Cont (10.11.23 @ 15:11) >  Status post right nephrostomy and leftnephroureteral stent placement. No hydronephrosis.  Distal ureteral thickening and tapering of the contrast column approximately 2.8 cm above the UVJ suspicious for obstructing ureteral  mass or extension of bladder mass. No contrast is present in the urinary  bladder.  Masslike thickening of the ascending colon suspicious for malignancy.  Recommend colonoscopy.   < end of copied text >    # Acute renal failure - due to obstructive uropathy.  s/p R nephrostomy and L NUS, Cr improving.   # Adenocarcinoma of Colon - Ascending Colon Mass - s/p Colonoscopy 10/12 with findings concerning for malignancy.  Pathology confirmed adenocarcinoma.  Seen by GI, Surgery.  Surgery planning hemicolectomy.   # Bladder malignancy. - s/p cystoscopy 10/13 showing large tumor s/p resection.  , Oncology following.    # UTI - s/p Zosyn  # Essential HTN - Monitor BP.  Continue antihypertensives.  # Inpatient DVT prophylaxis - subcutaneous Heparin

## 2023-10-16 NOTE — CONSULT NOTE ADULT - REASON FOR ADMISSION
Acute Renal Failure/Hyperkalemia

## 2023-10-16 NOTE — PROGRESS NOTE ADULT - SUBJECTIVE AND OBJECTIVE BOX
Pt had nephro-ureteral stent placed    Colon path: +ADENOCARCINOMA of Colon (well differentiated)      MEDICATIONS  (STANDING):  chlorhexidine 2% Cloths 1 Application(s) Topical <User Schedule>  heparin   Injectable 5000 Unit(s) SubCutaneous every 8 hours  influenza  Vaccine (HIGH DOSE) 0.7 milliLiter(s) IntraMuscular once  polyethylene glycol 3350 17 Gram(s) Oral daily    MEDICATIONS  (PRN):  acetaminophen     Tablet .. 650 milliGRAM(s) Oral every 6 hours PRN Temp greater or equal to 38C (100.4F)  bisacodyl 5 milliGRAM(s) Oral every 12 hours PRN Constipation  hydrALAZINE Injectable 10 milliGRAM(s) IV Push every 4 hours PRN SBP > 180  oxyCODONE    IR 5 milliGRAM(s) Oral every 6 hours PRN Moderate Pain (4 - 6)      Allergies    No Known Allergies    Intolerances        Vital Signs Last 24 Hrs  T(C): 36.8 (16 Oct 2023 05:54), Max: 36.8 (16 Oct 2023 05:54)  T(F): 98.2 (16 Oct 2023 05:54), Max: 98.2 (16 Oct 2023 05:54)  HR: 68 (16 Oct 2023 05:54) (68 - 79)  BP: 106/70 (16 Oct 2023 05:54) (106/70 - 109/65)  BP(mean): --  RR: 19 (16 Oct 2023 05:54) (18 - 19)  SpO2: 98% (16 Oct 2023 05:54) (95% - 98%)        PHYSICAL EXAM:  General: NAD.  CVS: S1, S2  Chest: air entry bilaterally present  Abd: BS present, soft, non-tender      LABS:                        8.0    11.77 )-----------( 322      ( 16 Oct 2023 06:15 )             26.5     10-16    142  |  108  |  22  ----------------------------<  97  3.9   |  28  |  0.88    Ca    8.8      16 Oct 2023 06:15      + adenoca of colon    will need R hemicolectomy  as per Dr Whitney

## 2023-10-16 NOTE — PROGRESS NOTE ADULT - SUBJECTIVE AND OBJECTIVE BOX
Patient seen and  examined at bedside resting comfortably.   Offers no complaints at this time.   Alvarenga catheter d/c'd, has not voided yet.   Denies fever, chills, N/V/D, CP, SOB.     Vital Signs Last 24 Hrs  T(F): 98.2 (10-16-23 @ 05:54), Max: 98.2 (10-15-23 @ 11:50)  HR: 68 (10-16-23 @ 05:54)  BP: 106/70 (10-16-23 @ 05:54)  RR: 19 (10-16-23 @ 05:54)  SpO2: 98% (10-16-23 @ 05:54)  POCT Blood Glucose.: 161 mg/dL (15 Oct 2023 21:28)    PHYSICAL EXAM:  GENERAL: Alert, NAD  CHEST/LUNG: Clear to auscultation bilaterally, respirations nonlabored  HEART: Regular rate and rhythm; S1 & S2 appreciated  ABDOMEN: + Bowel sounds, soft, Nontender, Nondistended  : no suprapubic tenderness or distention. B/l PCNs in place with clear, yellow urine   EXTREMITIES:  no calf tenderness, No edema    I&O's Detail    15 Oct 2023 07:01  -  16 Oct 2023 07:00  --------------------------------------------------------  IN:  Total IN: 0 mL    OUT:    Nephrostomy Tube (mL): 650 mL    Nephrostomy Tube (mL): 700 mL  Total OUT: 1350 mL    Total NET: -1350 mL          LABS:                        8.0    11.77 )-----------( 322      ( 16 Oct 2023 06:15 )             26.5     10-16    142  |  108  |  22  ----------------------------<  97  3.9   |  28  |  0.88    Ca    8.8      16 Oct 2023 06:15          RADIOLOGY & ADDITIONAL STUDIES:    A/P  73F with b/l PCNs, now POD 3 s/p TURB  Cr downtrending    - f/u PVR, alvarenga catheter d/c'd on 10/15  - drain care, monitor output  - plan for Right antegrade stent placement if able per IR  - continue care per primary team   - will d/w urology attending

## 2023-10-16 NOTE — CHART NOTE - NSCHARTNOTEFT_GEN_A_CORE
Pt with PMH of HLD and ?CHF presented with hematuria. Found to have acute renal failure, hyperkalemia, severe b/l hydronephrosis due to obstructive uropathy. Suspicion 2/2 bladder malignancy. Admitted to ICU for emergent HD; currently off HD (renal function much improved) and transferred to medical floor. s/p nephrostomy tubes. Also with ascending colon mass, s/p colonoscopy on 10/12 with findings concerning for malignancy. Pathology pending; if malignant, likely for laparoscopic hemicolectomy. Plan for R antegrade stent placement.    Factors impacting intake: [ ] none [ ] nausea  [ ] vomiting [ ] diarrhea [ ] constipation  [ ]chewing problems [ ] swallowing issues  [x] other: acute illness    Diet Prescription: Diet, NPO (10-16-23 @ 07:49)    Intake: Currently NPO for possible procedure today, otherwise has been on Consistent CHO Renal (no snacks) diet with mostly fair PO intake per pt (51-75%) and tolerating well    Current Weight: 78.5 kg (10/11), 78.9 kg (10/07)  % Weight Change: wt fairly stable x 4 days; request current wt    No edema noted    Physical appearance: Pt with only mild orbital depletion and mild temporal wasting noted    Pertinent Medications: MEDICATIONS  (STANDING):  chlorhexidine 2% Cloths 1 Application(s) Topical <User Schedule>  heparin   Injectable 5000 Unit(s) SubCutaneous every 8 hours  influenza  Vaccine (HIGH DOSE) 0.7 milliLiter(s) IntraMuscular once  polyethylene glycol 3350 17 Gram(s) Oral daily    MEDICATIONS  (PRN):  acetaminophen     Tablet .. 650 milliGRAM(s) Oral every 6 hours PRN Temp greater or equal to 38C (100.4F)  bisacodyl 5 milliGRAM(s) Oral every 12 hours PRN Constipation  hydrALAZINE Injectable 10 milliGRAM(s) IV Push every 4 hours PRN SBP > 180  oxyCODONE    IR 5 milliGRAM(s) Oral every 6 hours PRN Moderate Pain (4 - 6)    Pertinent Labs: 10-16 Na142 mmol/L Glu 97 mg/dL K+ 3.9 mmol/L Cr  0.88 mg/dL BUN 22 mg/dL 10-12 Phos 2.2 mg/dL<L> 10-11 Alb 1.9 g/dL<L>  10-08 HgbA1c 5.9%    POCT Blood Glucose.: 161 mg/dL (15 Oct 2023 21:28)  POCT Blood Glucose.: 105 mg/dL (15 Oct 2023 15:43)    Skin: WDL    Estimated Needs:   [x] no change since previous assessment on 10/09  [ ] recalculated:     Previous Nutrition Diagnosis:   [x] Inadequate Energy Intake  Etiology: acute illness  Signs/Symptoms: <50% nutrition needs x 2 days    Previous Goal: pt to consume>50-75% of meals & supplement    Nutrition Diagnosis is [x] ongoing [ ] resolved [ ] not applicable     New Nutrition Diagnosis: [x] not applicable       Interventions:   Recommend  [x] Change Diet To: As medically feasible, recommend resume PO diet, consistent CHO (with evening snack), low Na  [ ] Nutrition Supplement  [ ] Nutrition Support  [x] Other: add low fiber to diet rx if pt s/p hemicolectomy    Monitoring and Evaluation:   [ x ] PO intake [ x ] Tolerance to diet prescription [ x ] weights [ x ] labs (fs, glucose, renal indices) [ x ] follow up per protocol  [ ] other:

## 2023-10-16 NOTE — PROGRESS NOTE ADULT - NS ATTEND AMEND GEN_ALL_CORE FT
Awaiting GI path. Discussed with the pt and answered any questions she had. Will continue to follow.

## 2023-10-17 NOTE — PROGRESS NOTE ADULT - SUBJECTIVE AND OBJECTIVE BOX
INTERVAL HPI/OVERNIGHT EVENTS:  Pt resting comfortably. No acute complaints. No nausea, vomiting, fever or chills. Tolerating diet. Reports no blood per rectum    MEDICATIONS  (STANDING):  chlorhexidine 2% Cloths 1 Application(s) Topical <User Schedule>  heparin   Injectable 5000 Unit(s) SubCutaneous every 8 hours  influenza  Vaccine (HIGH DOSE) 0.7 milliLiter(s) IntraMuscular once  polyethylene glycol 3350 17 Gram(s) Oral daily    MEDICATIONS  (PRN):  acetaminophen     Tablet .. 650 milliGRAM(s) Oral every 6 hours PRN Temp greater or equal to 38C (100.4F)  bisacodyl 5 milliGRAM(s) Oral every 12 hours PRN Constipation  hydrALAZINE Injectable 10 milliGRAM(s) IV Push every 4 hours PRN SBP > 180  oxyCODONE    IR 5 milliGRAM(s) Oral every 6 hours PRN Moderate Pain (4 - 6)      Vital Signs Last 24 Hrs  T(C): 36.8 (17 Oct 2023 05:14), Max: 36.9 (16 Oct 2023 23:52)  T(F): 98.3 (17 Oct 2023 05:14), Max: 98.4 (16 Oct 2023 23:52)  HR: 69 (17 Oct 2023 05:14) (60 - 75)  BP: 109/64 (17 Oct 2023 05:14) (105/68 - 122/80)  BP(mean): --  RR: 18 (17 Oct 2023 05:14) (17 - 18)  SpO2: 97% (17 Oct 2023 05:14) (97% - 100%)    Parameters below as of 16 Oct 2023 16:05  Patient On (Oxygen Delivery Method): room air        Physical:  General: A&Ox3. NAD.  Abdomen: Soft nondistended, nontender.  : nephrostomy tubes in place with clear urine  Ext: no calf tenderness, no edema    I&O's Detail    16 Oct 2023 07:01  -  17 Oct 2023 07:00  --------------------------------------------------------  IN:    Oral Fluid: 720 mL  Total IN: 720 mL    OUT:    Nephrostomy Tube (mL): 650 mL    Nephrostomy Tube (mL): 800 mL  Total OUT: 1450 mL    Total NET: -730 mL      LABS:                        8.0    11.11 )-----------( 304      ( 17 Oct 2023 06:36 )             26.0             10-17    141  |  109<H>  |  18  ----------------------------<  97  4.2   |  29  |  0.90    Ca    8.5      17 Oct 2023 06:36

## 2023-10-17 NOTE — PROGRESS NOTE ADULT - NS ATTEND AMEND GEN_ALL_CORE FT
Patient seen and examined  Doing well  No nausea, vomiting, fever or chills  Tolerating PO intake  No blood per rectum    On exam: awake, alert oriented  Breathing comfortably on room air  Abd is soft, not distended and not tender    - colon pathology: fragments of adenocarcinoma discussed with patient. Explained that she would benefit from right hemicolectomy.   - urology pathology pending: follow up pathology, follow up urology regarding any further surgical intervention

## 2023-10-17 NOTE — PROGRESS NOTE ADULT - ASSESSMENT
73F with a PMH of HLD and ?CHF who presented to the ED with hematuria.  Found to have severe bilateral hydronephrosis due to bladder outlet obstruction - suspicion secondary to bladder malignancy.  Patient was admitted to the ICU for emergent HD.  Patient had bilateral nephrostomy tubes placed.  Patient is currently off HD and transferred to [facility] for further care.    < from: CT Abdomen and Pelvis No Cont (10.11.23 @ 15:11) >  Status post right nephrostomy and leftnephroureteral stent placement. No hydronephrosis.  Distal ureteral thickening and tapering of the contrast column approximately 2.8 cm above the UVJ suspicious for obstructing ureteral  mass or extension of bladder mass. No contrast is present in the urinary  bladder.  Masslike thickening of the ascending colon suspicious for malignancy.  Recommend colonoscopy.   < end of copied text >    # Acute renal failure - due to obstructive uropathy.  s/p R nephrostomy and L NUS, Cr improving.   # Adenocarcinoma of Colon - Ascending Colon Mass - s/p Colonoscopy 10/12 with findings concerning for malignancy.  Pathology confirmed adenocarcinoma.  Seen by GI, Surgery.  Surgery planning hemicolectomy.   # Bladder malignancy. - s/p cystoscopy 10/13 showing large tumor s/p resection.  , Oncology following.  Awaiting path from [facility]  # UTI - s/p Zosyn  # Essential HTN - Monitor BP.  Continue antihypertensives.  # Inpatient DVT prophylaxis - subcutaneous Heparin

## 2023-10-17 NOTE — PROGRESS NOTE ADULT - ASSESSMENT
73F with b/l PCNs, now s/p TURBT 10/13  s/p IR nephroureteral stent placement 10/16    - f/u PVR  - drain care, monitor output  - awaiting pathology  - continue care per primary team   - will d/w urology attending

## 2023-10-17 NOTE — PROGRESS NOTE ADULT - SUBJECTIVE AND OBJECTIVE BOX
Case discussed with Dr Whitney    Surgical Pathology Report (10.12.23 @ 17:50)    Surgical Pathology Report:   ACCESSION No:  [id number]  Patient:   FRANSICO GUZMAN      Specimen(s) Submitted  1  Ascending Colon Mass    Final Diagnosis  Ascending colon mass; biopsy:  -   Fragments of adenocarcinoma, well  differentiated    Verified by: Selena Wiley MD  (Electronic Signature)        MEDICATIONS  (STANDING):  chlorhexidine 2% Cloths 1 Application(s) Topical <User Schedule>  heparin   Injectable 5000 Unit(s) SubCutaneous every 8 hours  influenza  Vaccine (HIGH DOSE) 0.7 milliLiter(s) IntraMuscular once  polyethylene glycol 3350 17 Gram(s) Oral daily    MEDICATIONS  (PRN):  acetaminophen     Tablet .. 650 milliGRAM(s) Oral every 6 hours PRN Temp greater or equal to 38C (100.4F)  bisacodyl 5 milliGRAM(s) Oral every 12 hours PRN Constipation  hydrALAZINE Injectable 10 milliGRAM(s) IV Push every 4 hours PRN SBP > 180  oxyCODONE    IR 5 milliGRAM(s) Oral every 6 hours PRN Moderate Pain (4 - 6)      Allergies    No Known Allergies    Intolerances        Vital Signs Last 24 Hrs  T(C): 37.4 (17 Oct 2023 11:32), Max: 37.4 (17 Oct 2023 11:32)  T(F): 99.3 (17 Oct 2023 11:32), Max: 99.3 (17 Oct 2023 11:32)  HR: 80 (17 Oct 2023 14:50) (69 - 80)  BP: 99/63 (17 Oct 2023 14:50) (99/63 - 109/64)  BP(mean): --  RR: 17 (17 Oct 2023 14:50) (16 - 18)  SpO2: 97% (17 Oct 2023 14:50) (97% - 100%)    Parameters below as of 17 Oct 2023 14:50  Patient On (Oxygen Delivery Method): room air        PHYSICAL EXAM:  General: NAD.  CVS: S1, S2  Chest: air entry bilaterally present  Abd: BS present, soft, non-tender      LABS:                        8.0    11.11 )-----------( 304      ( 17 Oct 2023 06:36 )             26.0     10-17    141  |  109<H>  |  18  ----------------------------<  97  4.2   |  29  |  0.90    Ca    8.5      17 Oct 2023 06:36        +Adeno CA of Colon  Pt will need R hemicolectomy  Urology intervention possible as well

## 2023-10-17 NOTE — PROGRESS NOTE ADULT - NSPROGADDITIONALINFOA_GEN_ALL_CORE
Pt has Bilateral nephroureterostomies. Left nephrostomy capped. Right nephrostomy draining. Ricks catheter Pt has Bilateral nephroureterostomies.  Both typhloureterostomies  are functioning well.

## 2023-10-17 NOTE — PROGRESS NOTE ADULT - ASSESSMENT
73 yr old F with hx of HLD and ? HF on lasix presents to the ER with hematuria. Found to be in acute renal failure, admitted to ICU for urgent HD.    #Malig w/u  -patient being w/u OP for hematuria  -CT A/P 10/3 done OP (not showing up anywhere but PACS) shpwing Masslike thickening involving the posterior bladder suspicious for neoplasm. Severe bilateral hydronephrosis, and Masslike thickening of the ascending colon is indeterminate  CT-C  10/11 done for staging showing 4 mm indeterminate right apical nodule.  -GI onboard s/p colonoscopy (10/12) findings suspicious of malig- path (+) for fragment cells of  adenocarcinoma well differentiated    -Sx eval -(+) malig possible Lap. R hemicolectomy  -urology onboard   -s/p cystourethroscopy with resection of large bladder mass 10/13  -f/u pathology   -Cea 2.4  -patient can f/u with [facility]  [address] [place] [address]· [phone] when deemed stable for d/c       #Normocytic anemia  -patient presenting w/ anemia hx of hematuria  -unknown baseline  -patient also w/ [facility], new onsent of RF   -TSH-NL, EPO-15.8-neprology onboard   -hemolysis (-), iron/B12/fol noted   -maintain hgb >7 or if symptomatic        Will continue to monitor the patient.  Please call with any questions [phone]  Above reviewed with Attending Dr. Jone COPELAND/[facility]  [address] [facility], [address], [place], [place]  [phone]  *Note not finalized until signed by Attending Physician

## 2023-10-17 NOTE — PROGRESS NOTE ADULT - ASSESSMENT
73 year old woman with ascending colon mass s/p colonoscopy and biopsy - found to have ascending colon mass consistent with adenocarcinoma  s/p TURBT - pathology pending    -plan for surgical intervention pending bladder mass pathology   -diet as tolerated  -remainder of care per primary team  -will continue to follow

## 2023-10-17 NOTE — PROGRESS NOTE ADULT - SUBJECTIVE AND OBJECTIVE BOX
INTERVAL HPI/OVERNIGHT EVENTS:  sting comfortably. No acute complaints. No nausea, vomiting, fever or chills. Tolerating diet. Reports no blood per rectum      MEDICATIONS  (STANDING):  chlorhexidine 2% Cloths 1 Application(s) Topical <User Schedule>  heparin   Injectable 5000 Unit(s) SubCutaneous every 8 hours  influenza  Vaccine (HIGH DOSE) 0.7 milliLiter(s) IntraMuscular once  polyethylene glycol 3350 17 Gram(s) Oral daily    MEDICATIONS  (PRN):  acetaminophen     Tablet .. 650 milliGRAM(s) Oral every 6 hours PRN Temp greater or equal to 38C (100.4F)  bisacodyl 5 milliGRAM(s) Oral every 12 hours PRN Constipation  hydrALAZINE Injectable 10 milliGRAM(s) IV Push every 4 hours PRN SBP > 180  oxyCODONE    IR 5 milliGRAM(s) Oral every 6 hours PRN Moderate Pain (4 - 6)      Vital Signs Last 24 Hrs  T(C): 36.8 (17 Oct 2023 05:14), Max: 36.9 (16 Oct 2023 23:52)  T(F): 98.3 (17 Oct 2023 05:14), Max: 98.4 (16 Oct 2023 23:52)  HR: 69 (17 Oct 2023 05:14) (60 - 75)  BP: 109/64 (17 Oct 2023 05:14) (105/68 - 122/80)  RR: 18 (17 Oct 2023 05:14) (17 - 18)  SpO2: 97% (17 Oct 2023 05:14) (97% - 100%)    Parameters below as of 16 Oct 2023 16:05  Patient On (Oxygen Delivery Method): room air      Physical:  General: A&Ox3. NAD.  Abdomen: Soft nondistended, nontender.  : nephrostomy tubes in place with clear urine  Ext: no calf tenderness, no edema    I&O's Detail    16 Oct 2023 07:01  -  17 Oct 2023 07:00  --------------------------------------------------------  IN:    Oral Fluid: 720 mL  Total IN: 720 mL    OUT:    Nephrostomy Tube (mL): 650 mL    Nephrostomy Tube (mL): 800 mL  Total OUT: 1450 mL    Total NET: -730 mL      LABS:                        8.0    11.11 )-----------( 304      ( 17 Oct 2023 06:36 )             26.0             10-17    141  |  109<H>  |  18  ----------------------------<  97  4.2   |  29  |  0.90    Ca    8.5      17 Oct 2023 06:36

## 2023-10-17 NOTE — PROGRESS NOTE ADULT - SUBJECTIVE AND OBJECTIVE BOX
Heme/Onc Progress note    INTERVAL HPI/OVERNIGHT EVENTS:  Patient S&E at bedside. No o/n events, patient resting comfortably. No complaints at this time. Patient denies fever, chills, dizziness, weakness, CP, palpitations, SOB, cough, N/V/D/C, dysuria, changes in bowel movements, LE edema.    VITAL SIGNS:  T(F): 98.3 (10-17-23 @ 05:14)  HR: 69 (10-17-23 @ 05:14)  BP: 109/64 (10-17-23 @ 05:14)  RR: 18 (10-17-23 @ 05:14)  SpO2: 97% (10-17-23 @ 05:14)  Wt(kg): --    PHYSICAL EXAM:    Constitutional: NAD  Eyes: EOMI, sclera non-icteric  Neck: supple, no masses, no JVD  Respiratory: CTA b/l, good air entry b/l  Cardiovascular: RRR, no M/R/G  Gastrointestinal: soft, NTND, no masses palpable, + BS,   : B/L PCN  Extremities: no c/c/e  Neurological: AAOx3      MEDICATIONS  (STANDING):  chlorhexidine 2% Cloths 1 Application(s) Topical <User Schedule>  heparin   Injectable 5000 Unit(s) SubCutaneous every 8 hours  influenza  Vaccine (HIGH DOSE) 0.7 milliLiter(s) IntraMuscular once  polyethylene glycol 3350 17 Gram(s) Oral daily    MEDICATIONS  (PRN):  acetaminophen     Tablet .. 650 milliGRAM(s) Oral every 6 hours PRN Temp greater or equal to 38C (100.4F)  bisacodyl 5 milliGRAM(s) Oral every 12 hours PRN Constipation  hydrALAZINE Injectable 10 milliGRAM(s) IV Push every 4 hours PRN SBP > 180  oxyCODONE    IR 5 milliGRAM(s) Oral every 6 hours PRN Moderate Pain (4 - 6)      Allergies    No Known Allergies    Intolerances        LABS:                        8.0    11.11 )-----------( 304      ( 17 Oct 2023 06:36 )             26.0     10-17    141  |  109<H>  |  18  ----------------------------<  97  4.2   |  29  |  0.90    Ca    8.5      17 Oct 2023 06:36        Urinalysis Basic - ( 17 Oct 2023 06:36 )    Color: x / Appearance: x / SG: x / pH: x  Gluc: 97 mg/dL / Ketone: x  / Bili: x / Urobili: x   Blood: x / Protein: x / Nitrite: x   Leuk Esterase: x / RBC: x / WBC x   Sq Epi: x / Non Sq Epi: x / Bacteria: x        RADIOLOGY & ADDITIONAL TESTS:  Studies reviewed.    ASSESSMENT & PLAN:

## 2023-10-17 NOTE — PROGRESS NOTE ADULT - SUBJECTIVE AND OBJECTIVE BOX
Patient: FRANSICO GUZMAN [id number] 73y Female                            Hospitalist Attending Note    No complaints.      ____________________PHYSICAL EXAM:  GENERAL:  NAD Alert and Oriented x 3   HEENT: NCAT  CARDIOVASCULAR:  S1, S2  LUNGS: CTAB  ABDOMEN:  soft, (-) tenderness, (-) distension, (+) bowel sounds, (-) guarding, (-) rebound (-) rigidity  BACK: b/l nephrostomy tubes in place with clear yellow urine draining.   EXTREMITIES:  no cyanosis / clubbing / edema.   ____________________      VITALS:  Vital Signs Last 24 Hrs  T(C): 37.4 (17 Oct 2023 11:32), Max: 37.4 (17 Oct 2023 11:32)  T(F): 99.3 (17 Oct 2023 11:32), Max: 99.3 (17 Oct 2023 11:32)  HR: 80 (17 Oct 2023 14:50) (69 - 80)  BP: 99/63 (17 Oct 2023 14:50) (99/63 - 109/64)  BP(mean): --  RR: 17 (17 Oct 2023 14:50) (16 - 18)  SpO2: 97% (17 Oct 2023 14:50) (97% - 100%)    Parameters below as of 17 Oct 2023 14:50  Patient On (Oxygen Delivery Method): room air     Daily     Daily   CAPILLARY BLOOD GLUCOSE        I&O's Summary    16 Oct 2023 07:01  -  17 Oct 2023 07:00  --------------------------------------------------------  IN: 720 mL / OUT: 1450 mL / NET: -730 mL    17 Oct 2023 07:01  -  17 Oct 2023 17:17  --------------------------------------------------------  IN: 0 mL / OUT: 540 mL / NET: -540 mL        LABS:                        8.0    11.11 )-----------( 304      ( 17 Oct 2023 06:36 )             26.0     10-17    141  |  109<H>  |  18  ----------------------------<  97  4.2   |  29  |  0.90    Ca    8.5      17 Oct 2023 06:36          Urinalysis Basic - ( 17 Oct 2023 06:36 )    Color: x / Appearance: x / SG: x / pH: x  Gluc: 97 mg/dL / Ketone: x  / Bili: x / Urobili: x   Blood: x / Protein: x / Nitrite: x   Leuk Esterase: x / RBC: x / WBC x   Sq Epi: x / Non Sq Epi: x / Bacteria: x              MEDICATIONS:  acetaminophen     Tablet .. 650 milliGRAM(s) Oral every 6 hours PRN  bisacodyl 5 milliGRAM(s) Oral every 12 hours PRN  chlorhexidine 2% Cloths 1 Application(s) Topical <User Schedule>  heparin   Injectable 5000 Unit(s) SubCutaneous every 8 hours  hydrALAZINE Injectable 10 milliGRAM(s) IV Push every 4 hours PRN  influenza  Vaccine (HIGH DOSE) 0.7 milliLiter(s) IntraMuscular once  oxyCODONE    IR 5 milliGRAM(s) Oral every 6 hours PRN  polyethylene glycol 3350 17 Gram(s) Oral daily

## 2023-10-18 NOTE — PROGRESS NOTE ADULT - NS ATTEND AMEND GEN_ALL_CORE FT
I have seen and examined the patient with MONICA Woo and agree with the assessment and plan of care as detailed.  Await pathology from posterior bladder mass, s/p resection/biopsy on 10/13. Appreciate urology evaluation for management of b/l hydronephrosis.   Colonoscopy with findings c/w well differentiated adenocarcinoma. Plan for hemicolectomy with surgical oncology once pathology finalized.   Oncologic workup to date and plans discussed with the patient at bedside. Will continue to follow with you.

## 2023-10-18 NOTE — PROGRESS NOTE ADULT - NS ATTEND AMEND GEN_ALL_CORE FT
[FreeTextEntry1] : I, Treence Matthews, acted solely as a scribe for Dr. Norberto Orlando on this date 04/19/2019  .\par  \par All medical record entries made by the Scribe were at my, Dr. Norberto Orlando, direction and personally dictated by me on 04/19/2019 . I have reviewed the chart and agree that the record accurately reflects my personal performance of the history, physical exam, assessment and plan. I have also personally directed, reviewed, and agreed with the chart.\par \par \par   Agree with above, awaiting bladder biopsy.

## 2023-10-18 NOTE — PROGRESS NOTE ADULT - ASSESSMENT
73F with a PMH of HLD and ?CHF who presented to the ED with hematuria.  Found to have severe bilateral hydronephrosis due to bladder outlet obstruction - suspicion secondary to bladder malignancy.  Patient was admitted to the ICU for emergent HD.  Patient had bilateral nephrostomy tubes placed.  Patient is currently off HD and transferred to [facility] surg for further care.    < from: CT Abdomen and Pelvis No Cont (10.11.23 @ 15:11) >  Status post right nephrostomy and leftnephroureteral stent placement. No hydronephrosis.  Distal ureteral thickening and tapering of the contrast column approximately 2.8 cm above the UVJ suspicious for obstructing ureteral  mass or extension of bladder mass. No contrast is present in the urinary  bladder.  Masslike thickening of the ascending colon suspicious for malignancy.  Recommend colonoscopy.   < end of copied text >    # Acute renal failure - due to obstructive uropathy from bladder mass.  s/p R nephrostomy and L NUS, Cr improving. Trend BMP.   # Adenocarcinoma of Colon - Ascending Colon Mass - s/p Colonoscopy 10/12 with findings concerning for malignancy.  Pathology confirmed adenocarcinoma.  Seen by GI, Surgery.  Surgery planning hemicolectomy pending bladder mass pathology. .   # Bladder malignancy. - s/p cystoscopy 10/13 showing large tumor s/p resection.  , Oncology following.  Awaiting path from [facility]  # UTI - s/p Zosyn  # Essential HTN - Monitor BP.  Continue antihypertensives.  #AOCD. no active gross bleeding. no hematuria. Trend CBC.   # Inpatient DVT prophylaxis - subcutaneous Heparin

## 2023-10-18 NOTE — PROGRESS NOTE ADULT - ASSESSMENT
74 Y/O female with ascending colon mass s/p colonoscopy and biopsy. Found to have ascending colon mass consistent with adenocarcinoma. s/p TURBT - pathology pending.         PLAN:     - F/U pathology   - Plan for surgical intervention pending bladder mass and colon mass pathology   - Diet as tolerated   - Continue care per primary team   - Will continue to follow  - Discussed with Dr. Whitney  - No acute surgical intervention needed at this time

## 2023-10-18 NOTE — PROGRESS NOTE ADULT - SUBJECTIVE AND OBJECTIVE BOX
Pt stable  +Adeno CA colon      MEDICATIONS  (STANDING):  chlorhexidine 2% Cloths 1 Application(s) Topical <User Schedule>  heparin   Injectable 5000 Unit(s) SubCutaneous every 8 hours  influenza  Vaccine (HIGH DOSE) 0.7 milliLiter(s) IntraMuscular once  polyethylene glycol 3350 17 Gram(s) Oral daily    MEDICATIONS  (PRN):  acetaminophen     Tablet .. 650 milliGRAM(s) Oral every 6 hours PRN Temp greater or equal to 38C (100.4F)  bisacodyl 5 milliGRAM(s) Oral every 12 hours PRN Constipation  hydrALAZINE Injectable 10 milliGRAM(s) IV Push every 4 hours PRN SBP > 180  oxyCODONE    IR 5 milliGRAM(s) Oral every 6 hours PRN Moderate Pain (4 - 6)      Allergies    No Known Allergies    Intolerances        Vital Signs Last 24 Hrs  T(C): 36.5 (18 Oct 2023 11:21), Max: 37.4 (17 Oct 2023 11:32)  T(F): 97.7 (18 Oct 2023 11:21), Max: 99.3 (17 Oct 2023 11:32)  HR: 69 (18 Oct 2023 11:21) (63 - 88)  BP: 98/60 (18 Oct 2023 11:21) (98/60 - 103/64)  BP(mean): --  RR: 17 (18 Oct 2023 11:21) (16 - 18)  SpO2: 98% (18 Oct 2023 11:21) (96% - 98%)    Parameters below as of 18 Oct 2023 04:57  Patient On (Oxygen Delivery Method): room air        PHYSICAL EXAM:  General: NAD.  CVS: S1, S2  Chest: air entry bilaterally present  Abd: BS present, soft, non-tender      LABS:                        7.9    11.02 )-----------( 297      ( 18 Oct 2023 06:50 )             25.1     10-18    139  |  107  |  16  ----------------------------<  97  3.7   |  31  |  0.87    Ca    8.6      18 Oct 2023 06:50        awaiting Urology f/u and path  will need R hemicolectomy

## 2023-10-18 NOTE — PROGRESS NOTE ADULT - SUBJECTIVE AND OBJECTIVE BOX
Heme/Onc Progress note    INTERVAL HPI/OVERNIGHT EVENTS:  Patient S&E at bedside.  Had some abd pain overnight, resolved w/ medication. patient now  resting comfortably. No complaints at this time. Patient denies fever, chills, dizziness, weakness, CP, palpitations, SOB, cough, N/V/D/C, dysuria, changes in bowel movements, LE edema.    VITAL SIGNS:  T(F): 98.1 (10-18-23 @ 04:57)  HR: 63 (10-18-23 @ 04:57)  BP: 98/62 (10-18-23 @ 04:57)  RR: 17 (10-18-23 @ 04:57)  SpO2: 98% (10-18-23 @ 04:57)  Wt(kg): --    PHYSICAL EXAM:    Constitutional: NAD  Eyes: EOMI, sclera non-icteric  Neck: supple, no masses, no JVD  Respiratory: CTA b/l, good air entry b/l  Cardiovascular: RRR, no M/R/G  Gastrointestinal: soft, NTND, no masses palpable, + BS,   Extremities: no c/c/e  Neurological: AAOx3      MEDICATIONS  (STANDING):  chlorhexidine 2% Cloths 1 Application(s) Topical <User Schedule>  heparin   Injectable 5000 Unit(s) SubCutaneous every 8 hours  influenza  Vaccine (HIGH DOSE) 0.7 milliLiter(s) IntraMuscular once  polyethylene glycol 3350 17 Gram(s) Oral daily    MEDICATIONS  (PRN):  acetaminophen     Tablet .. 650 milliGRAM(s) Oral every 6 hours PRN Temp greater or equal to 38C (100.4F)  bisacodyl 5 milliGRAM(s) Oral every 12 hours PRN Constipation  hydrALAZINE Injectable 10 milliGRAM(s) IV Push every 4 hours PRN SBP > 180  oxyCODONE    IR 5 milliGRAM(s) Oral every 6 hours PRN Moderate Pain (4 - 6)      Allergies    No Known Allergies    Intolerances        LABS:                        7.9    11.02 )-----------( 297      ( 18 Oct 2023 06:50 )             25.1     10-18    139  |  107  |  16  ----------------------------<  97  3.7   |  31  |  0.87    Ca    8.6      18 Oct 2023 06:50        Urinalysis Basic - ( 18 Oct 2023 06:50 )    Color: x / Appearance: x / SG: x / pH: x  Gluc: 97 mg/dL / Ketone: x  / Bili: x / Urobili: x   Blood: x / Protein: x / Nitrite: x   Leuk Esterase: x / RBC: x / WBC x   Sq Epi: x / Non Sq Epi: x / Bacteria: x        RADIOLOGY & ADDITIONAL TESTS:  Studies reviewed.    ASSESSMENT & PLAN:

## 2023-10-18 NOTE — PROGRESS NOTE ADULT - SUBJECTIVE AND OBJECTIVE BOX
CHIEF COMPLAINT: FU of bladder mass and colon adenocarcinoma  tolerating oral diet  passing gas and +BM  no chest pain   no sob  no hematuria  no fever       PHYSICAL EXAM:    GENERAL: Thinly built, no acute distress   CHEST/LUNG:  No wheezing, no crackles   HEART: Regular rate and rhythm; No murmurs  ABDOMEN: Soft, Nontender, Nondistended; Bowel sounds present  EXTREMITIES:  No clubbing, cyanosis, or edema  Psychiatry: AAO x 3, mood is appropriate       OBJECTIVE DATA:   Vital Signs Last 24 Hrs  T(C): 36.7 (18 Oct 2023 16:25), Max: 37.1 (17 Oct 2023 18:00)  T(F): 98 (18 Oct 2023 16:25), Max: 98.8 (17 Oct 2023 18:00)  HR: 65 (18 Oct 2023 16:25) (63 - 88)  BP: 99/64 (18 Oct 2023 16:25) (98/60 - 100/65)  BP(mean): --  RR: 18 (18 Oct 2023 16:25) (16 - 18)  SpO2: 99% (18 Oct 2023 16:25) (96% - 99%)    Parameters below as of 18 Oct 2023 15:42  Patient On (Oxygen Delivery Method): room air       LABS:                        7.9    11.02 )-----------( 297      ( 18 Oct 2023 06:50 )             25.1             10-18    139  |  107  |  16  ----------------------------<  97  3.7   |  31  |  0.87    Ca    8.6      18 Oct 2023 06:50                  Urinalysis Basic - ( 18 Oct 2023 06:50 )    Color: x / Appearance: x / SG: x / pH: x  Gluc: 97 mg/dL / Ketone: x  / Bili: x / Urobili: x   Blood: x / Protein: x / Nitrite: x   Leuk Esterase: x / RBC: x / WBC x   Sq Epi: x / Non Sq Epi: x / Bacteria: x            CAPILLARY BLOOD GLUCOSE      POCT Blood Glucose.: 93 mg/dL (18 Oct 2023 08:08)        MEDICATIONS  (STANDING):  chlorhexidine 2% Cloths 1 Application(s) Topical <User Schedule>  heparin   Injectable 5000 Unit(s) SubCutaneous every 8 hours  influenza  Vaccine (HIGH DOSE) 0.7 milliLiter(s) IntraMuscular once  polyethylene glycol 3350 17 Gram(s) Oral daily    MEDICATIONS  (PRN):  acetaminophen     Tablet .. 650 milliGRAM(s) Oral every 6 hours PRN Temp greater or equal to 38C (100.4F)  bisacodyl 5 milliGRAM(s) Oral every 12 hours PRN Constipation  hydrALAZINE Injectable 10 milliGRAM(s) IV Push every 4 hours PRN SBP > 180  oxyCODONE    IR 5 milliGRAM(s) Oral every 6 hours PRN Moderate Pain (4 - 6)

## 2023-10-18 NOTE — PROGRESS NOTE ADULT - SUBJECTIVE AND OBJECTIVE BOX
Patient seen and examined at bedside with Dr. Whitney complaining of mild LLQ pain.   Tolerating full liquid diet.  Admits to flatus/BM.   Denies nausea/ vomiting, chills, SOB, chest pain, calf tenderness.          Vital Signs Last 24 Hrs  T(F): 98.1 (10-18-23 @ 04:57), Max: 99.3 (10-17-23 @ 11:32)  HR: 63 (10-18-23 @ 04:57)  BP: 98/62 (10-18-23 @ 04:57)  RR: 17 (10-18-23 @ 04:57)  SpO2: 98% (10-18-23 @ 04:57)  Wt(kg): --   CAPILLARY BLOOD GLUCOSE      POCT Blood Glucose.: 93 mg/dL (18 Oct 2023 08:08)      GENERAL: Alert, NAD  CHEST/LUNG: Respirations non labored, good inspiratory effort  HEART: S1S2  HEENT: NCAT, EOMI, conjunctiva clear   ABDOMEN: Obese abdomen, nondistended, + bowel sounds, mild LLQ tenderness   EXTREMITIES:  No calf tenderness, no edema    I&O's Detail    17 Oct 2023 07:01  -  18 Oct 2023 07:00  --------------------------------------------------------  IN:  Total IN: 0 mL    OUT:    Nephrostomy Tube (mL): 860 mL    Nephrostomy Tube (mL): 710 mL  Total OUT: 1570 mL    Total NET: -1570 mL          LABS:                        7.9    11.02 )-----------( 297      ( 18 Oct 2023 06:50 )             25.1     10-18    139  |  107  |  16  ----------------------------<  97  3.7   |  31  |  0.87    Ca    8.6      18 Oct 2023 06:50          RADIOLOGY & ADDITIONAL STUDIES:           Patient seen and examined at bedside with Dr. Whitney  Tolerating diet.  Admits to flatus/BM.   Denies nausea/ vomiting, chills, SOB, chest pain, calf tenderness.          Vital Signs Last 24 Hrs  T(F): 98.1 (10-18-23 @ 04:57), Max: 99.3 (10-17-23 @ 11:32)  HR: 63 (10-18-23 @ 04:57)  BP: 98/62 (10-18-23 @ 04:57)  RR: 17 (10-18-23 @ 04:57)  SpO2: 98% (10-18-23 @ 04:57)  Wt(kg): --   CAPILLARY BLOOD GLUCOSE      POCT Blood Glucose.: 93 mg/dL (18 Oct 2023 08:08)      GENERAL: Alert, NAD  CHEST/LUNG: Respirations non labored, good inspiratory effort  HEART: S1S2  HEENT: NCAT, EOMI, conjunctiva clear   ABDOMEN: Obese abdomen, nondistended, + bowel sounds,   EXTREMITIES:  No calf tenderness, no edema    I&O's Detail    17 Oct 2023 07:01  -  18 Oct 2023 07:00  --------------------------------------------------------  IN:  Total IN: 0 mL    OUT:    Nephrostomy Tube (mL): 860 mL    Nephrostomy Tube (mL): 710 mL  Total OUT: 1570 mL    Total NET: -1570 mL          LABS:                        7.9    11.02 )-----------( 297      ( 18 Oct 2023 06:50 )             25.1     10-18    139  |  107  |  16  ----------------------------<  97  3.7   |  31  |  0.87    Ca    8.6      18 Oct 2023 06:50          RADIOLOGY & ADDITIONAL STUDIES:           Patient seen and examined at bedside with Dr. Whitney  Tolerating diet.  Admits to flatus/BM.   Denies nausea/ vomiting, chills, SOB, chest pain, calf tenderness.          Vital Signs Last 24 Hrs  T(F): 98.1 (10-18-23 @ 04:57), Max: 99.3 (10-17-23 @ 11:32)  HR: 63 (10-18-23 @ 04:57)  BP: 98/62 (10-18-23 @ 04:57)  RR: 17 (10-18-23 @ 04:57)  SpO2: 98% (10-18-23 @ 04:57)  Wt(kg): --   CAPILLARY BLOOD GLUCOSE      POCT Blood Glucose.: 93 mg/dL (18 Oct 2023 08:08)      GENERAL: Alert, NAD  CHEST/LUNG: Respirations non labored, good inspiratory effort  HEART: S1S2  HEENT: NCAT, EOMI, conjunctiva clear   ABDOMEN: Obese abdomen, nondistended, + bowel sounds,   EXTREMITIES:  No calf tenderness, no edema  : nephrostomy tubes in place with clear urine    I&O's Detail    17 Oct 2023 07:01  -  18 Oct 2023 07:00  --------------------------------------------------------  IN:  Total IN: 0 mL    OUT:    Nephrostomy Tube (mL): 860 mL    Nephrostomy Tube (mL): 710 mL  Total OUT: 1570 mL    Total NET: -1570 mL          LABS:                        7.9    11.02 )-----------( 297      ( 18 Oct 2023 06:50 )             25.1     10-18    139  |  107  |  16  ----------------------------<  97  3.7   |  31  |  0.87    Ca    8.6      18 Oct 2023 06:50          RADIOLOGY & ADDITIONAL STUDIES:

## 2023-10-18 NOTE — PROGRESS NOTE ADULT - ASSESSMENT
73 yr old F with hx of HLD and ? HF on lasix presents to the ER with hematuria. Found to be in acute renal failure, admitted to ICU for urgent HD.    #Malig w/u  -patient being w/u OP for hematuria  -CT A/P 10/3 done OP (not showing up anywhere but PACS) shpwing Masslike thickening involving the posterior bladder suspicious for neoplasm. Severe bilateral hydronephrosis, and Masslike thickening of the ascending colon is indeterminate  CT-C  10/11 done for staging showing 4 mm indeterminate right apical nodule.  -GI onboard s/p colonoscopy (10/12) findings suspicious of malig- path (+) for fragment cells of  adenocarcinoma well differentiated   -urology onboard   -s/p cystourethroscopy with resection of large bladder mass 10/13-f/u pathology   -Cea 2.4   -Sx onboard pending urology path for final decision about sx interventions   -patient can f/u with [facility]  [address] [place] [address]· [phone] when deemed stable for d/c (info sent to [facility] consults, discussed w/ patient)      #Normocytic anemia  -patient presenting w/ anemia hx of hematuria  -unknown baseline  -patient also w/ [facility], new onsent of RF   -TSH-NL, EPO-15.8-neprology onboard   -hemolysis (-), iron/B12/fol noted   -maintain hgb >7 or if symptomatic

## 2023-10-18 NOTE — PROGRESS NOTE ADULT - SUBJECTIVE AND OBJECTIVE BOX
Pt seen and examined seated in chair.  No complaints offered.   B/l PCNUs draining. Pt reports she has not voided.  No n/v. Tolerating diet. No fever or chills.    T(F): 98.1 (10-18-23 @ 04:57), Max: 99.3 (10-17-23 @ 11:32)  HR: 63 (10-18-23 @ 04:57) (63 - 88)  BP: 98/62 (10-18-23 @ 04:57) (98/62 - 103/64)  RR: 17 (10-18-23 @ 04:57) (16 - 18)  SpO2: 98% (10-18-23 @ 04:57) (96% - 98%)  Wt(kg): --  POCT Blood Glucose.: 93 mg/dL (18 Oct 2023 08:08)  POCT Blood Glucose.: 161 mg/dL (17 Oct 2023 21:07)      PHYSICAL EXAM:  General: NAD, alert and awake  HEENT: NCAT, EOMI, conjunctiva clear  Chest: nonlabored respirations, good inspiratory effort  Abdomen: soft, NTND.   Extremities: no pedal edema or calf tenderness noted   : b/l PCNU drains with yellow urine, dressings CDI. Output R 860cc, L 710cc  no suprpubic tenderness to palpation    LABS:                        7.9    11.02 )-----------( 297      ( 18 Oct 2023 06:50 )             25.1   10-18    139  |  107  |  16  ----------------------------<  97  3.7   |  31  |  0.87    Ca    8.6      18 Oct 2023 06:50      I&O's Detail    17 Oct 2023 07:01  -  18 Oct 2023 07:00  --------------------------------------------------------  IN:  Total IN: 0 mL    OUT:    Nephrostomy Tube (mL): 860 mL    Nephrostomy Tube (mL): 710 mL  Total OUT: 1570 mL    Total NET: -1570 mL      A/P: 73F with colon adenoca with bilateral hydronephrosis due to bladder lesion causing outlet obstruction  s/p IR placement of L PCNU and R PCN on 10/9  with IR conversion to R PCNU on 10/16  s/p cysto and TURBT on 10/13 - pathology results pending  UCx 10/9 b/l kidneys negative. Completed 7 day course of zosyn, stopped 10/15. Renal function stable.  as discussed with core lab pathology dept [phone]: cytology pending. Pathology dept at [facility] as well as [facility] contacted, awaiting Follow up.   continue local drain care, output monitoring and medical management/supportive care  c/w VTE ppx, ambulate  OR planning pending path.     Pt seen and examined seated in chair.  No complaints offered.   B/l PCNUs draining. Pt reports she has not voided.  No n/v. Tolerating diet. No fever or chills.    T(F): 98.1 (10-18-23 @ 04:57), Max: 99.3 (10-17-23 @ 11:32)  HR: 63 (10-18-23 @ 04:57) (63 - 88)  BP: 98/62 (10-18-23 @ 04:57) (98/62 - 103/64)  RR: 17 (10-18-23 @ 04:57) (16 - 18)  SpO2: 98% (10-18-23 @ 04:57) (96% - 98%)  Wt(kg): --  POCT Blood Glucose.: 93 mg/dL (18 Oct 2023 08:08)  POCT Blood Glucose.: 161 mg/dL (17 Oct 2023 21:07)      PHYSICAL EXAM:  General: NAD, alert and awake  HEENT: NCAT, EOMI, conjunctiva clear  Chest: nonlabored respirations, good inspiratory effort  Abdomen: soft, NTND.   Extremities: no pedal edema or calf tenderness noted   : b/l PCNU drains with yellow urine, dressings CDI. Output R 860cc, L 710cc  no suprpubic tenderness to palpation    LABS:                        7.9    11.02 )-----------( 297      ( 18 Oct 2023 06:50 )             25.1   10-18    139  |  107  |  16  ----------------------------<  97  3.7   |  31  |  0.87    Ca    8.6      18 Oct 2023 06:50      I&O's Detail    17 Oct 2023 07:01  -  18 Oct 2023 07:00  --------------------------------------------------------  IN:  Total IN: 0 mL    OUT:    Nephrostomy Tube (mL): 860 mL    Nephrostomy Tube (mL): 710 mL  Total OUT: 1570 mL    Total NET: -1570 mL      A/P: 73F with colon adenoca with bilateral hydronephrosis due to bladder lesion causing outlet obstruction  s/p IR placement of L PCNU and R PCN on 10/9  with IR conversion to R PCNU on 10/16  s/p cysto and TURBT on 10/13 - pathology results pending  UCx 10/9 b/l kidneys negative. Completed 7 day course of zosyn, stopped 10/15. Renal function stable.  as discussed with core lab pathology dept [phone]: cytology pending. Pathology dept at [facility] as well as [facility] contacted, awaiting Follow up.   continue local drain care, output monitoring and medical management/supportive care  c/w VTE ppx, ambulate  Per discussion with Dr Llanes - will cap b/l PCNUs and monitor urine output.   No  surgical intervention is planned for this admission; patient will require OP Follow up with Dr Llanes for further  planning pending pathology.

## 2023-10-19 NOTE — PROGRESS NOTE ADULT - SUBJECTIVE AND OBJECTIVE BOX
Patient seen and  examined at bedside resting.  Offers no complaints at this time.   Tolerating diet, with bowel function.   Denies fever, chills, N/V/D, CP, SOB.    Vital Signs Last 24 Hrs  T(F): 98.1 (10-19-23 @ 05:16), Max: 98.3 (10-18-23 @ 23:49)  HR: 73 (10-19-23 @ 05:16)  BP: 114/70 (10-19-23 @ 05:16)  RR: 17 (10-19-23 @ 05:16)  SpO2: 99% (10-19-23 @ 05:16)  POCT Blood Glucose.: 93 mg/dL (18 Oct 2023 08:08)    PHYSICAL EXAM:  GENERAL: Alert, NAD  CHEST/LUNG: Clear to auscultation bilaterally, respirations nonlabored  HEART: Regular rate and rhythm; S1 & S2 appreciated  ABDOMEN: soft, NT/ND  EXTREMITIES:  no calf tenderness, No edema    I&O's Detail    18 Oct 2023 07:01  -  19 Oct 2023 07:00  --------------------------------------------------------  IN:  Total IN: 0 mL    OUT:    Nephrostomy Tube (mL): 300 mL    Nephrostomy Tube (mL): 240 mL  Total OUT: 540 mL    Total NET: -540 mL          LABS:                        7.9    11.02 )-----------( 297      ( 18 Oct 2023 06:50 )             25.1     10-18    139  |  107  |  16  ----------------------------<  97  3.7   |  31  |  0.87    Ca    8.6      18 Oct 2023 06:50    RADIOLOGY & ADDITIONAL STUDIES:    A/P  74 Y/O female with ascending colon mass s/p colonoscopy and biopsy, and s/p TURBT. Found to have ascending colon mass consistent with adenocarcinoma  - f/u  pathology; surgical plan pending results   - continue diet  - DVT ppx, OOB/AAT  - continue care per primary team  - d/w Dr. Whitney

## 2023-10-19 NOTE — PROGRESS NOTE ADULT - SUBJECTIVE AND OBJECTIVE BOX
CHIEF COMPLAINT: FU of bladder mass and colon adenocarcinoma  tolerating oral diet  passing gas and +BM  no chest pain   no sob  no hematuria      PHYSICAL EXAM:    GENERAL: Thinly built, no acute distress   CHEST/LUNG:  No wheezing, no crackles   HEART: Regular rate and rhythm; No murmurs  ABDOMEN: Soft, Nontender, Nondistended; Bowel sounds present  EXTREMITIES:  No clubbing, cyanosis, or edema  Psychiatry: AAO x 3, mood is appropriate       OBJECTIVE DATA:     Vital Signs Last 24 Hrs  T(C): 36.4 (19 Oct 2023 11:35), Max: 36.8 (18 Oct 2023 23:49)  T(F): 97.5 (19 Oct 2023 11:35), Max: 98.3 (18 Oct 2023 23:49)  HR: 72 (19 Oct 2023 11:35) (64 - 74)  BP: 109/67 (19 Oct 2023 11:35) (99/61 - 114/70)  BP(mean): --  RR: 18 (19 Oct 2023 11:35) (16 - 18)  SpO2: 99% (19 Oct 2023 11:35) (98% - 99%)    Parameters below as of 19 Oct 2023 11:35  Patient On (Oxygen Delivery Method): room air               Daily     Daily Weight in k.6 (18 Oct 2023 23:49)  LABS:                        7.9    11.02 )-----------( 297      ( 18 Oct 2023 06:50 )             25.1             10-18    139  |  107  |  16  ----------------------------<  97  3.7   |  31  |  0.87    Ca    8.6      18 Oct 2023 06:50                  Urinalysis Basic - ( 18 Oct 2023 06:50 )    Color: x / Appearance: x / SG: x / pH: x  Gluc: 97 mg/dL / Ketone: x  / Bili: x / Urobili: x   Blood: x / Protein: x / Nitrite: x   Leuk Esterase: x / RBC: x / WBC x   Sq Epi: x / Non Sq Epi: x / Bacteria: x           CAPILLARY BLOOD GLUCOSE      POCT Blood Glucose.: 93 mg/dL (18 Oct 2023 08:08)    MEDICATIONS  (STANDING):  chlorhexidine 2% Cloths 1 Application(s) Topical <User Schedule>  heparin   Injectable 5000 Unit(s) SubCutaneous every 8 hours  influenza  Vaccine (HIGH DOSE) 0.7 milliLiter(s) IntraMuscular once  polyethylene glycol 3350 17 Gram(s) Oral daily    MEDICATIONS  (PRN):  acetaminophen     Tablet .. 650 milliGRAM(s) Oral every 6 hours PRN Temp greater or equal to 38C (100.4F)  bisacodyl 5 milliGRAM(s) Oral every 12 hours PRN Constipation  hydrALAZINE Injectable 10 milliGRAM(s) IV Push every 4 hours PRN SBP > 180  oxyCODONE    IR 5 milliGRAM(s) Oral every 6 hours PRN Moderate Pain (4 - 6)

## 2023-10-19 NOTE — PROGRESS NOTE ADULT - SUBJECTIVE AND OBJECTIVE BOX
Patient seen and examined at bedside in no distress.  Reports voiding spontaneously since PCNs clamped.    T(F): 98.1 (10-19-23 @ 05:16), Max: 98.3 (10-18-23 @ 23:49)  HR: 73 (10-19-23 @ 05:16) (64 - 74)  BP: 114/70 (10-19-23 @ 05:16) (98/60 - 114/70)  RR: 17 (10-19-23 @ 05:16) (16 - 18)  SpO2: 99% (10-19-23 @ 05:16) (98% - 99%)    PHYSICAL EXAM:  General: Alert & oriented x 3  CV: +S1S2 regular rate and rhythm  Lung: Respirations nonlabored  Abdomen: Soft  : b/l PCNs capped, no SP tenderness, no palpable bladder distention    LABS:                        7.9    11.02 )-----------( 297      ( 18 Oct 2023 06:50 )             25.1     10-18    139  |  107  |  16  ----------------------------<  97  3.7   |  31  |  0.87    Ca    8.6      18 Oct 2023 06:50    A/P: 73F with colon adenoca with bilateral hydronephrosis due to bladder lesion causing outlet obstruction  s/p IR placement of L PCNU and R PCN on 10/9, converted to PCNU 10/16, s/p cysto/TURBT 10/13  -- still awaiting  pathology  -- no  intervention planned on this admission, close OP f/u with Dr. Llanes

## 2023-10-19 NOTE — PROGRESS NOTE ADULT - ASSESSMENT
73F with a PMH of HLD and ?CHF who presented to the ED with hematuria.  Found to have severe bilateral hydronephrosis due to bladder outlet obstruction - suspicion secondary to bladder malignancy.  Patient was admitted to the ICU for emergent HD.  Patient had bilateral nephrostomy tubes placed.  Patient is currently off HD and transferred to [facility] surg for further care.    < from: CT Abdomen and Pelvis No Cont (10.11.23 @ 15:11) >  Status post right nephrostomy and left nephroureteral stent placement. No hydronephrosis.  Distal ureteral thickening and tapering of the contrast column approximately 2.8 cm above the UVJ suspicious for obstructing ureteral  mass or extension of bladder mass. No contrast is present in the urinary  bladder.  Masslike thickening of the ascending colon suspicious for malignancy.  Recommend colonoscopy.   < end of copied text >    # Acute renal failure - due to obstructive uropathy from bladder mass.  s/p R nephrostomy and L NUS.   # Adenocarcinoma of Colon - Ascending Colon Mass - s/p Colonoscopy 10/12 with findings concerning for malignancy.  Pathology confirmed adenocarcinoma.  Seen by GI, Surgery.  Surgery planning hemicolectomy pending bladder mass pathology. I discussed with surgery team in person about pathology report. still awaiting result .   # Bladder malignancy. - s/p cystoscopy 10/13 showing large tumor s/p resection.  , Oncology following.  Awaiting path from [facility]  # UTI - s/p Zosyn  # Essential HTN - Monitor BP.  Continue antihypertensives.  #AOCD. no active gross bleeding. no hematuria.   # Inpatient DVT prophylaxis - subcutaneous Heparin

## 2023-10-20 NOTE — DISCHARGE NOTE PROVIDER - NSDCMRMEDTOKEN_GEN_ALL_CORE_FT
polyethylene glycol 3350 oral powder for reconstitution: 17 gram(s) orally once a day  traMADol 50 mg oral tablet: 1 tab(s) orally every 6 hours MDD:four

## 2023-10-20 NOTE — PROGRESS NOTE ADULT - SUBJECTIVE AND OBJECTIVE BOX
incomplete       Heme/Onc Progress note    INTERVAL HPI/OVERNIGHT EVENTS:  Patient S&E at bedside. No o/n events, patient resting comfortably. No complaints at this time. Patient denies fever, chills, dizziness, weakness, CP, palpitations, SOB, cough, N/V/D/C, dysuria, changes in bowel movements, LE edema.    VITAL SIGNS:  T(F): 98.2 (10-20-23 @ 11:25)  HR: 86 (10-20-23 @ 11:25)  BP: 104/69 (10-20-23 @ 11:25)  RR: 19 (10-20-23 @ 11:25)  SpO2: 97% (10-20-23 @ 11:25)  Wt(kg): --    PHYSICAL EXAM:    Constitutional: NAD  Eyes: EOMI, sclera non-icteric  Neck: supple, no masses, no JVD  Respiratory: CTA b/l, good air entry b/l  Cardiovascular: RRR, no M/R/G  Gastrointestinal: soft, NTND, no masses palpable, + BS, no hepatosplenomegaly  Extremities: no c/c/e  Neurological: AAOx3      MEDICATIONS  (STANDING):  chlorhexidine 2% Cloths 1 Application(s) Topical <User Schedule>  heparin   Injectable 5000 Unit(s) SubCutaneous every 8 hours  influenza  Vaccine (HIGH DOSE) 0.7 milliLiter(s) IntraMuscular once  polyethylene glycol 3350 17 Gram(s) Oral daily    MEDICATIONS  (PRN):  acetaminophen     Tablet .. 650 milliGRAM(s) Oral every 6 hours PRN Temp greater or equal to 38C (100.4F)  bisacodyl 5 milliGRAM(s) Oral every 12 hours PRN Constipation  hydrALAZINE Injectable 10 milliGRAM(s) IV Push every 4 hours PRN SBP > 180  oxyCODONE    IR 5 milliGRAM(s) Oral every 6 hours PRN Moderate Pain (4 - 6)      Allergies    No Known Allergies    Intolerances        LABS:        Surgical Pathology Report (10.13.23 @ 14:40)   Surgical Pathology Report:   ACCESSION No: [id number]   Patient: FRANSICO GUZMAN   Accession: [id number]   Collected Date/Time: 10/13/2023 14:40 EDT   Received Date/Time: 10/16/2023 11:51 EDT   Surgical Pathology Report - Auth (Verified)   Specimen(s) Submitted   Bladder chips   Final Diagnosis   Bladder, chips, TUR   -Histologic Type: Invasive urothelial carcinoma, conventional and   focally sarcomatoid subtype (less than 1%)   -Histologic Grade: High grade   Papillary and Flat   -Pattern of growth of the non-invasive component:   urothelial carcinoma in situ   -Local Invasion: Carcinoma invades muscularis propria (detrusor   muscle)   -Muscularis Propria: Muscularis propria (detrusor muscle) is   involved   -Lymphovascular Invasion: Not identified   Verified by: Liyah Chaney M.D., Chief of Genitourinary Pathology         RADIOLOGY & ADDITIONAL TESTS:  Studies reviewed.    ASSESSMENT & PLAN:      Heme/Onc Progress note    INTERVAL HPI/OVERNIGHT EVENTS:  Patient S&E at bedside. No o/n events, patient resting comfortably. No complaints at this time. Patient denies fever, chills, dizziness, weakness, CP, palpitations, SOB, cough, N/V/D/C, dysuria, changes in bowel movements, LE edema.    VITAL SIGNS:  T(F): 98.2 (10-20-23 @ 11:25)  HR: 86 (10-20-23 @ 11:25)  BP: 104/69 (10-20-23 @ 11:25)  RR: 19 (10-20-23 @ 11:25)  SpO2: 97% (10-20-23 @ 11:25)  Wt(kg): --    PHYSICAL EXAM:    Constitutional: NAD  Eyes: EOMI, sclera non-icteric  Neck: supple, no masses, no JVD  Respiratory:  diminished b/l   Cardiovascular: RRR, no M/R/G  Gastrointestinal: soft, NTND, no masses palpable, + BS,   Extremities: no c/c/e  Neurological: AAOx3      MEDICATIONS  (STANDING):  chlorhexidine 2% Cloths 1 Application(s) Topical <User Schedule>  heparin   Injectable 5000 Unit(s) SubCutaneous every 8 hours  influenza  Vaccine (HIGH DOSE) 0.7 milliLiter(s) IntraMuscular once  polyethylene glycol 3350 17 Gram(s) Oral daily    MEDICATIONS  (PRN):  acetaminophen     Tablet .. 650 milliGRAM(s) Oral every 6 hours PRN Temp greater or equal to 38C (100.4F)  bisacodyl 5 milliGRAM(s) Oral every 12 hours PRN Constipation  hydrALAZINE Injectable 10 milliGRAM(s) IV Push every 4 hours PRN SBP > 180  oxyCODONE    IR 5 milliGRAM(s) Oral every 6 hours PRN Moderate Pain (4 - 6)      Allergies    No Known Allergies    Intolerances        LABS:        Surgical Pathology Report (10.13.23 @ 14:40)   Surgical Pathology Report:   ACCESSION No: [id number]   Patient: FRANSICO GUZMAN   Accession: [id number]   Collected Date/Time: 10/13/2023 14:40 EDT   Received Date/Time: 10/16/2023 11:51 EDT   Surgical Pathology Report - Auth (Verified)   Specimen(s) Submitted   Bladder chips   Final Diagnosis   Bladder, chips, TUR   -Histologic Type: Invasive urothelial carcinoma, conventional and   focally sarcomatoid subtype (less than 1%)   -Histologic Grade: High grade   Papillary and Flat   -Pattern of growth of the non-invasive component:   urothelial carcinoma in situ   -Local Invasion: Carcinoma invades muscularis propria (detrusor   muscle)   -Muscularis Propria: Muscularis propria (detrusor muscle) is   involved   -Lymphovascular Invasion: Not identified   Verified by: Liyah Chaney M.D., Chief of Genitourinary Pathology         RADIOLOGY & ADDITIONAL TESTS:  Studies reviewed.    ASSESSMENT & PLAN:    Heme/Onc Progress note    INTERVAL HPI/OVERNIGHT EVENTS:  Patient S&E at bedside. No o/n events, patient resting comfortably. No complaints at this time. Patient denies fever, chills, dizziness, weakness, CP, palpitations, SOB, cough, N/V/D/C, dysuria, changes in bowel movements, LE edema.    VITAL SIGNS:  T(F): 98.2 (10-20-23 @ 11:25)  HR: 86 (10-20-23 @ 11:25)  BP: 104/69 (10-20-23 @ 11:25)  RR: 19 (10-20-23 @ 11:25)  SpO2: 97% (10-20-23 @ 11:25)  Wt(kg): --    PHYSICAL EXAM:    Constitutional: NAD  Eyes: EOMI, sclera non-icteric  Neck: supple, no masses, no JVD  Respiratory:  diminished b/l   Cardiovascular: RRR, no M/R/G  Gastrointestinal: soft, NTND, no masses palpable, + BS,   Extremities: no c/c/e  Neurological: AAOx3      MEDICATIONS  (STANDING):  chlorhexidine 2% Cloths 1 Application(s) Topical <User Schedule>  heparin   Injectable 5000 Unit(s) SubCutaneous every 8 hours  influenza  Vaccine (HIGH DOSE) 0.7 milliLiter(s) IntraMuscular once  polyethylene glycol 3350 17 Gram(s) Oral daily    MEDICATIONS  (PRN):  acetaminophen     Tablet .. 650 milliGRAM(s) Oral every 6 hours PRN Temp greater or equal to 38C (100.4F)  bisacodyl 5 milliGRAM(s) Oral every 12 hours PRN Constipation  hydrALAZINE Injectable 10 milliGRAM(s) IV Push every 4 hours PRN SBP > 180  oxyCODONE    IR 5 milliGRAM(s) Oral every 6 hours PRN Moderate Pain (4 - 6)      Allergies    No Known Allergies    Intolerances        LABS:        Surgical Pathology Report (10.13.23 @ 14:40)   Surgical Pathology Report:   ACCESSION No: [id number]   Patient: FRANSICO GUZMAN   Accession: [id number]   Collected Date/Time: 10/13/2023 14:40 EDT   Received Date/Time: 10/16/2023 11:51 EDT   Surgical Pathology Report - Auth (Verified)   Specimen(s) Submitted   Bladder chips   Final Diagnosis   Bladder, chips, TUR   -Histologic Type: Invasive urothelial carcinoma, conventional and   focally sarcomatoid subtype (less than 1%)   -Histologic Grade: High grade   Papillary and Flat   -Pattern of growth of the non-invasive component:   urothelial carcinoma in situ   -Local Invasion: Carcinoma invades muscularis propria (detrusor   muscle)   -Muscularis Propria: Muscularis propria (detrusor muscle) is   involved   -Lymphovascular Invasion: Not identified   Verified by: Liyah Chaney M.D., Chief of Genitourinary Pathology         RADIOLOGY & ADDITIONAL TESTS:  Studies reviewed.    ASSESSMENT & PLAN:

## 2023-10-20 NOTE — PROGRESS NOTE ADULT - ASSESSMENT
A/P  72 Y/O female with ascending colon mass s/p colonoscopy and biopsy, and s/p TURBT. Found to have ascending colon mass consistent with adenocarcinoma.   pathology from bladder biopsy consistent with urothelial carcinoma     - continue diet  - DVT ppx, OOB/AAT  - continue care per primary team  - no plans for surgical intervention while in house. Plan to coordinate procedures with  team.   - will need OP f/u with Dr. Korey Taveras, urology  - stable for d/c from urology standpoint

## 2023-10-20 NOTE — PROGRESS NOTE ADULT - ASSESSMENT
73 yr old F with hx of HLD and ? HF on lasix presents to the ER with hematuria. Found to be in acute renal failure, admitted to ICU for urgent HD.    #Malig w/u  -patient being w/u OP for hematuria  -CT A/P 10/3 done OP (not showing up anywhere but PACS) shpwing Masslike thickening involving the posterior bladder suspicious for neoplasm. Severe bilateral hydronephrosis, and Masslike thickening of the ascending colon is indeterminate  CT-C  10/11 done for staging showing 4 mm indeterminate right apical nodule.  -GI onboard s/p colonoscopy (10/12) findings suspicious of malig- path (+) for fragment cells of  adenocarcinoma well differentiated   -urology onboard   -s/p cystourethroscopy with resection of large bladder mass 10/13- pathology showing Pattern of growth of the non-invasive component:   urothelial carcinoma in situ   -Cea 2.4   -Sx onboard -no IP sx interventions planned will need coordination w/ urology  -Urology onboard no plan for sx interventions IP will f/u OP  -patient will need f/u w/ SX ONC and urology OP will refer to cancer care direct to help w/ coordination of care  -rec PT eval   -patient can f/u OP with [facility]  [address] [place] [place] [address]· [phone] when deemed stable for d/c and seen by sx onc and urology     #Normocytic anemia  -patient presenting w/ anemia hx of hematuria  -unknown baseline  -patient also w/ LANA, new onsent of RF   -TSH-NL, EPO-15.8-neprology onboard   -hemolysis (-), iron/B12/fol noted   -maintain hgb >7 or if symptomatic           73 yr old F with hx of HLD and ? HF on lasix presents to the ER with hematuria. Found to be in acute renal failure, admitted to ICU for urgent HD.    #Malig w/u  -patient being w/u OP for hematuria  -CT A/P 10/3 done OP (not showing up anywhere but PACS) shpwing Masslike thickening involving the posterior bladder suspicious for neoplasm. Severe bilateral hydronephrosis, and Masslike thickening of the ascending colon is indeterminate  CT-C  10/11 done for staging showing 4 mm indeterminate right apical nodule.  -GI onboard s/p colonoscopy (10/12) findings suspicious of malig- path (+) for fragment cells of  adenocarcinoma well differentiated   -urology onboard -s/p cystourethroscopy with resection of large bladder mass 10/13- pathology showing Pattern of growth of the non-invasive component:   urothelial carcinoma in situ   -Cea 2.4   -Sx onboard -no IP sx interventions planned will need coordination w/ urology  -Urology has no plan for sx interventions IP will f/u OP  -patient will need f/u w/ SX ONC and urology OP will also refer to cancer care direct to help w/ coordination of care  -rec PT eval for d/c planning   -patient can f/u OP with [facility]  [address] [place] [address]· [phone] when deemed stable for d/c and seen by sx onc and urology     #Normocytic anemia  -patient presenting w/ anemia hx of hematuria  -unknown baseline  -patient also w/ LANA, new onsent of RF   -TSH-NL, EPO-15.8-neprology onboard   -hemolysis (-), iron/B12/fol noted   -maintain hgb >7 or if symptomatic

## 2023-10-20 NOTE — PROGRESS NOTE ADULT - NS ATTEND OPT1 GEN_ALL_CORE

## 2023-10-20 NOTE — PROGRESS NOTE ADULT - SUBJECTIVE AND OBJECTIVE BOX
· Subjective and Objective:   Patient seen and examined at bedside resting comfortably.   Offers no complaints    Vital Signs Last 24 Hrs  T(F): 98.2 (10-20-23 @ 11:25), Max: 99.1 (10-19-23 @ 23:36)  HR: 86 (10-20-23 @ 11:25)  BP: 104/69 (10-20-23 @ 11:25)  RR: 19 (10-20-23 @ 11:25)  SpO2: 97% (10-20-23 @ 11:25)    PHYSICAL EXAM:  GENERAL: Alert, NAD  CHEST/LUNG: Clear to auscultation bilaterally, respirations nonlabored  HEART: Regular rate and rhythm; S1 & S2 appreciated  ABDOMEN: + Bowel sounds, soft, Nontender, Nondistended  : b/l PCNs draining well  EXTREMITIES:  no calf tenderness, No edema    I&O's Detail    19 Oct 2023 07:01  -  20 Oct 2023 07:00  --------------------------------------------------------  IN:  Total IN: 0 mL    OUT:    Voided (mL): 1600 mL  Total OUT: 1600 mL    Total NET: -1600 mL

## 2023-10-20 NOTE — PROGRESS NOTE ADULT - NS ATTEND AMEND GEN_ALL_CORE FT
-GI onboard s/p colonoscopy (10/12) findings suspicious of malig- path (+) for fragment cells of  adenocarcinoma well differentiated   -urology onboard -s/p cystourethroscopy with resection of large bladder mass 10/13- pathology showing Pattern of growth of the non-invasive component:   urothelial carcinoma in situ   -Cea 2.4 I have seen and examined the patient with MONICA Woo and agree with the assessment and plan of care as detailed.  Pathology from posterior bladder mass, s/p resection/biopsy on 10/13 reviewed, c/w muscle-invasive urothelial carcinoma. Appreciate urology evaluation. Colonoscopy with findings c/w well differentiated adenocarcinoma.   Plan to f/u with urology and surgical oncology as outpatient to coordinate planned surgeries for bladder ca and hemicolectomy for colon ca.   Referrals made via [facility] for outpatient follow up. I have seen and examined the patient with MONICA Woo and agree with the assessment and plan of care as detailed.  Pathology from posterior bladder mass, s/p resection/biopsy on 10/13 reviewed, c/w muscle-invasive urothelial carcinoma. Appreciate urology evaluation. Colonoscopy with findings c/w well differentiated adenocarcinoma. Pathology findings reviewed with the patient.   Plan to f/u with urology and surgical oncology as outpatient to coordinate planned surgeries for bladder ca and hemicolectomy for colon ca.   Referrals made via [facility] for outpatient follow up. Patient stable for discharge from oncologic perspective.

## 2023-10-20 NOTE — DISCHARGE NOTE PROVIDER - NSDCCPCAREPLAN_GEN_ALL_CORE_FT
PRINCIPAL DISCHARGE DIAGNOSIS  Diagnosis: Acute renal failure (ARF)  Assessment and Plan of Treatment:       SECONDARY DISCHARGE DIAGNOSES  Diagnosis: Hyperkalemia  Assessment and Plan of Treatment:

## 2023-10-20 NOTE — PROGRESS NOTE ADULT - NS ATTEND AMEND GEN_ALL_CORE FT
Patient seen and examined with PAs  Colon pathology discussed with patient and daughter  Urology pathology discussed; per urology needs to see either Dr. Taveras or Dr. Anderson  Outpatient follow up with urology; and plan for combined surgery  All questions answered

## 2023-10-20 NOTE — DISCHARGE NOTE PROVIDER - NSDCHHPTASSISTHOME_GEN_ALL_CORE
Patient Needs Assistance to Leave Residence...
FAMILY HISTORY:  No pertinent family history in first degree relatives

## 2023-10-20 NOTE — DISCHARGE NOTE PROVIDER - HOSPITAL COURSE
ICU transfer summary: 73F with a PMH of HLD and ?CHF who presented to the ED with hematuria.  Found to have severe bilateral hydronephrosis due to bladder outlet obstruction - suspicion secondary to bladder malignancy.  Patient was admitted to the ICU for emergent HD.  Patient had bilateral nephrostomy tubes placed.  Patient is currently off HD and transferred to [facility] for further care.      < from: CT Abdomen and Pelvis No Cont (10.11.23 @ 15:11) >  Status post right nephrostomy and left nephroureteral stent placement. No hydronephrosis.  Distal ureteral thickening and tapering of the contrast column approximately 2.8 cm above the UVJ suspicious for obstructing ureteral  mass or extension of bladder mass. No contrast is present in the urinary  bladder.  Masslike thickening of the ascending colon suspicious for malignancy.  Recommend colonoscopy.   < end of copied text >    # Acute renal failure - due to obstructive uropathy from bladder mass.  s/p R nephrostomy and L NUS. outpatient urology follow up with Dr. Makenzie Taveras.  # Adenocarcinoma of Colon - Ascending Colon Mass - s/p Colonoscopy 10/12 with findings concerning for malignancy.  Pathology confirmed adenocarcinoma.  Seen by GI, Surgery.  Surgery planning hemicolectomy as outpatient. I discussed about it with Dr. Devonte Montenegro in person. updated daughter and patient.   # Bladder malignancy. -s/p cystoscopy 10/13 showing large tumor s/p resection.  Urothelial cancer on pathology. Patient can be discharged with outpatient follow up with Dr. Makenzie Taveras for outpatient interval urology service (and hemicolectomy at the same time).   # UTI - s/p Zosyn  # Essential HTN - Monitor BP.  Continue antihypertensives.  #AOCD. no active gross bleeding. no hematuria.       Seen and examined by me today. Vitals stable.   I have discussed all the inpatient radiographic findings with the patient and stressed that patient follows with the PCP for further outpatient care. I have educated patient to use [employer] patient portal (as instructed on the discharge paperwork) to access medical records outside the hospital.   All questions welcomed and answered appropriately. Patient verbalized understanding of post discharge physician's follows up and discharge instructions.   DC time spent by me face to face excluding billable procedures 38 mins

## 2023-10-20 NOTE — DISCHARGE NOTE PROVIDER - CARE PROVIDER_API CALL
Korey Taveras  Urology  [address] [place] [address], [address]  [place] [address]  Phone: [phone]  Fax: [phone]  Follow Up Time: 2 weeks    Lan Whitney  Surgery  [address] [facility]  [place] [address]  Phone: [phone]  Fax: [phone]  Follow Up Time: 2 weeks    Your PCP in a week,   Phone: (   )    -  Fax: (   )    -  Follow Up Time:

## 2023-10-20 NOTE — PROGRESS NOTE ADULT - SUBJECTIVE AND OBJECTIVE BOX
Pt stable  awaiting scheduling as per surgery    +Adenocarcinoma of colon --> needs R hemicolectomy      MEDICATIONS  (STANDING):  chlorhexidine 2% Cloths 1 Application(s) Topical <User Schedule>  heparin   Injectable 5000 Unit(s) SubCutaneous every 8 hours  influenza  Vaccine (HIGH DOSE) 0.7 milliLiter(s) IntraMuscular once  polyethylene glycol 3350 17 Gram(s) Oral daily    MEDICATIONS  (PRN):  acetaminophen     Tablet .. 650 milliGRAM(s) Oral every 6 hours PRN Temp greater or equal to 38C (100.4F)  bisacodyl 5 milliGRAM(s) Oral every 12 hours PRN Constipation  hydrALAZINE Injectable 10 milliGRAM(s) IV Push every 4 hours PRN SBP > 180  oxyCODONE    IR 5 milliGRAM(s) Oral every 6 hours PRN Moderate Pain (4 - 6)      Allergies    No Known Allergies    Intolerances        Vital Signs Last 24 Hrs  T(C): 37 (20 Oct 2023 05:00), Max: 37.3 (19 Oct 2023 23:36)  T(F): 98.6 (20 Oct 2023 05:00), Max: 99.1 (19 Oct 2023 23:36)  HR: 69 (20 Oct 2023 05:00) (69 - 84)  BP: 113/72 (20 Oct 2023 05:00) (99/62 - 113/72)  BP(mean): --  RR: 17 (20 Oct 2023 05:00) (16 - 18)  SpO2: 96% (20 Oct 2023 05:00) (96% - 99%)    Parameters below as of 20 Oct 2023 05:00  Patient On (Oxygen Delivery Method): room air        PHYSICAL EXAM:  General: NAD.  CVS: S1, S2  Chest: air entry bilaterally present  Abd: BS present, soft, non-tender      no other acute GI problems  awaiting R hemicolectomy for R colon adenoCA  please re-consult GI if needed

## 2023-10-20 NOTE — DISCHARGE NOTE PROVIDER - CARE PROVIDERS DIRECT ADDRESSES
[email or web address],DirectAddress_Unknown,DirectAddress_Unknown

## 2023-10-20 NOTE — DISCHARGE NOTE PROVIDER - PROVIDER TOKENS
PROVIDER:[TOKEN:[[id number]],FOLLOWUP:[2 weeks]],PROVIDER:[TOKEN:[[id number]],FOLLOWUP:[2 weeks]],FREE:[LAST:[Your PCP in a week],PHONE:[(   )    -],FAX:[(   )    -]]

## 2023-10-20 NOTE — PROGRESS NOTE ADULT - NSPROGADDITIONALINFOA_GEN_ALL_CORE
pt had ARF secondary to bilateral hydronephrosis, managed with bilateral nephrostomies. Draining well. Bilateral hydro secondary to large  invasive TCC of the bladder, including trigone.   Associated problem: adeno ca ascending colon.    Pt will most likely need Radical cystectomy along with management of Ca colon.    Pt will follow with Dr Korey Taveras or Dr Federico Anderson at [facility]. Dr Lan Whitney MD will follow. pt had ARF secondary to bilateral hydronephrosis, managed with bilateral nephroureterostomies. Draining well. Bilateral hydro secondary to large  invasive TCC of the bladder, including trigone.   Associated problem: adeno ca ascending colon.    Pt will most likely need Radical cystectomy along with management of Ca colon.    Pt will follow with Dr Korye Taveras or Dr Federico Anderson at [facility]. Dr Lan Whitney MD will follow and coordiante. .

## 2023-10-20 NOTE — DISCHARGE NOTE PROVIDER - NSDCFUADDINST_GEN_ALL_CORE_FT
1) It is important to see your primary physician as well as other necessary consultants within next 7-10 days to perform a comprehensive medical review.  Call their offices for an appointment as soon as you leave the hospital.  If you do not have a primary physician or unable to reach your PCP, contact the [facility] Physician Referral Service at [phone].  Your medical issues appear to be stable at this time, but if your symptoms recur or worsen, contact your physicians and/or return to the hospital if necessary.  If you encounter any issues or questions with your medication, call your physicians before stopping the medication.    2) Please access [facility] Patient portal (as instructed on the discharge paperwork) to access your medical records at any time after discharge.

## 2023-10-20 NOTE — PROGRESS NOTE ADULT - SUBJECTIVE AND OBJECTIVE BOX
Patient seen and examined at bedside resting comfortably.   Offers no complaints at this time.   Denies fever, chills, N/V/D, CP, SOB.     Vital Signs Last 24 Hrs  T(F): 98.2 (10-20-23 @ 11:25), Max: 99.1 (10-19-23 @ 23:36)  HR: 86 (10-20-23 @ 11:25)  BP: 104/69 (10-20-23 @ 11:25)  RR: 19 (10-20-23 @ 11:25)  SpO2: 97% (10-20-23 @ 11:25)    PHYSICAL EXAM:  GENERAL: Alert, NAD  CHEST/LUNG: Clear to auscultation bilaterally, respirations nonlabored  HEART: Regular rate and rhythm; S1 & S2 appreciated  ABDOMEN: + Bowel sounds, soft, Nontender, Nondistended  : b/l PCNs draining well  EXTREMITIES:  no calf tenderness, No edema    I&O's Detail    19 Oct 2023 07:01  -  20 Oct 2023 07:00  --------------------------------------------------------  IN:  Total IN: 0 mL    OUT:    Voided (mL): 1600 mL  Total OUT: 1600 mL    Total NET: -1600 mL          LABS:              RADIOLOGY & ADDITIONAL STUDIES:    A/P  74 Y/O female with ascending colon mass s/p colonoscopy and biopsy, and s/p TURBT. Found to have ascending colon mass consistent with adenocarcinoma.   pathology from bladder biopsy consistent with urothelial carcinoma     - continue diet  - DVT ppx, OOB/AAT  - continue care per primary team  - no plans for surgical intervention while in house. Plan to coordinate procedures with  team.   - will need OP f/u with Dr. Korey Taveras, urology  - stable for d/c from surgical standpoint  - d/w Dr. Whitnye

## 2023-10-21 NOTE — PROGRESS NOTE ADULT - PROVIDER SPECIALTY LIST ADULT
Critical Care
Gastroenterology
Gastroenterology
Heme/Onc
Hospitalist
Hospitalist
Nephrology
Nephrology
Surgery
Urology
Gastroenterology
Heme/Onc
Heme/Onc
Hospitalist
Intervent Radiology
Nephrology
Nephrology
Surgery
Urology
Critical Care
Gastroenterology
Heme/Onc
Hospitalist
Nephrology
Surgery
Urology
Heme/Onc
Hospitalist
Urology
Urology
Critical Care
Hospitalist

## 2023-10-21 NOTE — PROGRESS NOTE ADULT - ASSESSMENT
73F with a PMH of HLD and ?CHF who presented to the ED with hematuria.  Found to have severe bilateral hydronephrosis due to bladder outlet obstruction - suspicion secondary to bladder malignancy.  Patient was admitted to the ICU for emergent HD.  Patient had bilateral nephrostomy tubes placed.  Patient is currently off HD and transferred to [facility] for further care.    < from: CT Abdomen and Pelvis No Cont (10.11.23 @ 15:11) >  Status post right nephrostomy and left nephroureteral stent placement. No hydronephrosis.  Distal ureteral thickening and tapering of the contrast column approximately 2.8 cm above the UVJ suspicious for obstructing ureteral  mass or extension of bladder mass. No contrast is present in the urinary  bladder.  Masslike thickening of the ascending colon suspicious for malignancy.  Recommend colonoscopy.   < end of copied text >    # Acute renal failure - due to obstructive uropathy from bladder mass.  s/p R nephrostomy and L NUS.   # Adenocarcinoma of Colon - Ascending Colon Mass - s/p Colonoscopy 10/12 with findings concerning for malignancy.  Pathology confirmed adenocarcinoma.  Seen by GI, Surgery.  Surgery planning hemicolectomy but bladder mass pathology came back urothelial malignancy.   # Bladder malignancy. - s/p cystoscopy 10/13 showing large tumor s/p resection.  outpatient Dr Du follow up for elective interval surgery. discussed with daughter and patient.   # UTI - s/p Zosyn  # Essential HTN - Monitor BP.  Continue antihypertensives.  #AOCD. no active gross bleeding. no hematuria.     Ok to dc home with HHA.

## 2023-10-21 NOTE — PROGRESS NOTE ADULT - SUBJECTIVE AND OBJECTIVE BOX
CHIEF COMPLAINT: FU of bladder mass and colon adenocarcinoma  tolerating oral diet  passing gas and +BM  no chest pain   no sob        PHYSICAL EXAM:    GENERAL: Thinly built, no acute distress   CHEST/LUNG:  No wheezing, no crackles   HEART: Regular rate and rhythm; No murmurs  ABDOMEN: Soft, Nontender, Nondistended; Bowel sounds present  EXTREMITIES:  No clubbing, cyanosis, or edema  Psychiatry: AAO x 3, mood is appropriate       OBJECTIVE DATA:       Vital Signs Last 24 Hrs  T(C): 36.6 (21 Oct 2023 05:34), Max: 36.7 (20 Oct 2023 17:00)  T(F): 97.9 (21 Oct 2023 05:34), Max: 98.1 (20 Oct 2023 17:00)  HR: 65 (21 Oct 2023 05:34) (52 - 82)  BP: 108/70 (21 Oct 2023 05:34) (92/58 - 108/70)  BP(mean): --  RR: 17 (21 Oct 2023 05:34) (17 - 18)  SpO2: 98% (21 Oct 2023 05:34) (96% - 98%)    Parameters below as of 21 Oct 2023 05:34  Patient On (Oxygen Delivery Method): room air               Daily     Daily Weight in k.1 (20 Oct 2023 23:20)    MEDICATIONS  (STANDING):  chlorhexidine 2% Cloths 1 Application(s) Topical <User Schedule>  heparin   Injectable 5000 Unit(s) SubCutaneous every 8 hours  influenza  Vaccine (HIGH DOSE) 0.7 milliLiter(s) IntraMuscular once  polyethylene glycol 3350 17 Gram(s) Oral daily    MEDICATIONS  (PRN):  acetaminophen     Tablet .. 650 milliGRAM(s) Oral every 6 hours PRN Temp greater or equal to 38C (100.4F)  bisacodyl 5 milliGRAM(s) Oral every 12 hours PRN Constipation  hydrALAZINE Injectable 10 milliGRAM(s) IV Push every 4 hours PRN SBP > 180

## 2023-10-21 NOTE — DISCHARGE NOTE NURSING/CASE MANAGEMENT/SOCIAL WORK - PATIENT PORTAL LINK FT
You can access the FollowMyHealth Patient Portal offered by [facility] by registering at the following website: [email or web address]. By joining [facility]’s FollowMyHealth portal, you will also be able to view your health information using other applications (apps) compatible with our system.

## 2023-10-21 NOTE — PROGRESS NOTE ADULT - REASON FOR ADMISSION
Acute Renal Failure/Hyperkalemia

## 2023-11-01 PROBLEM — N32.89 BLADDER MASS: Status: ACTIVE | Noted: 2023-01-01

## 2023-11-27 PROBLEM — D63.0 ANEMIA IN NEOPLASTIC DISEASE: Status: ACTIVE | Noted: 2023-01-01

## 2023-11-27 PROBLEM — D72.829 LEUCOCYTOSIS: Status: ACTIVE | Noted: 2023-01-01

## 2023-11-28 PROBLEM — Z80.1 FAMILY HISTORY OF LUNG CANCER: Status: ACTIVE | Noted: 2023-01-01

## 2023-11-28 PROBLEM — Z63.5 DIVORCED: Status: ACTIVE | Noted: 2023-01-01

## 2023-11-28 PROBLEM — I82.4Y1: Status: RESOLVED | Noted: 2023-01-01 | Resolved: 2023-01-01

## 2023-11-29 PROBLEM — R54 FRAIL ELDERLY: Status: ACTIVE | Noted: 2023-01-01

## 2023-11-29 PROBLEM — R10.2 PERINEAL PAIN: Status: ACTIVE | Noted: 2023-01-01

## 2023-12-09 PROBLEM — N13.30 BILATERAL HYDRONEPHROSIS: Status: ACTIVE | Noted: 2023-01-01

## 2023-12-15 PROBLEM — C18.9 ADENOCARCINOMA, COLON: Status: ACTIVE | Noted: 2023-01-01

## 2023-12-15 PROBLEM — I82.411 DEEP VEIN THROMBOSIS (DVT) OF FEMORAL VEIN OF RIGHT LOWER EXTREMITY, UNSPECIFIED CHRONICITY: Status: ACTIVE | Noted: 2023-01-01

## 2023-12-15 PROBLEM — C67.0 MALIGNANT NEOPLASM OF TRIGONE OF URINARY BLADDER: Status: ACTIVE | Noted: 2023-01-01

## 2023-12-15 NOTE — PHYSICAL EXAM
[Ambulatory and capable of all self care but unable to carry out any work activities] : Status 2- Ambulatory and capable of all self care but unable to carry out any work activities. Up and about more than 50% of waking hours [Normal] : affect appropriate [de-identified] : Significant BLE edema

## 2023-12-15 NOTE — HISTORY OF PRESENT ILLNESS
[Disease: _____________________] : Disease: [unfilled] [T: ___] : T[unfilled] [N: ___] : N[unfilled] [M: ___] : M[unfilled] [AJCC Stage: ____] : AJCC Stage: [unfilled] [Date: ____________] : Patient's last distress assessment performed on [unfilled]. [1 - Distress Level] : Distress Level: 1 [de-identified] : Ms. Velazquez is seen in the office in consultation. She initially saw my colleague medical oncologist Dr. Flores on 11/28/23.   She was seen by Dr. Taveras on November 1, 2023. She first noted some blood in her urine approximately 1 month prior to that in addition to experiencing urinary frequency. A CT scan revealed a bladder mass with bilateral hydronephrosis and a colon mass. The colon mass was biopsied and noted to be an adenocarcinoma. Her creatinine was 12. She had bilateral nephrostomy tubes placed, and her creatinine normalized.   A bladder biopsy was performed on October 30 and this revealed invasive urothelial carcinoma, both conventionally and focally sarcomatoid subtype. High-grade carcinoma invaded muscularis propria.   She was then seen by Dr. Lan Whitney of colorectal surgery. The lesion in the colon was a right-sided lesion. The plan was to discuss a right hemicolectomy after discussion with Dr. Taveras of urology.   She was admitted to the hospital from October 7 until October 21. She was admitted to the intensive care unit. She was found to have severe bilateral hydronephrosis due to bladder outlet obstruction. She received emergent hemodialysis x 1. Bilateral nephrostomy tubes were placed. A CT scan on October 11 revealed distal ureteral thickening and tapering of the contrast column approximately 2.8 cm above the UVJ suspicious for obstructing ureteral mass or extension of a bladder mass. No contrast reached the urinary bladder at the time of the scan. There was masslike thickening of the ascending colon suspicious for malignancy. Colonoscopy was performed on October 12 confirming adenocarcinoma.   A CT scan done recently reveals tumor extending into the ureters and a right pelvic LN at 2 x 1.5 cms.   10/13/2023 underwent TURBT with Dr. Bradford. Pathology showed HG invasive urothelial carcinoma, conventional and focally sarcomatoid subtype, invades into muscularis propria  Upon being evaluated by Dr. Flores, she was found to have poor functional status (being predominantly wheelchair dependent/ dependent for her ADLs), and was deemed not a candidate for systemic therapy and was referred to hospice. She was seen by Dr. Taveras recently and was found to have improvement in her functional status. The patient is seeking a second opinion.    Interval History: She reports over the past three weeks she has been doing better. She is more active during the days, and spends 80-90% of her day outside of the bed. She reports being independent of most of her ADLs. She had a recent DVT and is on Eliquis. She also has significant position dependent (was previously on diuretic but unsure which one)  [de-identified] : [facility] [de-identified] : Well differentiated right-sided colon cancer, MSI-intact; pending surgery once bladder cancer is resolved

## 2023-12-15 NOTE — END OF VISIT
Spoke with pt, informed needs to fast for bloodwork, pt verbalized understanding   [Time Spent: ___ minutes] : I have spent [unfilled] minutes of time on the encounter. [>50% of the face to face encounter time was spent on counseling and/or coordination of care for ___] : Greater than 50% of the face to face encounter time was spent on counseling and/or coordination of care for [unfilled]

## 2023-12-15 NOTE — ASSESSMENT
[FreeTextEntry1] : 73 year old female with  recently diagnosed stage IIIA invasive urothelial carcinoma, both conventionally and focally sarcomatoid subtype/ high-grade carcinoma invaded muscularis propria. She also was diagnosed with limited stage colon cancer recently. She is presenting for medical oncology re-evaluation to assess candidacy for bladder cancer neoadjuvant chemotherapy given improvement in her functional status. She has  also had surgical evaluation for colon cancer; management of bladder cancer deemed priority at this time   On evaluation today, she appears to be functionally improved than prior. She now ambulates with a walker, is independent of most ADLs, and spends 80-90% of her waking hours outside of bed. Majority of her ambulatory difficulties stem from her significant BLE edema c/b recent DVT. She reports that she was previously on a diuretic that was recently discontinued (unsure which diuretic). She also has some discomfort from her nephrostomy tubes when she moves too suddenly.   Discussed with her and her daughter, that given her improvement in her functional status over the past three weeks, she may likely be a candidate for chemotherapy with continued improvement. We discussed potential treatment with Don[place]han 600mg/m2 (instead of 1000mg/m2) and Cisplatin 35mg/m2 (instead of 70mg/m2) each on Day 1 and Day 8; plan for 4 to 6 cylces. We discussed benefits, mechanisms of action and side effects of each drug. Information sheet given to her and her daughter   Will avoid salty foods and start Lasix 40mg daily for her LLE. Patient to return for re-evaluation on 12/22/23 to assess functional status and potentially be consented for chemotherapy based on assessment.   All questions answered to the best of our abilities and to the patient's and her daughter's apparent satisfaction.  Patient seen/examined and discussed with Dr. Joan Shelley MD PGY6 Hematology/Oncology Fellow I have participated in history collection, physical exam and making assessment and plan through the whole visit. I also reviewed and edited the note.   
Routine  care and anticipatory guidance

## 2023-12-15 NOTE — CONSULT LETTER
[Dear  ___] : Dear  [unfilled], [Consult Letter:] : I had the pleasure of evaluating your patient, [unfilled]. [Please see my note below.] : Please see my note below. [Consult Closing:] : Thank you very much for allowing me to participate in the care of this patient.  If you have any questions, please do not hesitate to contact me. [Sincerely,] : Sincerely, [FreeTextEntry3] : Barbara Franco MD

## 2023-12-15 NOTE — REVIEW OF SYSTEMS
[Fatigue] : fatigue [Muscle Weakness] : muscle weakness [Negative] : ENT [Lower Ext Edema] : lower extremity edema [Fever] : no fever [Chills] : no chills [Chest Pain] : no chest pain [Palpitations] : no palpitations [Leg Claudication] : no intermittent leg claudication [Shortness Of Breath] : no shortness of breath [Wheezing] : no wheezing [Cough] : no cough [SOB on Exertion] : no shortness of breath during exertion [Abdominal Pain] : no abdominal pain [Vomiting] : no vomiting [Constipation] : no constipation [Dysuria] : no dysuria [Confused] : no confusion [Dizziness] : no dizziness [Fainting] : no fainting [Easy Bleeding] : no tendency for easy bleeding

## 2023-12-15 NOTE — REASON FOR VISIT
[Initial Consultation] : an initial consultation [Family Member] : family member [FreeTextEntry2] : [facility]

## 2024-01-01 ENCOUNTER — NON-APPOINTMENT (OUTPATIENT)
Age: 74
End: 2024-01-01

## 2024-01-01 ENCOUNTER — EMERGENCY (EMERGENCY)
Facility: HOSPITAL | Age: 74
LOS: 1 days | End: 2024-01-01
Attending: EMERGENCY MEDICINE
Payer: MEDICARE

## 2024-01-01 ENCOUNTER — TRANSCRIPTION ENCOUNTER (OUTPATIENT)
Age: 74
End: 2024-01-01

## 2024-01-01 ENCOUNTER — INPATIENT (INPATIENT)
Facility: HOSPITAL | Age: 74
LOS: 20 days | Discharge: NURSING, REHAB OR LONG-TERM CARE | End: 2024-01-31
Attending: HOSPITALIST | Admitting: HOSPITALIST
Payer: MEDICARE

## 2024-01-01 ENCOUNTER — APPOINTMENT (OUTPATIENT)
Dept: HEMATOLOGY ONCOLOGY | Facility: CLINIC | Age: 74
End: 2024-01-01

## 2024-01-01 VITALS
OXYGEN SATURATION: 97 % | RESPIRATION RATE: 16 BRPM | TEMPERATURE: 98 F | HEART RATE: 90 BPM | SYSTOLIC BLOOD PRESSURE: 113 MMHG | DIASTOLIC BLOOD PRESSURE: 79 MMHG

## 2024-01-01 VITALS
OXYGEN SATURATION: 100 % | SYSTOLIC BLOOD PRESSURE: 114 MMHG | HEART RATE: 98 BPM | TEMPERATURE: 99 F | RESPIRATION RATE: 17 BRPM | DIASTOLIC BLOOD PRESSURE: 71 MMHG

## 2024-01-01 VITALS — HEIGHT: 61 IN

## 2024-01-01 DIAGNOSIS — E83.52 HYPERCALCEMIA: ICD-10-CM

## 2024-01-01 DIAGNOSIS — Z90.710 ACQUIRED ABSENCE OF BOTH CERVIX AND UTERUS: Chronic | ICD-10-CM

## 2024-01-01 DIAGNOSIS — I80.10 PHLEBITIS AND THROMBOPHLEBITIS OF UNSPECIFIED FEMORAL VEIN: ICD-10-CM

## 2024-01-01 DIAGNOSIS — D50.9 IRON DEFICIENCY ANEMIA, UNSPECIFIED: ICD-10-CM

## 2024-01-01 DIAGNOSIS — T83.022A DISPLACEMENT OF NEPHROSTOMY CATHETER, INITIAL ENCOUNTER: Chronic | ICD-10-CM

## 2024-01-01 DIAGNOSIS — E55.9 VITAMIN D DEFICIENCY, UNSPECIFIED: ICD-10-CM

## 2024-01-01 DIAGNOSIS — R10.9 UNSPECIFIED ABDOMINAL PAIN: ICD-10-CM

## 2024-01-01 DIAGNOSIS — C18.9 MALIGNANT NEOPLASM OF COLON, UNSPECIFIED: ICD-10-CM

## 2024-01-01 DIAGNOSIS — D72.829 ELEVATED WHITE BLOOD CELL COUNT, UNSPECIFIED: ICD-10-CM

## 2024-01-01 DIAGNOSIS — Z71.89 OTHER SPECIFIED COUNSELING: ICD-10-CM

## 2024-01-01 DIAGNOSIS — T83.022A DISPLACEMENT OF NEPHROSTOMY CATHETER, INITIAL ENCOUNTER: ICD-10-CM

## 2024-01-01 DIAGNOSIS — E83.39 OTHER DISORDERS OF PHOSPHORUS METABOLISM: ICD-10-CM

## 2024-01-01 DIAGNOSIS — I82.409 ACUTE EMBOLISM AND THROMBOSIS OF UNSPECIFIED DEEP VEINS OF UNSPECIFIED LOWER EXTREMITY: ICD-10-CM

## 2024-01-01 DIAGNOSIS — Z98.890 OTHER SPECIFIED POSTPROCEDURAL STATES: Chronic | ICD-10-CM

## 2024-01-01 DIAGNOSIS — C67.9 MALIGNANT NEOPLASM OF BLADDER, UNSPECIFIED: ICD-10-CM

## 2024-01-01 DIAGNOSIS — E87.1 HYPO-OSMOLALITY AND HYPONATREMIA: ICD-10-CM

## 2024-01-01 DIAGNOSIS — E86.0 DEHYDRATION: ICD-10-CM

## 2024-01-01 DIAGNOSIS — Z29.9 ENCOUNTER FOR PROPHYLACTIC MEASURES, UNSPECIFIED: ICD-10-CM

## 2024-01-01 DIAGNOSIS — L98.429 NON-PRESSURE CHRONIC ULCER OF BACK WITH UNSPECIFIED SEVERITY: ICD-10-CM

## 2024-01-01 LAB
% ALBUMIN: 33.1 % — SIGNIFICANT CHANGE UP
% ALPHA 1: 8.1 % — SIGNIFICANT CHANGE UP
% ALPHA 2: 17.8 % — SIGNIFICANT CHANGE UP
% BETA: 15.4 % — SIGNIFICANT CHANGE UP
% GAMMA: 25.6 % — SIGNIFICANT CHANGE UP
24R-OH-CALCIDIOL SERPL-MCNC: 17.1 NG/ML — LOW (ref 30–80)
24R-OH-CALCIDIOL SERPL-MCNC: 17.1 NG/ML — LOW (ref 30–80)
A1C WITH ESTIMATED AVERAGE GLUCOSE RESULT: 5.4 % — SIGNIFICANT CHANGE UP (ref 4–5.6)
A1C WITH ESTIMATED AVERAGE GLUCOSE RESULT: 5.4 % — SIGNIFICANT CHANGE UP (ref 4–5.6)
ACANTHOCYTES BLD QL SMEAR: SLIGHT — SIGNIFICANT CHANGE UP
ALBUMIN SERPL ELPH-MCNC: 2.15 G/DL — LOW (ref 3.3–4.4)
ALBUMIN SERPL ELPH-MCNC: 2.2 G/DL — LOW (ref 3.3–5)
ALBUMIN SERPL ELPH-MCNC: 2.3 G/DL — LOW (ref 3.3–5)
ALBUMIN SERPL ELPH-MCNC: 2.4 G/DL — LOW (ref 3.3–5)
ALBUMIN SERPL ELPH-MCNC: 2.4 G/DL — LOW (ref 3.3–5)
ALBUMIN SERPL ELPH-MCNC: 2.5 G/DL — LOW (ref 3.3–5)
ALBUMIN SERPL ELPH-MCNC: 2.6 G/DL — LOW (ref 3.3–5)
ALBUMIN SERPL ELPH-MCNC: 2.7 G/DL — LOW (ref 3.3–5)
ALBUMIN/GLOB SERPL ELPH: 0.5 RATIO — SIGNIFICANT CHANGE UP
ALP SERPL-CCNC: 105 U/L — SIGNIFICANT CHANGE UP (ref 40–120)
ALP SERPL-CCNC: 105 U/L — SIGNIFICANT CHANGE UP (ref 40–120)
ALP SERPL-CCNC: 82 U/L — SIGNIFICANT CHANGE UP (ref 40–120)
ALP SERPL-CCNC: 85 U/L — SIGNIFICANT CHANGE UP (ref 40–120)
ALP SERPL-CCNC: 86 U/L — SIGNIFICANT CHANGE UP (ref 40–120)
ALP SERPL-CCNC: 86 U/L — SIGNIFICANT CHANGE UP (ref 40–120)
ALP SERPL-CCNC: 87 U/L — SIGNIFICANT CHANGE UP (ref 40–120)
ALP SERPL-CCNC: 92 U/L — SIGNIFICANT CHANGE UP (ref 40–120)
ALP SERPL-CCNC: 92 U/L — SIGNIFICANT CHANGE UP (ref 40–120)
ALP SERPL-CCNC: 94 U/L — SIGNIFICANT CHANGE UP (ref 40–120)
ALP SERPL-CCNC: 95 U/L — SIGNIFICANT CHANGE UP (ref 40–120)
ALP SERPL-CCNC: 95 U/L — SIGNIFICANT CHANGE UP (ref 40–120)
ALP SERPL-CCNC: 96 U/L — SIGNIFICANT CHANGE UP (ref 40–120)
ALP SERPL-CCNC: 96 U/L — SIGNIFICANT CHANGE UP (ref 40–120)
ALPHA1 GLOB SERPL ELPH-MCNC: 0.53 G/DL — HIGH (ref 0.1–0.3)
ALPHA2 GLOB SERPL ELPH-MCNC: 1.2 G/DL — HIGH (ref 0.6–1)
ALT FLD-CCNC: 10 U/L — SIGNIFICANT CHANGE UP (ref 4–33)
ALT FLD-CCNC: 5 U/L — SIGNIFICANT CHANGE UP (ref 4–33)
ALT FLD-CCNC: 6 U/L — SIGNIFICANT CHANGE UP (ref 4–33)
ALT FLD-CCNC: 7 U/L — SIGNIFICANT CHANGE UP (ref 4–33)
ALT FLD-CCNC: 8 U/L — SIGNIFICANT CHANGE UP (ref 4–33)
ALT FLD-CCNC: 8 U/L — SIGNIFICANT CHANGE UP (ref 4–33)
ANION GAP SERPL CALC-SCNC: 10 MMOL/L — SIGNIFICANT CHANGE UP (ref 7–14)
ANION GAP SERPL CALC-SCNC: 11 MMOL/L — SIGNIFICANT CHANGE UP (ref 7–14)
ANION GAP SERPL CALC-SCNC: 12 MMOL/L — SIGNIFICANT CHANGE UP (ref 7–14)
ANION GAP SERPL CALC-SCNC: 13 MMOL/L — SIGNIFICANT CHANGE UP (ref 7–14)
ANION GAP SERPL CALC-SCNC: 13 MMOL/L — SIGNIFICANT CHANGE UP (ref 7–14)
ANION GAP SERPL CALC-SCNC: 14 MMOL/L — SIGNIFICANT CHANGE UP (ref 7–14)
ANION GAP SERPL CALC-SCNC: 8 MMOL/L — SIGNIFICANT CHANGE UP (ref 7–14)
ANION GAP SERPL CALC-SCNC: 8 MMOL/L — SIGNIFICANT CHANGE UP (ref 7–14)
ANION GAP SERPL CALC-SCNC: 9 MMOL/L — SIGNIFICANT CHANGE UP (ref 7–14)
ANISOCYTOSIS BLD QL: SLIGHT — SIGNIFICANT CHANGE UP
APPEARANCE UR: ABNORMAL
APPEARANCE UR: CLEAR — SIGNIFICANT CHANGE UP
APPEARANCE UR: CLEAR — SIGNIFICANT CHANGE UP
APTT BLD: 28.5 SEC — SIGNIFICANT CHANGE UP (ref 24.5–35.6)
APTT BLD: 28.5 SEC — SIGNIFICANT CHANGE UP (ref 24.5–35.6)
APTT BLD: 29 SEC — SIGNIFICANT CHANGE UP (ref 24.5–35.6)
APTT BLD: 29 SEC — SIGNIFICANT CHANGE UP (ref 24.5–35.6)
APTT BLD: 29.7 SEC — SIGNIFICANT CHANGE UP (ref 24.5–35.6)
APTT BLD: 29.7 SEC — SIGNIFICANT CHANGE UP (ref 24.5–35.6)
APTT BLD: 49.1 SEC — HIGH (ref 24.5–35.6)
APTT BLD: 49.1 SEC — HIGH (ref 24.5–35.6)
APTT BLD: 75.5 SEC — HIGH (ref 24.5–35.6)
APTT BLD: 75.5 SEC — HIGH (ref 24.5–35.6)
APTT BLD: 83.8 SEC — HIGH (ref 24.5–35.6)
APTT BLD: 83.8 SEC — HIGH (ref 24.5–35.6)
AST SERPL-CCNC: 10 U/L — SIGNIFICANT CHANGE UP (ref 4–32)
AST SERPL-CCNC: 11 U/L — SIGNIFICANT CHANGE UP (ref 4–32)
AST SERPL-CCNC: 11 U/L — SIGNIFICANT CHANGE UP (ref 4–32)
AST SERPL-CCNC: 12 U/L — SIGNIFICANT CHANGE UP (ref 4–32)
AST SERPL-CCNC: 13 U/L — SIGNIFICANT CHANGE UP (ref 4–32)
AST SERPL-CCNC: 15 U/L — SIGNIFICANT CHANGE UP (ref 4–32)
AST SERPL-CCNC: 15 U/L — SIGNIFICANT CHANGE UP (ref 4–32)
AST SERPL-CCNC: 25 U/L — SIGNIFICANT CHANGE UP (ref 4–32)
AST SERPL-CCNC: 27 U/L — SIGNIFICANT CHANGE UP (ref 4–32)
B PERT DNA SPEC QL NAA+PROBE: SIGNIFICANT CHANGE UP
B PERT DNA SPEC QL NAA+PROBE: SIGNIFICANT CHANGE UP
B PERT+PARAPERT DNA PNL SPEC NAA+PROBE: SIGNIFICANT CHANGE UP
B PERT+PARAPERT DNA PNL SPEC NAA+PROBE: SIGNIFICANT CHANGE UP
B-GLOBULIN SERPL ELPH-MCNC: 1 G/DL — SIGNIFICANT CHANGE UP (ref 0.6–1.1)
BACTERIA # UR AUTO: ABNORMAL /HPF
BACTERIA # UR AUTO: NEGATIVE /HPF — SIGNIFICANT CHANGE UP
BASE EXCESS BLDV CALC-SCNC: -0.7 MMOL/L — SIGNIFICANT CHANGE UP (ref -2–3)
BASOPHILS # BLD AUTO: 0 K/UL — SIGNIFICANT CHANGE UP (ref 0–0.2)
BASOPHILS # BLD AUTO: 0.04 K/UL — SIGNIFICANT CHANGE UP (ref 0–0.2)
BASOPHILS # BLD AUTO: 0.05 K/UL — SIGNIFICANT CHANGE UP (ref 0–0.2)
BASOPHILS # BLD AUTO: 0.06 K/UL — SIGNIFICANT CHANGE UP (ref 0–0.2)
BASOPHILS # BLD AUTO: 0.07 K/UL — SIGNIFICANT CHANGE UP (ref 0–0.2)
BASOPHILS NFR BLD AUTO: 0 % — SIGNIFICANT CHANGE UP (ref 0–2)
BASOPHILS NFR BLD AUTO: 0.2 % — SIGNIFICANT CHANGE UP (ref 0–2)
BASOPHILS NFR BLD AUTO: 0.3 % — SIGNIFICANT CHANGE UP (ref 0–2)
BASOPHILS NFR BLD AUTO: 0.4 % — SIGNIFICANT CHANGE UP (ref 0–2)
BILIRUB DIRECT SERPL-MCNC: <0.2 MG/DL — SIGNIFICANT CHANGE UP (ref 0–0.3)
BILIRUB INDIRECT FLD-MCNC: >0 MG/DL — SIGNIFICANT CHANGE UP (ref 0–1)
BILIRUB INDIRECT FLD-MCNC: SIGNIFICANT CHANGE UP MG/DL (ref 0–1)
BILIRUB SERPL-MCNC: 0.2 MG/DL — SIGNIFICANT CHANGE UP (ref 0.2–1.2)
BILIRUB SERPL-MCNC: 0.3 MG/DL — SIGNIFICANT CHANGE UP (ref 0.2–1.2)
BILIRUB SERPL-MCNC: <0.2 MG/DL — SIGNIFICANT CHANGE UP (ref 0.2–1.2)
BILIRUB UR-MCNC: ABNORMAL
BILIRUB UR-MCNC: ABNORMAL
BILIRUB UR-MCNC: NEGATIVE — SIGNIFICANT CHANGE UP
BLD GP AB SCN SERPL QL: NEGATIVE — SIGNIFICANT CHANGE UP
BLOOD GAS VENOUS COMPREHENSIVE RESULT: SIGNIFICANT CHANGE UP
BORDETELLA PARAPERTUSSIS (RAPRVP): SIGNIFICANT CHANGE UP
BORDETELLA PARAPERTUSSIS (RAPRVP): SIGNIFICANT CHANGE UP
BUN SERPL-MCNC: 10 MG/DL — SIGNIFICANT CHANGE UP (ref 7–23)
BUN SERPL-MCNC: 12 MG/DL — SIGNIFICANT CHANGE UP (ref 7–23)
BUN SERPL-MCNC: 13 MG/DL — SIGNIFICANT CHANGE UP (ref 7–23)
BUN SERPL-MCNC: 14 MG/DL — SIGNIFICANT CHANGE UP (ref 7–23)
BUN SERPL-MCNC: 15 MG/DL — SIGNIFICANT CHANGE UP (ref 7–23)
BUN SERPL-MCNC: 16 MG/DL — SIGNIFICANT CHANGE UP (ref 7–23)
BUN SERPL-MCNC: 16 MG/DL — SIGNIFICANT CHANGE UP (ref 7–23)
BUN SERPL-MCNC: 17 MG/DL — SIGNIFICANT CHANGE UP (ref 7–23)
BUN SERPL-MCNC: 17 MG/DL — SIGNIFICANT CHANGE UP (ref 7–23)
BUN SERPL-MCNC: 18 MG/DL — SIGNIFICANT CHANGE UP (ref 7–23)
BUN SERPL-MCNC: 19 MG/DL — SIGNIFICANT CHANGE UP (ref 7–23)
BUN SERPL-MCNC: 19 MG/DL — SIGNIFICANT CHANGE UP (ref 7–23)
BUN SERPL-MCNC: 22 MG/DL — SIGNIFICANT CHANGE UP (ref 7–23)
BUN SERPL-MCNC: 23 MG/DL — SIGNIFICANT CHANGE UP (ref 7–23)
BUN SERPL-MCNC: 9 MG/DL — SIGNIFICANT CHANGE UP (ref 7–23)
C PNEUM DNA SPEC QL NAA+PROBE: SIGNIFICANT CHANGE UP
C PNEUM DNA SPEC QL NAA+PROBE: SIGNIFICANT CHANGE UP
CALCIUM SERPL-MCNC: 10.2 MG/DL — SIGNIFICANT CHANGE UP (ref 8.4–10.5)
CALCIUM SERPL-MCNC: 10.2 MG/DL — SIGNIFICANT CHANGE UP (ref 8.4–10.5)
CALCIUM SERPL-MCNC: 10.3 MG/DL — SIGNIFICANT CHANGE UP (ref 8.4–10.5)
CALCIUM SERPL-MCNC: 10.3 MG/DL — SIGNIFICANT CHANGE UP (ref 8.4–10.5)
CALCIUM SERPL-MCNC: 10.6 MG/DL — HIGH (ref 8.4–10.5)
CALCIUM SERPL-MCNC: 10.7 MG/DL — HIGH (ref 8.4–10.5)
CALCIUM SERPL-MCNC: 10.8 MG/DL — HIGH (ref 8.4–10.5)
CALCIUM SERPL-MCNC: 10.8 MG/DL — HIGH (ref 8.4–10.5)
CALCIUM SERPL-MCNC: 10.9 MG/DL — HIGH (ref 8.4–10.5)
CALCIUM SERPL-MCNC: 10.9 MG/DL — HIGH (ref 8.4–10.5)
CALCIUM SERPL-MCNC: 8 MG/DL — LOW (ref 8.4–10.5)
CALCIUM SERPL-MCNC: 8.1 MG/DL — LOW (ref 8.4–10.5)
CALCIUM SERPL-MCNC: 8.1 MG/DL — LOW (ref 8.4–10.5)
CALCIUM SERPL-MCNC: 8.2 MG/DL — LOW (ref 8.4–10.5)
CALCIUM SERPL-MCNC: 8.2 MG/DL — LOW (ref 8.4–10.5)
CALCIUM SERPL-MCNC: 8.4 MG/DL — SIGNIFICANT CHANGE UP (ref 8.4–10.5)
CALCIUM SERPL-MCNC: 8.5 MG/DL — SIGNIFICANT CHANGE UP (ref 8.4–10.5)
CALCIUM SERPL-MCNC: 8.5 MG/DL — SIGNIFICANT CHANGE UP (ref 8.4–10.5)
CALCIUM SERPL-MCNC: 8.6 MG/DL — SIGNIFICANT CHANGE UP (ref 8.4–10.5)
CALCIUM SERPL-MCNC: 8.9 MG/DL — SIGNIFICANT CHANGE UP (ref 8.4–10.5)
CALCIUM SERPL-MCNC: 8.9 MG/DL — SIGNIFICANT CHANGE UP (ref 8.4–10.5)
CALCIUM SERPL-MCNC: 9 MG/DL — SIGNIFICANT CHANGE UP (ref 8.4–10.5)
CALCIUM SERPL-MCNC: 9.1 MG/DL — SIGNIFICANT CHANGE UP (ref 8.4–10.5)
CALCIUM SERPL-MCNC: 9.1 MG/DL — SIGNIFICANT CHANGE UP (ref 8.4–10.5)
CALCIUM SERPL-MCNC: 9.2 MG/DL — SIGNIFICANT CHANGE UP (ref 8.4–10.5)
CALCIUM SERPL-MCNC: 9.2 MG/DL — SIGNIFICANT CHANGE UP (ref 8.4–10.5)
CALCIUM SERPL-MCNC: 9.3 MG/DL — SIGNIFICANT CHANGE UP (ref 8.4–10.5)
CALCIUM SERPL-MCNC: 9.6 MG/DL — SIGNIFICANT CHANGE UP (ref 8.4–10.5)
CAST: 1 /LPF — SIGNIFICANT CHANGE UP (ref 0–4)
CAST: 2 /LPF — SIGNIFICANT CHANGE UP (ref 0–4)
CAST: 2 /LPF — SIGNIFICANT CHANGE UP (ref 0–4)
CHLORIDE BLDV-SCNC: 104 MMOL/L — SIGNIFICANT CHANGE UP (ref 96–108)
CHLORIDE SERPL-SCNC: 101 MMOL/L — SIGNIFICANT CHANGE UP (ref 98–107)
CHLORIDE SERPL-SCNC: 101 MMOL/L — SIGNIFICANT CHANGE UP (ref 98–107)
CHLORIDE SERPL-SCNC: 102 MMOL/L — SIGNIFICANT CHANGE UP (ref 98–107)
CHLORIDE SERPL-SCNC: 103 MMOL/L — SIGNIFICANT CHANGE UP (ref 98–107)
CHLORIDE SERPL-SCNC: 104 MMOL/L — SIGNIFICANT CHANGE UP (ref 98–107)
CHLORIDE SERPL-SCNC: 105 MMOL/L — SIGNIFICANT CHANGE UP (ref 98–107)
CHLORIDE SERPL-SCNC: 106 MMOL/L — SIGNIFICANT CHANGE UP (ref 98–107)
CHLORIDE SERPL-SCNC: 107 MMOL/L — SIGNIFICANT CHANGE UP (ref 98–107)
CHLORIDE SERPL-SCNC: 97 MMOL/L — LOW (ref 98–107)
CHLORIDE SERPL-SCNC: 97 MMOL/L — LOW (ref 98–107)
CHLORIDE SERPL-SCNC: 98 MMOL/L — SIGNIFICANT CHANGE UP (ref 98–107)
CHLORIDE SERPL-SCNC: 98 MMOL/L — SIGNIFICANT CHANGE UP (ref 98–107)
CHOLEST SERPL-MCNC: 127 MG/DL — SIGNIFICANT CHANGE UP
CHOLEST SERPL-MCNC: 127 MG/DL — SIGNIFICANT CHANGE UP
CO2 BLDV-SCNC: 25.4 MMOL/L — SIGNIFICANT CHANGE UP (ref 22–26)
CO2 SERPL-SCNC: 19 MMOL/L — LOW (ref 22–31)
CO2 SERPL-SCNC: 20 MMOL/L — LOW (ref 22–31)
CO2 SERPL-SCNC: 21 MMOL/L — LOW (ref 22–31)
CO2 SERPL-SCNC: 21 MMOL/L — LOW (ref 22–31)
CO2 SERPL-SCNC: 22 MMOL/L — SIGNIFICANT CHANGE UP (ref 22–31)
CO2 SERPL-SCNC: 22 MMOL/L — SIGNIFICANT CHANGE UP (ref 22–31)
CO2 SERPL-SCNC: 23 MMOL/L — SIGNIFICANT CHANGE UP (ref 22–31)
CO2 SERPL-SCNC: 24 MMOL/L — SIGNIFICANT CHANGE UP (ref 22–31)
CO2 SERPL-SCNC: 25 MMOL/L — SIGNIFICANT CHANGE UP (ref 22–31)
CO2 SERPL-SCNC: 26 MMOL/L — SIGNIFICANT CHANGE UP (ref 22–31)
CO2 SERPL-SCNC: 27 MMOL/L — SIGNIFICANT CHANGE UP (ref 22–31)
COLOR SPEC: SIGNIFICANT CHANGE UP
COLOR SPEC: SIGNIFICANT CHANGE UP
COLOR SPEC: YELLOW — SIGNIFICANT CHANGE UP
CREAT SERPL-MCNC: 0.28 MG/DL — LOW (ref 0.5–1.3)
CREAT SERPL-MCNC: 0.31 MG/DL — LOW (ref 0.5–1.3)
CREAT SERPL-MCNC: 0.31 MG/DL — LOW (ref 0.5–1.3)
CREAT SERPL-MCNC: 0.32 MG/DL — LOW (ref 0.5–1.3)
CREAT SERPL-MCNC: 0.32 MG/DL — LOW (ref 0.5–1.3)
CREAT SERPL-MCNC: 0.34 MG/DL — LOW (ref 0.5–1.3)
CREAT SERPL-MCNC: 0.35 MG/DL — LOW (ref 0.5–1.3)
CREAT SERPL-MCNC: 0.36 MG/DL — LOW (ref 0.5–1.3)
CREAT SERPL-MCNC: 0.37 MG/DL — LOW (ref 0.5–1.3)
CREAT SERPL-MCNC: 0.39 MG/DL — LOW (ref 0.5–1.3)
CREAT SERPL-MCNC: 0.42 MG/DL — LOW (ref 0.5–1.3)
CREAT SERPL-MCNC: 0.43 MG/DL — LOW (ref 0.5–1.3)
CREAT SERPL-MCNC: 0.45 MG/DL — LOW (ref 0.5–1.3)
CREAT SERPL-MCNC: 0.49 MG/DL — LOW (ref 0.5–1.3)
CREAT SERPL-MCNC: 0.49 MG/DL — LOW (ref 0.5–1.3)
CREAT SERPL-MCNC: 0.52 MG/DL — SIGNIFICANT CHANGE UP (ref 0.5–1.3)
CREAT SERPL-MCNC: 0.53 MG/DL — SIGNIFICANT CHANGE UP (ref 0.5–1.3)
CREAT SERPL-MCNC: 0.55 MG/DL — SIGNIFICANT CHANGE UP (ref 0.5–1.3)
CREAT SERPL-MCNC: 0.55 MG/DL — SIGNIFICANT CHANGE UP (ref 0.5–1.3)
CREAT SERPL-MCNC: 0.56 MG/DL — SIGNIFICANT CHANGE UP (ref 0.5–1.3)
CREAT SERPL-MCNC: 0.63 MG/DL — SIGNIFICANT CHANGE UP (ref 0.5–1.3)
CREAT SERPL-MCNC: 0.63 MG/DL — SIGNIFICANT CHANGE UP (ref 0.5–1.3)
CREAT SERPL-MCNC: 0.65 MG/DL — SIGNIFICANT CHANGE UP (ref 0.5–1.3)
CREAT SERPL-MCNC: 0.65 MG/DL — SIGNIFICANT CHANGE UP (ref 0.5–1.3)
CULTURE RESULTS: SIGNIFICANT CHANGE UP
DIFF PNL FLD: ABNORMAL
DIFF PNL FLD: NEGATIVE — SIGNIFICANT CHANGE UP
EGFR: 101 ML/MIN/1.73M2 — SIGNIFICANT CHANGE UP
EGFR: 102 ML/MIN/1.73M2 — SIGNIFICANT CHANGE UP
EGFR: 103 ML/MIN/1.73M2 — SIGNIFICANT CHANGE UP
EGFR: 104 ML/MIN/1.73M2 — SIGNIFICANT CHANGE UP
EGFR: 106 ML/MIN/1.73M2 — SIGNIFICANT CHANGE UP
EGFR: 106 ML/MIN/1.73M2 — SIGNIFICANT CHANGE UP
EGFR: 107 ML/MIN/1.73M2 — SIGNIFICANT CHANGE UP
EGFR: 108 ML/MIN/1.73M2 — SIGNIFICANT CHANGE UP
EGFR: 110 ML/MIN/1.73M2 — SIGNIFICANT CHANGE UP
EGFR: 113 ML/MIN/1.73M2 — SIGNIFICANT CHANGE UP
EGFR: 92 ML/MIN/1.73M2 — SIGNIFICANT CHANGE UP
EGFR: 92 ML/MIN/1.73M2 — SIGNIFICANT CHANGE UP
EGFR: 93 ML/MIN/1.73M2 — SIGNIFICANT CHANGE UP
EGFR: 93 ML/MIN/1.73M2 — SIGNIFICANT CHANGE UP
EGFR: 96 ML/MIN/1.73M2 — SIGNIFICANT CHANGE UP
EGFR: 97 ML/MIN/1.73M2 — SIGNIFICANT CHANGE UP
EGFR: 99 ML/MIN/1.73M2 — SIGNIFICANT CHANGE UP
EGFR: 99 ML/MIN/1.73M2 — SIGNIFICANT CHANGE UP
EOSINOPHIL # BLD AUTO: 0.04 K/UL — SIGNIFICANT CHANGE UP (ref 0–0.5)
EOSINOPHIL # BLD AUTO: 0.04 K/UL — SIGNIFICANT CHANGE UP (ref 0–0.5)
EOSINOPHIL # BLD AUTO: 0.11 K/UL — SIGNIFICANT CHANGE UP (ref 0–0.5)
EOSINOPHIL # BLD AUTO: 0.13 K/UL — SIGNIFICANT CHANGE UP (ref 0–0.5)
EOSINOPHIL # BLD AUTO: 0.14 K/UL — SIGNIFICANT CHANGE UP (ref 0–0.5)
EOSINOPHIL # BLD AUTO: 0.15 K/UL — SIGNIFICANT CHANGE UP (ref 0–0.5)
EOSINOPHIL # BLD AUTO: 0.17 K/UL — SIGNIFICANT CHANGE UP (ref 0–0.5)
EOSINOPHIL # BLD AUTO: 0.17 K/UL — SIGNIFICANT CHANGE UP (ref 0–0.5)
EOSINOPHIL # BLD AUTO: 0.21 K/UL — SIGNIFICANT CHANGE UP (ref 0–0.5)
EOSINOPHIL # BLD AUTO: 0.23 K/UL — SIGNIFICANT CHANGE UP (ref 0–0.5)
EOSINOPHIL # BLD AUTO: 0.27 K/UL — SIGNIFICANT CHANGE UP (ref 0–0.5)
EOSINOPHIL NFR BLD AUTO: 0.2 % — SIGNIFICANT CHANGE UP (ref 0–6)
EOSINOPHIL NFR BLD AUTO: 0.2 % — SIGNIFICANT CHANGE UP (ref 0–6)
EOSINOPHIL NFR BLD AUTO: 0.6 % — SIGNIFICANT CHANGE UP (ref 0–6)
EOSINOPHIL NFR BLD AUTO: 0.7 % — SIGNIFICANT CHANGE UP (ref 0–6)
EOSINOPHIL NFR BLD AUTO: 0.8 % — SIGNIFICANT CHANGE UP (ref 0–6)
EOSINOPHIL NFR BLD AUTO: 0.9 % — SIGNIFICANT CHANGE UP (ref 0–6)
EOSINOPHIL NFR BLD AUTO: 0.9 % — SIGNIFICANT CHANGE UP (ref 0–6)
EOSINOPHIL NFR BLD AUTO: 1 % — SIGNIFICANT CHANGE UP (ref 0–6)
EOSINOPHIL NFR BLD AUTO: 1.5 % — SIGNIFICANT CHANGE UP (ref 0–6)
EOSINOPHIL NFR BLD AUTO: 1.7 % — SIGNIFICANT CHANGE UP (ref 0–6)
EOSINOPHIL NFR BLD AUTO: 1.8 % — SIGNIFICANT CHANGE UP (ref 0–6)
ESTIMATED AVERAGE GLUCOSE: 108 — SIGNIFICANT CHANGE UP
ESTIMATED AVERAGE GLUCOSE: 108 — SIGNIFICANT CHANGE UP
FERRITIN SERPL-MCNC: 410 NG/ML — HIGH (ref 13–330)
FERRITIN SERPL-MCNC: 410 NG/ML — HIGH (ref 13–330)
FLUAV SUBTYP SPEC NAA+PROBE: SIGNIFICANT CHANGE UP
FLUAV SUBTYP SPEC NAA+PROBE: SIGNIFICANT CHANGE UP
FLUBV RNA SPEC QL NAA+PROBE: SIGNIFICANT CHANGE UP
FLUBV RNA SPEC QL NAA+PROBE: SIGNIFICANT CHANGE UP
GAMMA GLOBULIN: 1.66 G/DL — SIGNIFICANT CHANGE UP (ref 0.7–1.7)
GAS PNL BLDV: 136 MMOL/L — SIGNIFICANT CHANGE UP (ref 136–145)
GI PCR PANEL: SIGNIFICANT CHANGE UP
GLUCOSE BLDC GLUCOMTR-MCNC: 100 MG/DL — HIGH (ref 70–99)
GLUCOSE BLDC GLUCOMTR-MCNC: 117 MG/DL — HIGH (ref 70–99)
GLUCOSE BLDC GLUCOMTR-MCNC: 117 MG/DL — HIGH (ref 70–99)
GLUCOSE BLDC GLUCOMTR-MCNC: 118 MG/DL — HIGH (ref 70–99)
GLUCOSE BLDC GLUCOMTR-MCNC: 118 MG/DL — HIGH (ref 70–99)
GLUCOSE BLDC GLUCOMTR-MCNC: 122 MG/DL — HIGH (ref 70–99)
GLUCOSE BLDV-MCNC: 112 MG/DL — HIGH (ref 70–99)
GLUCOSE SERPL-MCNC: 101 MG/DL — HIGH (ref 70–99)
GLUCOSE SERPL-MCNC: 102 MG/DL — HIGH (ref 70–99)
GLUCOSE SERPL-MCNC: 103 MG/DL — HIGH (ref 70–99)
GLUCOSE SERPL-MCNC: 105 MG/DL — HIGH (ref 70–99)
GLUCOSE SERPL-MCNC: 111 MG/DL — HIGH (ref 70–99)
GLUCOSE SERPL-MCNC: 111 MG/DL — HIGH (ref 70–99)
GLUCOSE SERPL-MCNC: 112 MG/DL — HIGH (ref 70–99)
GLUCOSE SERPL-MCNC: 112 MG/DL — HIGH (ref 70–99)
GLUCOSE SERPL-MCNC: 114 MG/DL — HIGH (ref 70–99)
GLUCOSE SERPL-MCNC: 114 MG/DL — HIGH (ref 70–99)
GLUCOSE SERPL-MCNC: 120 MG/DL — HIGH (ref 70–99)
GLUCOSE SERPL-MCNC: 123 MG/DL — HIGH (ref 70–99)
GLUCOSE SERPL-MCNC: 124 MG/DL — HIGH (ref 70–99)
GLUCOSE SERPL-MCNC: 129 MG/DL — HIGH (ref 70–99)
GLUCOSE SERPL-MCNC: 129 MG/DL — HIGH (ref 70–99)
GLUCOSE SERPL-MCNC: 134 MG/DL — HIGH (ref 70–99)
GLUCOSE SERPL-MCNC: 134 MG/DL — HIGH (ref 70–99)
GLUCOSE SERPL-MCNC: 136 MG/DL — HIGH (ref 70–99)
GLUCOSE SERPL-MCNC: 136 MG/DL — HIGH (ref 70–99)
GLUCOSE SERPL-MCNC: 147 MG/DL — HIGH (ref 70–99)
GLUCOSE SERPL-MCNC: 147 MG/DL — HIGH (ref 70–99)
GLUCOSE SERPL-MCNC: 151 MG/DL — HIGH (ref 70–99)
GLUCOSE SERPL-MCNC: 157 MG/DL — HIGH (ref 70–99)
GLUCOSE SERPL-MCNC: 71 MG/DL — SIGNIFICANT CHANGE UP (ref 70–99)
GLUCOSE SERPL-MCNC: 83 MG/DL — SIGNIFICANT CHANGE UP (ref 70–99)
GLUCOSE SERPL-MCNC: 95 MG/DL — SIGNIFICANT CHANGE UP (ref 70–99)
GLUCOSE SERPL-MCNC: 99 MG/DL — SIGNIFICANT CHANGE UP (ref 70–99)
GLUCOSE SERPL-MCNC: 99 MG/DL — SIGNIFICANT CHANGE UP (ref 70–99)
GLUCOSE UR QL: NEGATIVE MG/DL — SIGNIFICANT CHANGE UP
HADV DNA SPEC QL NAA+PROBE: SIGNIFICANT CHANGE UP
HADV DNA SPEC QL NAA+PROBE: SIGNIFICANT CHANGE UP
HCO3 BLDV-SCNC: 24 MMOL/L — SIGNIFICANT CHANGE UP (ref 22–29)
HCOV 229E RNA SPEC QL NAA+PROBE: SIGNIFICANT CHANGE UP
HCOV 229E RNA SPEC QL NAA+PROBE: SIGNIFICANT CHANGE UP
HCOV HKU1 RNA SPEC QL NAA+PROBE: SIGNIFICANT CHANGE UP
HCOV HKU1 RNA SPEC QL NAA+PROBE: SIGNIFICANT CHANGE UP
HCOV NL63 RNA SPEC QL NAA+PROBE: SIGNIFICANT CHANGE UP
HCOV NL63 RNA SPEC QL NAA+PROBE: SIGNIFICANT CHANGE UP
HCOV OC43 RNA SPEC QL NAA+PROBE: SIGNIFICANT CHANGE UP
HCOV OC43 RNA SPEC QL NAA+PROBE: SIGNIFICANT CHANGE UP
HCT VFR BLD CALC: 23.1 % — LOW (ref 34.5–45)
HCT VFR BLD CALC: 24.3 % — LOW (ref 34.5–45)
HCT VFR BLD CALC: 24.4 % — LOW (ref 34.5–45)
HCT VFR BLD CALC: 24.6 % — LOW (ref 34.5–45)
HCT VFR BLD CALC: 25.2 % — LOW (ref 34.5–45)
HCT VFR BLD CALC: 25.3 % — LOW (ref 34.5–45)
HCT VFR BLD CALC: 25.4 % — LOW (ref 34.5–45)
HCT VFR BLD CALC: 25.6 % — LOW (ref 34.5–45)
HCT VFR BLD CALC: 25.7 % — LOW (ref 34.5–45)
HCT VFR BLD CALC: 25.7 % — LOW (ref 34.5–45)
HCT VFR BLD CALC: 26 % — LOW (ref 34.5–45)
HCT VFR BLD CALC: 26.1 % — LOW (ref 34.5–45)
HCT VFR BLD CALC: 26.6 % — LOW (ref 34.5–45)
HCT VFR BLD CALC: 26.7 % — LOW (ref 34.5–45)
HCT VFR BLD CALC: 26.7 % — LOW (ref 34.5–45)
HCT VFR BLD CALC: 27 % — LOW (ref 34.5–45)
HCT VFR BLD CALC: 27 % — LOW (ref 34.5–45)
HCT VFR BLD CALC: 27.1 % — LOW (ref 34.5–45)
HCT VFR BLD CALC: 27.1 % — LOW (ref 34.5–45)
HCT VFR BLD CALC: 27.3 % — LOW (ref 34.5–45)
HCT VFR BLD CALC: 27.3 % — LOW (ref 34.5–45)
HCT VFR BLD CALC: 27.7 % — LOW (ref 34.5–45)
HCT VFR BLD CALC: 27.7 % — LOW (ref 34.5–45)
HCT VFR BLD CALC: 28.5 % — LOW (ref 34.5–45)
HCT VFR BLD CALC: 28.5 % — LOW (ref 34.5–45)
HCT VFR BLD CALC: 29.2 % — LOW (ref 34.5–45)
HCT VFR BLD CALC: 29.2 % — LOW (ref 34.5–45)
HCT VFR BLDA CALC: 24 % — LOW (ref 34.5–46.5)
HDLC SERPL-MCNC: 46 MG/DL — LOW
HDLC SERPL-MCNC: 46 MG/DL — LOW
HGB BLD CALC-MCNC: 8 G/DL — LOW (ref 11.7–16.1)
HGB BLD-MCNC: 7.2 G/DL — LOW (ref 11.5–15.5)
HGB BLD-MCNC: 7.4 G/DL — LOW (ref 11.5–15.5)
HGB BLD-MCNC: 7.4 G/DL — LOW (ref 11.5–15.5)
HGB BLD-MCNC: 7.6 G/DL — LOW (ref 11.5–15.5)
HGB BLD-MCNC: 7.7 G/DL — LOW (ref 11.5–15.5)
HGB BLD-MCNC: 7.8 G/DL — LOW (ref 11.5–15.5)
HGB BLD-MCNC: 7.9 G/DL — LOW (ref 11.5–15.5)
HGB BLD-MCNC: 8 G/DL — LOW (ref 11.5–15.5)
HGB BLD-MCNC: 8 G/DL — LOW (ref 11.5–15.5)
HGB BLD-MCNC: 8.1 G/DL — LOW (ref 11.5–15.5)
HGB BLD-MCNC: 8.2 G/DL — LOW (ref 11.5–15.5)
HGB BLD-MCNC: 8.3 G/DL — LOW (ref 11.5–15.5)
HGB BLD-MCNC: 8.5 G/DL — LOW (ref 11.5–15.5)
HGB BLD-MCNC: 8.5 G/DL — LOW (ref 11.5–15.5)
HGB BLD-MCNC: 8.6 G/DL — LOW (ref 11.5–15.5)
HGB BLD-MCNC: 8.6 G/DL — LOW (ref 11.5–15.5)
HGB BLD-MCNC: 8.9 G/DL — LOW (ref 11.5–15.5)
HGB BLD-MCNC: 8.9 G/DL — LOW (ref 11.5–15.5)
HMPV RNA SPEC QL NAA+PROBE: SIGNIFICANT CHANGE UP
HMPV RNA SPEC QL NAA+PROBE: SIGNIFICANT CHANGE UP
HPIV1 RNA SPEC QL NAA+PROBE: SIGNIFICANT CHANGE UP
HPIV1 RNA SPEC QL NAA+PROBE: SIGNIFICANT CHANGE UP
HPIV2 RNA SPEC QL NAA+PROBE: SIGNIFICANT CHANGE UP
HPIV2 RNA SPEC QL NAA+PROBE: SIGNIFICANT CHANGE UP
HPIV3 RNA SPEC QL NAA+PROBE: SIGNIFICANT CHANGE UP
HPIV3 RNA SPEC QL NAA+PROBE: SIGNIFICANT CHANGE UP
HPIV4 RNA SPEC QL NAA+PROBE: SIGNIFICANT CHANGE UP
HPIV4 RNA SPEC QL NAA+PROBE: SIGNIFICANT CHANGE UP
IANC: 11.25 K/UL — HIGH (ref 1.8–7.4)
IANC: 11.26 K/UL — HIGH (ref 1.8–7.4)
IANC: 12.08 K/UL — HIGH (ref 1.8–7.4)
IANC: 12.31 K/UL — HIGH (ref 1.8–7.4)
IANC: 12.96 K/UL — HIGH (ref 1.8–7.4)
IANC: 12.96 K/UL — HIGH (ref 1.8–7.4)
IANC: 13.96 K/UL — HIGH (ref 1.8–7.4)
IANC: 14.15 K/UL — HIGH (ref 1.8–7.4)
IANC: 15.23 K/UL — HIGH (ref 1.8–7.4)
IANC: 15.43 K/UL — HIGH (ref 1.8–7.4)
IANC: 15.46 K/UL — HIGH (ref 1.8–7.4)
IANC: 15.48 K/UL — HIGH (ref 1.8–7.4)
IANC: 16.72 K/UL — HIGH (ref 1.8–7.4)
IANC: 17.39 K/UL — HIGH (ref 1.8–7.4)
IANC: 9.86 K/UL — HIGH (ref 1.8–7.4)
IMM GRANULOCYTES NFR BLD AUTO: 0.4 % — SIGNIFICANT CHANGE UP (ref 0–0.9)
IMM GRANULOCYTES NFR BLD AUTO: 0.5 % — SIGNIFICANT CHANGE UP (ref 0–0.9)
IMM GRANULOCYTES NFR BLD AUTO: 0.6 % — SIGNIFICANT CHANGE UP (ref 0–0.9)
IMM GRANULOCYTES NFR BLD AUTO: 0.7 % — SIGNIFICANT CHANGE UP (ref 0–0.9)
IMM GRANULOCYTES NFR BLD AUTO: 0.8 % — SIGNIFICANT CHANGE UP (ref 0–0.9)
IMM GRANULOCYTES NFR BLD AUTO: 0.8 % — SIGNIFICANT CHANGE UP (ref 0–0.9)
IMM GRANULOCYTES NFR BLD AUTO: 0.9 % — SIGNIFICANT CHANGE UP (ref 0–0.9)
IMM GRANULOCYTES NFR BLD AUTO: 1 % — HIGH (ref 0–0.9)
IMM GRANULOCYTES NFR BLD AUTO: 1 % — HIGH (ref 0–0.9)
IMM GRANULOCYTES NFR BLD AUTO: 1.1 % — HIGH (ref 0–0.9)
INR BLD: 1.1 RATIO — SIGNIFICANT CHANGE UP (ref 0.85–1.18)
INR BLD: 1.1 RATIO — SIGNIFICANT CHANGE UP (ref 0.85–1.18)
INR BLD: 1.11 RATIO — SIGNIFICANT CHANGE UP (ref 0.85–1.18)
INR BLD: 1.11 RATIO — SIGNIFICANT CHANGE UP (ref 0.85–1.18)
INTERPRETATION 24H UR IFE-IMP: SIGNIFICANT CHANGE UP
INTERPRETATION SERPL IFE-IMP: SIGNIFICANT CHANGE UP
IRON SATN MFR SERPL: 11 % — LOW (ref 14–50)
IRON SATN MFR SERPL: 11 % — LOW (ref 14–50)
IRON SATN MFR SERPL: 22 UG/DL — LOW (ref 30–160)
IRON SATN MFR SERPL: 22 UG/DL — LOW (ref 30–160)
KETONES UR-MCNC: NEGATIVE MG/DL — SIGNIFICANT CHANGE UP
LACTATE BLDV-MCNC: 2.4 MMOL/L — HIGH (ref 0.5–2)
LACTATE SERPL-SCNC: 2.3 MMOL/L — HIGH (ref 0.5–2)
LEUKOCYTE ESTERASE UR-ACNC: ABNORMAL
LEUKOCYTE ESTERASE UR-ACNC: NEGATIVE — SIGNIFICANT CHANGE UP
LIPID PNL WITH DIRECT LDL SERPL: 68 MG/DL — SIGNIFICANT CHANGE UP
LIPID PNL WITH DIRECT LDL SERPL: 68 MG/DL — SIGNIFICANT CHANGE UP
LYMPHOCYTES # BLD AUTO: 0.69 K/UL — LOW (ref 1–3.3)
LYMPHOCYTES # BLD AUTO: 1.64 K/UL — SIGNIFICANT CHANGE UP (ref 1–3.3)
LYMPHOCYTES # BLD AUTO: 1.87 K/UL — SIGNIFICANT CHANGE UP (ref 1–3.3)
LYMPHOCYTES # BLD AUTO: 1.9 K/UL — SIGNIFICANT CHANGE UP (ref 1–3.3)
LYMPHOCYTES # BLD AUTO: 1.9 K/UL — SIGNIFICANT CHANGE UP (ref 1–3.3)
LYMPHOCYTES # BLD AUTO: 1.94 K/UL — SIGNIFICANT CHANGE UP (ref 1–3.3)
LYMPHOCYTES # BLD AUTO: 10.1 % — LOW (ref 13–44)
LYMPHOCYTES # BLD AUTO: 11.2 % — LOW (ref 13–44)
LYMPHOCYTES # BLD AUTO: 11.3 % — LOW (ref 13–44)
LYMPHOCYTES # BLD AUTO: 11.8 % — LOW (ref 13–44)
LYMPHOCYTES # BLD AUTO: 11.8 % — LOW (ref 13–44)
LYMPHOCYTES # BLD AUTO: 12.9 % — LOW (ref 13–44)
LYMPHOCYTES # BLD AUTO: 12.9 % — LOW (ref 13–44)
LYMPHOCYTES # BLD AUTO: 13.1 % — SIGNIFICANT CHANGE UP (ref 13–44)
LYMPHOCYTES # BLD AUTO: 13.1 % — SIGNIFICANT CHANGE UP (ref 13–44)
LYMPHOCYTES # BLD AUTO: 15 % — SIGNIFICANT CHANGE UP (ref 13–44)
LYMPHOCYTES # BLD AUTO: 15.5 % — SIGNIFICANT CHANGE UP (ref 13–44)
LYMPHOCYTES # BLD AUTO: 2.05 K/UL — SIGNIFICANT CHANGE UP (ref 1–3.3)
LYMPHOCYTES # BLD AUTO: 2.06 K/UL — SIGNIFICANT CHANGE UP (ref 1–3.3)
LYMPHOCYTES # BLD AUTO: 2.11 K/UL — SIGNIFICANT CHANGE UP (ref 1–3.3)
LYMPHOCYTES # BLD AUTO: 2.11 K/UL — SIGNIFICANT CHANGE UP (ref 1–3.3)
LYMPHOCYTES # BLD AUTO: 2.15 K/UL — SIGNIFICANT CHANGE UP (ref 1–3.3)
LYMPHOCYTES # BLD AUTO: 2.19 K/UL — SIGNIFICANT CHANGE UP (ref 1–3.3)
LYMPHOCYTES # BLD AUTO: 2.26 K/UL — SIGNIFICANT CHANGE UP (ref 1–3.3)
LYMPHOCYTES # BLD AUTO: 2.31 K/UL — SIGNIFICANT CHANGE UP (ref 1–3.3)
LYMPHOCYTES # BLD AUTO: 2.38 K/UL — SIGNIFICANT CHANGE UP (ref 1–3.3)
LYMPHOCYTES # BLD AUTO: 5.3 % — LOW (ref 13–44)
LYMPHOCYTES # BLD AUTO: 9.6 % — LOW (ref 13–44)
LYMPHOCYTES # BLD AUTO: 9.8 % — LOW (ref 13–44)
LYMPHOCYTES # BLD AUTO: 9.9 % — LOW (ref 13–44)
M PNEUMO DNA SPEC QL NAA+PROBE: SIGNIFICANT CHANGE UP
M PNEUMO DNA SPEC QL NAA+PROBE: SIGNIFICANT CHANGE UP
MACROCYTES BLD QL: SLIGHT — SIGNIFICANT CHANGE UP
MAGNESIUM SERPL-MCNC: 1.7 MG/DL — SIGNIFICANT CHANGE UP (ref 1.6–2.6)
MAGNESIUM SERPL-MCNC: 1.8 MG/DL — SIGNIFICANT CHANGE UP (ref 1.6–2.6)
MAGNESIUM SERPL-MCNC: 1.9 MG/DL — SIGNIFICANT CHANGE UP (ref 1.6–2.6)
MAGNESIUM SERPL-MCNC: 2 MG/DL — SIGNIFICANT CHANGE UP (ref 1.6–2.6)
MAGNESIUM SERPL-MCNC: 2 MG/DL — SIGNIFICANT CHANGE UP (ref 1.6–2.6)
MAGNESIUM SERPL-MCNC: 2.1 MG/DL — SIGNIFICANT CHANGE UP (ref 1.6–2.6)
MAGNESIUM SERPL-MCNC: 2.2 MG/DL — SIGNIFICANT CHANGE UP (ref 1.6–2.6)
MANUAL SMEAR VERIFICATION: SIGNIFICANT CHANGE UP
MCHC RBC-ENTMCNC: 23.2 PG — LOW (ref 27–34)
MCHC RBC-ENTMCNC: 23.3 PG — LOW (ref 27–34)
MCHC RBC-ENTMCNC: 23.4 PG — LOW (ref 27–34)
MCHC RBC-ENTMCNC: 23.4 PG — LOW (ref 27–34)
MCHC RBC-ENTMCNC: 23.5 PG — LOW (ref 27–34)
MCHC RBC-ENTMCNC: 23.6 PG — LOW (ref 27–34)
MCHC RBC-ENTMCNC: 23.7 PG — LOW (ref 27–34)
MCHC RBC-ENTMCNC: 23.8 PG — LOW (ref 27–34)
MCHC RBC-ENTMCNC: 24 PG — LOW (ref 27–34)
MCHC RBC-ENTMCNC: 24 PG — LOW (ref 27–34)
MCHC RBC-ENTMCNC: 24.1 PG — LOW (ref 27–34)
MCHC RBC-ENTMCNC: 24.1 PG — LOW (ref 27–34)
MCHC RBC-ENTMCNC: 24.2 PG — LOW (ref 27–34)
MCHC RBC-ENTMCNC: 24.3 PG — LOW (ref 27–34)
MCHC RBC-ENTMCNC: 24.5 PG — LOW (ref 27–34)
MCHC RBC-ENTMCNC: 29.6 GM/DL — LOW (ref 32–36)
MCHC RBC-ENTMCNC: 29.6 GM/DL — LOW (ref 32–36)
MCHC RBC-ENTMCNC: 29.7 GM/DL — LOW (ref 32–36)
MCHC RBC-ENTMCNC: 29.7 GM/DL — LOW (ref 32–36)
MCHC RBC-ENTMCNC: 30.1 GM/DL — LOW (ref 32–36)
MCHC RBC-ENTMCNC: 30.2 GM/DL — LOW (ref 32–36)
MCHC RBC-ENTMCNC: 30.2 GM/DL — LOW (ref 32–36)
MCHC RBC-ENTMCNC: 30.3 GM/DL — LOW (ref 32–36)
MCHC RBC-ENTMCNC: 30.4 GM/DL — LOW (ref 32–36)
MCHC RBC-ENTMCNC: 30.5 GM/DL — LOW (ref 32–36)
MCHC RBC-ENTMCNC: 30.6 GM/DL — LOW (ref 32–36)
MCHC RBC-ENTMCNC: 30.7 GM/DL — LOW (ref 32–36)
MCHC RBC-ENTMCNC: 30.7 GM/DL — LOW (ref 32–36)
MCHC RBC-ENTMCNC: 30.8 GM/DL — LOW (ref 32–36)
MCHC RBC-ENTMCNC: 30.9 GM/DL — LOW (ref 32–36)
MCHC RBC-ENTMCNC: 31.1 GM/DL — LOW (ref 32–36)
MCHC RBC-ENTMCNC: 31.2 GM/DL — LOW (ref 32–36)
MCHC RBC-ENTMCNC: 31.3 GM/DL — LOW (ref 32–36)
MCHC RBC-ENTMCNC: 31.4 GM/DL — LOW (ref 32–36)
MCHC RBC-ENTMCNC: 31.4 GM/DL — LOW (ref 32–36)
MCV RBC AUTO: 76.3 FL — LOW (ref 80–100)
MCV RBC AUTO: 76.5 FL — LOW (ref 80–100)
MCV RBC AUTO: 76.7 FL — LOW (ref 80–100)
MCV RBC AUTO: 76.9 FL — LOW (ref 80–100)
MCV RBC AUTO: 76.9 FL — LOW (ref 80–100)
MCV RBC AUTO: 77 FL — LOW (ref 80–100)
MCV RBC AUTO: 77.1 FL — LOW (ref 80–100)
MCV RBC AUTO: 77.2 FL — LOW (ref 80–100)
MCV RBC AUTO: 77.2 FL — LOW (ref 80–100)
MCV RBC AUTO: 77.5 FL — LOW (ref 80–100)
MCV RBC AUTO: 77.5 FL — LOW (ref 80–100)
MCV RBC AUTO: 77.6 FL — LOW (ref 80–100)
MCV RBC AUTO: 77.6 FL — LOW (ref 80–100)
MCV RBC AUTO: 77.7 FL — LOW (ref 80–100)
MCV RBC AUTO: 77.8 FL — LOW (ref 80–100)
MCV RBC AUTO: 77.9 FL — LOW (ref 80–100)
MCV RBC AUTO: 77.9 FL — LOW (ref 80–100)
MCV RBC AUTO: 78 FL — LOW (ref 80–100)
MCV RBC AUTO: 78.5 FL — LOW (ref 80–100)
MCV RBC AUTO: 78.7 FL — LOW (ref 80–100)
MCV RBC AUTO: 79.4 FL — LOW (ref 80–100)
MCV RBC AUTO: 79.4 FL — LOW (ref 80–100)
MCV RBC AUTO: 80.5 FL — SIGNIFICANT CHANGE UP (ref 80–100)
MCV RBC AUTO: 80.8 FL — SIGNIFICANT CHANGE UP (ref 80–100)
MCV RBC AUTO: 80.8 FL — SIGNIFICANT CHANGE UP (ref 80–100)
MICROCYTES BLD QL: SIGNIFICANT CHANGE UP
MONOCYTES # BLD AUTO: 0.75 K/UL — SIGNIFICANT CHANGE UP (ref 0–0.9)
MONOCYTES # BLD AUTO: 0.76 K/UL — SIGNIFICANT CHANGE UP (ref 0–0.9)
MONOCYTES # BLD AUTO: 0.83 K/UL — SIGNIFICANT CHANGE UP (ref 0–0.9)
MONOCYTES # BLD AUTO: 0.84 K/UL — SIGNIFICANT CHANGE UP (ref 0–0.9)
MONOCYTES # BLD AUTO: 0.86 K/UL — SIGNIFICANT CHANGE UP (ref 0–0.9)
MONOCYTES # BLD AUTO: 0.88 K/UL — SIGNIFICANT CHANGE UP (ref 0–0.9)
MONOCYTES # BLD AUTO: 0.92 K/UL — HIGH (ref 0–0.9)
MONOCYTES # BLD AUTO: 0.97 K/UL — HIGH (ref 0–0.9)
MONOCYTES # BLD AUTO: 1 K/UL — HIGH (ref 0–0.9)
MONOCYTES # BLD AUTO: 1.01 K/UL — HIGH (ref 0–0.9)
MONOCYTES # BLD AUTO: 1.03 K/UL — HIGH (ref 0–0.9)
MONOCYTES # BLD AUTO: 1.04 K/UL — HIGH (ref 0–0.9)
MONOCYTES # BLD AUTO: 1.07 K/UL — HIGH (ref 0–0.9)
MONOCYTES # BLD AUTO: 1.08 K/UL — HIGH (ref 0–0.9)
MONOCYTES # BLD AUTO: 1.08 K/UL — HIGH (ref 0–0.9)
MONOCYTES NFR BLD AUTO: 3.8 % — SIGNIFICANT CHANGE UP (ref 2–14)
MONOCYTES NFR BLD AUTO: 4.2 % — SIGNIFICANT CHANGE UP (ref 2–14)
MONOCYTES NFR BLD AUTO: 4.5 % — SIGNIFICANT CHANGE UP (ref 2–14)
MONOCYTES NFR BLD AUTO: 4.9 % — SIGNIFICANT CHANGE UP (ref 2–14)
MONOCYTES NFR BLD AUTO: 5.3 % — SIGNIFICANT CHANGE UP (ref 2–14)
MONOCYTES NFR BLD AUTO: 5.3 % — SIGNIFICANT CHANGE UP (ref 2–14)
MONOCYTES NFR BLD AUTO: 5.4 % — SIGNIFICANT CHANGE UP (ref 2–14)
MONOCYTES NFR BLD AUTO: 5.5 % — SIGNIFICANT CHANGE UP (ref 2–14)
MONOCYTES NFR BLD AUTO: 5.5 % — SIGNIFICANT CHANGE UP (ref 2–14)
MONOCYTES NFR BLD AUTO: 5.6 % — SIGNIFICANT CHANGE UP (ref 2–14)
MONOCYTES NFR BLD AUTO: 6.3 % — SIGNIFICANT CHANGE UP (ref 2–14)
MONOCYTES NFR BLD AUTO: 6.5 % — SIGNIFICANT CHANGE UP (ref 2–14)
MONOCYTES NFR BLD AUTO: 6.6 % — SIGNIFICANT CHANGE UP (ref 2–14)
MONOCYTES NFR BLD AUTO: 6.6 % — SIGNIFICANT CHANGE UP (ref 2–14)
MONOCYTES NFR BLD AUTO: 8 % — SIGNIFICANT CHANGE UP (ref 2–14)
MRSA PCR RESULT.: SIGNIFICANT CHANGE UP
MRSA PCR RESULT.: SIGNIFICANT CHANGE UP
MYELOCYTES NFR BLD: 0.9 % — HIGH (ref 0–0)
NEUTROPHILS # BLD AUTO: 10.37 K/UL — HIGH (ref 1.8–7.4)
NEUTROPHILS # BLD AUTO: 11.25 K/UL — HIGH (ref 1.8–7.4)
NEUTROPHILS # BLD AUTO: 11.26 K/UL — HIGH (ref 1.8–7.4)
NEUTROPHILS # BLD AUTO: 12.08 K/UL — HIGH (ref 1.8–7.4)
NEUTROPHILS # BLD AUTO: 12.31 K/UL — HIGH (ref 1.8–7.4)
NEUTROPHILS # BLD AUTO: 12.96 K/UL — HIGH (ref 1.8–7.4)
NEUTROPHILS # BLD AUTO: 12.96 K/UL — HIGH (ref 1.8–7.4)
NEUTROPHILS # BLD AUTO: 13.96 K/UL — HIGH (ref 1.8–7.4)
NEUTROPHILS # BLD AUTO: 14.15 K/UL — HIGH (ref 1.8–7.4)
NEUTROPHILS # BLD AUTO: 15.23 K/UL — HIGH (ref 1.8–7.4)
NEUTROPHILS # BLD AUTO: 15.43 K/UL — HIGH (ref 1.8–7.4)
NEUTROPHILS # BLD AUTO: 15.46 K/UL — HIGH (ref 1.8–7.4)
NEUTROPHILS # BLD AUTO: 15.48 K/UL — HIGH (ref 1.8–7.4)
NEUTROPHILS # BLD AUTO: 16.72 K/UL — HIGH (ref 1.8–7.4)
NEUTROPHILS # BLD AUTO: 17.39 K/UL — HIGH (ref 1.8–7.4)
NEUTROPHILS NFR BLD AUTO: 75.9 % — SIGNIFICANT CHANGE UP (ref 43–77)
NEUTROPHILS NFR BLD AUTO: 76.5 % — SIGNIFICANT CHANGE UP (ref 43–77)
NEUTROPHILS NFR BLD AUTO: 79 % — HIGH (ref 43–77)
NEUTROPHILS NFR BLD AUTO: 79.1 % — HIGH (ref 43–77)
NEUTROPHILS NFR BLD AUTO: 79.3 % — HIGH (ref 43–77)
NEUTROPHILS NFR BLD AUTO: 79.3 % — HIGH (ref 43–77)
NEUTROPHILS NFR BLD AUTO: 79.5 % — HIGH (ref 43–77)
NEUTROPHILS NFR BLD AUTO: 80.4 % — HIGH (ref 43–77)
NEUTROPHILS NFR BLD AUTO: 81.7 % — HIGH (ref 43–77)
NEUTROPHILS NFR BLD AUTO: 82.1 % — HIGH (ref 43–77)
NEUTROPHILS NFR BLD AUTO: 82.3 % — HIGH (ref 43–77)
NEUTROPHILS NFR BLD AUTO: 82.3 % — HIGH (ref 43–77)
NEUTROPHILS NFR BLD AUTO: 82.9 % — HIGH (ref 43–77)
NEUTROPHILS NFR BLD AUTO: 84.1 % — HIGH (ref 43–77)
NEUTROPHILS NFR BLD AUTO: 84.6 % — HIGH (ref 43–77)
NITRITE UR-MCNC: NEGATIVE — SIGNIFICANT CHANGE UP
NON HDL CHOLESTEROL: 81 MG/DL — SIGNIFICANT CHANGE UP
NON HDL CHOLESTEROL: 81 MG/DL — SIGNIFICANT CHANGE UP
NRBC # BLD: 0 /100 WBCS — SIGNIFICANT CHANGE UP (ref 0–0)
NRBC # FLD: 0 K/UL — SIGNIFICANT CHANGE UP (ref 0–0)
NRBC # FLD: 0.02 K/UL — HIGH (ref 0–0)
OSMOLALITY SERPL: 288 MOSM/KG — SIGNIFICANT CHANGE UP (ref 275–295)
OSMOLALITY SERPL: 288 MOSM/KG — SIGNIFICANT CHANGE UP (ref 275–295)
OSMOLALITY UR: 510 MOSM/KG — SIGNIFICANT CHANGE UP (ref 50–1200)
OSMOLALITY UR: 510 MOSM/KG — SIGNIFICANT CHANGE UP (ref 50–1200)
OVALOCYTES BLD QL SMEAR: SLIGHT — SIGNIFICANT CHANGE UP
PCO2 BLDV: 40 MMHG — SIGNIFICANT CHANGE UP (ref 39–52)
PH BLDV: 7.39 — SIGNIFICANT CHANGE UP (ref 7.32–7.43)
PH UR: 6 — SIGNIFICANT CHANGE UP (ref 5–8)
PH UR: 6.5 — SIGNIFICANT CHANGE UP (ref 5–8)
PH UR: 6.5 — SIGNIFICANT CHANGE UP (ref 5–8)
PHOSPHATE SERPL-MCNC: 1.4 MG/DL — LOW (ref 2.5–4.5)
PHOSPHATE SERPL-MCNC: 1.5 MG/DL — LOW (ref 2.5–4.5)
PHOSPHATE SERPL-MCNC: 1.7 MG/DL — LOW (ref 2.5–4.5)
PHOSPHATE SERPL-MCNC: 1.8 MG/DL — LOW (ref 2.5–4.5)
PHOSPHATE SERPL-MCNC: 1.9 MG/DL — LOW (ref 2.5–4.5)
PHOSPHATE SERPL-MCNC: 2 MG/DL — LOW (ref 2.5–4.5)
PHOSPHATE SERPL-MCNC: 2.1 MG/DL — LOW (ref 2.5–4.5)
PHOSPHATE SERPL-MCNC: 2.2 MG/DL — LOW (ref 2.5–4.5)
PHOSPHATE SERPL-MCNC: 2.2 MG/DL — LOW (ref 2.5–4.5)
PHOSPHATE SERPL-MCNC: 2.3 MG/DL — LOW (ref 2.5–4.5)
PHOSPHATE SERPL-MCNC: 2.4 MG/DL — LOW (ref 2.5–4.5)
PHOSPHATE SERPL-MCNC: 2.4 MG/DL — LOW (ref 2.5–4.5)
PHOSPHATE SERPL-MCNC: 2.7 MG/DL — SIGNIFICANT CHANGE UP (ref 2.5–4.5)
PHOSPHATE SERPL-MCNC: 3 MG/DL — SIGNIFICANT CHANGE UP (ref 2.5–4.5)
PHOSPHATE SERPL-MCNC: 3 MG/DL — SIGNIFICANT CHANGE UP (ref 2.5–4.5)
PHOSPHATE SERPL-MCNC: 3.7 MG/DL — SIGNIFICANT CHANGE UP (ref 2.5–4.5)
PHOSPHATE SERPL-MCNC: 3.7 MG/DL — SIGNIFICANT CHANGE UP (ref 2.5–4.5)
PLAT MORPH BLD: NORMAL — SIGNIFICANT CHANGE UP
PLATELET # BLD AUTO: 334 K/UL — SIGNIFICANT CHANGE UP (ref 150–400)
PLATELET # BLD AUTO: 350 K/UL — SIGNIFICANT CHANGE UP (ref 150–400)
PLATELET # BLD AUTO: 352 K/UL — SIGNIFICANT CHANGE UP (ref 150–400)
PLATELET # BLD AUTO: 352 K/UL — SIGNIFICANT CHANGE UP (ref 150–400)
PLATELET # BLD AUTO: 354 K/UL — SIGNIFICANT CHANGE UP (ref 150–400)
PLATELET # BLD AUTO: 363 K/UL — SIGNIFICANT CHANGE UP (ref 150–400)
PLATELET # BLD AUTO: 364 K/UL — SIGNIFICANT CHANGE UP (ref 150–400)
PLATELET # BLD AUTO: 366 K/UL — SIGNIFICANT CHANGE UP (ref 150–400)
PLATELET # BLD AUTO: 371 K/UL — SIGNIFICANT CHANGE UP (ref 150–400)
PLATELET # BLD AUTO: 371 K/UL — SIGNIFICANT CHANGE UP (ref 150–400)
PLATELET # BLD AUTO: 379 K/UL — SIGNIFICANT CHANGE UP (ref 150–400)
PLATELET # BLD AUTO: 379 K/UL — SIGNIFICANT CHANGE UP (ref 150–400)
PLATELET # BLD AUTO: 384 K/UL — SIGNIFICANT CHANGE UP (ref 150–400)
PLATELET # BLD AUTO: 387 K/UL — SIGNIFICANT CHANGE UP (ref 150–400)
PLATELET # BLD AUTO: 387 K/UL — SIGNIFICANT CHANGE UP (ref 150–400)
PLATELET # BLD AUTO: 397 K/UL — SIGNIFICANT CHANGE UP (ref 150–400)
PLATELET # BLD AUTO: 403 K/UL — HIGH (ref 150–400)
PLATELET # BLD AUTO: 406 K/UL — HIGH (ref 150–400)
PLATELET # BLD AUTO: 406 K/UL — HIGH (ref 150–400)
PLATELET # BLD AUTO: 408 K/UL — HIGH (ref 150–400)
PLATELET # BLD AUTO: 408 K/UL — HIGH (ref 150–400)
PLATELET # BLD AUTO: 410 K/UL — HIGH (ref 150–400)
PLATELET # BLD AUTO: 416 K/UL — HIGH (ref 150–400)
PLATELET # BLD AUTO: 424 K/UL — HIGH (ref 150–400)
PLATELET # BLD AUTO: 429 K/UL — HIGH (ref 150–400)
PLATELET # BLD AUTO: 434 K/UL — HIGH (ref 150–400)
PLATELET # BLD AUTO: 443 K/UL — HIGH (ref 150–400)
PLATELET # BLD AUTO: 443 K/UL — HIGH (ref 150–400)
PLATELET # BLD AUTO: 459 K/UL — HIGH (ref 150–400)
PLATELET COUNT - ESTIMATE: NORMAL — SIGNIFICANT CHANGE UP
PO2 BLDV: 47 MMHG — HIGH (ref 25–45)
POIKILOCYTOSIS BLD QL AUTO: SLIGHT — SIGNIFICANT CHANGE UP
POLYCHROMASIA BLD QL SMEAR: SLIGHT — SIGNIFICANT CHANGE UP
POTASSIUM BLDV-SCNC: 3.8 MMOL/L — SIGNIFICANT CHANGE UP (ref 3.5–5.1)
POTASSIUM SERPL-MCNC: 3.3 MMOL/L — LOW (ref 3.5–5.3)
POTASSIUM SERPL-MCNC: 3.4 MMOL/L — LOW (ref 3.5–5.3)
POTASSIUM SERPL-MCNC: 3.4 MMOL/L — LOW (ref 3.5–5.3)
POTASSIUM SERPL-MCNC: 3.7 MMOL/L — SIGNIFICANT CHANGE UP (ref 3.5–5.3)
POTASSIUM SERPL-MCNC: 3.7 MMOL/L — SIGNIFICANT CHANGE UP (ref 3.5–5.3)
POTASSIUM SERPL-MCNC: 3.8 MMOL/L — SIGNIFICANT CHANGE UP (ref 3.5–5.3)
POTASSIUM SERPL-MCNC: 3.9 MMOL/L — SIGNIFICANT CHANGE UP (ref 3.5–5.3)
POTASSIUM SERPL-MCNC: 4 MMOL/L — SIGNIFICANT CHANGE UP (ref 3.5–5.3)
POTASSIUM SERPL-MCNC: 4.1 MMOL/L — SIGNIFICANT CHANGE UP (ref 3.5–5.3)
POTASSIUM SERPL-MCNC: 4.2 MMOL/L — SIGNIFICANT CHANGE UP (ref 3.5–5.3)
POTASSIUM SERPL-MCNC: 4.3 MMOL/L — SIGNIFICANT CHANGE UP (ref 3.5–5.3)
POTASSIUM SERPL-MCNC: 4.4 MMOL/L — SIGNIFICANT CHANGE UP (ref 3.5–5.3)
POTASSIUM SERPL-MCNC: 4.5 MMOL/L — SIGNIFICANT CHANGE UP (ref 3.5–5.3)
POTASSIUM SERPL-MCNC: 4.5 MMOL/L — SIGNIFICANT CHANGE UP (ref 3.5–5.3)
POTASSIUM SERPL-MCNC: 4.6 MMOL/L — SIGNIFICANT CHANGE UP (ref 3.5–5.3)
POTASSIUM SERPL-MCNC: 4.8 MMOL/L — SIGNIFICANT CHANGE UP (ref 3.5–5.3)
POTASSIUM SERPL-MCNC: 4.8 MMOL/L — SIGNIFICANT CHANGE UP (ref 3.5–5.3)
POTASSIUM SERPL-MCNC: 4.9 MMOL/L — SIGNIFICANT CHANGE UP (ref 3.5–5.3)
POTASSIUM SERPL-MCNC: 5.3 MMOL/L — SIGNIFICANT CHANGE UP (ref 3.5–5.3)
POTASSIUM SERPL-MCNC: 5.3 MMOL/L — SIGNIFICANT CHANGE UP (ref 3.5–5.3)
POTASSIUM SERPL-SCNC: 3.3 MMOL/L — LOW (ref 3.5–5.3)
POTASSIUM SERPL-SCNC: 3.4 MMOL/L — LOW (ref 3.5–5.3)
POTASSIUM SERPL-SCNC: 3.4 MMOL/L — LOW (ref 3.5–5.3)
POTASSIUM SERPL-SCNC: 3.7 MMOL/L — SIGNIFICANT CHANGE UP (ref 3.5–5.3)
POTASSIUM SERPL-SCNC: 3.7 MMOL/L — SIGNIFICANT CHANGE UP (ref 3.5–5.3)
POTASSIUM SERPL-SCNC: 3.8 MMOL/L — SIGNIFICANT CHANGE UP (ref 3.5–5.3)
POTASSIUM SERPL-SCNC: 3.9 MMOL/L — SIGNIFICANT CHANGE UP (ref 3.5–5.3)
POTASSIUM SERPL-SCNC: 4 MMOL/L — SIGNIFICANT CHANGE UP (ref 3.5–5.3)
POTASSIUM SERPL-SCNC: 4.1 MMOL/L — SIGNIFICANT CHANGE UP (ref 3.5–5.3)
POTASSIUM SERPL-SCNC: 4.2 MMOL/L — SIGNIFICANT CHANGE UP (ref 3.5–5.3)
POTASSIUM SERPL-SCNC: 4.3 MMOL/L — SIGNIFICANT CHANGE UP (ref 3.5–5.3)
POTASSIUM SERPL-SCNC: 4.4 MMOL/L — SIGNIFICANT CHANGE UP (ref 3.5–5.3)
POTASSIUM SERPL-SCNC: 4.5 MMOL/L — SIGNIFICANT CHANGE UP (ref 3.5–5.3)
POTASSIUM SERPL-SCNC: 4.5 MMOL/L — SIGNIFICANT CHANGE UP (ref 3.5–5.3)
POTASSIUM SERPL-SCNC: 4.6 MMOL/L — SIGNIFICANT CHANGE UP (ref 3.5–5.3)
POTASSIUM SERPL-SCNC: 4.8 MMOL/L — SIGNIFICANT CHANGE UP (ref 3.5–5.3)
POTASSIUM SERPL-SCNC: 4.8 MMOL/L — SIGNIFICANT CHANGE UP (ref 3.5–5.3)
POTASSIUM SERPL-SCNC: 4.9 MMOL/L — SIGNIFICANT CHANGE UP (ref 3.5–5.3)
POTASSIUM SERPL-SCNC: 5.3 MMOL/L — SIGNIFICANT CHANGE UP (ref 3.5–5.3)
POTASSIUM SERPL-SCNC: 5.3 MMOL/L — SIGNIFICANT CHANGE UP (ref 3.5–5.3)
PROT PATTERN SERPL ELPH-IMP: SIGNIFICANT CHANGE UP
PROT SERPL-MCNC: 6.1 G/DL — SIGNIFICANT CHANGE UP (ref 6–8.3)
PROT SERPL-MCNC: 6.3 G/DL — SIGNIFICANT CHANGE UP (ref 6–8.3)
PROT SERPL-MCNC: 6.5 G/DL — SIGNIFICANT CHANGE UP
PROT SERPL-MCNC: 6.5 G/DL — SIGNIFICANT CHANGE UP (ref 6–8.3)
PROT SERPL-MCNC: 6.6 G/DL — SIGNIFICANT CHANGE UP (ref 6–8.3)
PROT SERPL-MCNC: 6.6 G/DL — SIGNIFICANT CHANGE UP (ref 6–8.3)
PROT SERPL-MCNC: 6.7 G/DL — SIGNIFICANT CHANGE UP (ref 6–8.3)
PROT SERPL-MCNC: 6.8 G/DL — SIGNIFICANT CHANGE UP (ref 6–8.3)
PROT SERPL-MCNC: 6.8 G/DL — SIGNIFICANT CHANGE UP (ref 6–8.3)
PROT SERPL-MCNC: 6.9 G/DL — SIGNIFICANT CHANGE UP (ref 6–8.3)
PROT SERPL-MCNC: 6.9 G/DL — SIGNIFICANT CHANGE UP (ref 6–8.3)
PROT SERPL-MCNC: 7.3 G/DL — SIGNIFICANT CHANGE UP (ref 6–8.3)
PROT SERPL-MCNC: 7.3 G/DL — SIGNIFICANT CHANGE UP (ref 6–8.3)
PROT UR-MCNC: 100 MG/DL
PROT UR-MCNC: 100 MG/DL
PROT UR-MCNC: 30 MG/DL
PROT UR-MCNC: 300 MG/DL
PROT UR-MCNC: 300 MG/DL
PROTHROM AB SERPL-ACNC: 12.4 SEC — SIGNIFICANT CHANGE UP (ref 9.5–13)
PTH RELATED PROT SERPL-MCNC: <2 PMOL/L — SIGNIFICANT CHANGE UP
PTH-INTACT FLD-MCNC: 18 PG/ML — SIGNIFICANT CHANGE UP (ref 15–65)
PTH-INTACT FLD-MCNC: 18 PG/ML — SIGNIFICANT CHANGE UP (ref 15–65)
PTH-INTACT FLD-MCNC: 24 PG/ML — SIGNIFICANT CHANGE UP (ref 15–65)
PTH-INTACT FLD-MCNC: 24 PG/ML — SIGNIFICANT CHANGE UP (ref 15–65)
RAPID RVP RESULT: SIGNIFICANT CHANGE UP
RAPID RVP RESULT: SIGNIFICANT CHANGE UP
RBC # BLD: 2.98 M/UL — LOW (ref 3.8–5.2)
RBC # BLD: 3.13 M/UL — LOW (ref 3.8–5.2)
RBC # BLD: 3.13 M/UL — LOW (ref 3.8–5.2)
RBC # BLD: 3.18 M/UL — LOW (ref 3.8–5.2)
RBC # BLD: 3.19 M/UL — LOW (ref 3.8–5.2)
RBC # BLD: 3.25 M/UL — LOW (ref 3.8–5.2)
RBC # BLD: 3.27 M/UL — LOW (ref 3.8–5.2)
RBC # BLD: 3.29 M/UL — LOW (ref 3.8–5.2)
RBC # BLD: 3.29 M/UL — LOW (ref 3.8–5.2)
RBC # BLD: 3.33 M/UL — LOW (ref 3.8–5.2)
RBC # BLD: 3.35 M/UL — LOW (ref 3.8–5.2)
RBC # BLD: 3.35 M/UL — LOW (ref 3.8–5.2)
RBC # BLD: 3.37 M/UL — LOW (ref 3.8–5.2)
RBC # BLD: 3.38 M/UL — LOW (ref 3.8–5.2)
RBC # BLD: 3.39 M/UL — LOW (ref 3.8–5.2)
RBC # BLD: 3.4 M/UL — LOW (ref 3.8–5.2)
RBC # BLD: 3.44 M/UL — LOW (ref 3.8–5.2)
RBC # BLD: 3.44 M/UL — LOW (ref 3.8–5.2)
RBC # BLD: 3.49 M/UL — LOW (ref 3.8–5.2)
RBC # BLD: 3.51 M/UL — LOW (ref 3.8–5.2)
RBC # BLD: 3.51 M/UL — LOW (ref 3.8–5.2)
RBC # BLD: 3.62 M/UL — LOW (ref 3.8–5.2)
RBC # BLD: 3.62 M/UL — LOW (ref 3.8–5.2)
RBC # BLD: 3.75 M/UL — LOW (ref 3.8–5.2)
RBC # BLD: 3.75 M/UL — LOW (ref 3.8–5.2)
RBC # FLD: 19.8 % — HIGH (ref 10.3–14.5)
RBC # FLD: 20 % — HIGH (ref 10.3–14.5)
RBC # FLD: 20 % — HIGH (ref 10.3–14.5)
RBC # FLD: 20.1 % — HIGH (ref 10.3–14.5)
RBC # FLD: 20.1 % — HIGH (ref 10.3–14.5)
RBC # FLD: 20.2 % — HIGH (ref 10.3–14.5)
RBC # FLD: 20.6 % — HIGH (ref 10.3–14.5)
RBC # FLD: 20.7 % — HIGH (ref 10.3–14.5)
RBC # FLD: 20.8 % — HIGH (ref 10.3–14.5)
RBC # FLD: 21 % — HIGH (ref 10.3–14.5)
RBC # FLD: 21 % — HIGH (ref 10.3–14.5)
RBC # FLD: 21.1 % — HIGH (ref 10.3–14.5)
RBC # FLD: 21.2 % — HIGH (ref 10.3–14.5)
RBC # FLD: 21.2 % — HIGH (ref 10.3–14.5)
RBC # FLD: 21.3 % — HIGH (ref 10.3–14.5)
RBC # FLD: 21.3 % — HIGH (ref 10.3–14.5)
RBC # FLD: 21.5 % — HIGH (ref 10.3–14.5)
RBC # FLD: 21.5 % — HIGH (ref 10.3–14.5)
RBC BLD AUTO: ABNORMAL
RBC CASTS # UR COMP ASSIST: 19 /HPF — HIGH (ref 0–4)
RBC CASTS # UR COMP ASSIST: 21 /HPF — HIGH (ref 0–4)
RBC CASTS # UR COMP ASSIST: 4 /HPF — SIGNIFICANT CHANGE UP (ref 0–4)
RBC CASTS # UR COMP ASSIST: 951 /HPF — HIGH (ref 0–4)
RBC CASTS # UR COMP ASSIST: 951 /HPF — HIGH (ref 0–4)
RETICS #: 94.9 K/UL — SIGNIFICANT CHANGE UP (ref 25–125)
RETICS/RBC NFR: 2.7 % — HIGH (ref 0.5–2.5)
REVIEW: SIGNIFICANT CHANGE UP
RH IG SCN BLD-IMP: POSITIVE — SIGNIFICANT CHANGE UP
RSV RNA SPEC QL NAA+PROBE: SIGNIFICANT CHANGE UP
RSV RNA SPEC QL NAA+PROBE: SIGNIFICANT CHANGE UP
RV+EV RNA SPEC QL NAA+PROBE: SIGNIFICANT CHANGE UP
RV+EV RNA SPEC QL NAA+PROBE: SIGNIFICANT CHANGE UP
S AUREUS DNA NOSE QL NAA+PROBE: SIGNIFICANT CHANGE UP
S AUREUS DNA NOSE QL NAA+PROBE: SIGNIFICANT CHANGE UP
SAO2 % BLDV: 77.2 % — SIGNIFICANT CHANGE UP (ref 67–88)
SARS-COV-2 RNA SPEC QL NAA+PROBE: SIGNIFICANT CHANGE UP
SARS-COV-2 RNA SPEC QL NAA+PROBE: SIGNIFICANT CHANGE UP
SCHISTOCYTES BLD QL AUTO: SLIGHT — SIGNIFICANT CHANGE UP
SODIUM SERPL-SCNC: 132 MMOL/L — LOW (ref 135–145)
SODIUM SERPL-SCNC: 132 MMOL/L — LOW (ref 135–145)
SODIUM SERPL-SCNC: 135 MMOL/L — SIGNIFICANT CHANGE UP (ref 135–145)
SODIUM SERPL-SCNC: 137 MMOL/L — SIGNIFICANT CHANGE UP (ref 135–145)
SODIUM SERPL-SCNC: 138 MMOL/L — SIGNIFICANT CHANGE UP (ref 135–145)
SODIUM SERPL-SCNC: 139 MMOL/L — SIGNIFICANT CHANGE UP (ref 135–145)
SODIUM SERPL-SCNC: 140 MMOL/L — SIGNIFICANT CHANGE UP (ref 135–145)
SODIUM SERPL-SCNC: 141 MMOL/L — SIGNIFICANT CHANGE UP (ref 135–145)
SODIUM SERPL-SCNC: 142 MMOL/L — SIGNIFICANT CHANGE UP (ref 135–145)
SP GR SPEC: 1.02 — SIGNIFICANT CHANGE UP (ref 1–1.03)
SP GR SPEC: 1.02 — SIGNIFICANT CHANGE UP (ref 1–1.03)
SP GR SPEC: 1.03 — HIGH (ref 1–1.03)
SP GR SPEC: 1.04 — HIGH (ref 1–1.03)
SP GR SPEC: 1.04 — HIGH (ref 1–1.03)
SPECIMEN SOURCE: SIGNIFICANT CHANGE UP
SQUAMOUS # UR AUTO: 1 /HPF — SIGNIFICANT CHANGE UP (ref 0–5)
SQUAMOUS # UR AUTO: 11 /HPF — HIGH (ref 0–5)
SQUAMOUS # UR AUTO: 11 /HPF — HIGH (ref 0–5)
SQUAMOUS # UR AUTO: 2 /HPF — SIGNIFICANT CHANGE UP (ref 0–5)
SQUAMOUS # UR AUTO: 8 /HPF — HIGH (ref 0–5)
TIBC SERPL-MCNC: 197 UG/DL — LOW (ref 220–430)
TIBC SERPL-MCNC: 197 UG/DL — LOW (ref 220–430)
TRANSFERRIN SERPL-MCNC: 157 MG/DL — LOW (ref 200–360)
TRANSFERRIN SERPL-MCNC: 157 MG/DL — LOW (ref 200–360)
TRIGL SERPL-MCNC: 66 MG/DL — SIGNIFICANT CHANGE UP
TRIGL SERPL-MCNC: 66 MG/DL — SIGNIFICANT CHANGE UP
TSH SERPL-MCNC: 1.27 UIU/ML — SIGNIFICANT CHANGE UP (ref 0.27–4.2)
TSH SERPL-MCNC: 1.27 UIU/ML — SIGNIFICANT CHANGE UP (ref 0.27–4.2)
UIBC SERPL-MCNC: 175 UG/DL — SIGNIFICANT CHANGE UP (ref 110–370)
UIBC SERPL-MCNC: 175 UG/DL — SIGNIFICANT CHANGE UP (ref 110–370)
UROBILINOGEN FLD QL: 0.2 MG/DL — SIGNIFICANT CHANGE UP (ref 0.2–1)
VARIANT LYMPHS # BLD: 4.5 % — SIGNIFICANT CHANGE UP (ref 0–6)
VIT D25+D1,25 OH+D1,25 PNL SERPL-MCNC: 23.3 PG/ML — SIGNIFICANT CHANGE UP (ref 19.9–79.3)
VIT D25+D1,25 OH+D1,25 PNL SERPL-MCNC: 23.3 PG/ML — SIGNIFICANT CHANGE UP (ref 19.9–79.3)
WBC # BLD: 13.04 K/UL — HIGH (ref 3.8–10.5)
WBC # BLD: 13.91 K/UL — HIGH (ref 3.8–10.5)
WBC # BLD: 13.91 K/UL — HIGH (ref 3.8–10.5)
WBC # BLD: 14.23 K/UL — HIGH (ref 3.8–10.5)
WBC # BLD: 14.45 K/UL — HIGH (ref 3.8–10.5)
WBC # BLD: 14.45 K/UL — HIGH (ref 3.8–10.5)
WBC # BLD: 14.86 K/UL — HIGH (ref 3.8–10.5)
WBC # BLD: 15.13 K/UL — HIGH (ref 3.8–10.5)
WBC # BLD: 15.13 K/UL — HIGH (ref 3.8–10.5)
WBC # BLD: 15.43 K/UL — HIGH (ref 3.8–10.5)
WBC # BLD: 15.43 K/UL — HIGH (ref 3.8–10.5)
WBC # BLD: 15.6 K/UL — HIGH (ref 3.8–10.5)
WBC # BLD: 15.83 K/UL — HIGH (ref 3.8–10.5)
WBC # BLD: 16.34 K/UL — HIGH (ref 3.8–10.5)
WBC # BLD: 16.35 K/UL — HIGH (ref 3.8–10.5)
WBC # BLD: 16.35 K/UL — HIGH (ref 3.8–10.5)
WBC # BLD: 16.99 K/UL — HIGH (ref 3.8–10.5)
WBC # BLD: 17.06 K/UL — HIGH (ref 3.8–10.5)
WBC # BLD: 18.53 K/UL — HIGH (ref 3.8–10.5)
WBC # BLD: 18.75 K/UL — HIGH (ref 3.8–10.5)
WBC # BLD: 18.96 K/UL — HIGH (ref 3.8–10.5)
WBC # BLD: 19.05 K/UL — HIGH (ref 3.8–10.5)
WBC # BLD: 19.21 K/UL — HIGH (ref 3.8–10.5)
WBC # BLD: 19.75 K/UL — HIGH (ref 3.8–10.5)
WBC # BLD: 20.07 K/UL — HIGH (ref 3.8–10.5)
WBC # BLD: 20.71 K/UL — HIGH (ref 3.8–10.5)
WBC # BLD: 21.59 K/UL — HIGH (ref 3.8–10.5)
WBC # FLD AUTO: 13.04 K/UL — HIGH (ref 3.8–10.5)
WBC # FLD AUTO: 13.91 K/UL — HIGH (ref 3.8–10.5)
WBC # FLD AUTO: 13.91 K/UL — HIGH (ref 3.8–10.5)
WBC # FLD AUTO: 14.23 K/UL — HIGH (ref 3.8–10.5)
WBC # FLD AUTO: 14.45 K/UL — HIGH (ref 3.8–10.5)
WBC # FLD AUTO: 14.45 K/UL — HIGH (ref 3.8–10.5)
WBC # FLD AUTO: 14.86 K/UL — HIGH (ref 3.8–10.5)
WBC # FLD AUTO: 15.13 K/UL — HIGH (ref 3.8–10.5)
WBC # FLD AUTO: 15.13 K/UL — HIGH (ref 3.8–10.5)
WBC # FLD AUTO: 15.43 K/UL — HIGH (ref 3.8–10.5)
WBC # FLD AUTO: 15.43 K/UL — HIGH (ref 3.8–10.5)
WBC # FLD AUTO: 15.6 K/UL — HIGH (ref 3.8–10.5)
WBC # FLD AUTO: 15.83 K/UL — HIGH (ref 3.8–10.5)
WBC # FLD AUTO: 16.34 K/UL — HIGH (ref 3.8–10.5)
WBC # FLD AUTO: 16.35 K/UL — HIGH (ref 3.8–10.5)
WBC # FLD AUTO: 16.35 K/UL — HIGH (ref 3.8–10.5)
WBC # FLD AUTO: 16.99 K/UL — HIGH (ref 3.8–10.5)
WBC # FLD AUTO: 17.06 K/UL — HIGH (ref 3.8–10.5)
WBC # FLD AUTO: 18.53 K/UL — HIGH (ref 3.8–10.5)
WBC # FLD AUTO: 18.75 K/UL — HIGH (ref 3.8–10.5)
WBC # FLD AUTO: 18.96 K/UL — HIGH (ref 3.8–10.5)
WBC # FLD AUTO: 19.05 K/UL — HIGH (ref 3.8–10.5)
WBC # FLD AUTO: 19.21 K/UL — HIGH (ref 3.8–10.5)
WBC # FLD AUTO: 19.75 K/UL — HIGH (ref 3.8–10.5)
WBC # FLD AUTO: 20.07 K/UL — HIGH (ref 3.8–10.5)
WBC # FLD AUTO: 20.71 K/UL — HIGH (ref 3.8–10.5)
WBC # FLD AUTO: 21.59 K/UL — HIGH (ref 3.8–10.5)
WBC UR QL: 14 /HPF — HIGH (ref 0–5)
WBC UR QL: 17 /HPF — HIGH (ref 0–5)
WBC UR QL: 5 /HPF — SIGNIFICANT CHANGE UP (ref 0–5)
WBC UR QL: 85 /HPF — HIGH (ref 0–5)
WBC UR QL: 85 /HPF — HIGH (ref 0–5)

## 2024-01-01 PROCEDURE — 50387 CHANGE NEPHROURETERAL CATH: CPT | Mod: LT

## 2024-01-01 PROCEDURE — 99232 SBSQ HOSP IP/OBS MODERATE 35: CPT

## 2024-01-01 PROCEDURE — 99239 HOSP IP/OBS DSCHRG MGMT >30: CPT

## 2024-01-01 PROCEDURE — 71260 CT THORAX DX C+: CPT | Mod: 26

## 2024-01-01 PROCEDURE — 99233 SBSQ HOSP IP/OBS HIGH 50: CPT

## 2024-01-01 PROCEDURE — G0316 PROLONG INPT EVAL ADD15 M: CPT | Mod: 25

## 2024-01-01 PROCEDURE — 93970 EXTREMITY STUDY: CPT | Mod: 26

## 2024-01-01 PROCEDURE — 92950 HEART/LUNG RESUSCITATION CPR: CPT

## 2024-01-01 PROCEDURE — 99232 SBSQ HOSP IP/OBS MODERATE 35: CPT | Mod: GC

## 2024-01-01 PROCEDURE — 36556 INSERT NON-TUNNEL CV CATH: CPT | Mod: RT

## 2024-01-01 PROCEDURE — 99223 1ST HOSP IP/OBS HIGH 75: CPT | Mod: 25

## 2024-01-01 PROCEDURE — 74177 CT ABD & PELVIS W/CONTRAST: CPT | Mod: 26

## 2024-01-01 PROCEDURE — 99497 ADVNCD CARE PLAN 30 MIN: CPT | Mod: 25

## 2024-01-01 PROCEDURE — 36556 INSERT NON-TUNNEL CV CATH: CPT

## 2024-01-01 PROCEDURE — 84165 PROTEIN E-PHORESIS SERUM: CPT | Mod: 26

## 2024-01-01 PROCEDURE — 31500 INSERT EMERGENCY AIRWAY: CPT

## 2024-01-01 PROCEDURE — 99285 EMERGENCY DEPT VISIT HI MDM: CPT | Mod: 25

## 2024-01-01 PROCEDURE — 82962 GLUCOSE BLOOD TEST: CPT

## 2024-01-01 PROCEDURE — C1751: CPT

## 2024-01-01 PROCEDURE — 99285 EMERGENCY DEPT VISIT HI MDM: CPT

## 2024-01-01 PROCEDURE — 74018 RADEX ABDOMEN 1 VIEW: CPT | Mod: 26

## 2024-01-01 PROCEDURE — 86335 IMMUNFIX E-PHORSIS/URINE/CSF: CPT | Mod: 26

## 2024-01-01 PROCEDURE — 86334 IMMUNOFIX E-PHORESIS SERUM: CPT | Mod: 26

## 2024-01-01 PROCEDURE — 99222 1ST HOSP IP/OBS MODERATE 55: CPT | Mod: GC

## 2024-01-01 PROCEDURE — 37191 INS ENDOVAS VENA CAVA FILTR: CPT

## 2024-01-01 RX ORDER — SODIUM CHLORIDE 9 MG/ML
1000 INJECTION INTRAMUSCULAR; INTRAVENOUS; SUBCUTANEOUS
Refills: 0 | Status: DISCONTINUED | OUTPATIENT
Start: 2024-01-01 | End: 2024-01-01

## 2024-01-01 RX ORDER — SODIUM CHLORIDE 9 MG/ML
500 INJECTION INTRAMUSCULAR; INTRAVENOUS; SUBCUTANEOUS
Refills: 0 | Status: DISCONTINUED | OUTPATIENT
Start: 2024-01-01 | End: 2024-01-01

## 2024-01-01 RX ORDER — CHLORHEXIDINE GLUCONATE 213 G/1000ML
1 SOLUTION TOPICAL DAILY
Refills: 0 | Status: DISCONTINUED | OUTPATIENT
Start: 2024-01-01 | End: 2024-01-01

## 2024-01-01 RX ORDER — POTASSIUM PHOSPHATE, MONOBASIC POTASSIUM PHOSPHATE, DIBASIC 236; 224 MG/ML; MG/ML
15 INJECTION, SOLUTION INTRAVENOUS ONCE
Refills: 0 | Status: COMPLETED | OUTPATIENT
Start: 2024-01-01 | End: 2024-01-01

## 2024-01-01 RX ORDER — PIPERACILLIN AND TAZOBACTAM 4; .5 G/20ML; G/20ML
3.38 INJECTION, POWDER, LYOPHILIZED, FOR SOLUTION INTRAVENOUS EVERY 8 HOURS
Refills: 0 | Status: DISCONTINUED | OUTPATIENT
Start: 2024-01-01 | End: 2024-01-01

## 2024-01-01 RX ORDER — SENNA PLUS 8.6 MG/1
2 TABLET ORAL
Qty: 0 | Refills: 0 | DISCHARGE
Start: 2024-01-01

## 2024-01-01 RX ORDER — INFLUENZA VIRUS VACCINE 15; 15; 15; 15 UG/.5ML; UG/.5ML; UG/.5ML; UG/.5ML
0.7 SUSPENSION INTRAMUSCULAR ONCE
Refills: 0 | Status: DISCONTINUED | OUTPATIENT
Start: 2024-01-01 | End: 2024-01-01

## 2024-01-01 RX ORDER — MORPHINE SULFATE 50 MG/1
4 CAPSULE, EXTENDED RELEASE ORAL EVERY 6 HOURS
Refills: 0 | Status: DISCONTINUED | OUTPATIENT
Start: 2024-01-01 | End: 2024-01-01

## 2024-01-01 RX ORDER — CHOLECALCIFEROL (VITAMIN D3) 125 MCG
2000 CAPSULE ORAL DAILY
Refills: 0 | Status: DISCONTINUED | OUTPATIENT
Start: 2024-01-01 | End: 2024-01-01

## 2024-01-01 RX ORDER — PIPERACILLIN AND TAZOBACTAM 4; .5 G/20ML; G/20ML
3.38 INJECTION, POWDER, LYOPHILIZED, FOR SOLUTION INTRAVENOUS ONCE
Refills: 0 | Status: COMPLETED | OUTPATIENT
Start: 2024-01-01 | End: 2024-01-01

## 2024-01-01 RX ORDER — SODIUM,POTASSIUM PHOSPHATES 278-250MG
1 POWDER IN PACKET (EA) ORAL ONCE
Refills: 0 | Status: COMPLETED | OUTPATIENT
Start: 2024-01-01 | End: 2024-01-01

## 2024-01-01 RX ORDER — POTASSIUM CHLORIDE 20 MEQ
40 PACKET (EA) ORAL ONCE
Refills: 0 | Status: DISCONTINUED | OUTPATIENT
Start: 2024-01-01 | End: 2024-01-01

## 2024-01-01 RX ORDER — POTASSIUM CHLORIDE 20 MEQ
40 PACKET (EA) ORAL ONCE
Refills: 0 | Status: COMPLETED | OUTPATIENT
Start: 2024-01-01 | End: 2024-01-01

## 2024-01-01 RX ORDER — POLYETHYLENE GLYCOL 3350 17 G/17G
17 POWDER, FOR SOLUTION ORAL AT BEDTIME
Refills: 0 | Status: DISCONTINUED | OUTPATIENT
Start: 2024-01-01 | End: 2024-01-01

## 2024-01-01 RX ORDER — CHLORHEXIDINE GLUCONATE 213 G/1000ML
1 SOLUTION TOPICAL
Qty: 0 | Refills: 0 | DISCHARGE
Start: 2024-01-01

## 2024-01-01 RX ORDER — ACETAMINOPHEN 500 MG
2 TABLET ORAL
Qty: 0 | Refills: 0 | DISCHARGE
Start: 2024-01-01

## 2024-01-01 RX ORDER — SODIUM,POTASSIUM PHOSPHATES 278-250MG
1 POWDER IN PACKET (EA) ORAL ONCE
Refills: 0 | Status: DISCONTINUED | OUTPATIENT
Start: 2024-01-01 | End: 2024-01-01

## 2024-01-01 RX ORDER — SODIUM,POTASSIUM PHOSPHATES 278-250MG
1 POWDER IN PACKET (EA) ORAL
Refills: 0 | Status: COMPLETED | OUTPATIENT
Start: 2024-01-01 | End: 2024-01-01

## 2024-01-01 RX ORDER — CHOLECALCIFEROL (VITAMIN D3) 125 MCG
2000 CAPSULE ORAL
Qty: 0 | Refills: 0 | DISCHARGE
Start: 2024-01-01

## 2024-01-01 RX ORDER — ASCORBIC ACID 60 MG
1 TABLET,CHEWABLE ORAL
Qty: 0 | Refills: 0 | DISCHARGE
Start: 2024-01-01

## 2024-01-01 RX ORDER — ASCORBIC ACID 60 MG
500 TABLET,CHEWABLE ORAL DAILY
Refills: 0 | Status: DISCONTINUED | OUTPATIENT
Start: 2024-01-01 | End: 2024-01-01

## 2024-01-01 RX ORDER — SODIUM CHLORIDE 9 MG/ML
500 INJECTION INTRAMUSCULAR; INTRAVENOUS; SUBCUTANEOUS ONCE
Refills: 0 | Status: DISCONTINUED | OUTPATIENT
Start: 2024-01-01 | End: 2024-01-01

## 2024-01-01 RX ORDER — POTASSIUM PHOSPHATE, MONOBASIC POTASSIUM PHOSPHATE, DIBASIC 236; 224 MG/ML; MG/ML
30 INJECTION, SOLUTION INTRAVENOUS ONCE
Refills: 0 | Status: COMPLETED | OUTPATIENT
Start: 2024-01-01 | End: 2024-01-01

## 2024-01-01 RX ORDER — SODIUM CHLORIDE 9 MG/ML
1000 INJECTION, SOLUTION INTRAVENOUS
Refills: 0 | Status: DISCONTINUED | OUTPATIENT
Start: 2024-01-01 | End: 2024-01-01

## 2024-01-01 RX ORDER — OXYCODONE HYDROCHLORIDE 5 MG/1
5 TABLET ORAL EVERY 4 HOURS
Refills: 0 | Status: DISCONTINUED | OUTPATIENT
Start: 2024-01-01 | End: 2024-01-01

## 2024-01-01 RX ORDER — ACETAMINOPHEN 500 MG
1000 TABLET ORAL ONCE
Refills: 0 | Status: COMPLETED | OUTPATIENT
Start: 2024-01-01 | End: 2024-01-01

## 2024-01-01 RX ORDER — SODIUM CHLORIDE 9 MG/ML
1000 INJECTION INTRAMUSCULAR; INTRAVENOUS; SUBCUTANEOUS ONCE
Refills: 0 | Status: COMPLETED | OUTPATIENT
Start: 2024-01-01 | End: 2024-01-01

## 2024-01-01 RX ORDER — FUROSEMIDE 40 MG
0 TABLET ORAL
Qty: 0 | Refills: 0 | DISCHARGE

## 2024-01-01 RX ORDER — APIXABAN 2.5 MG/1
5 TABLET, FILM COATED ORAL
Refills: 0 | Status: DISCONTINUED | OUTPATIENT
Start: 2024-01-01 | End: 2024-01-01

## 2024-01-01 RX ORDER — HEPARIN SODIUM 5000 [USP'U]/ML
INJECTION INTRAVENOUS; SUBCUTANEOUS
Qty: 25000 | Refills: 0 | Status: DISCONTINUED | OUTPATIENT
Start: 2024-01-01 | End: 2024-01-01

## 2024-01-01 RX ORDER — ZOLEDRONIC ACID 5 MG/100ML
4 INJECTION, SOLUTION INTRAVENOUS ONCE
Refills: 0 | Status: COMPLETED | OUTPATIENT
Start: 2024-01-01 | End: 2024-01-01

## 2024-01-01 RX ORDER — SENNA PLUS 8.6 MG/1
2 TABLET ORAL AT BEDTIME
Refills: 0 | Status: DISCONTINUED | OUTPATIENT
Start: 2024-01-01 | End: 2024-01-01

## 2024-01-01 RX ORDER — FERROUS SULFATE 325(65) MG
325 TABLET ORAL DAILY
Refills: 0 | Status: DISCONTINUED | OUTPATIENT
Start: 2024-01-01 | End: 2024-01-01

## 2024-01-01 RX ORDER — FERROUS SULFATE 325(65) MG
1 TABLET ORAL
Qty: 0 | Refills: 0 | DISCHARGE
Start: 2024-01-01

## 2024-01-01 RX ORDER — MAGNESIUM SULFATE 500 MG/ML
1 VIAL (ML) INJECTION ONCE
Refills: 0 | Status: COMPLETED | OUTPATIENT
Start: 2024-01-01 | End: 2024-01-01

## 2024-01-01 RX ORDER — ACETAMINOPHEN 500 MG
650 TABLET ORAL EVERY 6 HOURS
Refills: 0 | Status: DISCONTINUED | OUTPATIENT
Start: 2024-01-01 | End: 2024-01-01

## 2024-01-01 RX ORDER — VANCOMYCIN HCL 1 G
1000 VIAL (EA) INTRAVENOUS EVERY 12 HOURS
Refills: 0 | Status: DISCONTINUED | OUTPATIENT
Start: 2024-01-01 | End: 2024-01-01

## 2024-01-01 RX ORDER — OMEPRAZOLE 10 MG/1
0 CAPSULE, DELAYED RELEASE ORAL
Qty: 0 | Refills: 0 | DISCHARGE

## 2024-01-01 RX ADMIN — PIPERACILLIN AND TAZOBACTAM 200 GRAM(S): 4; .5 INJECTION, POWDER, LYOPHILIZED, FOR SOLUTION INTRAVENOUS at 01:34

## 2024-01-01 RX ADMIN — PIPERACILLIN AND TAZOBACTAM 200 GRAM(S): 4; .5 INJECTION, POWDER, LYOPHILIZED, FOR SOLUTION INTRAVENOUS at 03:56

## 2024-01-01 RX ADMIN — Medication 63.75 MILLIMOLE(S): at 01:15

## 2024-01-01 RX ADMIN — Medication 2000 UNIT(S): at 11:50

## 2024-01-01 RX ADMIN — Medication 400 MILLIGRAM(S): at 04:52

## 2024-01-01 RX ADMIN — PIPERACILLIN AND TAZOBACTAM 25 GRAM(S): 4; .5 INJECTION, POWDER, LYOPHILIZED, FOR SOLUTION INTRAVENOUS at 05:36

## 2024-01-01 RX ADMIN — Medication 500 MILLIGRAM(S): at 12:56

## 2024-01-01 RX ADMIN — Medication 40 MILLIEQUIVALENT(S): at 13:13

## 2024-01-01 RX ADMIN — SODIUM CHLORIDE 100 MILLILITER(S): 9 INJECTION, SOLUTION INTRAVENOUS at 06:47

## 2024-01-01 RX ADMIN — Medication 650 MILLIGRAM(S): at 00:56

## 2024-01-01 RX ADMIN — PIPERACILLIN AND TAZOBACTAM 25 GRAM(S): 4; .5 INJECTION, POWDER, LYOPHILIZED, FOR SOLUTION INTRAVENOUS at 05:56

## 2024-01-01 RX ADMIN — PIPERACILLIN AND TAZOBACTAM 25 GRAM(S): 4; .5 INJECTION, POWDER, LYOPHILIZED, FOR SOLUTION INTRAVENOUS at 21:50

## 2024-01-01 RX ADMIN — Medication 2000 UNIT(S): at 17:17

## 2024-01-01 RX ADMIN — Medication 500 MILLIGRAM(S): at 11:15

## 2024-01-01 RX ADMIN — Medication 1000 MILLIGRAM(S): at 00:17

## 2024-01-01 RX ADMIN — Medication 325 MILLIGRAM(S): at 11:15

## 2024-01-01 RX ADMIN — PIPERACILLIN AND TAZOBACTAM 25 GRAM(S): 4; .5 INJECTION, POWDER, LYOPHILIZED, FOR SOLUTION INTRAVENOUS at 05:40

## 2024-01-01 RX ADMIN — Medication 63.75 MILLIMOLE(S): at 09:25

## 2024-01-01 RX ADMIN — Medication 2000 UNIT(S): at 12:33

## 2024-01-01 RX ADMIN — Medication 2000 UNIT(S): at 11:49

## 2024-01-01 RX ADMIN — Medication 1 TABLET(S): at 17:14

## 2024-01-01 RX ADMIN — PIPERACILLIN AND TAZOBACTAM 25 GRAM(S): 4; .5 INJECTION, POWDER, LYOPHILIZED, FOR SOLUTION INTRAVENOUS at 21:49

## 2024-01-01 RX ADMIN — Medication 1 TABLET(S): at 12:25

## 2024-01-01 RX ADMIN — APIXABAN 5 MILLIGRAM(S): 2.5 TABLET, FILM COATED ORAL at 05:56

## 2024-01-01 RX ADMIN — Medication 1 TABLET(S): at 11:15

## 2024-01-01 RX ADMIN — Medication 2000 UNIT(S): at 13:02

## 2024-01-01 RX ADMIN — PIPERACILLIN AND TAZOBACTAM 25 GRAM(S): 4; .5 INJECTION, POWDER, LYOPHILIZED, FOR SOLUTION INTRAVENOUS at 05:52

## 2024-01-01 RX ADMIN — Medication 40 MILLIEQUIVALENT(S): at 04:28

## 2024-01-01 RX ADMIN — APIXABAN 5 MILLIGRAM(S): 2.5 TABLET, FILM COATED ORAL at 08:58

## 2024-01-01 RX ADMIN — Medication 1 PACKET(S): at 05:14

## 2024-01-01 RX ADMIN — OXYCODONE HYDROCHLORIDE 5 MILLIGRAM(S): 5 TABLET ORAL at 12:04

## 2024-01-01 RX ADMIN — ZOLEDRONIC ACID 4 MILLIGRAM(S): 5 INJECTION, SOLUTION INTRAVENOUS at 18:02

## 2024-01-01 RX ADMIN — PIPERACILLIN AND TAZOBACTAM 25 GRAM(S): 4; .5 INJECTION, POWDER, LYOPHILIZED, FOR SOLUTION INTRAVENOUS at 22:10

## 2024-01-01 RX ADMIN — Medication 85 MILLIMOLE(S): at 11:16

## 2024-01-01 RX ADMIN — HEPARIN SODIUM 1300 UNIT(S)/HR: 5000 INJECTION INTRAVENOUS; SUBCUTANEOUS at 21:07

## 2024-01-01 RX ADMIN — Medication 650 MILLIGRAM(S): at 02:56

## 2024-01-01 RX ADMIN — Medication 650 MILLIGRAM(S): at 01:46

## 2024-01-01 RX ADMIN — CHLORHEXIDINE GLUCONATE 1 APPLICATION(S): 213 SOLUTION TOPICAL at 11:39

## 2024-01-01 RX ADMIN — Medication 325 MILLIGRAM(S): at 12:56

## 2024-01-01 RX ADMIN — Medication 2000 UNIT(S): at 11:56

## 2024-01-01 RX ADMIN — POTASSIUM PHOSPHATE, MONOBASIC POTASSIUM PHOSPHATE, DIBASIC 62.5 MILLIMOLE(S): 236; 224 INJECTION, SOLUTION INTRAVENOUS at 16:57

## 2024-01-01 RX ADMIN — PIPERACILLIN AND TAZOBACTAM 25 GRAM(S): 4; .5 INJECTION, POWDER, LYOPHILIZED, FOR SOLUTION INTRAVENOUS at 05:33

## 2024-01-01 RX ADMIN — CHLORHEXIDINE GLUCONATE 1 APPLICATION(S): 213 SOLUTION TOPICAL at 12:34

## 2024-01-01 RX ADMIN — APIXABAN 5 MILLIGRAM(S): 2.5 TABLET, FILM COATED ORAL at 17:14

## 2024-01-01 RX ADMIN — Medication 2000 UNIT(S): at 12:55

## 2024-01-01 RX ADMIN — PIPERACILLIN AND TAZOBACTAM 25 GRAM(S): 4; .5 INJECTION, POWDER, LYOPHILIZED, FOR SOLUTION INTRAVENOUS at 05:30

## 2024-01-01 RX ADMIN — PIPERACILLIN AND TAZOBACTAM 25 GRAM(S): 4; .5 INJECTION, POWDER, LYOPHILIZED, FOR SOLUTION INTRAVENOUS at 13:19

## 2024-01-01 RX ADMIN — OXYCODONE HYDROCHLORIDE 5 MILLIGRAM(S): 5 TABLET ORAL at 13:00

## 2024-01-01 RX ADMIN — Medication 2000 UNIT(S): at 11:01

## 2024-01-01 RX ADMIN — PIPERACILLIN AND TAZOBACTAM 25 GRAM(S): 4; .5 INJECTION, POWDER, LYOPHILIZED, FOR SOLUTION INTRAVENOUS at 15:21

## 2024-01-01 RX ADMIN — SODIUM CHLORIDE 1000 MILLILITER(S): 9 INJECTION INTRAMUSCULAR; INTRAVENOUS; SUBCUTANEOUS at 02:07

## 2024-01-01 RX ADMIN — APIXABAN 5 MILLIGRAM(S): 2.5 TABLET, FILM COATED ORAL at 17:32

## 2024-01-01 RX ADMIN — APIXABAN 5 MILLIGRAM(S): 2.5 TABLET, FILM COATED ORAL at 17:22

## 2024-01-01 RX ADMIN — CHLORHEXIDINE GLUCONATE 1 APPLICATION(S): 213 SOLUTION TOPICAL at 11:52

## 2024-01-01 RX ADMIN — PIPERACILLIN AND TAZOBACTAM 25 GRAM(S): 4; .5 INJECTION, POWDER, LYOPHILIZED, FOR SOLUTION INTRAVENOUS at 22:54

## 2024-01-01 RX ADMIN — Medication 250 MILLIGRAM(S): at 05:56

## 2024-01-01 RX ADMIN — PIPERACILLIN AND TAZOBACTAM 25 GRAM(S): 4; .5 INJECTION, POWDER, LYOPHILIZED, FOR SOLUTION INTRAVENOUS at 22:09

## 2024-01-01 RX ADMIN — PIPERACILLIN AND TAZOBACTAM 25 GRAM(S): 4; .5 INJECTION, POWDER, LYOPHILIZED, FOR SOLUTION INTRAVENOUS at 21:06

## 2024-01-01 RX ADMIN — SODIUM CHLORIDE 75 MILLILITER(S): 9 INJECTION INTRAMUSCULAR; INTRAVENOUS; SUBCUTANEOUS at 22:55

## 2024-01-01 RX ADMIN — Medication 2000 UNIT(S): at 11:33

## 2024-01-01 RX ADMIN — PIPERACILLIN AND TAZOBACTAM 25 GRAM(S): 4; .5 INJECTION, POWDER, LYOPHILIZED, FOR SOLUTION INTRAVENOUS at 23:09

## 2024-01-01 RX ADMIN — Medication 1 TABLET(S): at 05:33

## 2024-01-01 RX ADMIN — CHLORHEXIDINE GLUCONATE 1 APPLICATION(S): 213 SOLUTION TOPICAL at 12:56

## 2024-01-01 RX ADMIN — Medication 650 MILLIGRAM(S): at 11:14

## 2024-01-01 RX ADMIN — POTASSIUM PHOSPHATE, MONOBASIC POTASSIUM PHOSPHATE, DIBASIC 83.33 MILLIMOLE(S): 236; 224 INJECTION, SOLUTION INTRAVENOUS at 17:39

## 2024-01-01 RX ADMIN — Medication 2000 UNIT(S): at 12:05

## 2024-01-01 RX ADMIN — PIPERACILLIN AND TAZOBACTAM 25 GRAM(S): 4; .5 INJECTION, POWDER, LYOPHILIZED, FOR SOLUTION INTRAVENOUS at 06:29

## 2024-01-01 RX ADMIN — HEPARIN SODIUM 1200 UNIT(S)/HR: 5000 INJECTION INTRAVENOUS; SUBCUTANEOUS at 08:25

## 2024-01-01 RX ADMIN — CHLORHEXIDINE GLUCONATE 1 APPLICATION(S): 213 SOLUTION TOPICAL at 11:57

## 2024-01-01 RX ADMIN — Medication 63.75 MILLIMOLE(S): at 13:19

## 2024-01-01 RX ADMIN — PIPERACILLIN AND TAZOBACTAM 25 GRAM(S): 4; .5 INJECTION, POWDER, LYOPHILIZED, FOR SOLUTION INTRAVENOUS at 14:04

## 2024-01-01 RX ADMIN — Medication 2000 UNIT(S): at 12:56

## 2024-01-01 RX ADMIN — Medication 2000 UNIT(S): at 17:14

## 2024-01-01 RX ADMIN — CHLORHEXIDINE GLUCONATE 1 APPLICATION(S): 213 SOLUTION TOPICAL at 13:02

## 2024-01-01 RX ADMIN — CHLORHEXIDINE GLUCONATE 1 APPLICATION(S): 213 SOLUTION TOPICAL at 11:15

## 2024-01-01 RX ADMIN — Medication 100 GRAM(S): at 00:26

## 2024-01-01 RX ADMIN — PIPERACILLIN AND TAZOBACTAM 25 GRAM(S): 4; .5 INJECTION, POWDER, LYOPHILIZED, FOR SOLUTION INTRAVENOUS at 13:45

## 2024-01-01 RX ADMIN — Medication 2000 UNIT(S): at 11:58

## 2024-01-01 RX ADMIN — PIPERACILLIN AND TAZOBACTAM 25 GRAM(S): 4; .5 INJECTION, POWDER, LYOPHILIZED, FOR SOLUTION INTRAVENOUS at 12:25

## 2024-01-01 RX ADMIN — CHLORHEXIDINE GLUCONATE 1 APPLICATION(S): 213 SOLUTION TOPICAL at 12:07

## 2024-01-01 RX ADMIN — SODIUM CHLORIDE 100 MILLILITER(S): 9 INJECTION INTRAMUSCULAR; INTRAVENOUS; SUBCUTANEOUS at 08:36

## 2024-01-01 RX ADMIN — Medication 1000 MILLIGRAM(S): at 05:47

## 2024-01-01 RX ADMIN — HEPARIN SODIUM 1300 UNIT(S)/HR: 5000 INJECTION INTRAVENOUS; SUBCUTANEOUS at 00:58

## 2024-01-01 RX ADMIN — HEPARIN SODIUM 1300 UNIT(S)/HR: 5000 INJECTION INTRAVENOUS; SUBCUTANEOUS at 20:12

## 2024-01-01 RX ADMIN — Medication 650 MILLIGRAM(S): at 01:56

## 2024-01-01 RX ADMIN — PIPERACILLIN AND TAZOBACTAM 25 GRAM(S): 4; .5 INJECTION, POWDER, LYOPHILIZED, FOR SOLUTION INTRAVENOUS at 13:14

## 2024-01-01 RX ADMIN — PIPERACILLIN AND TAZOBACTAM 25 GRAM(S): 4; .5 INJECTION, POWDER, LYOPHILIZED, FOR SOLUTION INTRAVENOUS at 05:19

## 2024-01-01 RX ADMIN — Medication 40 MILLIEQUIVALENT(S): at 11:16

## 2024-01-01 RX ADMIN — Medication 2000 UNIT(S): at 13:20

## 2024-01-01 RX ADMIN — PIPERACILLIN AND TAZOBACTAM 25 GRAM(S): 4; .5 INJECTION, POWDER, LYOPHILIZED, FOR SOLUTION INTRAVENOUS at 21:44

## 2024-01-01 RX ADMIN — Medication 40 MILLIEQUIVALENT(S): at 21:35

## 2024-01-01 RX ADMIN — CHLORHEXIDINE GLUCONATE 1 APPLICATION(S): 213 SOLUTION TOPICAL at 13:20

## 2024-01-01 RX ADMIN — PIPERACILLIN AND TAZOBACTAM 25 GRAM(S): 4; .5 INJECTION, POWDER, LYOPHILIZED, FOR SOLUTION INTRAVENOUS at 05:28

## 2024-01-01 RX ADMIN — Medication 1 PACKET(S): at 17:12

## 2024-01-01 RX ADMIN — Medication 2000 UNIT(S): at 15:21

## 2024-01-01 RX ADMIN — PIPERACILLIN AND TAZOBACTAM 25 GRAM(S): 4; .5 INJECTION, POWDER, LYOPHILIZED, FOR SOLUTION INTRAVENOUS at 12:00

## 2024-01-01 RX ADMIN — Medication 1 PACKET(S): at 12:33

## 2024-01-01 RX ADMIN — PIPERACILLIN AND TAZOBACTAM 25 GRAM(S): 4; .5 INJECTION, POWDER, LYOPHILIZED, FOR SOLUTION INTRAVENOUS at 13:00

## 2024-01-01 RX ADMIN — POTASSIUM PHOSPHATE, MONOBASIC POTASSIUM PHOSPHATE, DIBASIC 83.33 MILLIMOLE(S): 236; 224 INJECTION, SOLUTION INTRAVENOUS at 17:02

## 2024-01-01 RX ADMIN — PIPERACILLIN AND TAZOBACTAM 25 GRAM(S): 4; .5 INJECTION, POWDER, LYOPHILIZED, FOR SOLUTION INTRAVENOUS at 06:11

## 2024-01-01 RX ADMIN — PIPERACILLIN AND TAZOBACTAM 25 GRAM(S): 4; .5 INJECTION, POWDER, LYOPHILIZED, FOR SOLUTION INTRAVENOUS at 15:10

## 2024-01-01 RX ADMIN — HEPARIN SODIUM 1200 UNIT(S)/HR: 5000 INJECTION INTRAVENOUS; SUBCUTANEOUS at 05:07

## 2024-01-01 RX ADMIN — Medication 63.75 MILLIMOLE(S): at 15:37

## 2024-01-01 RX ADMIN — CHLORHEXIDINE GLUCONATE 1 APPLICATION(S): 213 SOLUTION TOPICAL at 12:35

## 2024-01-01 RX ADMIN — HEPARIN SODIUM 1300 UNIT(S)/HR: 5000 INJECTION INTRAVENOUS; SUBCUTANEOUS at 14:43

## 2024-01-01 RX ADMIN — POTASSIUM PHOSPHATE, MONOBASIC POTASSIUM PHOSPHATE, DIBASIC 83.33 MILLIMOLE(S): 236; 224 INJECTION, SOLUTION INTRAVENOUS at 10:21

## 2024-01-01 RX ADMIN — PIPERACILLIN AND TAZOBACTAM 25 GRAM(S): 4; .5 INJECTION, POWDER, LYOPHILIZED, FOR SOLUTION INTRAVENOUS at 21:48

## 2024-01-01 RX ADMIN — Medication 1 TABLET(S): at 12:56

## 2024-01-01 RX ADMIN — APIXABAN 5 MILLIGRAM(S): 2.5 TABLET, FILM COATED ORAL at 05:34

## 2024-01-01 RX ADMIN — CHLORHEXIDINE GLUCONATE 1 APPLICATION(S): 213 SOLUTION TOPICAL at 11:01

## 2024-01-01 RX ADMIN — Medication 400 MILLIGRAM(S): at 23:47

## 2024-01-01 RX ADMIN — SODIUM CHLORIDE 250 MILLILITER(S): 9 INJECTION INTRAMUSCULAR; INTRAVENOUS; SUBCUTANEOUS at 03:57

## 2024-01-01 RX ADMIN — Medication 1 TABLET(S): at 17:33

## 2024-01-01 RX ADMIN — HEPARIN SODIUM 1300 UNIT(S)/HR: 5000 INJECTION INTRAVENOUS; SUBCUTANEOUS at 03:57

## 2024-01-01 RX ADMIN — PIPERACILLIN AND TAZOBACTAM 25 GRAM(S): 4; .5 INJECTION, POWDER, LYOPHILIZED, FOR SOLUTION INTRAVENOUS at 13:02

## 2024-01-01 RX ADMIN — Medication 650 MILLIGRAM(S): at 12:14

## 2024-01-01 RX ADMIN — HEPARIN SODIUM 1300 UNIT(S)/HR: 5000 INJECTION INTRAVENOUS; SUBCUTANEOUS at 10:50

## 2024-01-01 RX ADMIN — APIXABAN 5 MILLIGRAM(S): 2.5 TABLET, FILM COATED ORAL at 06:12

## 2024-01-01 RX ADMIN — Medication 2000 UNIT(S): at 11:39

## 2024-01-01 RX ADMIN — Medication 1 PACKET(S): at 09:11

## 2024-01-01 RX ADMIN — Medication 2000 UNIT(S): at 11:15

## 2024-01-01 RX ADMIN — CHLORHEXIDINE GLUCONATE 1 APPLICATION(S): 213 SOLUTION TOPICAL at 11:33

## 2024-01-01 RX ADMIN — APIXABAN 5 MILLIGRAM(S): 2.5 TABLET, FILM COATED ORAL at 21:10

## 2024-01-10 NOTE — ED ADULT NURSE NOTE - OBJECTIVE STATEMENT
pt left nephrostomy tube removed. right nephrostomy tube remains in place and functional at this time. pt resting in stretcher in NAD, RR even and unlabored. daughter at bedside pt recently discharged from [facility]. left nephrostomy tube removed accidentally at home during wheelchair transfer. right nephrostomy tube remains in place and functional at this time. pt resting in stretcher in NAD, RR even and unlabored. daughter at bedside pt recently discharged from [facility]. left nephrostomy tube removed accidentally at home during wheelchair transfer. right nephrostomy tube remains in place and functional at this time. pt resting in stretcher in NAD, RR even and unlabored. daughter at bedside

## 2024-01-10 NOTE — ED ADULT TRIAGE NOTE - CHIEF COMPLAINT QUOTE
Pt arrives to ED from walk in reporting left sided nephrostomy tube pulled out and family states pt was bleeding from site.  RN placed gauze over site.  Pt has functional nephrostomy tube also to right side.  Hx of DVT.  Pt stopped taking blood thinners 2 weeks ago.

## 2024-01-10 NOTE — ED ADULT NURSE NOTE - NSFALLRISKINTERV_ED_ALL_ED
Assistance OOB with selected safe patient handling equipment if applicable/Assistance with ambulation/Communicate fall risk and risk factors to all staff, patient, and family/Provide patient with walking aids/Provide visual cue: yellow wristband, yellow gown, etc/Reinforce activity limits and safety measures with patient and family/Call bell, personal items and telephone in reach/Instruct patient to call for assistance before getting out of bed/chair/stretcher/Non-slip footwear applied when patient is off stretcher/[place] to call system/Physically safe environment - no spills, clutter or unnecessary equipment/Purposeful Proactive Rounding/Room/bathroom lighting operational, light cord in reach Assistance OOB with selected safe patient handling equipment if applicable/Assistance with ambulation/Communicate fall risk and risk factors to all staff, patient, and family/Provide patient with walking aids/Provide visual cue: yellow wristband, yellow gown, etc/Reinforce activity limits and safety measures with patient and family/Call bell, personal items and telephone in reach/Instruct patient to call for assistance before getting out of bed/chair/stretcher/Non-slip footwear applied when patient is off stretcher/[place] to call system/Physically safe environment - no spills, clutter or unnecessary equipment/Purposeful Proactive Rounding/Room/bathroom lighting operational, light cord in reach

## 2024-01-10 NOTE — ED CLERICAL - NS ED CARE COORDINATION INFORMATION
This patient is eligible for (or currently enrolled in) an outpatient care management program available through [facility]. This program can coordinate outpatient follow up and assist the patient in accessing a variety of outpatient resources.  If discharged from the ED, the patient will be contacted to see if any additional resources are needed.                                                                                    Please call the Nurse Clinical Call Center at [phone] with any questions or for assistance in discharge planning. This patient is eligible for (or currently enrolled in) an outpatient care management program available through [facility]. This program can coordinate outpatient follow up and assist the patient in accessing a variety of outpatient resources.  If discharged from the ED, the patient will be contacted to see if any additional resources are needed.                                                                                    Please call the Nurse Clinical Call Center at [phone] with any questions or for assistance in discharge planning.

## 2024-01-11 NOTE — H&P ADULT - NSICDXPASTMEDICALHX_GEN_ALL_CORE_FT
PAST MEDICAL HISTORY:  Colon adenocarcinoma     DVT (deep venous thrombosis)     HLD (hyperlipidemia)     Urothelial carcinoma of bladder

## 2024-01-11 NOTE — H&P ADULT - HISTORY OF PRESENT ILLNESS
Ms. Chen is a 73 year old F with history of HLD (not on med currently), DVT (recurrent, a few years ago with coumadin x6 months, left sided, now with right DVT on eliquis, but recently told at [facility] to discontinue eliquis?), invasive urothelial carcinoma both conventionally and focally sarcomatoid subtype  and high grade carcinoma invaded muscularis propria and well differentiated adeno colon cancer T4 N1 Mo, stage III, MSI intact), s/p TURBT who presents for admission with dislodged left nephrostomy tube.  She has a history of severe bilateral hydronephrosis due to bladder obstruction. The patient was admitted to the ICU at that time and was given HDx1. The patient then underwent TURBT with Dr. Bradford 10/12/2023. The patient reportedly recently has had an admission at [facility] from 12/26-01/10. The patient was admitted for UTI and blocked nephrostomy tubes and given antibiotics at that time. The rest of the admission at [facility] is unknown (no records and patient did not want night admitting team to call daughter overnight to not wake her up). When she returned home yesterday the patient slid out of the wheelchair and onto the floor. EMS noted bleeding from the left back with dislodgement of the nephrostomy tube. Denies any fever, chills, cough, chest congestion, diarrhea, nausea, vomiting, melena, hematochezia. shortness of breath, chest pain, back pain. The patient is currently wheelchair bound at this time. Per the patient she has a history of being on coumadin a few years ago initially for a left DVT and was on it for 6 months. She was recently started on eliquis with right DVT in October. She denies any recent hematuria or melena. She reports recently over last couple weeks being taken off of eliquis and was unable to tell me why. Labs were sig for the following: WBC 16.35, H/H 8.9/29.2 (7.9/25.1-October 18, 2023). MCV 77.9, neutrophilic 79.3%, Na 132, Cl 97, BUN/Cr 19/0.49, , la 10.7, (previously 8.6), TP 7.3, Albumin 2.7, phos 2.3. CT abdomen, pelvis and chest showed thrombus in right common femoral vein extending into superficial vein (not seen on 03/2023), increase in hydronephrosis despite perc nephrostomy tubes and ureteral stent, prominent soft tissue in posterior bladder extending into ureters inseparable from vaginal cuff adjacent fat increased size of right external iliac LNs.  Ms. Cehn is a 73 year old F with history of HLD (not on med currently), DVT (recurrent, a few years ago with coumadin x6 months, left sided, now with right DVT on eliquis, but recently told at [facility] to discontinue eliquis?), invasive urothelial carcinoma both conventionally and focally sarcomatoid subtype  and high grade carcinoma invaded muscularis propria and well differentiated adeno colon cancer T4 N1 Mo, stage III, MSI intact), s/p TURBT who presents for admission with dislodged left nephrostomy tube.  She has a history of severe bilateral hydronephrosis due to bladder obstruction. The patient was admitted to the ICU at that time and was given HDx1. The patient then underwent TURBT with Dr. Bradford 10/12/2023. The patient reportedly recently has had an admission at [facility] from 12/26-01/10. The patient was admitted for UTI and blocked nephrostomy tubes and given antibiotics at that time. The rest of the admission at [facility] is unknown (no records and patient did not want night admitting team to call daughter overnight to not wake her up). When she returned home yesterday the patient slid out of the wheelchair and onto the floor. EMS noted bleeding from the left back with dislodgement of the nephrostomy tube. Denies any fever, chills, cough, chest congestion, diarrhea, nausea, vomiting, melena, hematochezia. shortness of breath, chest pain, back pain. The patient is currently wheelchair bound at this time. Per the patient she has a history of being on coumadin a few years ago initially for a left DVT and was on it for 6 months. She was recently started on eliquis with right DVT in October. She denies any recent hematuria or melena. She reports recently over last couple weeks being taken off of eliquis and was unable to tell me why. Labs were sig for the following: WBC 16.35, H/H 8.9/29.2 (7.9/25.1-October 18, 2023). MCV 77.9, neutrophilic 79.3%, Na 132, Cl 97, BUN/Cr 19/0.49, , la 10.7, (previously 8.6), TP 7.3, Albumin 2.7, phos 2.3. CT abdomen, pelvis and chest showed thrombus in right common femoral vein extending into superficial vein (not seen on 03/2023), increase in hydronephrosis despite perc nephrostomy tubes and ureteral stent, prominent soft tissue in posterior bladder extending into ureters inseparable from vaginal cuff adjacent fat increased size of right external iliac LNs.

## 2024-01-11 NOTE — PROGRESS NOTE ADULT - PROBLEM SELECTOR PLAN 8
recurrent DVT  Currently extensive thrombus in right common femoral vein extending into superficial vein  On eliquis recently, discontinued in last few weeks, per patient denies melena and hematochezia in the last few weeks to be the reason   Continue heparin drip for now unless contraindicated by GI bleed   -US of the lower extremity bilaterally ordered to eval for DVT

## 2024-01-11 NOTE — PROGRESS NOTE ADULT - PROBLEM SELECTOR PLAN 10
DVT prophylaxis: hep drip   GI prophylaxis: none   Diet: NPO except meds for procedure . Resume diet post procedure

## 2024-01-11 NOTE — PROGRESS NOTE ADULT - PROBLEM SELECTOR PLAN 5
Ca 10.7 from 8.6 , now at 10.2  Suspect dehydration vs malignancy   s/p IVF, consider calcitonin if Calcium rises  FU PTH  Continue to monitor BMP daily

## 2024-01-11 NOTE — H&P ADULT - PROBLEM SELECTOR PLAN 2
- UA/urine culture ordered- should be drawn when left nephrostomy tube placed   -blood culturex2  -recently treated for UTI at [facility]-team in AM to obtain culture results/records   -will empirically treat with IV Zosyn for now   -if bacteremia found, and urine negative, patient may need MRI lumbar spine/sacrum to evaluate for sacral OM given stage II-III ulcer.  -continue to monitor fever/WBC curve - UA/urine culture ordered- should be drawn when left nephrostomy tube placed   -blood culturex2  -recently treated for UTI at [facility]-team in AM to obtain culture results/records   -will empirically treat with IV Zosyn for now   -if bacteremia found, and urine negative, patient may need MRI lumbar spine/sacrum to evaluate for sacral OM given stage II-III ulcer.  -continue to monitor fever/WBC curve

## 2024-01-11 NOTE — H&P ADULT - PROBLEM SELECTOR PLAN 8
recurrent DVT  -currently extensive thrombus in right common femoral vein extending into superficial vein  -on eliquis recently, discontinued in last few weeks  -obtain records from [facility] to determine why discontinued   -per patient denies melena and hematochezia in the last few weeks to be the reason   -continue heparin drip for now unless contraindicated by GI bleed   -US of the lower extremity bilaterally ordered to eval for DVT recurrent DVT  -currently extensive thrombus in right common femoral vein extending into superficial vein  -on eliquis recently, discontinued in last few weeks  -obtain records from [facility] to determine why discontinued   -per patient denies melena and hematochezia in the last few weeks to be the reason   -continue heparin drip for now unless contraindicated by GI bleed   -US of the lower extremity bilaterally ordered to eval for DVT

## 2024-01-11 NOTE — ED ADULT NURSE REASSESSMENT NOTE - NS ED NURSE REASSESS COMMENT FT1
pt soiled upon arrival. pt cleaned in stretcher, new linens and diaper applied. + stage 2 sacral pressure ulcer noted, minimal bleeding to center of wound, new allevyn dressing placed. pt tolerated well, daughter at bedside. safety measures maintained, side rails up x2. call bell in place at bedside.

## 2024-01-11 NOTE — H&P ADULT - NSHPREVIEWOFSYSTEMS_GEN_ALL_CORE
Review of Systems:   CONSTITUTIONAL: No fever  EYES: No eye pain, visual disturbances, or discharge  ENMT:  No difficulty hearing, tinnitus, vertigo; No sinus or throat pain  RESPIRATORY: No SOB. No cough, no wheezing, no chills, no hemoptysis  CARDIOVASCULAR: No chest pain, no palpitations, no dizziness, + leg swelling (chronic with pain, R>L)  GASTROINTESTINAL: + suprapubic pain, +lower abdominal, no epigastric pain. No nausea, vomiting, or hematemesis; No diarrhea or constipation. No melena or hematochezia.  GENITOURINARY: no hematuria  NEUROLOGICAL: No headaches,  + loss of strength, no numbness, no tremors  SKIN: No itching, burning, rashes, or lesions   ENDOCRINE: No heat or cold intolerance; No hair loss  MUSCULOSKELETAL: No joint pain or swelling; No muscle pain, no back pain  PSYCHIATRIC: No difficulty sleeping

## 2024-01-11 NOTE — H&P ADULT - NSHPSOCIALHISTORY_GEN_ALL_CORE
Denies smoking, alcohol or drugs. Currently lives with daughter and is fully dependent for ADLs, currently wheelchair bound.

## 2024-01-11 NOTE — H&P ADULT - PROBLEM SELECTOR PLAN 5
-Ca 10.7 from 8.6   -suspect dehydration vs malignancy   -PTH ordered in AM and vitamin D hydroxy  -given 500cc bolus of NS  -consider calcitonin if Calcium rises   -continue to monitor BMP daily

## 2024-01-11 NOTE — ASSESSMENT
[FreeTextEntry1] : Malignant neoplasm of trigone of urinary bladder (188.0) (C67.0) Deep vein thrombosis (DVT) of femoral vein of right lower extremity, unspecified chronicity (453.41) (I82.411) Adenocarcinoma, colon (153.9) (C18.9) 73 year old female with initial med oncology consultation on 12/13/23 for MIBC. She was recently diagnosed stage IIIA invasive urothelial carcinoma, both conventionally and focally sarcomatoid subtype/ high-grade carcinoma invaded muscularis propria. She also was diagnosed with limited stage colon cancer recently. She is presenting for medical oncology re-evaluation to assess candidacy for bladder cancer neoadjuvant chemotherapy given improvement in her functional status. She has also had surgical evaluation for colon cancer; management of bladder cancer deemed priority at this time.   On evaluation during the initial consultation on 12/13/23 , she appears to be functionally improved than prior. She now ambulates with a walker, is independent of most ADLs, and spends 80-90% of her waking hours outside of bed. Majority of her ambulatory difficulties stem from her significant BLE edema c/b recent DVT. She reports that she was previously on a diuretic that was recently discontinued (unsure which diuretic). She also has some discomfort from her nephrostomy tubes when she moves too suddenly.  Discussed with her and her daughter, that given her improvement in her functional status over the past three weeks, she may likely be a candidate for chemotherapy with continued improvement. We discussed potential treatment with Pickett 600mg/m2 (instead of 1000mg/m2) and Cisplatin 35mg/m2 (instead of 70mg/m2) each on Day 1 and Day 8; plan for 4 to 6 cylces. We discussed benefits, mechanisms of action and side effects of each drug. Information sheet given to her and her daughter.   Plan  -Will avoid salty foods and start Lasix 40mg daily for her LLE.  -Patient returned today 1/12/24  to re-evaluate her functional status and consent for chemotherapy based on assessment. -lab today 1/12/24   RTC in

## 2024-01-11 NOTE — H&P ADULT - PROBLEM SELECTOR PLAN 11
Extensively discussed with patient -would like trial of intubation, would not want to be long-term on ventilator. Patient is ok with dialysis if needed. Patient would not like feeding tube if needed. Daughters are her healthcare proxy.

## 2024-01-11 NOTE — ED ADULT NURSE REASSESSMENT NOTE - NS ED NURSE REASSESS COMMENT FT1
morning blood work drawn and sent to lab. urine collected and sent to lab. iv meds infusing well without complications, bilateral iv sites WNL. report given to [facility] 2 RN, all questions answered. safety measures maintained, side rails up x2. awaiting transportation to [facility] section.

## 2024-01-11 NOTE — ED ADULT NURSE REASSESSMENT NOTE - NS ED NURSE REASSESS COMMENT FT1
weight obtained via portable bed scale. pt repositioned in bed, now laying on left side, pillows provided. second 20G iv placed to left AC, blood cultures collected prior to abx infusion. iv infusing without complications, iv site WNL. safety measures maintained, side rails up x2.

## 2024-01-11 NOTE — H&P ADULT - PROBLEM SELECTOR PLAN 1
- severe bilateral hydronephrosis with b/l outlet obstruction s/p bilateral nephrostomy tube placement 10/31, with exchange/replacement recently at [facility] due to blocked nephrostomy tube   -right nephrostomy tube in place, left nephrostomy tube displaced/dislodged yesterday  -CT abdomen, pelvis and chest showed thrombus in right common femoral vein extending into superficial vein (not seen on 03/2023), increase in hydronephrosis despite perc nephrostomy tubes and ureteral stent, prominent soft tissue in posterior bladder extending into ureters inseparable from vaginal cuff adjacent fat increased size of right external iliac LNs.   -VIR consult for replacement  -BUN/Cr 19/0.49, continue to trend - severe bilateral hydronephrosis with b/l outlet obstruction s/p bilateral nephrostomy tube placement 10/31, with exchange/replacement recently at [facility] due to blocked nephrostomy tube   -right nephrostomy tube in place, left nephrostomy tube displaced/dislodged yesterday  -CT abdomen, pelvis and chest showed thrombus in right common femoral vein extending into superficial vein (not seen on 03/2023), increase in hydronephrosis despite perc nephrostomy tubes and ureteral stent, prominent soft tissue in posterior bladder extending into ureters inseparable from vaginal cuff adjacent fat increased size of right external iliac LNs.   -VIR consult for replacement  -BUN/Cr 19/0.49, continue to trend

## 2024-01-11 NOTE — H&P ADULT - PROBLEM SELECTOR PROBLEM 2
Has known patent foramen ovale.  Saw several cardiologists and had extensive work up per pt
Leukocytosis
15-Aug-2020 06:08

## 2024-01-11 NOTE — PROCEDURE NOTE - PROCEDURE FINDINGS AND DETAILS
Successful reinsertion of a 8.5Fr x 22cm nephroureteral tube Successful reinsertion of a left 8.5Fr x 22cm nephroureteral tube

## 2024-01-11 NOTE — ED PROVIDER NOTE - NSICDXPASTMEDICALHX_GEN_ALL_CORE_FT
PAST MEDICAL HISTORY:  Colon adenocarcinoma     DVT (deep venous thrombosis)     Urothelial carcinoma of bladder

## 2024-01-11 NOTE — PROGRESS NOTE ADULT - ASSESSMENT
Ms. 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 Ms. Chen is a 73 year old F with history of HLD (not on med currently), DVT (recurrent, a few years ago with coumadin x6 months, left sided, now with right DVT on eliquis, but recently told at [facility] to discontinue eliquis?), invasive urothelial carcinoma both conventionally and focally sarcomatoid subtype  and high grade carcinoma invaded muscularis propria and well differentiated adeno colon cancer T4 N1 Mo, stage III, MSI intact), s/p TURBT who presents for admission with dislodged left nephrostomy tube.

## 2024-01-11 NOTE — CHART NOTE - NSCHARTNOTEFT_GEN_A_CORE
Patient with  DVT in the right common femoral vein and superficial femoral vein.  Dr. Rueda recommended to start hep gtt without bolus. hep gtt ordered

## 2024-01-11 NOTE — H&P ADULT - PROBLEM SELECTOR PLAN 4
-Phos 2.3, suspect secondary to decrease PO intake   -given 15 mmol NaPhos  -continue to monitor phos daily

## 2024-01-11 NOTE — H&P ADULT - NSHPLABSRESULTS_GEN_ALL_CORE
8.9    16.35 )-----------( 352      ( 11 Jan 2024 00:12 )             29.2     132<L>  |  97<L>  |  19  ----------------------------<  136<H>     01-11  5.3   |  23  |  0.49<L>    Ca    10.7<H>      11 Jan 2024 00:12  Phos  2.3     01-11  Mg     2.20     01-11    TPro  7.3  /  Alb  2.7<L>  /  TBili  0.3  /  DBili  x   /  AST  27  /  ALT  10  /  AlkPhos  105  01-11    PT/INR: 12.4/1.11 (01-11-24 @ 00:12)  PTT: 29.0 (01-11-24 @ 00:12)

## 2024-01-11 NOTE — H&P ADULT - NSICDXPASTSURGICALHX_GEN_ALL_CORE_FT
PAST SURGICAL HISTORY:  Displacement of nephrostomy tube     History of transurethral resection of bladder tumor (TURBT)     S/P hysterectomy

## 2024-01-11 NOTE — PROGRESS NOTE ADULT - PROBLEM SELECTOR PLAN 2
UA/urine culture ordered- should be drawn when left nephrostomy tube placed   FU blood culturex2  recently treated for UTI at [facility]  On empiric treatment with IV Zosyn for now   If bacteremia found, and urine negative, patient may need MRI lumbar spine/sacrum to evaluate for sacral OM given stage II-III ulcer.  Continue to monitor fever/WBC curve UA/urine culture ordered- should be drawn when left nephrostomy tube placed   FU blood culturex2  recently treated for UTI at [facility]  On empiric treatment with IV Zosyn for now   If bacteremia found, and urine negative, patient may need MRI lumbar spine/sacrum to evaluate for sacral OM given stage II-III ulcer.  Continue to monitor fever/WBC curve

## 2024-01-11 NOTE — PROGRESS NOTE ADULT - PROBLEM SELECTOR PLAN 7
MCV 77.9, H/H 8.9/29.2 (7.9/25.1 -October 18, 2023)  Iron pane reviewed   Likely in setting of colon cancer  Continue to monitor CBC daily

## 2024-01-11 NOTE — H&P ADULT - PROBLEM SELECTOR PLAN 7
MCV 77.9, H/H 8.9/29.2 (7.9/25.1 -October 18, 2023)  -iron panel ordered  -likely in setting of colon cancer  -continue to monitor CBC daily

## 2024-01-11 NOTE — PROGRESS NOTE ADULT - PROBLEM SELECTOR PLAN 9
Currently poor functional status, not on chemotherapy, awaiting treatment plan for urothelial cancer prior to hemicolectomy   On Tylenol and oxycodone for pain PRN

## 2024-01-11 NOTE — H&P ADULT - PROBLEM SELECTOR PLAN 3
-Na 132 from 139, suspect pre-renal in setting of infection   -will give one bolus of 500cc  of NaCl now   -serum osm, urine osm, and urine Na ordered  -will not correct more than 6-8 meq in the next 24 hours  -continue to trend BMP daily

## 2024-01-11 NOTE — PROGRESS NOTE ADULT - SUBJECTIVE AND OBJECTIVE BOX
PROGRESS NOTE:     Patient is a 73y old  Female who presents with a chief complaint of dislodged nephrostomy tube (11 Jan 2024 02:53)      SUBJECTIVE / OVERNIGHT EVENTS:    ADDITIONAL REVIEW OF SYSTEMS:    MEDICATIONS  (STANDING):  cholecalciferol 2000 Unit(s) Oral daily  heparin  Infusion.  Unit(s)/Hr (12 mL/Hr) IV Continuous <Continuous>  piperacillin/tazobactam IVPB.. 3.375 Gram(s) IV Intermittent every 8 hours  sodium chloride 0.9%. 500 milliLiter(s) (250 mL/Hr) IV Continuous <Continuous>    MEDICATIONS  (PRN):  acetaminophen     Tablet .. 650 milliGRAM(s) Oral every 6 hours PRN Mild Pain (1 - 3)  oxyCODONE    IR 5 milliGRAM(s) Oral every 4 hours PRN Moderate Pain (4 - 6)      CAPILLARY BLOOD GLUCOSE      POCT Blood Glucose.: 117 mg/dL (11 Jan 2024 12:44)  POCT Blood Glucose.: 118 mg/dL (11 Jan 2024 09:20)    I&O's Summary      PHYSICAL EXAM:  Vital Signs Last 24 Hrs  T(C): 37.1 (11 Jan 2024 13:59), Max: 37.1 (11 Jan 2024 13:59)  T(F): 98.8 (11 Jan 2024 13:59), Max: 98.8 (11 Jan 2024 13:59)  HR: 93 (11 Jan 2024 15:28) (76 - 95)  BP: 102/62 (11 Jan 2024 15:28) (102/62 - 135/84)  BP(mean): --  RR: 17 (11 Jan 2024 15:28) (15 - 20)  SpO2: 100% (11 Jan 2024 15:28) (96% - 100%)    Parameters below as of 11 Jan 2024 15:28  Patient On (Oxygen Delivery Method): room air        CONSTITUTIONAL: NAD, well-developed  RESPIRATORY: Normal respiratory effort; lungs are clear to auscultation bilaterally  CARDIOVASCULAR: Regular rate and rhythm, normal S1 and S2, no murmur/rub/gallop; No lower extremity edema; Peripheral pulses are 2+ bilaterally  ABDOMEN: Nontender to palpation, normoactive bowel sounds, no rebound/guarding; No hepatosplenomegaly  MUSCLOSKELETAL: no clubbing or cyanosis of digits; no joint swelling or tenderness to palpation  PSYCH: A+O to person, place, and time; affect appropriate  NEURO: No gross deficits  SKIN: No rash     LABS:                        8.6    14.45 )-----------( 371      ( 11 Jan 2024 11:42 )             28.5     01-11    135  |  98  |  18  ----------------------------<  129<H>  4.6   |  27  |  0.52    Ca    10.2      11 Jan 2024 06:54  Phos  3.7     01-11  Mg     2.20     01-11    TPro  6.9  /  Alb  2.7<L>  /  TBili  0.3  /  DBili  x   /  AST  13  /  ALT  8   /  AlkPhos  96  01-11    PT/INR - ( 11 Jan 2024 06:54 )   PT: 12.4 sec;   INR: 1.10 ratio         PTT - ( 11 Jan 2024 11:42 )  PTT:49.1 sec      Urinalysis Basic - ( 11 Jan 2024 06:54 )    Color: x / Appearance: x / SG: x / pH: x  Gluc: 129 mg/dL / Ketone: x  / Bili: x / Urobili: x   Blood: x / Protein: x / Nitrite: x   Leuk Esterase: x / RBC: x / WBC x   Sq Epi: x / Non Sq Epi: x / Bacteria: x          RADIOLOGY & ADDITIONAL TESTS:  Results Reviewed:   Imaging Personally Reviewed:  Electrocardiogram Personally Reviewed:    COORDINATION OF CARE:  Care Discussed with Consultants/Other Providers [Y/N]:  Prior or Outpatient Records Reviewed [Y/N]:   PROGRESS NOTE:     Patient is a 73y old  Female who presents with a chief complaint of dislodged nephrostomy tube (11 Jan 2024 02:53)      SUBJECTIVE / OVERNIGHT EVENTS: Pt seen and examined by me  Just got back from her procedure. Resting- no acute events     ADDITIONAL REVIEW OF SYSTEMS:    MEDICATIONS  (STANDING):  cholecalciferol 2000 Unit(s) Oral daily  heparin  Infusion.  Unit(s)/Hr (12 mL/Hr) IV Continuous <Continuous>  piperacillin/tazobactam IVPB.. 3.375 Gram(s) IV Intermittent every 8 hours  sodium chloride 0.9%. 500 milliLiter(s) (250 mL/Hr) IV Continuous <Continuous>    MEDICATIONS  (PRN):  acetaminophen     Tablet .. 650 milliGRAM(s) Oral every 6 hours PRN Mild Pain (1 - 3)  oxyCODONE    IR 5 milliGRAM(s) Oral every 4 hours PRN Moderate Pain (4 - 6)      CAPILLARY BLOOD GLUCOSE      POCT Blood Glucose.: 117 mg/dL (11 Jan 2024 12:44)  POCT Blood Glucose.: 118 mg/dL (11 Jan 2024 09:20)    I&O's Summary      PHYSICAL EXAM:  Vital Signs Last 24 Hrs  T(C): 37.1 (11 Jan 2024 13:59), Max: 37.1 (11 Jan 2024 13:59)  T(F): 98.8 (11 Jan 2024 13:59), Max: 98.8 (11 Jan 2024 13:59)  HR: 93 (11 Jan 2024 15:28) (76 - 95)  BP: 102/62 (11 Jan 2024 15:28) (102/62 - 135/84)  BP(mean): --  RR: 17 (11 Jan 2024 15:28) (15 - 20)  SpO2: 100% (11 Jan 2024 15:28) (96% - 100%)    Parameters below as of 11 Jan 2024 15:28  Patient On (Oxygen Delivery Method): room air        CONSTITUTIONAL: NAD, well-developed  RESPIRATORY: Normal respiratory effort; lungs are clear to auscultation bilaterally  CARDIOVASCULAR: Regular rate and rhythm, normal S1 and S2, no murmur/rub/gallop; No lower extremity edema; Peripheral pulses are 2+ bilaterally  ABDOMEN: Nontender to palpation, normoactive bowel sounds, no rebound/guarding; No hepatosplenomegaly.Nephrostomy tube present  MUSCLOSKELETAL: no clubbing or cyanosis of digits; no joint swelling or tenderness to palpation  PSYCH: A+O to person, place, and time; affect appropriate  NEURO: No gross deficits  SKIN: No rash   RLE Swelling > LLE     LABS:                        8.6    14.45 )-----------( 371      ( 11 Jan 2024 11:42 )             28.5     01-11    135  |  98  |  18  ----------------------------<  129<H>  4.6   |  27  |  0.52    Ca    10.2      11 Jan 2024 06:54  Phos  3.7     01-11  Mg     2.20     01-11    TPro  6.9  /  Alb  2.7<L>  /  TBili  0.3  /  DBili  x   /  AST  13  /  ALT  8   /  AlkPhos  96  01-11    PT/INR - ( 11 Jan 2024 06:54 )   PT: 12.4 sec;   INR: 1.10 ratio         PTT - ( 11 Jan 2024 11:42 )  PTT:49.1 sec      Urinalysis Basic - ( 11 Jan 2024 06:54 )    Color: x / Appearance: x / SG: x / pH: x  Gluc: 129 mg/dL / Ketone: x  / Bili: x / Urobili: x   Blood: x / Protein: x / Nitrite: x   Leuk Esterase: x / RBC: x / WBC x   Sq Epi: x / Non Sq Epi: x / Bacteria: x          RADIOLOGY & ADDITIONAL TESTS:  Results Reviewed:   Imaging Personally Reviewed:  Electrocardiogram Personally Reviewed:    COORDINATION OF CARE:  Care Discussed with Consultants/Other Providers [Y/N]:  Prior or Outpatient Records Reviewed [Y/N]:

## 2024-01-11 NOTE — ED ADULT NURSE REASSESSMENT NOTE - NS ED NURSE REASSESS COMMENT FT1
heparin drip initiated via left iv, infusing well without complication, site WNL. pt connected to cardiac monitor, o2 sat 99% on RA, RR even and unlabored. nephrostomy bag emptied.

## 2024-01-11 NOTE — ED PROVIDER NOTE - OBJECTIVE STATEMENT
74 yo F hx of HLD, DVT in 12/23 (previously on eliquis), adenocarcinoma of right ascending colon (dx 10/2023), and urothelial carcinoma of bladder s/p TURBT and s/p b/l nephrostomy tubes BIBEMS for accidental left nephrostomy tube removal. Patient was discharged today from [facility] (stay was 12/26-1/11) for UTI and b/lo nephrostomy tube blockages. At home, daughter was attempting to help patient out of wheelchair to help her clean up in the bathroom, patient declined assistance and slide out of wheelchair onto floor. No LOC, witnessed event. EMS was called to help patient back into wheelchair. EMS noticed bleeding from left back with dislodgement of nephrostomy tube - unclear when it came out exactly. Denies any fevers, chills, nausea, vomiting, back pain, chest pain, and SOB. Wheelchair bound at this time. 72 yo F hx of HLD, DVT in 12/23 (previously on eliquis), adenocarcinoma of right ascending colon (dx 10/2023), and urothelial carcinoma of bladder s/p TURBT and s/p b/l nephrostomy tubes BIBEMS for accidental left nephrostomy tube removal. Patient was discharged today from [facility] (stay was 12/26-1/11) for UTI and b/lo nephrostomy tube blockages. At home, daughter was attempting to help patient out of wheelchair to help her clean up in the bathroom, patient declined assistance and slide out of wheelchair onto floor. No LOC, witnessed event. EMS was called to help patient back into wheelchair. EMS noticed bleeding from left back with dislodgement of nephrostomy tube - unclear when it came out exactly. Denies any fevers, chills, nausea, vomiting, back pain, chest pain, and SOB. Wheelchair bound at this time.

## 2024-01-11 NOTE — PROGRESS NOTE ADULT - PROBLEM SELECTOR PLAN 1
Severe bilateral hydronephrosis with b/l outlet obstruction s/p bilateral nephrostomy tube placement 10/31, with exchange/replacement recently at [facility] due to blocked nephrostomy tube   Right nephrostomy tube in place, left nephrostomy tube displaced/dislodged yesterday  CT abdomen, pelvis and chest showed thrombus in right common femoral vein extending into superficial vein (not seen on 03/2023), increase in hydronephrosis despite perc nephrostomy tubes and ureteral stent, prominent soft tissue in posterior bladder extending into ureters inseparable from vaginal cuff adjacent fat increased size of right external iliac LNs.  BUN/Cr 19/0.49, continue to trend  IR consulted for replacement Severe bilateral hydronephrosis with b/l outlet obstruction s/p bilateral nephrostomy tube placement 10/31, with exchange/replacement recently at [facility] due to blocked nephrostomy tube   Right nephrostomy tube in place, left nephrostomy tube displaced/dislodged yesterday  CT abdomen, pelvis and chest showed thrombus in right common femoral vein extending into superficial vein (not seen on 03/2023), increase in hydronephrosis despite perc nephrostomy tubes and ureteral stent, prominent soft tissue in posterior bladder extending into ureters inseparable from vaginal cuff adjacent fat increased size of right external iliac LNs.  BUN/Cr 19/0.49, continue to trend  IR consulted for replacement

## 2024-01-11 NOTE — H&P ADULT - PROBLEM SELECTOR PLAN 10
DVT prophylaxis: hep drip once weight entered in chart, unless contraindicated  GI prophylaxis: none   Diet: NPO except meds for procedure in AM

## 2024-01-11 NOTE — ED PROVIDER NOTE - CLINICAL SUMMARY MEDICAL DECISION MAKING FREE TEXT BOX
74 yo F hx of HLD, DVT in 12/23 (previously on eliquis), adenocarcinoma of right ascending colon (dx 10/2023), and urothelial carcinoma of bladder s/p TURBT and s/p b/l nephrostomy tubes BIBEMS for accidental left nephrostomy tube removal. Will obtain pre-op labs. Will consult urology for evaluation. 74 yo F hx of HLD, DVT in 12/23 (previously on eliquis), adenocarcinoma of right ascending colon (dx 10/2023), and urothelial carcinoma of bladder s/p TURBT and s/p b/l nephrostomy tubes BIBEMS for accidental left nephrostomy tube removal. Will obtain pre-op labs. Will consult IR for evaluation. 72 yo F hx of HLD, DVT in 12/23 (previously on eliquis), adenocarcinoma of right ascending colon (dx 10/2023), and urothelial carcinoma of bladder s/p TURBT and s/p b/l nephrostomy tubes BIBEMS for accidental left nephrostomy tube removal. Will obtain pre-op labs. Will consult IR for evaluation.

## 2024-01-11 NOTE — ED ADULT NURSE REASSESSMENT NOTE - NS ED NURSE REASSESS COMMENT FT1
20GRAC placed labs sent, Patient cleaned, changed and repositioned. stage 1 healing sacral wound noted, dressing applied. Respirations even and unlabored, appears in NAD. No complaints of chest pain, headache, nausea, dizziness, vomiting  SOB, fever, chills verbalized. Right nephrostomy tube in place.

## 2024-01-11 NOTE — HISTORY OF PRESENT ILLNESS
[Disease: _____________________] : Disease: [unfilled] [T: ___] : T[unfilled] [N: ___] : N[unfilled] [M: ___] : M[unfilled] [AJCC Stage: ____] : AJCC Stage: [unfilled] [de-identified] : Ms. Velazquez is a 72 yo F with MIBC. She had the initial medical oncology for MIBC with Dr. Franco on 12/13/23; (She initially saw medical oncologist Dr. Flores on 11/28/23):  She was seen by Dr. Taveras on November 1, 2023. She first noted some blood in her urine approximately 1 month prior to that in addition to experiencing urinary frequency. A CT scan revealed a bladder mass with bilateral hydronephrosis and a colon mass. The colon mass was biopsied and noted to be an adenocarcinoma. Her creatinine was 12. She had bilateral nephrostomy tubes placed, and her creatinine normalized.  A bladder biopsy was performed on October 30 and this revealed invasive urothelial carcinoma, both conventionally and focally sarcomatoid subtype. High-grade carcinoma invaded muscularis propria.  She was then seen by Dr. Lan Whitney of colorectal surgery. The lesion in the colon was a right-sided lesion. The plan was to discuss a right hemicolectomy after discussion with Dr. Taveras of urology.  She was admitted to the hospital from October 7 until October 21. She was admitted to the intensive care unit. She was found to have severe bilateral hydronephrosis due to bladder outlet obstruction. She received emergent hemodialysis x 1. Bilateral nephrostomy tubes were placed. A CT scan on October 11 revealed distal ureteral thickening and tapering of the contrast column approximately 2.8 cm above the UVJ suspicious for obstructing ureteral mass or extension of a bladder mass. No contrast reached the urinary bladder at the time of the scan. There was masslike thickening of the ascending colon suspicious for malignancy. Colonoscopy was performed on October 12 confirming adenocarcinoma.  A CT scan done recently reveals tumor extending into the ureters and a right pelvic LN at 2 x 1.5 cms.  10/13/2023 underwent TURBT with Dr. Bradford. Pathology showed HG invasive urothelial carcinoma, conventional and focally sarcomatoid subtype, invades into muscularis propria  Upon being evaluated by Dr. Flores, she was found to have poor functional status (being predominantly wheelchair dependent/ dependent for her ADLs), and was deemed not a candidate for systemic therapy and was referred to hospice. She was seen by Dr. Taveras recently and was found to have improvement in her functional status. The patient is seeking a second opinion.  Interval History: She reports over the past three weeks she has been doing better. She is more active during the days, and spends 80-90% of her day outside of the bed. She reports being independent of most of her ADLs. She had a recent DVT and is on Eliquis. She also has significant position dependent (was previously on diuretic but unsure which one).    [de-identified] : [facility]  [de-identified] : Well differentiated right-sided colon cancer, MSI-intact; pending surgery once bladder cancer is resolved [de-identified] : 1/12/24

## 2024-01-11 NOTE — ED PROVIDER NOTE - ATTENDING CONTRIBUTION TO CARE
HPI: 74 yo F hx of HLD, DVT in 12/23 (previously on eliquis), adenocarcinoma of right ascending colon (dx 10/2023), and urothelial carcinoma of bladder s/p TURBT and s/p b/l nephrostomy tubes BIBEMS for accidental left nephrostomy tube removal. Patient was discharged today from [facility] (stay was 12/26-1/11) for UTI and b/lo nephrostomy tube blockages. At home, daughter was attempting to help patient out of wheelchair to help her clean up in the bathroom, patient declined assistance and slide out of wheelchair onto floor. No LOC, witnessed event. EMS was called to help patient back into wheelchair. EMS noticed bleeding from left back with dislodgement of nephrostomy tube - unclear when it came out exactly. Denies any fevers, chills, nausea, vomiting, back pain, chest pain, and SOB. Wheelchair bound at this time.    EXAM: Patient is well-appearing.  Patient has right sided nephrostomy tube in place without signs of infection or erythema or crepitus or fluctuance.  Left-sided nephrostomy tube not present.  Small hole at site not bleeding and no pus.  No surrounding crepitus or erythema.  Patient has a stage I sacral ulcer.  No bleeding and no crepitus or no surrounding gangrene or erythema.    MDM: This is a 73-year-old female with multiple medical problems that was recently discharged with bilateral nephrostomy tubes and the left nephrostomy tube was accidentally removed tonight so was brought in for replacement.  Patient has no complaints at this time and still is draining urine from her right nephrostomy tube.  Patient does have a sacral ulcer.  At this time will obtain labs but likely admit for IR replacement of left nephrostomy tube.  Patient has no complaints of pain at this time. HPI: 72 yo F hx of HLD, DVT in 12/23 (previously on eliquis), adenocarcinoma of right ascending colon (dx 10/2023), and urothelial carcinoma of bladder s/p TURBT and s/p b/l nephrostomy tubes BIBEMS for accidental left nephrostomy tube removal. Patient was discharged today from [facility] (stay was 12/26-1/11) for UTI and b/lo nephrostomy tube blockages. At home, daughter was attempting to help patient out of wheelchair to help her clean up in the bathroom, patient declined assistance and slide out of wheelchair onto floor. No LOC, witnessed event. EMS was called to help patient back into wheelchair. EMS noticed bleeding from left back with dislodgement of nephrostomy tube - unclear when it came out exactly. Denies any fevers, chills, nausea, vomiting, back pain, chest pain, and SOB. Wheelchair bound at this time.    EXAM: Patient is well-appearing.  Patient has right sided nephrostomy tube in place without signs of infection or erythema or crepitus or fluctuance.  Left-sided nephrostomy tube not present.  Small hole at site not bleeding and no pus.  No surrounding crepitus or erythema.  Patient has a stage I sacral ulcer.  No bleeding and no crepitus or no surrounding gangrene or erythema.    MDM: This is a 73-year-old female with multiple medical problems that was recently discharged with bilateral nephrostomy tubes and the left nephrostomy tube was accidentally removed tonight so was brought in for replacement.  Patient has no complaints at this time and still is draining urine from her right nephrostomy tube.  Patient does have a sacral ulcer.  At this time will obtain labs but likely admit for IR replacement of left nephrostomy tube.  Patient has no complaints of pain at this time.

## 2024-01-11 NOTE — H&P ADULT - PROBLEM SELECTOR PLAN 9
-currently poor functional status, not on chemotherapy, awaiting treatment plan for urothelial cancer prior to hemicolectomy   -will treat Tylenol and oxycodone for pain PRN

## 2024-01-11 NOTE — ED PROVIDER NOTE - PHYSICAL EXAMINATION
General: Well developed; well nourished; NAD  Eyes: PERRLA, EOMI; conjunctiva and sclera clear  HEENT: NCAT, no nasal discharge, oropharynx clear, no facial tenderness  Respiratory: No chest wall deformity, CTABL, no wheezes, rales, or rhonchi  Cardiovascular: RRR, +S1/S2, no murmurs, rubs, or gallops. 2+ upper and lower pulses b/l  Abdominal: Normal BS, soft, non-tender, non-distended, no rebound or guarding, no HSM, no masses  Back: R sided nephrostomy tube in place, L sided nephrostomy removed (hole at site)  Extremities: No cyanosis, clubbing or pedal edema  Neurological: CN II-XII grossly intact, sensation grossly intact, b/l LE weakness, wheelchair bound  Skin: stage I-II sacral decubitus ulcer

## 2024-01-11 NOTE — CHART NOTE - NSCHARTNOTEFT_GEN_A_CORE
Interventional Radiology Pre-Procedure Note    This is a 73y Female    Procedure: L Nephrostomy tube dislodgement/ replacement     IR Attending: Dr Adan    Medicine Attending: Dr Thomas     Diagnosis/Indication: Patient is a 73y old  Female who presents with a chief complaint of dislodged nephrostomy tube (11 Jan 2024 02:53)      PAST MEDICAL & SURGICAL HISTORY:  DVT (deep venous thrombosis)      Colon adenocarcinoma      Urothelial carcinoma of bladder      HLD (hyperlipidemia)      S/P hysterectomy      Displacement of nephrostomy tube      History of transurethral resection of bladder tumor (TURBT)           Female    Allergies: No Known Allergies      LABS:  CBC Full  -  ( 11 Jan 2024 00:12 )  WBC Count : 16.35 K/uL  RBC Count : 3.75 M/uL  Hemoglobin : 8.9 g/dL  Hematocrit : 29.2 %  Platelet Count - Automated : 352 K/uL  Mean Cell Volume : 77.9 fL  Mean Cell Hemoglobin : 23.7 pg  Mean Cell Hemoglobin Concentration : 30.5 gm/dL  Auto Neutrophil # : 12.96 K/uL  Auto Lymphocyte # : 2.11 K/uL  Auto Monocyte # : 1.08 K/uL  Auto Eosinophil # : 0.04 K/uL  Auto Basophil # : 0.07 K/uL  Auto Neutrophil % : 79.3 %  Auto Lymphocyte % : 12.9 %  Auto Monocyte % : 6.6 %  Auto Eosinophil % : 0.2 %  Auto Basophil % : 0.4 %    01-11    135  |  98  |  18  ----------------------------<  129<H>  4.6   |  27  |  0.52    Ca    10.2      11 Jan 2024 06:54  Phos  3.7     01-11  Mg     2.20     01-11    TPro  6.9  /  Alb  2.7<L>  /  TBili  0.3  /  DBili  x   /  AST  13  /  ALT  8   /  AlkPhos  96  01-11    PT/INR - ( 11 Jan 2024 06:54 )   PT: 12.4 sec;   INR: 1.10 ratio         PTT - ( 11 Jan 2024 06:54 )  PTT:29.7 sec

## 2024-01-12 NOTE — PROGRESS NOTE ADULT - PROBLEM SELECTOR PLAN 10
DVT prophylaxis: hep drip   GI prophylaxis: none   Diet: NPO except meds for procedure . Resume diet post procedure DVT prophylaxis: hep drip   GI prophylaxis: none

## 2024-01-12 NOTE — DISCHARGE NOTE PROVIDER - NSDCMRMEDTOKEN_GEN_ALL_CORE_FT
FUROSEMIDE 40 MG TABLET: TAKE 1 TABLET BY MOUTH EVERY DAY AS DIRECTED  OMEPRAZOLE 40MG CAP:    acetaminophen 325 mg oral tablet: 2 tab(s) orally every 6 hours As needed Mild Pain (1 - 3)  ascorbic acid 500 mg oral tablet: 1 tab(s) orally once a day  chlorhexidine 2% topical pad: 1 Apply topically to affected area once a day  cholecalciferol oral tablet: 2000 unit(s) orally once a day  ferrous sulfate 325 mg (65 mg elemental iron) oral tablet: 1 tab(s) orally once a day  Multiple Vitamins oral tablet: 1 tab(s) orally once a day  senna leaf extract oral tablet: 2 tab(s) orally once a day (at bedtime)

## 2024-01-12 NOTE — PROGRESS NOTE ADULT - PROBLEM SELECTOR PLAN 9
Currently poor functional status, not on chemotherapy, awaiting treatment plan for urothelial cancer prior to hemicolectomy   Sees Dr Taveras and Dr Whitney as outpt   On Tylenol and oxycodone for pain PRN

## 2024-01-12 NOTE — PROGRESS NOTE ADULT - PROBLEM SELECTOR PLAN 1
Severe bilateral hydronephrosis with b/l outlet obstruction s/p bilateral nephrostomy tube placement 10/31, with exchange/replacement recently at [facility] due to blocked nephrostomy tube   Right nephrostomy tube in place, left nephrostomy tube displaced/dislodged yesterday  CT abdomen, pelvis and chest showed thrombus in right common femoral vein extending into superficial vein (not seen on 03/2023), increase in hydronephrosis despite perc nephrostomy tubes and ureteral stent, prominent soft tissue in posterior bladder extending into ureters inseparable from vaginal cuff adjacent fat increased size of right external iliac LNs.  BUN/Cr 19/0.49, continue to trend  s/p L NT replaced on 1/11 - Flush with 5 cc NS qd  Continue to monitor Severe bilateral hydronephrosis with b/l outlet obstruction s/p bilateral nephrostomy tube placement 10/31, with exchange/replacement recently at [facility] due to blocked nephrostomy tube   Right nephrostomy tube in place, left nephrostomy tube displaced/dislodged yesterday  CT abdomen, pelvis and chest showed thrombus in right common femoral vein extending into superficial vein (not seen on 03/2023), increase in hydronephrosis despite perc nephrostomy tubes and ureteral stent, prominent soft tissue in posterior bladder extending into ureters inseparable from vaginal cuff adjacent fat increased size of right external iliac LNs.  BUN/Cr 19/0.49, continue to trend  s/p L NT replaced on 1/11 - Flush with 5 cc NS qd  Continue to monitor

## 2024-01-12 NOTE — PROGRESS NOTE ADULT - PROBLEM SELECTOR PLAN 5
Ca 10.7 from 8.6 , now at 10.2  Suspect dehydration vs malignancy   s/p IVF, consider calcitonin if Calcium rises  FU PTH  Continue to monitor BMP daily Ca 10.7 from 8.6 , increased from 10.2-->10.6  Suspect dehydration vs malignancy   s/p IVF, consider calcitonin if Calcium rises  PTH- 24, Low normal   Continue to monitor BMP daily

## 2024-01-12 NOTE — PROGRESS NOTE ADULT - PROBLEM SELECTOR PLAN 3
Na 132 -->135  s/p  one bolus of 500cc  of NaCl now   serum osm, urine osm, and urine Na ordered  Will not correct more than 6-8 meq in the next 24 hours  Continue to trend BMP daily

## 2024-01-12 NOTE — DISCHARGE NOTE PROVIDER - NSDCCPCAREPLAN_GEN_ALL_CORE_FT
PRINCIPAL DISCHARGE DIAGNOSIS  Diagnosis: Nephrostomy tube displaced  Assessment and Plan of Treatment: Your tube was replcaed by IR, your kidney function remained stable      SECONDARY DISCHARGE DIAGNOSES  Diagnosis: DVT, lower extremity  Assessment and Plan of Treatment: You weere started on Heparin gtt and was transitioned to PO AC     PRINCIPAL DISCHARGE DIAGNOSIS  Diagnosis: Nephrostomy tube displaced  Assessment and Plan of Treatment: Your tube was replcaed by IR, your kidney function remained stable      SECONDARY DISCHARGE DIAGNOSES  Diagnosis: DVT, lower extremity  Assessment and Plan of Treatment: you had IVC filter placed and not on blood thinner     PRINCIPAL DISCHARGE DIAGNOSIS  Diagnosis: Nephrostomy tube displaced  Assessment and Plan of Treatment: Your tube was replcaed by IR ( left 8.5 Fr x 22 cm nephroureterostomy tube) on 1/11/24 your kidney function remained stable.  Follow-up with IR for routine exchanges and flush with 5cc NS daily.      SECONDARY DISCHARGE DIAGNOSES  Diagnosis: Carcinoma of bladder  Assessment and Plan of Treatment: OP follow-up with oncology.    Diagnosis: DVT, lower extremity  Assessment and Plan of Treatment: You had extensive DVT and cannot tolerate blood thinners due to vaginal bleeding so the IR team placed and IVC filter 1/18/24.    Diagnosis: Sacral ulcer  Assessment and Plan of Treatment: Sacrum to b/l buttocks: Cleanse with skin cleanser, pat dry. Apply Skylar moisture barrier cream twice a day and PRN with incontinent episodes.   Apply Sween 24 Moisturizer to b/l UE and LE daily, avoiding in between the toes.   Continue low air loss bed therapy, continue heel elevation, continue to turn & reposition as per protocol, soft pillow between bony prominences, continue moisture management with barrier creams & single breathable pad, continue measures to decrease friction/shear/pressure. Continue with nutritional support as per dietary/orders.    Diagnosis: Microcytic anemia  Assessment and Plan of Treatment: Continue iron pills.

## 2024-01-12 NOTE — PROGRESS NOTE ADULT - SUBJECTIVE AND OBJECTIVE BOX
Jose Floyd  Hospitalist  Pager- [phone]    PROGRESS NOTE:     Patient is a 73y old  Female who presents with a chief complaint of dislodged nephrostomy tube (11 Jan 2024 02:53)      SUBJECTIVE / OVERNIGHT EVENTS: Pt seen and examined by me  ADDITIONAL REVIEW OF SYSTEMS:    MEDICATIONS  (STANDING):  cholecalciferol 2000 Unit(s) Oral daily  heparin  Infusion.  Unit(s)/Hr (12 mL/Hr) IV Continuous <Continuous>  piperacillin/tazobactam IVPB.. 3.375 Gram(s) IV Intermittent every 8 hours  sodium chloride 0.9%. 1000 milliLiter(s) (75 mL/Hr) IV Continuous <Continuous>    MEDICATIONS  (PRN):  acetaminophen     Tablet .. 650 milliGRAM(s) Oral every 6 hours PRN Mild Pain (1 - 3)  oxyCODONE    IR 5 milliGRAM(s) Oral every 4 hours PRN Moderate Pain (4 - 6)  polyethylene glycol 3350 17 Gram(s) Oral at bedtime PRN Constipation      Vital Signs Last 24 Hrs  T(C): 36.5 (12 Jan 2024 05:30), Max: 37.2 (11 Jan 2024 19:17)  T(F): 97.7 (12 Jan 2024 05:30), Max: 98.9 (11 Jan 2024 19:17)  HR: 83 (12 Jan 2024 05:30) (83 - 95)  BP: 119/80 (12 Jan 2024 05:30) (102/62 - 135/84)  BP(mean): --  RR: 19 (12 Jan 2024 05:30) (17 - 20)  SpO2: 100% (12 Jan 2024 05:30) (96% - 100%)    Parameters below as of 12 Jan 2024 05:30  Patient On (Oxygen Delivery Method): room air      PHYSICAL EXAM:    Vital Signs Last 24 Hrs  T(C): 36.5 (12 Jan 2024 05:30), Max: 37.2 (11 Jan 2024 19:17)  T(F): 97.7 (12 Jan 2024 05:30), Max: 98.9 (11 Jan 2024 19:17)  HR: 83 (12 Jan 2024 05:30) (83 - 95)  BP: 119/80 (12 Jan 2024 05:30) (102/62 - 135/84)  BP(mean): --  RR: 19 (12 Jan 2024 05:30) (17 - 20)  SpO2: 100% (12 Jan 2024 05:30) (96% - 100%)    Parameters below as of 12 Jan 2024 05:30  Patient On (Oxygen Delivery Method): room air      CONSTITUTIONAL: NAD, well-developed  RESPIRATORY: Normal respiratory effort; lungs are clear to auscultation bilaterally  CARDIOVASCULAR: Regular rate and rhythm, normal S1 and S2, no murmur/rub/gallop; No lower extremity edema; Peripheral pulses are 2+ bilaterally  ABDOMEN: Nontender to palpation, normoactive bowel sounds, no rebound/guarding; No hepatosplenomegaly. Nephrostomy tube present  MUSCLOSKELETAL: no clubbing or cyanosis of digits; no joint swelling or tenderness to palpation  PSYCH: A+O to person, place, and time; affect appropriate  NEURO: No gross deficits  SKIN: No rash   RLE Swelling > LLE     LABS:                                                8.5    15.43 )-----------( 379      ( 12 Jan 2024 02:58 )             27.1         01-12    137  |  101  |  18  ----------------------------<  120<H>  4.8   |  26  |  0.65    Ca    10.6<H>      12 Jan 2024 02:58  Phos  3.0     01-12  Mg     2.10     01-12    TPro  6.8  /  Alb  2.7<L>  /  TBili  0.2  /  DBili  <0.2  /  AST  15  /  ALT  10  /  AlkPhos  92  01-12    Urinalysis Basic - ( 11 Jan 2024 06:54 )    Color: x / Appearance: x / SG: x / pH: x  Gluc: 129 mg/dL / Ketone: x  / Bili: x / Urobili: x   Blood: x / Protein: x / Nitrite: x   Leuk Esterase: x / RBC: x / WBC x   Sq Epi: x / Non Sq Epi: x / Bacteria: x          RADIOLOGY & ADDITIONAL TESTS:  Results Reviewed:   Imaging Personally Reviewed:  Electrocardiogram Personally Reviewed:    COORDINATION OF CARE:  Care Discussed with Consultants/Other Providers [Y/N]:  Prior or Outpatient Records Reviewed [Y/N]:   Jose Floyd  Hospitalist  Pager- [phone]    PROGRESS NOTE:     Patient is a 73y old  Female who presents with a chief complaint of dislodged nephrostomy tube (11 Jan 2024 02:53)      SUBJECTIVE / OVERNIGHT EVENTS: Pt seen and examined by me  ADDITIONAL REVIEW OF SYSTEMS:    MEDICATIONS  (STANDING):  cholecalciferol 2000 Unit(s) Oral daily  heparin  Infusion.  Unit(s)/Hr (12 mL/Hr) IV Continuous <Continuous>  piperacillin/tazobactam IVPB.. 3.375 Gram(s) IV Intermittent every 8 hours  sodium chloride 0.9%. 1000 milliLiter(s) (75 mL/Hr) IV Continuous <Continuous>    MEDICATIONS  (PRN):  acetaminophen     Tablet .. 650 milliGRAM(s) Oral every 6 hours PRN Mild Pain (1 - 3)  oxyCODONE    IR 5 milliGRAM(s) Oral every 4 hours PRN Moderate Pain (4 - 6)  polyethylene glycol 3350 17 Gram(s) Oral at bedtime PRN Constipation      Vital Signs Last 24 Hrs  T(C): 36.5 (12 Jan 2024 05:30), Max: 37.2 (11 Jan 2024 19:17)  T(F): 97.7 (12 Jan 2024 05:30), Max: 98.9 (11 Jan 2024 19:17)  HR: 83 (12 Jan 2024 05:30) (83 - 95)  BP: 119/80 (12 Jan 2024 05:30) (102/62 - 135/84)  BP(mean): --  RR: 19 (12 Jan 2024 05:30) (17 - 20)  SpO2: 100% (12 Jan 2024 05:30) (96% - 100%)    Parameters below as of 12 Jan 2024 05:30  Patient On (Oxygen Delivery Method): room air      PHYSICAL EXAM:    Vital Signs Last 24 Hrs  T(C): 36.5 (12 Jan 2024 05:30), Max: 37.2 (11 Jan 2024 19:17)  T(F): 97.7 (12 Jan 2024 05:30), Max: 98.9 (11 Jan 2024 19:17)  HR: 83 (12 Jan 2024 05:30) (83 - 95)  BP: 119/80 (12 Jan 2024 05:30) (102/62 - 135/84)  BP(mean): --  RR: 19 (12 Jan 2024 05:30) (17 - 20)  SpO2: 100% (12 Jan 2024 05:30) (96% - 100%)    Parameters below as of 12 Jan 2024 05:30  Patient On (Oxygen Delivery Method): room air      CONSTITUTIONAL: NAD, well-developed  RESPIRATORY: Normal respiratory effort; lungs are clear to auscultation bilaterally  CARDIOVASCULAR: Regular rate and rhythm, normal S1 and S2, no murmur/rub/gallop; No lower extremity edema; Peripheral pulses are 2+ bilaterally  ABDOMEN: Nontender to palpation, normoactive bowel sounds, no rebound/guarding; No hepatosplenomegaly. Nephrostomy tube present  MUSCLOSKELETAL: no clubbing or cyanosis of digits; no joint swelling or tenderness to palpation  PSYCH: A+O to person, place, and time; affect appropriate  NEURO: No gross deficits  SKIN: No rash   RLE Swelling > LLE     LABS:                                                8.5    15.43 )-----------( 379      ( 12 Jan 2024 02:58 )             27.1         01-12    137  |  101  |  18  ----------------------------<  120<H>  4.8   |  26  |  0.65    Ca    10.6<H>      12 Jan 2024 02:58  Phos  3.0     01-12  Mg     2.10     01-12    TPro  6.8  /  Alb  2.7<L>  /  TBili  0.2  /  DBili  <0.2  /  AST  15  /  ALT  10  /  AlkPhos  92  01-12    Urinalysis Basic - ( 11 Jan 2024 06:54 )    Color: x / Appearance: x / SG: x / pH: x  Gluc: 129 mg/dL / Ketone: x  / Bili: x / Urobili: x   Blood: x / Protein: x / Nitrite: x   Leuk Esterase: x / RBC: x / WBC x   Sq Epi: x / Non Sq Epi: x / Bacteria: x          RADIOLOGY & ADDITIONAL TESTS:  Results Reviewed:   Imaging Personally Reviewed:  Electrocardiogram Personally Reviewed:    COORDINATION OF CARE:  Care Discussed with Consultants/Other Providers [Y/N]:  Prior or Outpatient Records Reviewed [Y/N]:   Jose Floyd  Hospitalist  Pager- [phone]    PROGRESS NOTE:     Patient is a 73y old  Female who presents with a chief complaint of dislodged nephrostomy tube (11 Jan 2024 02:53)      SUBJECTIVE / OVERNIGHT EVENTS: Pt seen and examined by me . no new events, feels well. Denies pain, No Nausea or vomiting   ADDITIONAL REVIEW OF SYSTEMS:    MEDICATIONS  (STANDING):  cholecalciferol 2000 Unit(s) Oral daily  heparin  Infusion.  Unit(s)/Hr (12 mL/Hr) IV Continuous <Continuous>  piperacillin/tazobactam IVPB.. 3.375 Gram(s) IV Intermittent every 8 hours  sodium chloride 0.9%. 1000 milliLiter(s) (75 mL/Hr) IV Continuous <Continuous>    MEDICATIONS  (PRN):  acetaminophen     Tablet .. 650 milliGRAM(s) Oral every 6 hours PRN Mild Pain (1 - 3)  oxyCODONE    IR 5 milliGRAM(s) Oral every 4 hours PRN Moderate Pain (4 - 6)  polyethylene glycol 3350 17 Gram(s) Oral at bedtime PRN Constipation      Vital Signs Last 24 Hrs  T(C): 36.5 (12 Jan 2024 05:30), Max: 37.2 (11 Jan 2024 19:17)  T(F): 97.7 (12 Jan 2024 05:30), Max: 98.9 (11 Jan 2024 19:17)  HR: 83 (12 Jan 2024 05:30) (83 - 95)  BP: 119/80 (12 Jan 2024 05:30) (102/62 - 135/84)  BP(mean): --  RR: 19 (12 Jan 2024 05:30) (17 - 20)  SpO2: 100% (12 Jan 2024 05:30) (96% - 100%)    Parameters below as of 12 Jan 2024 05:30  Patient On (Oxygen Delivery Method): room air      PHYSICAL EXAM:    Vital Signs Last 24 Hrs  T(C): 36.5 (12 Jan 2024 05:30), Max: 37.2 (11 Jan 2024 19:17)  T(F): 97.7 (12 Jan 2024 05:30), Max: 98.9 (11 Jan 2024 19:17)  HR: 83 (12 Jan 2024 05:30) (83 - 95)  BP: 119/80 (12 Jan 2024 05:30) (102/62 - 135/84)  BP(mean): --  RR: 19 (12 Jan 2024 05:30) (17 - 20)  SpO2: 100% (12 Jan 2024 05:30) (96% - 100%)    Parameters below as of 12 Jan 2024 05:30  Patient On (Oxygen Delivery Method): room air      CONSTITUTIONAL: NAD, well-developed  RESPIRATORY: Normal respiratory effort; lungs are clear to auscultation bilaterally  CARDIOVASCULAR: Regular rate and rhythm, normal S1 and S2, no murmur/rub/gallop; No lower extremity edema; Peripheral pulses are 2+ bilaterally  ABDOMEN: Nontender to palpation, normoactive bowel sounds, no rebound/guarding; No hepatosplenomegaly. Nephrostomy tube present  MUSCLOSKELETAL: no clubbing or cyanosis of digits; no joint swelling or tenderness to palpation  PSYCH: A+O to person, place, and time; affect appropriate  NEURO: No gross deficits  SKIN: No rash   RLE Swelling > LLE     LABS:                                                8.5    15.43 )-----------( 379      ( 12 Jan 2024 02:58 )             27.1         01-12    137  |  101  |  18  ----------------------------<  120<H>  4.8   |  26  |  0.65    Ca    10.6<H>      12 Jan 2024 02:58  Phos  3.0     01-12  Mg     2.10     01-12    TPro  6.8  /  Alb  2.7<L>  /  TBili  0.2  /  DBili  <0.2  /  AST  15  /  ALT  10  /  AlkPhos  92  01-12    Urinalysis Basic - ( 11 Jan 2024 06:54 )    Color: x / Appearance: x / SG: x / pH: x  Gluc: 129 mg/dL / Ketone: x  / Bili: x / Urobili: x   Blood: x / Protein: x / Nitrite: x   Leuk Esterase: x / RBC: x / WBC x   Sq Epi: x / Non Sq Epi: x / Bacteria: x          RADIOLOGY & ADDITIONAL TESTS:  Results Reviewed:   Imaging Personally Reviewed:  Electrocardiogram Personally Reviewed:    COORDINATION OF CARE:  Care Discussed with Consultants/Other Providers [Y/N]:  Prior or Outpatient Records Reviewed [Y/N]:   Jose Floyd  Hospitalist  Pager- [phone]    PROGRESS NOTE:     Patient is a 73y old  Female who presents with a chief complaint of dislodged nephrostomy tube (11 Jan 2024 02:53)      SUBJECTIVE / OVERNIGHT EVENTS: Pt seen and examined by me . no new events, feels well. Denies pain, No Nausea or vomiting   ADDITIONAL REVIEW OF SYSTEMS:    MEDICATIONS  (STANDING):  cholecalciferol 2000 Unit(s) Oral daily  heparin  Infusion.  Unit(s)/Hr (12 mL/Hr) IV Continuous <Continuous>  piperacillin/tazobactam IVPB.. 3.375 Gram(s) IV Intermittent every 8 hours  sodium chloride 0.9%. 1000 milliLiter(s) (75 mL/Hr) IV Continuous <Continuous>    MEDICATIONS  (PRN):  acetaminophen     Tablet .. 650 milliGRAM(s) Oral every 6 hours PRN Mild Pain (1 - 3)  oxyCODONE    IR 5 milliGRAM(s) Oral every 4 hours PRN Moderate Pain (4 - 6)  polyethylene glycol 3350 17 Gram(s) Oral at bedtime PRN Constipation      Vital Signs Last 24 Hrs  T(C): 36.5 (12 Jan 2024 05:30), Max: 37.2 (11 Jan 2024 19:17)  T(F): 97.7 (12 Jan 2024 05:30), Max: 98.9 (11 Jan 2024 19:17)  HR: 83 (12 Jan 2024 05:30) (83 - 95)  BP: 119/80 (12 Jan 2024 05:30) (102/62 - 135/84)  BP(mean): --  RR: 19 (12 Jan 2024 05:30) (17 - 20)  SpO2: 100% (12 Jan 2024 05:30) (96% - 100%)    Parameters below as of 12 Jan 2024 05:30  Patient On (Oxygen Delivery Method): room air      PHYSICAL EXAM:    Vital Signs Last 24 Hrs  T(C): 36.5 (12 Jan 2024 05:30), Max: 37.2 (11 Jan 2024 19:17)  T(F): 97.7 (12 Jan 2024 05:30), Max: 98.9 (11 Jan 2024 19:17)  HR: 83 (12 Jan 2024 05:30) (83 - 95)  BP: 119/80 (12 Jan 2024 05:30) (102/62 - 135/84)  BP(mean): --  RR: 19 (12 Jan 2024 05:30) (17 - 20)  SpO2: 100% (12 Jan 2024 05:30) (96% - 100%)    Parameters below as of 12 Jan 2024 05:30  Patient On (Oxygen Delivery Method): room air      CONSTITUTIONAL: NAD, well-developed  RESPIRATORY: Normal respiratory effort; lungs are clear to auscultation bilaterally  CARDIOVASCULAR: Regular rate and rhythm, normal S1 and S2, no murmur/rub/gallop; No lower extremity edema; Peripheral pulses are 2+ bilaterally  ABDOMEN: Nontender to palpation, normoactive bowel sounds, no rebound/guarding; No hepatosplenomegaly. Nephrostomy tube present  MUSCLOSKELETAL: no clubbing or cyanosis of digits; no joint swelling or tenderness to palpation  PSYCH: A+O to person, place, and time; affect appropriate  NEURO: No gross deficits  SKIN: No rash   RLE Swelling > LLE     LABS:                                                8.5    15.43 )-----------( 379      ( 12 Jan 2024 02:58 )             27.1         01-12    137  |  101  |  18  ----------------------------<  120<H>  4.8   |  26  |  0.65    Ca    10.6<H>      12 Jan 2024 02:58  Phos  3.0     01-12  Mg     2.10     01-12    TPro  6.8  /  Alb  2.7<L>  /  TBili  0.2  /  DBili  <0.2  /  AST  15  /  ALT  10  /  AlkPhos  92  01-12    Urinalysis Basic - ( 11 Jan 2024 06:54 )    Color: x / Appearance: x / SG: x / pH: x  Gluc: 129 mg/dL / Ketone: x  / Bili: x / Urobili: x   Blood: x / Protein: x / Nitrite: x   Leuk Esterase: x / RBC: x / WBC x   Sq Epi: x / Non Sq Epi: x / Bacteria: x          RADIOLOGY & ADDITIONAL TESTS:  Results Reviewed:   Imaging Personally Reviewed:  Electrocardiogram Personally Reviewed:    COORDINATION OF CARE:  Care Discussed with Consultants/Other Providers [Y/N]:  Prior or Outpatient Records Reviewed [Y/N]:

## 2024-01-12 NOTE — DISCHARGE NOTE PROVIDER - HOSPITAL COURSE
73 year old F with history of HLD (not on med currently), DVT (recurrent, a few years ago with coumadin x6 months, left sided, now with right DVT on eliquis, but recently told at [facility] to discontinue eliquis?), invasive urothelial carcinoma both conventionally and focally sarcomatoid subtype  and high grade carcinoma invaded muscularis propria and well differentiated adeno colon cancer T4 N1 Mo, stage III, MSI intact), s/p TURBT who presents for admission with dislodged left nephrostomy tube.  She has a history of severe bilateral hydronephrosis due to bladder obstruction. The patient was admitted to the ICU at that time and was given HDx1. The patient then underwent TURBT with Dr. Bradford 10/12/2023. The patient reportedly recently has had an admission at [facility] from 12/26-01/10. The patient was admitted for UTI and blocked nephrostomy tubes and given antibiotics at that time. The rest of the admission at [facility] is unknown (no records and patient did not want night admitting team to call daughter overnight to not wake her up). When she returned home yesterday the patient slid out of the wheelchair and onto the floor. EMS noted bleeding from the left back with dislodgement of the nephrostomy tube. Pt admitted with   # Dislodged Nephrostomy tube. Pt has history of severe bilateral hydronephrosis with b/l outlet obstruction s/p bilateral nephrostomy tube placement 10/31, with exchange/replacement recently at [facility] due to blocked nephrostomy tube. Right nephrostomy tube in place, left nephrostomy tube displaced/dislodged day prior to admission. CT abdomen, pelvis and chest showed thrombus in right common femoral vein extending into superficial vein (not seen on 03/2023), increase in hydronephrosis despite perc nephrostomy tubes and ureteral stent, prominent soft tissue in posterior bladder extending into ureters inseparable from vaginal cuff adjacent fat increased size of right external iliac LNs.BUN/Cr 19/0.49. s/p L NT replaced on 1/11 - Flush with 5 cc NS qd .Continue to monitor.    # Leukocytosis- UA- moderate LE. Blood culturex2- NGTD. UC not sent. Was recently treated for UTI at [facility]  On empiric treatment with IV Zosyn for now. Continue to monitor fever/WBC curve.    # Hyponatremia-  Na 132 -->135. s/p  one bolus of 500cc  of NaCl now. serum osm, urine osm, and urine Na ordered  Will not correct more than 6-8 meq in the next 24 hours  Continue to trend BMP daily.    #  Hypercalcemia- Ca 10.7 from 8.6 , increased from 10.2-->10.6. Suspect dehydration vs malignancy. s/p IVF, consider calcitonin if Calcium rises  PTH- 24, Low normal. Continue to monitor BMP daily.    #Stage II-III sacral ulcer on admission - Nutrition consult, wound care-nursing and surgery consult placed, turn every 4 hours     #Microcytic anemia-MCV 77.9, H/H 8.9/29.2 (7.9/25.1 -October 18, 2023). Iron panel reviewed. Likely in setting of colon cancer  # H/o Recurrent DVT- Currently extensive thrombus in right common femoral vein extending into superficial vein. On Eliquis recently, discontinued in last few weeks, per patient denies melena and hematochezia in the last few weeks to be the reason. On heparin drip for now unless contraindicated by GI bleed, will transition to Eliquis  FU US of the lower extremity bilaterally ordered to eval for DVT.    # Colon adenocarcinoma-  Currently poor functional status, not on chemotherapy, awaiting treatment plan for urothelial cancer prior to hemicolectomy   Sees Dr Taveras and Dr Whitney as outpt. On Tylenol and oxycodone for pain PRN.      73 year old F with history of HLD (not on med currently), DVT (recurrent, a few years ago with coumadin x6 months, left sided, now with right DVT on eliquis, but recently told at [facility] to discontinue eliquis?), invasive urothelial carcinoma both conventionally and focally sarcomatoid subtype  and high grade carcinoma invaded muscularis propria and well differentiated adeno colon cancer T4 N1 Mo, stage III, MSI intact), s/p TURBT who presents for admission with dislodged left nephrostomy tube.  She has a history of severe bilateral hydronephrosis due to bladder obstruction. The patient was admitted to the ICU at that time and was given HDx1. The patient then underwent TURBT with Dr. Bradford 10/12/2023. The patient reportedly recently has had an admission at [facility] from 12/26-01/10. The patient was admitted for UTI and blocked nephrostomy tubes and given antibiotics at that time. The rest of the admission at [facility] is unknown (no records and patient did not want night admitting team to call daughter overnight to not wake her up). When she returned home yesterday the patient slid out of the wheelchair and onto the floor. EMS noted bleeding from the left back with dislodgement of the nephrostomy tube. Pt admitted with   # Dislodged Nephrostomy tube. Pt has history of severe bilateral hydronephrosis with b/l outlet obstruction s/p bilateral nephrostomy tube placement 10/31, with exchange/replacement recently at [facility] due to blocked nephrostomy tube. Right nephrostomy tube in place, left nephrostomy tube displaced/dislodged day prior to admission. CT abdomen, pelvis and chest showed thrombus in right common femoral vein extending into superficial vein (not seen on 03/2023), increase in hydronephrosis despite perc nephrostomy tubes and ureteral stent, prominent soft tissue in posterior bladder extending into ureters inseparable from vaginal cuff adjacent fat increased size of right external iliac LNs.BUN/Cr 19/0.49. s/p L NT replaced on 1/11 - Flush with 5 cc NS qd .Continue to monitor.    # Leukocytosis- UA- moderate LE. Blood culturex2- NGTD. UC not sent. Was recently treated for UTI at [facility]  On empiric treatment with IV Zosyn for now. Continue to monitor fever/WBC curve.    # Hyponatremia-  Na 132 -->135. s/p  one bolus of 500cc  of NaCl now. serum osm, urine osm, and urine Na ordered  Will not correct more than 6-8 meq in the next 24 hours  Continue to trend BMP daily.    #  Hypercalcemia- Ca 10.7 from 8.6 , increased from 10.2-->10.6. Suspect dehydration vs malignancy. s/p IVF, consider calcitonin if Calcium rises  PTH- 24, Low normal. Continue to monitor BMP daily.    #Stage II-III sacral ulcer on admission - Nutrition consult, wound care-nursing and surgery consult placed, turn every 4 hours     #Microcytic anemia-MCV 77.9, H/H 8.9/29.2 (7.9/25.1 -October 18, 2023). Iron panel reviewed. Likely in setting of colon cancer  # H/o Recurrent DVT- Currently extensive thrombus in right common femoral vein extending into superficial vein. On Eliquis recently, discontinued in last few weeks, per patient denies melena and hematochezia in the last few weeks to be the reason. On heparin drip for now unless contraindicated by GI bleed, will transition to Eliquis  FU US of the lower extremity bilaterally ordered to eval for DVT.    # Colon adenocarcinoma-  Currently poor functional status, not on chemotherapy, awaiting treatment plan for urothelial cancer prior to hemicolectomy   Sees Dr Taveras and Dr Whitney as outpt. On Tylenol and oxycodone for pain PRN.      73 year old F with history of HLD (not on med currently), DVT (recurrent, a few years ago with coumadin x6 months, left sided, now with right DVT on eliquis, but recently told at [facility] to discontinue eliquis?), invasive urothelial carcinoma both conventionally and focally sarcomatoid subtype  and high grade carcinoma invaded muscularis propria and well differentiated adeno colon cancer T4 N1 Mo, stage III, MSI intact), s/p TURBT who presents for admission with dislodged left nephrostomy tube.  She has a history of severe bilateral hydronephrosis due to bladder obstruction. The patient was admitted to the ICU at that time and was given HDx1. The patient then underwent TURBT with Dr. Bradford 10/12/2023. The patient reportedly recently has had an admission at [facility] from 12/26-01/10. The patient was admitted for UTI and blocked nephrostomy tubes and given antibiotics at that time. The rest of the admission at [facility] is unknown (no records and patient did not want night admitting team to call daughter overnight to not wake her up). When she returned home yesterday the patient slid out of the wheelchair and onto the floor. EMS noted bleeding from the left back with dislodgement of the nephrostomy tube. Pt admitted with   # Dislodged Nephrostomy tube. Pt has history of severe bilateral hydronephrosis with b/l outlet obstruction s/p bilateral nephrostomy tube placement 10/31, with exchange/replacement recently at [facility] due to blocked nephrostomy tube. Right nephrostomy tube in place, left nephrostomy tube displaced/dislodged day prior to admission. CT abdomen, pelvis and chest showed thrombus in right common femoral vein extending into superficial vein (not seen on 03/2023), increase in hydronephrosis despite perc nephrostomy tubes and ureteral stent, prominent soft tissue in posterior bladder extending into ureters inseparable from vaginal cuff adjacent fat increased size of right external iliac LNs.BUN/Cr 19/0.49. s/p L NT replaced on 1/11 - Flush with 5 cc NS qd .Continue to monitor.    # UTI- pt had intermittent fevers. UA- moderate LE. Blood culture cx NGTD. s/p empiric treatment with IV Zosyn x 5 days    #Recurrent DVT- LE duplex showed acute b/l occlusive femoral DVT extending into superficial vein. Vascular surgery rec non-surgical management with systemic AC.  Pt received eliquis however c/b large vaginal bleeding likely source is known bladder tumor invading vagina. Eliquis on hold.  IVC filter was placed by IR 1/18.    #  Hypercalcemia 2/2 malignancy - Ca 10.7 from 8.6. received Zometa with improvement     #Stage II-III sacral ulcer on admission - Nutrition consult, wound care-nursing and surgery consult placed, turn every 4 hours     #Microcytic anemia-MCV 77.9, H/H 8.9/29.2 (7.9/25.1 -October 18, 2023). Iron panel reviewed. Likely in setting of malignancy               73 year old F with history of HLD (not on med currently), DVT (recurrent, a few years ago with coumadin x6 months, left sided, now with right DVT on eliquis, but recently told at [facility] to discontinue eliquis?), invasive urothelial carcinoma both conventionally and focally sarcomatoid subtype  and high grade carcinoma invaded muscularis propria and well differentiated adeno colon cancer T4 N1 Mo, stage III, MSI intact), s/p TURBT who presents for admission with dislodged left nephrostomy tube.  She has a history of severe bilateral hydronephrosis due to bladder obstruction. The patient was admitted to the ICU at that time and was given HDx1. The patient then underwent TURBT with Dr. Bradford 10/12/2023. The patient reportedly recently has had an admission at [facility] from 12/26-01/10. The patient was admitted for UTI and blocked nephrostomy tubes and given antibiotics at that time. The rest of the admission at [facility] is unknown (no records and patient did not want night admitting team to call daughter overnight to not wake her up). When she returned home yesterday the patient slid out of the wheelchair and onto the floor. EMS noted bleeding from the left back with dislodgement of the nephrostomy tube. Pt admitted with   # Dislodged Nephrostomy tube. Pt has history of severe bilateral hydronephrosis with b/l outlet obstruction s/p bilateral nephrostomy tube placement 10/31, with exchange/replacement recently at [facility] due to blocked nephrostomy tube. Right nephrostomy tube in place, left nephrostomy tube displaced/dislodged day prior to admission. CT abdomen, pelvis and chest showed thrombus in right common femoral vein extending into superficial vein (not seen on 03/2023), increase in hydronephrosis despite perc nephrostomy tubes and ureteral stent, prominent soft tissue in posterior bladder extending into ureters inseparable from vaginal cuff adjacent fat increased size of right external iliac LNs.BUN/Cr 19/0.49. s/p L NT replaced on 1/11 - Flush with 5 cc NS qd .Continue to monitor.    # UTI- pt had intermittent fevers. UA- moderate LE. Blood culture cx NGTD. s/p empiric treatment with IV Zosyn x 5 days    #Recurrent DVT- LE duplex showed acute b/l occlusive femoral DVT extending into superficial vein. Vascular surgery rec non-surgical management with systemic AC.  Pt received eliquis however c/b large vaginal bleeding likely source is known bladder tumor invading vagina. Eliquis on hold.  IVC filter was placed by IR 1/18.    #  Hypercalcemia 2/2 malignancy - Ca 10.7 from 8.6. received Zometa with improvement     #Stage II-III sacral ulcer on admission - Nutrition consult, wound care-nursing and surgery consult placed, turn every 4 hours     #Microcytic anemia-MCV 77.9, H/H 8.9/29.2 (7.9/25.1 -October 18, 2023). Iron panel reviewed. Likely in setting of malignancy              73M HLD (not on med currently), DVT (recurrent, a few years ago with coumadin x6 months, left sided, now with right DVT on Eliquis, but recently told at [facility] to discontinue eliquis?), invasive urothelial carcinoma both conventionally and focally sarcomatoid subtype  and high grade carcinoma invaded muscularis propria and well differentiated adeno colon cancer T4 N1 Mo, stage III, MSI intact), s/p TURBT who presents for admission with dislodged left nephrostomy tube.  She has a history of severe bilateral hydronephrosis due to bladder obstruction. The patient was admitted to the ICU at that time and was given HDx1. The patient then underwent TURBT with Dr. Bradford 10/12/2023. The patient reportedly recently has had an admission at [facility] from 12/26-01/10. The patient was admitted for UTI and blocked nephrostomy tubes and given antibiotics at that time. The rest of the admission at [facility] is unknown. When she returned home yesterday the patient slid out of the wheelchair and onto the floor. EMS noted bleeding from the left back with dislodgement of the nephrostomy tube.     # Dislodged Nephrostomy tube. Pt has history of severe bilateral hydronephrosis with b/l outlet obstruction s/p bilateral nephrostomy tube placement 10/31, with exchange/replacement recently at [facility] due to blocked nephrostomy tube. Right nephrostomy tube in place, left nephrostomy tube displaced/dislodged day prior to admission. CT abdomen, pelvis and chest showed thrombus in right common femoral vein extending into superficial vein (not seen on 03/2023), increase in hydronephrosis despite perc nephrostomy tubes and ureteral stent, prominent soft tissue in posterior bladder extending into ureters inseparable from vaginal cuff adjacent fat increased size of right external iliac LNs.BUN/Cr 19/0.49. s/p L NT replaced on 1/11 - Flush with 5 cc NS qd .Continue to monitor.    # UTI- pt had intermittent fevers. UA- moderate LE. Blood culture cx NGTD. s/p empiric treatment with IV Zosyn x 5 days    #Recurrent DVT- LE duplex showed acute b/l occlusive femoral DVT extending into superficial vein. Vascular surgery rec non-surgical management with systemic AC.  Pt received eliquis however c/b large vaginal bleeding likely source is known bladder tumor invading vagina. Eliquis on hold.  IVC filter was placed by IR 1/18.    #  Hypercalcemia 2/2 malignancy - Ca 10.7 from 8.6. received Zometa with improvement     #Stage II-III sacral ulcer on admission - Nutrition consult, wound care-nursing and surgery consult placed, turn every 4 hours     #Microcytic anemia-MCV 77.9, H/H 8.9/29.2 (7.9/25.1 -October 18, 2023). Iron panel mixed NIKO and AOCD.    Dc to [facility]             73M HLD (not on med currently), DVT (recurrent, a few years ago with coumadin x6 months, left sided, now with right DVT on Eliquis, but recently told at [facility] to discontinue eliquis?), invasive urothelial carcinoma both conventionally and focally sarcomatoid subtype  and high grade carcinoma invaded muscularis propria and well differentiated adeno colon cancer T4 N1 Mo, stage III, MSI intact), s/p TURBT who presents for admission with dislodged left nephrostomy tube.  She has a history of severe bilateral hydronephrosis due to bladder obstruction. The patient was admitted to the ICU at that time and was given HDx1. The patient then underwent TURBT with Dr. Bradford 10/12/2023. The patient reportedly recently has had an admission at [facility] from 12/26-01/10. The patient was admitted for UTI and blocked nephrostomy tubes and given antibiotics at that time. The rest of the admission at [facility] is unknown. When she returned home yesterday the patient slid out of the wheelchair and onto the floor. EMS noted bleeding from the left back with dislodgement of the nephrostomy tube.     # Dislodged Nephrostomy tube. Pt has history of severe bilateral hydronephrosis with b/l outlet obstruction s/p bilateral nephrostomy tube placement 10/31, with exchange/replacement recently at [facility] due to blocked nephrostomy tube. Right nephrostomy tube in place, left nephrostomy tube displaced/dislodged day prior to admission. CT abdomen, pelvis and chest showed thrombus in right common femoral vein extending into superficial vein (not seen on 03/2023), increase in hydronephrosis despite perc nephrostomy tubes and ureteral stent, prominent soft tissue in posterior bladder extending into ureters inseparable from vaginal cuff adjacent fat increased size of right external iliac LNs.BUN/Cr 19/0.49. s/p L NT replaced on 1/11 - Flush with 5 cc NS qd .Continue to monitor.    # UTI- pt had intermittent fevers. UA- moderate LE. Blood culture cx NGTD. s/p empiric treatment with IV Zosyn x 5 days    #Recurrent DVT- LE duplex showed acute b/l occlusive femoral DVT extending into superficial vein. Vascular surgery rec non-surgical management with systemic AC.  Pt received eliquis however c/b large vaginal bleeding likely source is known bladder tumor invading vagina. Eliquis on hold.  IVC filter was placed by IR 1/18.    #  Hypercalcemia 2/2 malignancy - Ca 10.7 from 8.6. received Zometa with improvement     #Stage II-III sacral ulcer on admission - Nutrition consult, wound care-nursing and surgery consult placed, turn every 4 hours     #Microcytic anemia-MCV 77.9, H/H 8.9/29.2 (7.9/25.1 -October 18, 2023). Iron panel mixed NIKO and AOCD.    Dc to [facility]

## 2024-01-12 NOTE — PROGRESS NOTE ADULT - ASSESSMENT
Ms. Chen is a 73 year old F with history of HLD (not on med currently), DVT (recurrent, a few years ago with coumadin x6 months, left sided, now with right DVT on eliquis, but recently told at [facility] to discontinue eliquis?), invasive urothelial carcinoma both conventionally and focally sarcomatoid subtype  and high grade carcinoma invaded muscularis propria and well differentiated adeno colon cancer T4 N1 Mo, stage III, MSI intact), s/p TURBT who presents for admission with dislodged left nephrostomy tube. Ms. Chen is a 73 year old F with history of HLD (not on med currently), DVT (recurrent, a few years ago with coumadin x6 months, left sided, now with right DVT on eliquis, but recently told at [facility] to discontinue eliquis?), invasive urothelial carcinoma both conventionally and focally sarcomatoid subtype  and high grade carcinoma invaded muscularis propria and well differentiated adeno colon cancer T4 N1 Mo, stage III, MSI intact), s/p TURBT who presents for admission with dislodged left nephrostomy tube.

## 2024-01-12 NOTE — PROGRESS NOTE ADULT - PROBLEM SELECTOR PLAN 2
UA/urine culture ordered- should be drawn when left nephrostomy tube placed   UA- moderate LE. Blood culturex2- NGTD. UC not sent   recently treated for UTI at [facility]  On empiric treatment with IV Zosyn for now   Continue to monitor fever/WBC curve UA/urine culture ordered- should be drawn when left nephrostomy tube placed   UA- moderate LE. Blood culturex2- NGTD. UC not sent   recently treated for UTI at [facility]  On empiric treatment with IV Zosyn for now   Continue to monitor fever/WBC curve

## 2024-01-12 NOTE — PROGRESS NOTE ADULT - PROBLEM SELECTOR PLAN 8
recurrent DVT  Currently extensive thrombus in right common femoral vein extending into superficial vein  On eliquis recently, discontinued in last few weeks, per patient denies melena and hematochezia in the last few weeks to be the reason   Continue heparin drip for now unless contraindicated by GI bleed   -US of the lower extremity bilaterally ordered to eval for DVT recurrent DVT  Currently extensive thrombus in right common femoral vein extending into superficial vein  On eliquis recently, discontinued in last few weeks, per patient denies melena and hematochezia in the last few weeks to be the reason   On heparin drip for now unless contraindicated by GI bleed, will transtion to Eliquis  FU US of the lower extremity bilaterally ordered to eval for DVT

## 2024-01-12 NOTE — DISCHARGE NOTE PROVIDER - DETAILS OF MALNUTRITION DIAGNOSIS/DIAGNOSES
This patient has been assessed with a concern for Malnutrition and was treated during this hospitalization for the following Nutrition diagnosis/diagnoses:     -  01/13/2024: Moderate protein-calorie malnutrition

## 2024-01-13 NOTE — PROGRESS NOTE ADULT - ASSESSMENT
Ms. Chen is a 73 year old F with history of HLD (not on med currently), DVT (recurrent, a few years ago with coumadin x6 months, left sided, now with right DVT on eliquis, but recently told at [facility] to discontinue eliquis?), invasive urothelial carcinoma both conventionally and focally sarcomatoid subtype  and high grade carcinoma invaded muscularis propria and well differentiated adeno colon cancer T4 N1 Mo, stage III, MSI intact), s/p TURBT who presents for admission with dislodged left nephrostomy tube. Ms. Chen is a 73 year old F with history of HLD (not on med currently), DVT (recurrent, a few years ago with coumadin x6 months, left sided, now with right DVT on eliquis, but recently told at [facility] to discontinue eliquis?), invasive urothelial carcinoma both conventionally and focally sarcomatoid subtype  and high grade carcinoma invaded muscularis propria and well differentiated adeno colon cancer T4 N1 Mo, stage III, MSI intact), s/p TURBT who presents for admission with dislodged left nephrostomy tube.

## 2024-01-13 NOTE — PROGRESS NOTE ADULT - SUBJECTIVE AND OBJECTIVE BOX
Jose Floyd  Hospitalist  Pager- [phone]    PROGRESS NOTE:     Patient is a 73y old  Female who presents with a chief complaint of dislodged nephrostomy tube (11 Jan 2024 02:53)      SUBJECTIVE / OVERNIGHT EVENTS: Pt seen and examined by me. No new events  Tolerating PO, Denies N/V. pain in the abdomen on touch and movement     ADDITIONAL REVIEW OF SYSTEMS:    MEDICATIONS  (STANDING):  apixaban 5 milliGRAM(s) Oral two times a day  cholecalciferol 2000 Unit(s) Oral daily  influenza  Vaccine (HIGH DOSE) 0.7 milliLiter(s) IntraMuscular once  piperacillin/tazobactam IVPB.. 3.375 Gram(s) IV Intermittent every 8 hours  potassium phosphate / sodium phosphate Tablet (K-PHOS No. 2) 1 Tablet(s) Oral two times a day  sodium chloride 0.9%. 1000 milliLiter(s) (75 mL/Hr) IV Continuous <Continuous>    MEDICATIONS  (PRN):  acetaminophen     Tablet .. 650 milliGRAM(s) Oral every 6 hours PRN Mild Pain (1 - 3)  oxyCODONE    IR 5 milliGRAM(s) Oral every 4 hours PRN Moderate Pain (4 - 6)  polyethylene glycol 3350 17 Gram(s) Oral at bedtime PRN Constipation      Vital Signs Last 24 Hrs  T(C): 36.4 (13 Jan 2024 11:10), Max: 36.8 (12 Jan 2024 14:14)  T(F): 97.5 (13 Jan 2024 11:10), Max: 98.3 (13 Jan 2024 01:10)  HR: 92 (13 Jan 2024 11:10) (69 - 92)  BP: 103/73 (13 Jan 2024 11:10) (100/60 - 115/76)  BP(mean): --  RR: 18 (13 Jan 2024 11:10) (16 - 20)  SpO2: 100% (13 Jan 2024 11:10) (99% - 100%)    Parameters below as of 13 Jan 2024 11:10  Patient On (Oxygen Delivery Method): room air        PHYSICAL EXAM:    Vital Signs Last 24 Hrs  T(C): 36.4 (13 Jan 2024 11:10), Max: 36.8 (12 Jan 2024 14:14)  T(F): 97.5 (13 Jan 2024 11:10), Max: 98.3 (13 Jan 2024 01:10)  HR: 92 (13 Jan 2024 11:10) (69 - 92)  BP: 103/73 (13 Jan 2024 11:10) (100/60 - 115/76)  BP(mean): --  RR: 18 (13 Jan 2024 11:10) (16 - 20)  SpO2: 100% (13 Jan 2024 11:10) (99% - 100%)    Parameters below as of 13 Jan 2024 11:10  Patient On (Oxygen Delivery Method): room air    CONSTITUTIONAL: NAD, well-developed  RESPIRATORY: Normal respiratory effort; lungs are clear to auscultation bilaterally  CARDIOVASCULAR: Regular rate and rhythm, normal S1 and S2, no murmur/rub/gallop; No lower extremity edema; Peripheral pulses are 2+ bilaterally  ABDOMEN: Midly distended, tender  normoactive bowel sounds, no rebound/guarding; No hepatosplenomegaly. Nephrostomy tube present  MUSCLOSKELETAL: no clubbing or cyanosis of digits; no joint swelling or tenderness to palpation  PSYCH: A+O to person, place, and time; affect appropriate  NEURO: No gross deficits  SKIN: No rash   LE swelling present     LABS:                                   8.2    13.91 )-----------( 387      ( 13 Jan 2024 05:18 )             27.7       01-13    138  |  103  |  17  ----------------------------<  103<H>  4.6   |  23  |  0.63    Ca    10.8<H>      13 Jan 2024 05:18  Phos  2.4     01-13  Mg     1.90     01-13    TPro  6.8  /  Alb  2.7<L>  /  TBili  0.2  /  DBili  <0.2  /  AST  15  /  ALT  10  /  AlkPhos  92  01-12             Urinalysis Basic - ( 11 Jan 2024 06:54 )    Color: x / Appearance: x / SG: x / pH: x  Gluc: 129 mg/dL / Ketone: x  / Bili: x / Urobili: x   Blood: x / Protein: x / Nitrite: x   Leuk Esterase: x / RBC: x / WBC x   Sq Epi: x / Non Sq Epi: x / Bacteria: x          RADIOLOGY & ADDITIONAL TESTS:  Results Reviewed:   Imaging Personally Reviewed:  Electrocardiogram Personally Reviewed:    COORDINATION OF CARE:  Care Discussed with Consultants/Other Providers [Y/N]:  Prior or Outpatient Records Reviewed [Y/N]:   Jose Floyd  Hospitalist  Pager- [phone]    PROGRESS NOTE:     Patient is a 73y old  Female who presents with a chief complaint of dislodged nephrostomy tube (11 Jan 2024 02:53)      SUBJECTIVE / OVERNIGHT EVENTS: Pt seen and examined by me. No new events  Tolerating PO, Denies N/V. pain in the abdomen on touch and movement     ADDITIONAL REVIEW OF SYSTEMS:    MEDICATIONS  (STANDING):  apixaban 5 milliGRAM(s) Oral two times a day  cholecalciferol 2000 Unit(s) Oral daily  influenza  Vaccine (HIGH DOSE) 0.7 milliLiter(s) IntraMuscular once  piperacillin/tazobactam IVPB.. 3.375 Gram(s) IV Intermittent every 8 hours  potassium phosphate / sodium phosphate Tablet (K-PHOS No. 2) 1 Tablet(s) Oral two times a day  sodium chloride 0.9%. 1000 milliLiter(s) (75 mL/Hr) IV Continuous <Continuous>    MEDICATIONS  (PRN):  acetaminophen     Tablet .. 650 milliGRAM(s) Oral every 6 hours PRN Mild Pain (1 - 3)  oxyCODONE    IR 5 milliGRAM(s) Oral every 4 hours PRN Moderate Pain (4 - 6)  polyethylene glycol 3350 17 Gram(s) Oral at bedtime PRN Constipation      Vital Signs Last 24 Hrs  T(C): 36.4 (13 Jan 2024 11:10), Max: 36.8 (12 Jan 2024 14:14)  T(F): 97.5 (13 Jan 2024 11:10), Max: 98.3 (13 Jan 2024 01:10)  HR: 92 (13 Jan 2024 11:10) (69 - 92)  BP: 103/73 (13 Jan 2024 11:10) (100/60 - 115/76)  BP(mean): --  RR: 18 (13 Jan 2024 11:10) (16 - 20)  SpO2: 100% (13 Jan 2024 11:10) (99% - 100%)    Parameters below as of 13 Jan 2024 11:10  Patient On (Oxygen Delivery Method): room air        PHYSICAL EXAM:    Vital Signs Last 24 Hrs  T(C): 36.4 (13 Jan 2024 11:10), Max: 36.8 (12 Jan 2024 14:14)  T(F): 97.5 (13 Jan 2024 11:10), Max: 98.3 (13 Jan 2024 01:10)  HR: 92 (13 Jan 2024 11:10) (69 - 92)  BP: 103/73 (13 Jan 2024 11:10) (100/60 - 115/76)  BP(mean): --  RR: 18 (13 Jan 2024 11:10) (16 - 20)  SpO2: 100% (13 Jan 2024 11:10) (99% - 100%)    Parameters below as of 13 Jan 2024 11:10  Patient On (Oxygen Delivery Method): room air    CONSTITUTIONAL: NAD, well-developed  RESPIRATORY: Normal respiratory effort; lungs are clear to auscultation bilaterally  CARDIOVASCULAR: Regular rate and rhythm, normal S1 and S2, no murmur/rub/gallop; No lower extremity edema; Peripheral pulses are 2+ bilaterally  ABDOMEN: Midly distended, tender  normoactive bowel sounds, no rebound/guarding; No hepatosplenomegaly. Nephrostomy tube present  MUSCLOSKELETAL: no clubbing or cyanosis of digits; no joint swelling or tenderness to palpation  PSYCH: A+O to person, place, and time; affect appropriate  NEURO: No gross deficits  SKIN: No rash   LE swelling present     LABS:                                   8.2    13.91 )-----------( 387      ( 13 Jan 2024 05:18 )             27.7       01-13    138  |  103  |  17  ----------------------------<  103<H>  4.6   |  23  |  0.63    Ca    10.8<H>      13 Jan 2024 05:18  Phos  2.4     01-13  Mg     1.90     01-13    TPro  6.8  /  Alb  2.7<L>  /  TBili  0.2  /  DBili  <0.2  /  AST  15  /  ALT  10  /  AlkPhos  92  01-12             Urinalysis Basic - ( 11 Jan 2024 06:54 )    Color: x / Appearance: x / SG: x / pH: x  Gluc: 129 mg/dL / Ketone: x  / Bili: x / Urobili: x   Blood: x / Protein: x / Nitrite: x   Leuk Esterase: x / RBC: x / WBC x   Sq Epi: x / Non Sq Epi: x / Bacteria: x          RADIOLOGY & ADDITIONAL TESTS:  Results Reviewed:   Imaging Personally Reviewed:  Electrocardiogram Personally Reviewed:    COORDINATION OF CARE:  Care Discussed with Consultants/Other Providers [Y/N]:  Prior or Outpatient Records Reviewed [Y/N]:

## 2024-01-13 NOTE — DIETITIAN INITIAL EVALUATION ADULT - PROBLEM SELECTOR PLAN 8
recurrent DVT  -currently extensive thrombus in right common femoral vein extending into superficial vein  -on eliquis recently, discontinued in last few weeks  -obtain records from [facility] to determine why discontinued   -per patient denies melena and hematochezia in the last few weeks to be the reason   -continue heparin drip for now unless contraindicated by GI bleed   -US of the lower extremity bilaterally ordered to eval for DVT recurrent DVT  -currently extensive thrombus in right common femoral vein extending into superficial vein  -on eliquis recently, discontinued in last few weeks  -obtain records from [facility] to determine why discontinued   -per patient denies melena and hematochezia in the last few weeks to be the reason   -continue heparin drip for now unless contraindicated by GI bleed   -US of the lower extremity bilaterally ordered to eval for DVT

## 2024-01-13 NOTE — DIETITIAN INITIAL EVALUATION ADULT - NAME AND PHONE
Tarah Momin MS, RD ([id number]) | Also available on TEAMS  Tarah Momin MS, RD ([id number]) | Also available on TEAMS

## 2024-01-13 NOTE — PROGRESS NOTE ADULT - PROBLEM SELECTOR PLAN 3
Na 132 -->135-->138  s/p  one bolus of 500cc  of NaCl now   serum osm, urine osm, and urine Na ordered  Will not correct more than 6-8 meq in the next 24 hours  Continue to trend BMP daily

## 2024-01-13 NOTE — DIETITIAN INITIAL EVALUATION ADULT - PERTINENT MEDS FT
MEDICATIONS  (STANDING):  apixaban 5 milliGRAM(s) Oral two times a day  cholecalciferol 2000 Unit(s) Oral daily  influenza  Vaccine (HIGH DOSE) 0.7 milliLiter(s) IntraMuscular once  piperacillin/tazobactam IVPB.. 3.375 Gram(s) IV Intermittent every 8 hours  potassium phosphate / sodium phosphate Tablet (K-PHOS No. 2) 1 Tablet(s) Oral two times a day  sodium chloride 0.9%. 1000 milliLiter(s) (75 mL/Hr) IV Continuous <Continuous>    MEDICATIONS  (PRN):  acetaminophen     Tablet .. 650 milliGRAM(s) Oral every 6 hours PRN Mild Pain (1 - 3)  oxyCODONE    IR 5 milliGRAM(s) Oral every 4 hours PRN Moderate Pain (4 - 6)  polyethylene glycol 3350 17 Gram(s) Oral at bedtime PRN Constipation

## 2024-01-13 NOTE — DIETITIAN INITIAL EVALUATION ADULT - PROBLEM SELECTOR PLAN 1
- severe bilateral hydronephrosis with b/l outlet obstruction s/p bilateral nephrostomy tube placement 10/31, with exchange/replacement recently at [facility] due to blocked nephrostomy tube   -right nephrostomy tube in place, left nephrostomy tube displaced/dislodged yesterday  -CT abdomen, pelvis and chest showed thrombus in right common femoral vein extending into superficial vein (not seen on 03/2023), increase in hydronephrosis despite perc nephrostomy tubes and ureteral stent, prominent soft tissue in posterior bladder extending into ureters inseparable from vaginal cuff adjacent fat increased size of right external iliac LNs.   -VIR consult for replacement  -BUN/Cr 19/0.49, continue to trend - severe bilateral hydronephrosis with b/l outlet obstruction s/p bilateral nephrostomy tube placement 10/31, with exchange/replacement recently at [facility] due to blocked nephrostomy tube   -right nephrostomy tube in place, left nephrostomy tube displaced/dislodged yesterday  -CT abdomen, pelvis and chest showed thrombus in right common femoral vein extending into superficial vein (not seen on 03/2023), increase in hydronephrosis despite perc nephrostomy tubes and ureteral stent, prominent soft tissue in posterior bladder extending into ureters inseparable from vaginal cuff adjacent fat increased size of right external iliac LNs.   -VIR consult for replacement  -BUN/Cr 19/0.49, continue to trend

## 2024-01-13 NOTE — DIETITIAN INITIAL EVALUATION ADULT - ADD RECOMMEND
1) Recommend regular diet (d/c CSTCHO and renal restrictions 2/2 poor PO intake   2) Ensure Plus High Protein 1 PO Twice Daily (700 kcal, 40 gm protein)   3)  to provide reduced sugar magic cup 1x/day (280cal, 9g pro)  4) Monitor weights, labs, BM's, skin integrity, p.o. intake.   5) Encourage PO intake and honor food preferences as able.   6) Please document % PO intake in RN flowsheets

## 2024-01-13 NOTE — PROGRESS NOTE ADULT - PROBLEM SELECTOR PLAN 11
Extensively discussed with patient -would like trial of intubation, would not want to be long-term on ventilator. Patient is ok with dialysis if needed. Patient would not like feeding tube if needed. Daughters are her healthcare proxy.  Updated daughter Curt 1/13  [facility]

## 2024-01-13 NOTE — DIETITIAN INITIAL EVALUATION ADULT - NS FNS DIET ORDER
Diet, Consistent Carbohydrate Renal/No Snacks:   Supplement Feeding Modality:  Oral  Ensure Enlive Cans or Servings Per Day:  1       Frequency:  Three Times a day (01-11-24 @ 16:49) [Active]

## 2024-01-13 NOTE — PHYSICAL THERAPY INITIAL EVALUATION ADULT - LEVEL OF INDEPENDENCE: SUPINE/SIT, REHAB EVAL
Pt required assistance to maintain upright trunk posture upon sitting at edge of bed, occasionally leaning backwards/to the side./moderate assist (50% patients effort)

## 2024-01-13 NOTE — CHART NOTE - NSCHARTNOTEFT_GEN_A_CORE
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 Endocrinology consulted for hypercalcemia.    DDx includes dehydration, primary hyperparathyroidism, malignancy, granulomatous disease, myeloma  Corrected 11.9 today, slightly increasing over past few days  GFR 93  PTH 24, inappropriately normal   25 OH vitamin D 17, low  Patient given IVF so far, also on 2000IU vitamin D  PLAN:  - check repeat PTH, also PTHrP, 1-25OH vitamin D, SPEP, serum immunofixation, UPEP, urine immunofixation, and TSH  - trend CMP for calcium and albumin daily  - c/w aggressive IVF as tolerated  - stop vitamin D for now until 1-25OH vitamin D results    Full consult to follow in AM      Chi Briceno MD  Endocrine Fellow  For nonurgent matters, please email [email or web address] or [email or web address]. For urgent follow up questions, discharge recommendations, or new consults please call answering service at [phone] (weekdays), [phone] (nights/weekends). Name: FRANSICO GUZMAN   MRN: [id number]  Location: [address]    The patient is a 73yFemale with history of HLD (not on med currently), DVT (recurrent, a few years ago with coumadin x6 months, left sided, now with right DVT on eliquis, but recently told at [facility] to discontinue eliquis?), invasive urothelial carcinoma both conventionally and focally sarcomatoid subtype  and high grade carcinoma invaded muscularis propria and well differentiated adeno colon cancer T4 N1 Mo, stage III, MSI intact), s/p TURBT who presents for admission with dislodged left nephrostomy tube. Endocrinology consulted for hypercalcemia.    DDx includes dehydration, primary hyperparathyroidism, malignancy, granulomatous disease, myeloma  Corrected 11.9 today, slightly increasing over past few days  GFR 93  PTH 24, inappropriately normal   25 OH vitamin D 17, low  Patient given IVF so far, also on 2000IU vitamin D  PLAN:  - check repeat PTH, also PTHrP, 1-25OH vitamin D, SPEP, serum immunofixation, UPEP, urine immunofixation, and TSH  - trend CMP for calcium and albumin daily  - c/w aggressive IVF as tolerated  - stop vitamin D for now until 1-25OH vitamin D results    Full consult to follow in AM      Chi Briceno MD  Endocrine Fellow  For nonurgent matters, please email [email or web address] or [email or web address]. For urgent follow up questions, discharge recommendations, or new consults please call answering service at [phone] (weekdays), [phone] (nights/weekends). 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 Endocrinology consulted for hypercalcemia.    DDx includes dehydration, immobilization, primary hyperparathyroidism, malignancy, granulomatous disease, myeloma  Corrected 11.9 today, slightly increasing over past few days  GFR 93  PTH 24, inappropriately normal   25 OH vitamin D 17, low  Patient given IVF so far, also on 2000IU vitamin D  PLAN:  - check repeat PTH, also PTHrP, 1-25OH vitamin D, SPEP, serum immunofixation, UPEP, urine immunofixation, and TSH  - trend CMP for calcium and albumin daily  - c/w aggressive IVF as tolerated  - stop vitamin D for now until 1-25OH vitamin D results    Full consult to follow in AM      Chi Briceno MD  Endocrine Fellow  For nonurgent matters, please email [email or web address] or [email or web address]. For urgent follow up questions, discharge recommendations, or new consults please call answering service at [phone] (weekdays), [phone] (nights/weekends). 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 Endocrinology consulted for hypercalcemia.    DDx includes dehydration, immobilization, primary hyperparathyroidism, malignancy, granulomatous disease, myeloma  Corrected 11.9 today, slightly increasing over past few days  GFR 93  PTH 24, inappropriately normal   25 OH vitamin D 17, low  Patient given IVF so far, also on 2000IU vitamin D  PLAN:  - check repeat PTH, also PTHrP, 1-25OH vitamin D, SPEP, serum immunofixation, UPEP, urine immunofixation, and TSH  - trend CMP for calcium and albumin daily  - c/w aggressive IVF as tolerated  - stop vitamin D for now until 1-25OH vitamin D results    Full consult to follow in AM      Chi Briceno MD  Endocrine Fellow  For nonurgent matters, please email [email or web address] or [email or web address]. For urgent follow up questions, discharge recommendations, or new consults please call answering service at [phone] (weekdays), [phone] (nights/weekends).

## 2024-01-13 NOTE — DIETITIAN INITIAL EVALUATION ADULT - PERTINENT LABORATORY DATA
01-13    138  |  103  |  17  ----------------------------<  103<H>  4.6   |  23  |  0.63    Ca    10.8<H>      13 Jan 2024 05:18  Phos  2.4     01-13  Mg     1.90     01-13    TPro  6.8  /  Alb  2.7<L>  /  TBili  0.2  /  DBili  <0.2  /  AST  15  /  ALT  10  /  AlkPhos  92  01-12  A1C with Estimated Average Glucose Result: 5.4 % (01-11-24 @ 06:54)  A1C with Estimated Average Glucose Result: 5.9 % (10-08-23 @ 03:45)

## 2024-01-13 NOTE — DIETITIAN INITIAL EVALUATION ADULT - OTHER INFO
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    Nutrition interview: Pt A&Ox4, lethargic during visit, answering questions with eyes closed. No recent episodes of nausea, vomiting, diarrhea or constipation, last BM noted on 1/12 per RN flowsheets. Receiving bowel regimen. Denies any chewing/swallowing difficulties. No known food allergies. Stated UBW: 165lb, confirmed per HIE. Current weight of 149lb indicating weight loss. Intake is 25-50% per RN flowsheets, obsered with <25% Of ensure supplement consumed at bedside. Feeding skills: tray set up. Pt with stage 2-3 sacral wound, wound care consult in place. Educated Pt on increased protein needs and good nutrition to aid in wound healing. Discussed role of supplement and Pt verbalized understanding.   Labs notable for hypophosphatemia, will recommend to liberalize renal restriction. POCT ~120, A1c 5.4%, will recommend to liberalize CSTCHO restriction.    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    Nutrition interview: Pt A&Ox4, lethargic during visit, answering questions with eyes closed. No recent episodes of nausea, vomiting, diarrhea or constipation, last BM noted on 1/12 per RN flowsheets. Receiving bowel regimen. Denies any chewing/swallowing difficulties. No known food allergies. Stated UBW: 165lb, confirmed per HIE. Current weight of 149lb indicating weight loss. Intake is 25-50% per RN flowsheets, obsered with <25% Of ensure supplement consumed at bedside. Feeding skills: tray set up. Pt with stage 2-3 sacral wound, wound care consult in place. Educated Pt on increased protein needs and good nutrition to aid in wound healing. Discussed role of supplement and Pt verbalized understanding.   Labs notable for hypophosphatemia, will recommend to liberalize renal restriction. POCT ~120, A1c 5.4%, will recommend to liberalize CSTCHO restriction.

## 2024-01-13 NOTE — PROGRESS NOTE ADULT - PROBLEM SELECTOR PLAN 5
Ca 10.7 from 8.6 , increased from 10.2-->10.6-->10.8   Suspect dehydration vs malignancy   s/p IVF, no significant improvement with IVF    PTH- 24, Low normal. Vitamin D 17.1  Continue to monitor BMP daily  Will check PTHrP  will obtain Endo consult

## 2024-01-13 NOTE — DIETITIAN INITIAL EVALUATION ADULT - PROBLEM SELECTOR PLAN 2
- UA/urine culture ordered- should be drawn when left nephrostomy tube placed   -blood culturex2  -recently treated for UTI at [facility]-team in AM to obtain culture results/records   -will empirically treat with IV Zosyn for now   -if bacteremia found, and urine negative, patient may need MRI lumbar spine/sacrum to evaluate for sacral OM given stage II-III ulcer.  -continue to monitor fever/WBC curve - UA/urine culture ordered- should be drawn when left nephrostomy tube placed   -blood culturex2  -recently treated for UTI at [facility]-team in AM to obtain culture results/records   -will empirically treat with IV Zosyn for now   -if bacteremia found, and urine negative, patient may need MRI lumbar spine/sacrum to evaluate for sacral OM given stage II-III ulcer.  -continue to monitor fever/WBC curve

## 2024-01-13 NOTE — PROGRESS NOTE ADULT - PROBLEM SELECTOR PLAN 2
UA/urine culture ordered- should be drawn when left nephrostomy tube placed   UA- moderate LE. Blood culturex2- NGTD. UC not sent   recently treated for UTI at [facility]  On empiric treatment with IV Zosyn for now. Anticipate 5-7 day course  Continue to monitor fever/WBC curve UA/urine culture ordered- should be drawn when left nephrostomy tube placed   UA- moderate LE. Blood culturex2- NGTD. UC not sent   recently treated for UTI at [facility]  On empiric treatment with IV Zosyn for now. Anticipate 5-7 day course  Continue to monitor fever/WBC curve

## 2024-01-13 NOTE — PROGRESS NOTE ADULT - PROBLEM SELECTOR PLAN 1
Severe bilateral hydronephrosis with b/l outlet obstruction s/p bilateral nephrostomy tube placement 10/31, with exchange/replacement recently at [facility] due to blocked nephrostomy tube   Right nephrostomy tube in place, left nephrostomy tube displaced/dislodged yesterday  CT abdomen, pelvis and chest showed thrombus in right common femoral vein extending into superficial vein (not seen on 03/2023), increase in hydronephrosis despite perc nephrostomy tubes and ureteral stent, prominent soft tissue in posterior bladder extending into ureters inseparable from vaginal cuff adjacent fat increased size of right external iliac LNs.  BUN/Cr 19/0.49, continue to trend  s/p L NT replaced on 1/11 - Flush with 5 cc NS qd  Continue to monitor Severe bilateral hydronephrosis with b/l outlet obstruction s/p bilateral nephrostomy tube placement 10/31, with exchange/replacement recently at [facility] due to blocked nephrostomy tube   Right nephrostomy tube in place, left nephrostomy tube displaced/dislodged yesterday  CT abdomen, pelvis and chest showed thrombus in right common femoral vein extending into superficial vein (not seen on 03/2023), increase in hydronephrosis despite perc nephrostomy tubes and ureteral stent, prominent soft tissue in posterior bladder extending into ureters inseparable from vaginal cuff adjacent fat increased size of right external iliac LNs.  BUN/Cr 19/0.49, continue to trend  s/p L NT replaced on 1/11 - Flush with 5 cc NS qd  Continue to monitor

## 2024-01-13 NOTE — DIETITIAN INITIAL EVALUATION ADULT - EDUCATION DIETARY MODIFICATIONS
Reviewed increased protein needs and importance of good nutrition to support wound healing and encouraged intake of supplement between meals./unable to verbalize/demonstrate/(1) partially meets; needs review/practice/verbalization

## 2024-01-13 NOTE — DIETITIAN INITIAL EVALUATION ADULT - PROBLEM SELECTOR PLAN 6
- stage II-III sacral ulcer on admission   -nutrition consult   -wound care-nursing and surgery consult placed   -turn every 4 hours   -continue to monitor

## 2024-01-13 NOTE — DIETITIAN INITIAL EVALUATION ADULT - ORAL INTAKE PTA/DIET HISTORY
Pt lives at home with daughter. No specific diet followed PTA. Pt reports that appetite has declined associated with weight loss. Also reports she consumed 1-2 ensure supplements/day.

## 2024-01-13 NOTE — PHYSICAL THERAPY INITIAL EVALUATION ADULT - PERTINENT HX OF CURRENT PROBLEM, REHAB EVAL
74 yo F hx of HLD, DVT in 12/23 (previously on eliquis), adenocarcinoma of right ascending colon (dx 10/2023), and urothelial carcinoma of bladder s/p TURBT and s/p b/l nephrostomy tubes BIBEMS for accidental left nephrostomy tube removal. Patient was discharged today from [facility] (stay was 12/26-1/11) for UTI and b/lo nephrostomy tube blockages 72 yo F hx of HLD, DVT in 12/23 (previously on eliquis), adenocarcinoma of right ascending colon (dx 10/2023), and urothelial carcinoma of bladder s/p TURBT and s/p b/l nephrostomy tubes BIBEMS for accidental left nephrostomy tube removal. Patient was discharged today from [facility] (stay was 12/26-1/11) for UTI and b/lo nephrostomy tube blockages

## 2024-01-13 NOTE — PROGRESS NOTE ADULT - PROBLEM SELECTOR PLAN 9
Currently poor functional status, not on chemotherapy, awaiting treatment plan for urothelial cancer prior to hemicolectomy   Sees Dr Taveras and Dr Whitney as outpt   On Tylenol and oxycodone for pain PRN  As per daughter, Dr Taveras wants pt optimized better for the surgery  Pt was to see Dr Chand from Oncology Currently poor functional status, not on chemotherapy, awaiting treatment plan for urothelial cancer prior to hemicolectomy   Sees Dr Tavears and Dr Whitney as outpt   On Tylenol and oxycodone for pain PRN  As per daughter, Dr Taveras wants pt optimized better for the surgery  Pt was to see Dr Chand from Oncology

## 2024-01-13 NOTE — PROGRESS NOTE ADULT - PROBLEM SELECTOR PLAN 8
recurrent DVT  Currently extensive thrombus in right common femoral vein extending into superficial vein  On eliquis recently, discontinued in last few weeks, per patient denies melena and hematochezia in the last few weeks to be the reason   On heparin drip for now unless contraindicated by GI bleed, transitioned to Eliquis  FU US of the lower extremity bilaterally ordered to eval for DVT

## 2024-01-13 NOTE — PATIENT PROFILE ADULT - FUNCTIONAL ASSESSMENT - BASIC MOBILITY 6.
1-calculated by average/Not able to assess (calculate score using [facility] averaging method)  1-calculated by average/Not able to assess (calculate score using [facility] averaging method)

## 2024-01-13 NOTE — PATIENT PROFILE ADULT - FALL HARM RISK - HARM RISK INTERVENTIONS
Assistance with ambulation/Assistance OOB with selected safe patient handling equipment/Communicate Risk of Fall with Harm to all staff/Reinforce activity limits and safety measures with patient and family/Tailored Fall Risk Interventions/Visual Cue: Yellow wristband and red socks/Bed in lowest position, wheels locked, appropriate side rails in place/Call bell, personal items and telephone in reach/Instruct patient to call for assistance before getting out of bed or chair/Non-slip footwear when patient is out of bed/[place] to call system/Physically safe environment - no spills, clutter or unnecessary equipment/Purposeful Proactive Rounding/Room/bathroom lighting operational, light cord in reach Assistance with ambulation/Assistance OOB with selected safe patient handling equipment/Communicate Risk of Fall with Harm to all staff/Reinforce activity limits and safety measures with patient and family/Tailored Fall Risk Interventions/Visual Cue: Yellow wristband and red socks/Bed in lowest position, wheels locked, appropriate side rails in place/Call bell, personal items and telephone in reach/Instruct patient to call for assistance before getting out of bed or chair/Non-slip footwear when patient is out of bed/[place] to call system/Physically safe environment - no spills, clutter or unnecessary equipment/Purposeful Proactive Rounding/Room/bathroom lighting operational, light cord in reach

## 2024-01-13 NOTE — PHYSICAL THERAPY INITIAL EVALUATION ADULT - ADDITIONAL COMMENTS
Pt states she lives in a house with her daughter and has 1 step to enter and remains on the main level. Prior to admission, pt was ambulating with a rollator. Currently feels weak and unable to walk.   Post PT evaluation, pt left semi-supine, alarm on, call bell and remote within reach, all precautions maintained, NAD. RN aware.

## 2024-01-14 NOTE — PROGRESS NOTE ADULT - PROBLEM SELECTOR PLAN 2
Ca 10.7 from 8.6 , increased from 10.2-->10.6-->10.9  Suspect dehydration vs malignancy   s/p IVF, no significant improvement with IVF    PTH- 24, Low normal. Vitamin D 17.1  Continue to monitor BMP daily  FU  PTHrP  Endo consulted- f/u PTHrP, 1-25OH vitamin D, SPEP, serum immunofixation, UPEP, urine immunofixation, stop vitamin D for now until 1-25OH vitamin D results. c/w aggressive IVF as tolerated. Will consider IV bisphosphonate today given lack of response to IVF so far

## 2024-01-14 NOTE — PROGRESS NOTE ADULT - PROBLEM SELECTOR PLAN 1
Severe bilateral hydronephrosis with b/l outlet obstruction s/p bilateral nephrostomy tube placement 10/31, with exchange/replacement recently at [facility] due to blocked nephrostomy tube   Right nephrostomy tube in place, left nephrostomy tube displaced/dislodged  CT abdomen, pelvis and chest 11/23 showed thrombus in right common femoral vein extending into superficial vein (not seen on 03/2023), increase in hydronephrosis despite perc nephrostomy tubes and ureteral stent, prominent soft tissue in posterior bladder extending into ureters inseparable from vaginal cuff adjacent fat increased size of right external iliac LNs.  BUN/Creatinine stable  s/p L NT replaced on 1/11 - Flush with 5 cc NS qd  Continue to monitor Severe bilateral hydronephrosis with b/l outlet obstruction s/p bilateral nephrostomy tube placement 10/31, with exchange/replacement recently at [facility] due to blocked nephrostomy tube   Right nephrostomy tube in place, left nephrostomy tube displaced/dislodged  CT abdomen, pelvis and chest 11/23 showed thrombus in right common femoral vein extending into superficial vein (not seen on 03/2023), increase in hydronephrosis despite perc nephrostomy tubes and ureteral stent, prominent soft tissue in posterior bladder extending into ureters inseparable from vaginal cuff adjacent fat increased size of right external iliac LNs.  BUN/Creatinine stable  s/p L NT replaced on 1/11 - Flush with 5 cc NS qd  Continue to monitor

## 2024-01-14 NOTE — PROGRESS NOTE ADULT - SUBJECTIVE AND OBJECTIVE BOX
Jose Floyd  Hospitalist  Pager- [phone]    PROGRESS NOTE:     Patient is a 73y old  Female who presents with a chief complaint of dislodged nephrostomy tube (11 Jan 2024 02:53)      SUBJECTIVE / OVERNIGHT EVENTS: Pt seen and examined by me. No new events      ADDITIONAL REVIEW OF SYSTEMS:      MEDICATIONS  (STANDING):  apixaban 5 milliGRAM(s) Oral two times a day  influenza  Vaccine (HIGH DOSE) 0.7 milliLiter(s) IntraMuscular once  piperacillin/tazobactam IVPB.. 3.375 Gram(s) IV Intermittent every 8 hours  potassium phosphate / sodium phosphate Tablet (K-PHOS No. 2) 1 Tablet(s) Oral three times a day with meals  sodium chloride 0.9%. 1000 milliLiter(s) (75 mL/Hr) IV Continuous <Continuous>  sodium chloride 0.9%. 1000 milliLiter(s) (100 mL/Hr) IV Continuous <Continuous>    MEDICATIONS  (PRN):  acetaminophen     Tablet .. 650 milliGRAM(s) Oral every 6 hours PRN Mild Pain (1 - 3)  oxyCODONE    IR 5 milliGRAM(s) Oral every 4 hours PRN Moderate Pain (4 - 6)  polyethylene glycol 3350 17 Gram(s) Oral at bedtime PRN Constipation      Vital Signs Last 24 Hrs  T(C): 36.9 (14 Jan 2024 05:31), Max: 37.3 (13 Jan 2024 22:00)  T(F): 98.4 (14 Jan 2024 05:31), Max: 99.1 (13 Jan 2024 22:00)  HR: 85 (14 Jan 2024 05:31) (85 - 94)  BP: 115/77 (14 Jan 2024 05:31) (103/62 - 115/77)  BP(mean): --  RR: 18 (14 Jan 2024 05:31) (18 - 18)  SpO2: 100% (14 Jan 2024 05:31) (100% - 100%)    Parameters below as of 14 Jan 2024 05:31  Patient On (Oxygen Delivery Method): room air      PHYSICAL EXAM:      Vital Signs Last 24 Hrs  T(C): 36.9 (14 Jan 2024 05:31), Max: 37.3 (13 Jan 2024 22:00)  T(F): 98.4 (14 Jan 2024 05:31), Max: 99.1 (13 Jan 2024 22:00)  HR: 85 (14 Jan 2024 05:31) (85 - 94)  BP: 115/77 (14 Jan 2024 05:31) (103/62 - 115/77)  BP(mean): --  RR: 18 (14 Jan 2024 05:31) (18 - 18)  SpO2: 100% (14 Jan 2024 05:31) (100% - 100%)    Parameters below as of 14 Jan 2024 05:31  Patient On (Oxygen Delivery Method): room air    CONSTITUTIONAL: NAD, well-developed  RESPIRATORY: Normal respiratory effort; lungs are clear to auscultation bilaterally  CARDIOVASCULAR: Regular rate and rhythm, normal S1 and S2, no murmur/rub/gallop; No lower extremity edema; Peripheral pulses are 2+ bilaterally  ABDOMEN: Midly distended, tender  normoactive bowel sounds, no rebound/guarding; No hepatosplenomegaly. Nephrostomy tube present  MUSCLOSKELETAL: no clubbing or cyanosis of digits; no joint swelling or tenderness to palpation  PSYCH: A+O to person, place, and time; affect appropriate  NEURO: No gross deficits  SKIN: No rash   LE swelling present     LABS:                                 8.1    15.13 )-----------( 406      ( 14 Jan 2024 06:23 )             27.3         01-14    139  |  104  |  12  ----------------------------<  99  4.5   |  27  |  0.55    Ca    10.9<H>      14 Jan 2024 06:23  Phos  2.3     01-14  Mg     1.90     01-14    TPro  6.5  /  Alb  x   /  TBili  x   /  DBili  x   /  AST  x   /  ALT  x   /  AlkPhos  x   01-14    Leuk Esterase: x / RBC: x / WBC x   Sq Epi: x / Non Sq Epi: x / Bacteria: x          RADIOLOGY & ADDITIONAL TESTS:  Results Reviewed:   Imaging Personally Reviewed:  Electrocardiogram Personally Reviewed:    COORDINATION OF CARE:  Care Discussed with Consultants/Other Providers [Y/N]:  Prior or Outpatient Records Reviewed [Y/N]:   Jose Floyd  Hospitalist  Pager- [phone]    PROGRESS NOTE:     Patient is a 73y old  Female who presents with a chief complaint of dislodged nephrostomy tube (11 Jan 2024 02:53)      SUBJECTIVE / OVERNIGHT EVENTS: Pt seen and examined by me. No new events      ADDITIONAL REVIEW OF SYSTEMS:      MEDICATIONS  (STANDING):  apixaban 5 milliGRAM(s) Oral two times a day  influenza  Vaccine (HIGH DOSE) 0.7 milliLiter(s) IntraMuscular once  piperacillin/tazobactam IVPB.. 3.375 Gram(s) IV Intermittent every 8 hours  potassium phosphate / sodium phosphate Tablet (K-PHOS No. 2) 1 Tablet(s) Oral three times a day with meals  sodium chloride 0.9%. 1000 milliLiter(s) (75 mL/Hr) IV Continuous <Continuous>  sodium chloride 0.9%. 1000 milliLiter(s) (100 mL/Hr) IV Continuous <Continuous>    MEDICATIONS  (PRN):  acetaminophen     Tablet .. 650 milliGRAM(s) Oral every 6 hours PRN Mild Pain (1 - 3)  oxyCODONE    IR 5 milliGRAM(s) Oral every 4 hours PRN Moderate Pain (4 - 6)  polyethylene glycol 3350 17 Gram(s) Oral at bedtime PRN Constipation      Vital Signs Last 24 Hrs  T(C): 36.9 (14 Jan 2024 05:31), Max: 37.3 (13 Jan 2024 22:00)  T(F): 98.4 (14 Jan 2024 05:31), Max: 99.1 (13 Jan 2024 22:00)  HR: 85 (14 Jan 2024 05:31) (85 - 94)  BP: 115/77 (14 Jan 2024 05:31) (103/62 - 115/77)  BP(mean): --  RR: 18 (14 Jan 2024 05:31) (18 - 18)  SpO2: 100% (14 Jan 2024 05:31) (100% - 100%)    Parameters below as of 14 Jan 2024 05:31  Patient On (Oxygen Delivery Method): room air      PHYSICAL EXAM:      Vital Signs Last 24 Hrs  T(C): 36.9 (14 Jan 2024 05:31), Max: 37.3 (13 Jan 2024 22:00)  T(F): 98.4 (14 Jan 2024 05:31), Max: 99.1 (13 Jan 2024 22:00)  HR: 85 (14 Jan 2024 05:31) (85 - 94)  BP: 115/77 (14 Jan 2024 05:31) (103/62 - 115/77)  BP(mean): --  RR: 18 (14 Jan 2024 05:31) (18 - 18)  SpO2: 100% (14 Jan 2024 05:31) (100% - 100%)    Parameters below as of 14 Jan 2024 05:31  Patient On (Oxygen Delivery Method): room air    CONSTITUTIONAL: NAD, well-developed  RESPIRATORY: Normal respiratory effort; lungs are clear to auscultation bilaterally  CARDIOVASCULAR: Regular rate and rhythm, normal S1 and S2, no murmur/rub/gallop; No lower extremity edema; Peripheral pulses are 2+ bilaterally  ABDOMEN: Midly distended, tender  normoactive bowel sounds, no rebound/guarding; No hepatosplenomegaly. Nephrostomy tube present  MUSCLOSKELETAL: no clubbing or cyanosis of digits; no joint swelling or tenderness to palpation  PSYCH: A+O to person, place, and time; affect appropriate  NEURO: No gross deficits  SKIN: No rash   LE swelling present     LABS:                                 8.1    15.13 )-----------( 406      ( 14 Jan 2024 06:23 )             27.3         01-14    139  |  104  |  12  ----------------------------<  99  4.5   |  27  |  0.55    Ca    10.9<H>      14 Jan 2024 06:23  Phos  2.3     01-14  Mg     1.90     01-14    TPro  6.5  /  Alb  x   /  TBili  x   /  DBili  x   /  AST  x   /  ALT  x   /  AlkPhos  x   01-14    Leuk Esterase: x / RBC: x / WBC x   Sq Epi: x / Non Sq Epi: x / Bacteria: x          RADIOLOGY & ADDITIONAL TESTS:  Results Reviewed:   Imaging Personally Reviewed:  Electrocardiogram Personally Reviewed:    COORDINATION OF CARE:  Care Discussed with Consultants/Other Providers [Y/N]:  Prior or Outpatient Records Reviewed [Y/N]:   Jose Floyd  Hospitalist  Pager- [phone]    PROGRESS NOTE:     Patient is a 73y old  Female who presents with a chief complaint of dislodged nephrostomy tube (11 Jan 2024 02:53)      SUBJECTIVE / OVERNIGHT EVENTS: Pt seen and examined by me.   c/o Abdominal pain on touch or movement. pt ordered for Oxy PRN but hasnt taken it yet.   Advised pt to take as needed       ADDITIONAL REVIEW OF SYSTEMS:      MEDICATIONS  (STANDING):  apixaban 5 milliGRAM(s) Oral two times a day  influenza  Vaccine (HIGH DOSE) 0.7 milliLiter(s) IntraMuscular once  piperacillin/tazobactam IVPB.. 3.375 Gram(s) IV Intermittent every 8 hours  potassium phosphate / sodium phosphate Tablet (K-PHOS No. 2) 1 Tablet(s) Oral three times a day with meals  sodium chloride 0.9%. 1000 milliLiter(s) (75 mL/Hr) IV Continuous <Continuous>  sodium chloride 0.9%. 1000 milliLiter(s) (100 mL/Hr) IV Continuous <Continuous>    MEDICATIONS  (PRN):  acetaminophen     Tablet .. 650 milliGRAM(s) Oral every 6 hours PRN Mild Pain (1 - 3)  oxyCODONE    IR 5 milliGRAM(s) Oral every 4 hours PRN Moderate Pain (4 - 6)  polyethylene glycol 3350 17 Gram(s) Oral at bedtime PRN Constipation      Vital Signs Last 24 Hrs  T(C): 36.9 (14 Jan 2024 05:31), Max: 37.3 (13 Jan 2024 22:00)  T(F): 98.4 (14 Jan 2024 05:31), Max: 99.1 (13 Jan 2024 22:00)  HR: 85 (14 Jan 2024 05:31) (85 - 94)  BP: 115/77 (14 Jan 2024 05:31) (103/62 - 115/77)  BP(mean): --  RR: 18 (14 Jan 2024 05:31) (18 - 18)  SpO2: 100% (14 Jan 2024 05:31) (100% - 100%)    Parameters below as of 14 Jan 2024 05:31  Patient On (Oxygen Delivery Method): room air      PHYSICAL EXAM:      Vital Signs Last 24 Hrs  T(C): 36.9 (14 Jan 2024 05:31), Max: 37.3 (13 Jan 2024 22:00)  T(F): 98.4 (14 Jan 2024 05:31), Max: 99.1 (13 Jan 2024 22:00)  HR: 85 (14 Jan 2024 05:31) (85 - 94)  BP: 115/77 (14 Jan 2024 05:31) (103/62 - 115/77)  BP(mean): --  RR: 18 (14 Jan 2024 05:31) (18 - 18)  SpO2: 100% (14 Jan 2024 05:31) (100% - 100%)    Parameters below as of 14 Jan 2024 05:31  Patient On (Oxygen Delivery Method): room air    CONSTITUTIONAL: NAD, well-developed  RESPIRATORY: Normal respiratory effort; lungs are clear to auscultation bilaterally  CARDIOVASCULAR: Regular rate and rhythm, normal S1 and S2, no murmur/rub/gallop; No lower extremity edema; Peripheral pulses are 2+ bilaterally  ABDOMEN: Midly distended, tender  normoactive bowel sounds, no rebound/guarding; No hepatosplenomegaly. Nephrostomy tube present  MUSCLOSKELETAL: no clubbing or cyanosis of digits; no joint swelling or tenderness to palpation  PSYCH: A+O to person, place, and time; affect appropriate  NEURO: No gross deficits  SKIN: No rash   LE swelling present     LABS:                                 8.1    15.13 )-----------( 406      ( 14 Jan 2024 06:23 )             27.3         01-14    139  |  104  |  12  ----------------------------<  99  4.5   |  27  |  0.55    Ca    10.9<H>      14 Jan 2024 06:23  Phos  2.3     01-14  Mg     1.90     01-14    TPro  6.5  /  Alb  x   /  TBili  x   /  DBili  x   /  AST  x   /  ALT  x   /  AlkPhos  x   01-14    Leuk Esterase: x / RBC: x / WBC x   Sq Epi: x / Non Sq Epi: x / Bacteria: x          RADIOLOGY & ADDITIONAL TESTS:  Results Reviewed:   Imaging Personally Reviewed:  Electrocardiogram Personally Reviewed:    COORDINATION OF CARE:  Care Discussed with Consultants/Other Providers [Y/N]:  Prior or Outpatient Records Reviewed [Y/N]:   Jose Floyd  Hospitalist  Pager- [phone]    PROGRESS NOTE:     Patient is a 73y old  Female who presents with a chief complaint of dislodged nephrostomy tube (11 Jan 2024 02:53)      SUBJECTIVE / OVERNIGHT EVENTS: Pt seen and examined by me.   c/o Abdominal pain on touch or movement. pt ordered for Oxy PRN but hasnt taken it yet.   Advised pt to take as needed       ADDITIONAL REVIEW OF SYSTEMS:      MEDICATIONS  (STANDING):  apixaban 5 milliGRAM(s) Oral two times a day  influenza  Vaccine (HIGH DOSE) 0.7 milliLiter(s) IntraMuscular once  piperacillin/tazobactam IVPB.. 3.375 Gram(s) IV Intermittent every 8 hours  potassium phosphate / sodium phosphate Tablet (K-PHOS No. 2) 1 Tablet(s) Oral three times a day with meals  sodium chloride 0.9%. 1000 milliLiter(s) (75 mL/Hr) IV Continuous <Continuous>  sodium chloride 0.9%. 1000 milliLiter(s) (100 mL/Hr) IV Continuous <Continuous>    MEDICATIONS  (PRN):  acetaminophen     Tablet .. 650 milliGRAM(s) Oral every 6 hours PRN Mild Pain (1 - 3)  oxyCODONE    IR 5 milliGRAM(s) Oral every 4 hours PRN Moderate Pain (4 - 6)  polyethylene glycol 3350 17 Gram(s) Oral at bedtime PRN Constipation      Vital Signs Last 24 Hrs  T(C): 36.9 (14 Jan 2024 05:31), Max: 37.3 (13 Jan 2024 22:00)  T(F): 98.4 (14 Jan 2024 05:31), Max: 99.1 (13 Jan 2024 22:00)  HR: 85 (14 Jan 2024 05:31) (85 - 94)  BP: 115/77 (14 Jan 2024 05:31) (103/62 - 115/77)  BP(mean): --  RR: 18 (14 Jan 2024 05:31) (18 - 18)  SpO2: 100% (14 Jan 2024 05:31) (100% - 100%)    Parameters below as of 14 Jan 2024 05:31  Patient On (Oxygen Delivery Method): room air      PHYSICAL EXAM:      Vital Signs Last 24 Hrs  T(C): 36.9 (14 Jan 2024 05:31), Max: 37.3 (13 Jan 2024 22:00)  T(F): 98.4 (14 Jan 2024 05:31), Max: 99.1 (13 Jan 2024 22:00)  HR: 85 (14 Jan 2024 05:31) (85 - 94)  BP: 115/77 (14 Jan 2024 05:31) (103/62 - 115/77)  BP(mean): --  RR: 18 (14 Jan 2024 05:31) (18 - 18)  SpO2: 100% (14 Jan 2024 05:31) (100% - 100%)    Parameters below as of 14 Jan 2024 05:31  Patient On (Oxygen Delivery Method): room air    CONSTITUTIONAL: NAD, well-developed  RESPIRATORY: Normal respiratory effort; lungs are clear to auscultation bilaterally  CARDIOVASCULAR: Regular rate and rhythm, normal S1 and S2, no murmur/rub/gallop; No lower extremity edema; Peripheral pulses are 2+ bilaterally  ABDOMEN: Midly distended, tender  normoactive bowel sounds, no rebound/guarding; No hepatosplenomegaly. Nephrostomy tube present  MUSCLOSKELETAL: no clubbing or cyanosis of digits; no joint swelling or tenderness to palpation  PSYCH: A+O to person, place, and time; affect appropriate  NEURO: No gross deficits  SKIN: No rash   LE swelling present     LABS:                                 8.1    15.13 )-----------( 406      ( 14 Jan 2024 06:23 )             27.3         01-14    139  |  104  |  12  ----------------------------<  99  4.5   |  27  |  0.55    Ca    10.9<H>      14 Jan 2024 06:23  Phos  2.3     01-14  Mg     1.90     01-14    TPro  6.5  /  Alb  x   /  TBili  x   /  DBili  x   /  AST  x   /  ALT  x   /  AlkPhos  x   01-14    Leuk Esterase: x / RBC: x / WBC x   Sq Epi: x / Non Sq Epi: x / Bacteria: x          RADIOLOGY & ADDITIONAL TESTS:  Results Reviewed:   Imaging Personally Reviewed:  Electrocardiogram Personally Reviewed:    COORDINATION OF CARE:  Care Discussed with Consultants/Other Providers [Y/N]:  Prior or Outpatient Records Reviewed [Y/N]:

## 2024-01-14 NOTE — PROGRESS NOTE ADULT - PROBLEM SELECTOR PLAN 7
Stage II-III sacral ulcer on admission   Nutrition consult   FU Wound care-nursing and surgery consult placed on 1/12- Not seen yet. Will dw RN to do wound assessment  Turn every 4 hours   Continue to monitor Stage II-III sacral ulcer on admission > As per pt, she developed it in [facility]   Nutrition consult   FU Wound care-nursing and surgery consult placed on 1/12- Not seen yet.  DW  RN to do wound assessment  Turn every 4 hours, OOB to chair as tolerated   Continue to monitor Stage II-III sacral ulcer on admission > As per pt, she developed it in [facility]   Nutrition consult   FU Wound care-nursing and surgery consult placed on 1/12- Not seen yet.  DW  RN to do wound assessment  Turn every 4 hours, OOB to chair as tolerated   Continue to monitor

## 2024-01-14 NOTE — CONSULT NOTE ADULT - SUBJECTIVE AND OBJECTIVE BOX
HPI:  Ms. 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  She has a history of severe bilateral hydronephrosis due to bladder obstruction. The patient was admitted to the ICU at that time and was given HDx1. The patient then underwent TURBT with Dr. Bradford 10/12/2023. The patient reportedly recently has had an admission at [facility] from 12/26-01/10. The patient was admitted for UTI and blocked nephrostomy tubes and given antibiotics at that time. The rest of the admission at [facility] is unknown (no records and patient did not want night admitting team to call daughter overnight to not wake her up). When she returned home yesterday the patient slid out of the wheelchair and onto the floor. EMS noted bleeding from the left back with dislodgement of the nephrostomy tube. Denies any fever, chills, cough, chest congestion, diarrhea, nausea, vomiting, melena, hematochezia. shortness of breath, chest pain, back pain. The patient is currently wheelchair bound at this time. Per the patient she has a history of being on coumadin a few years ago initially for a left DVT and was on it for 6 months. She was recently started on eliquis with right DVT in October. She denies any recent hematuria or melena. She reports recently over last couple weeks being taken off of eliquis and was unable to tell me why. Labs were sig for the following: WBC 16.35, H/H 8.9/29.2 (7.9/25.1-October 18, 2023). MCV 77.9, neutrophilic 79.3%, Na 132, Cl 97, BUN/Cr 19/0.49, , la 10.7, (previously 8.6), TP 7.3, Albumin 2.7, phos 2.3. CT abdomen, pelvis and chest showed thrombus in right common femoral vein extending into superficial vein (not seen on 03/2023), increase in hydronephrosis despite perc nephrostomy tubes and ureteral stent, prominent soft tissue in posterior bladder extending into ureters inseparable from vaginal cuff adjacent fat increased size of right external iliac LNs.  (11 Jan 2024 02:53)      ENDOCRINE HX:    Endocrinology consulted for hypercalcemia. Calcium normal on admission, now increasing to 12.3 corrected today. Being treated with IVF. Workup so far includes normal GFR, PTH 24 inappropriately normal), 25 OH vitamin D 17 (low), TSH normal. Calcium has previously been normal per chart review. Per patient's daughter she has never had a calcium disorder in the past. No offending medications noted. No family history of calcium disorders. Patient complains of abdominal pain due to nephrostomy tubes. She denies other symptoms of hypercalcemia including nausea, vomiting, bone pain, fatigue, muscle weakness.     PAST MEDICAL & SURGICAL HISTORY:  DVT (deep venous thrombosis)      Colon adenocarcinoma      Urothelial carcinoma of bladder      HLD (hyperlipidemia)      S/P hysterectomy      Displacement of nephrostomy tube      History of transurethral resection of bladder tumor (TURBT)          FAMILY HISTORY:  No pertinent family history in first degree relatives        Social History:    Outpatient Medications:    MEDICATIONS  (STANDING):  apixaban 5 milliGRAM(s) Oral two times a day  influenza  Vaccine (HIGH DOSE) 0.7 milliLiter(s) IntraMuscular once  piperacillin/tazobactam IVPB.. 3.375 Gram(s) IV Intermittent every 8 hours  potassium phosphate / sodium phosphate Tablet (K-PHOS No. 2) 1 Tablet(s) Oral three times a day with meals  sodium chloride 0.9%. 1000 milliLiter(s) (75 mL/Hr) IV Continuous <Continuous>  sodium chloride 0.9%. 1000 milliLiter(s) (100 mL/Hr) IV Continuous <Continuous>    MEDICATIONS  (PRN):  acetaminophen     Tablet .. 650 milliGRAM(s) Oral every 6 hours PRN Mild Pain (1 - 3)  oxyCODONE    IR 5 milliGRAM(s) Oral every 4 hours PRN Moderate Pain (4 - 6)  polyethylene glycol 3350 17 Gram(s) Oral at bedtime PRN Constipation      Allergies    No Known Allergies    Intolerances      Review of Systems:  Constitutional: No fever  Eyes: No blurry vision  Neuro: No tremors  HEENT: No pain  Cardiovascular: No chest pain, palpitations  Respiratory: No SOB, no cough  GI: No nausea, vomiting, abdominal pain  : No dysuria  Skin: no rash  Psych: no depression  Endocrine: no polyuria, polydipsia  Hem/lymph: no swelling  Osteoporosis: no fractures    ALL OTHER SYSTEMS REVIEWED AND NEGATIVE    PHYSICAL EXAM:  VITALS: T(C): 36.9 (01-14-24 @ 05:31)  T(F): 98.4 (01-14-24 @ 05:31), Max: 99.1 (01-13-24 @ 22:00)  HR: 85 (01-14-24 @ 05:31) (85 - 94)  BP: 115/77 (01-14-24 @ 05:31) (103/62 - 115/77)  RR:  (18 - 18)  SpO2:  (100% - 100%)  Wt(kg): --  GENERAL: NAD, well-groomed, well-developed  EYES: No proptosis, no lid lag, anicteric  HEENT:  Atraumatic, Normocephalic, moist mucous membranes  THYROID: Normal size, no palpable nodules  RESPIRATORY: Normal respiratory effort; no audible wheezing  CARDIOVASCULAR: Regular rate and rhythm; No murmurs; no peripheral edema  GI: Soft, nontender, non distended, normal bowel sounds  SKIN: Dry, intact, No rashes or lesions  MUSCULOSKELETAL: Full range of motion, normal strength  NEURO: sensation intact, extraocular movements intact, no tremor  PSYCH: Alert and oriented x 3, normal affect, normal mood  CUSHING'S SIGNS: no striae      CAPILLARY BLOOD GLUCOSE                                8.1    15.13 )-----------( 406      ( 14 Jan 2024 06:23 )             27.3       01-14    139  |  104  |  12  ----------------------------<  99  4.5   |  27  |  0.55    eGFR: 96    Ca    10.9<H>      01-14  Mg     1.90     01-14  Phos  2.3     01-14    TPro  6.5  /  Alb  x   /  TBili  x   /  DBili  x   /  AST  x   /  ALT  x   /  AlkPhos  x   01-14      Thyroid Function Tests:  01-14 @ 06:23 TSH 1.27 FreeT4 -- T3 -- Anti TPO -- Anti Thyroglobulin Ab -- TSI --      A1C with Estimated Average Glucose Result: 5.4 % (01-11-24 @ 06:54)  A1C with Estimated Average Glucose Result: 5.9 % (10-08-23 @ 03:45)      01-11 Chol 127 Direct LDL -- LDL calculated 68 HDL 46<L> Trig 66    Radiology:                HPI:  Ms. 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  She has a history of severe bilateral hydronephrosis due to bladder obstruction. The patient was admitted to the ICU at that time and was given HDx1. The patient then underwent TURBT with Dr. Bradford 10/12/2023. The patient reportedly recently has had an admission at [facility] from 12/26-01/10. The patient was admitted for UTI and blocked nephrostomy tubes and given antibiotics at that time. The rest of the admission at [facility] is unknown (no records and patient did not want night admitting team to call daughter overnight to not wake her up). When she returned home yesterday the patient slid out of the wheelchair and onto the floor. EMS noted bleeding from the left back with dislodgement of the nephrostomy tube. Denies any fever, chills, cough, chest congestion, diarrhea, nausea, vomiting, melena, hematochezia. shortness of breath, chest pain, back pain. The patient is currently wheelchair bound at this time. Per the patient she has a history of being on coumadin a few years ago initially for a left DVT and was on it for 6 months. She was recently started on eliquis with right DVT in October. She denies any recent hematuria or melena. She reports recently over last couple weeks being taken off of eliquis and was unable to tell me why. Labs were sig for the following: WBC 16.35, H/H 8.9/29.2 (7.9/25.1-October 18, 2023). MCV 77.9, neutrophilic 79.3%, Na 132, Cl 97, BUN/Cr 19/0.49, , la 10.7, (previously 8.6), TP 7.3, Albumin 2.7, phos 2.3. CT abdomen, pelvis and chest showed thrombus in right common femoral vein extending into superficial vein (not seen on 03/2023), increase in hydronephrosis despite perc nephrostomy tubes and ureteral stent, prominent soft tissue in posterior bladder extending into ureters inseparable from vaginal cuff adjacent fat increased size of right external iliac LNs.  (11 Jan 2024 02:53)      ENDOCRINE HX:    Endocrinology consulted for hypercalcemia. Calcium normal on admission, now increasing to 12.3 corrected today. Being treated with IVF. Workup so far includes normal GFR, PTH 24 inappropriately normal), 25 OH vitamin D 17 (low), TSH normal. Calcium has previously been normal per chart review. Per patient's daughter she has never had a calcium disorder in the past. No offending medications noted. No family history of calcium disorders. Patient complains of abdominal pain due to nephrostomy tubes. She denies other symptoms of hypercalcemia including nausea, vomiting, bone pain, fatigue, muscle weakness.     PAST MEDICAL & SURGICAL HISTORY:  DVT (deep venous thrombosis)      Colon adenocarcinoma      Urothelial carcinoma of bladder      HLD (hyperlipidemia)      S/P hysterectomy      Displacement of nephrostomy tube      History of transurethral resection of bladder tumor (TURBT)          FAMILY HISTORY:  No pertinent family history in first degree relatives        Social History:    Outpatient Medications:    MEDICATIONS  (STANDING):  apixaban 5 milliGRAM(s) Oral two times a day  influenza  Vaccine (HIGH DOSE) 0.7 milliLiter(s) IntraMuscular once  piperacillin/tazobactam IVPB.. 3.375 Gram(s) IV Intermittent every 8 hours  potassium phosphate / sodium phosphate Tablet (K-PHOS No. 2) 1 Tablet(s) Oral three times a day with meals  sodium chloride 0.9%. 1000 milliLiter(s) (75 mL/Hr) IV Continuous <Continuous>  sodium chloride 0.9%. 1000 milliLiter(s) (100 mL/Hr) IV Continuous <Continuous>    MEDICATIONS  (PRN):  acetaminophen     Tablet .. 650 milliGRAM(s) Oral every 6 hours PRN Mild Pain (1 - 3)  oxyCODONE    IR 5 milliGRAM(s) Oral every 4 hours PRN Moderate Pain (4 - 6)  polyethylene glycol 3350 17 Gram(s) Oral at bedtime PRN Constipation      Allergies    No Known Allergies    Intolerances      Review of Systems:  Constitutional: No fever  Eyes: No blurry vision  Neuro: No tremors  HEENT: No pain  Cardiovascular: No chest pain, palpitations  Respiratory: No SOB, no cough  GI: No nausea, vomiting, abdominal pain  : No dysuria  Skin: no rash  Psych: no depression  Endocrine: no polyuria, polydipsia  Hem/lymph: no swelling  Osteoporosis: no fractures    ALL OTHER SYSTEMS REVIEWED AND NEGATIVE    PHYSICAL EXAM:  VITALS: T(C): 36.9 (01-14-24 @ 05:31)  T(F): 98.4 (01-14-24 @ 05:31), Max: 99.1 (01-13-24 @ 22:00)  HR: 85 (01-14-24 @ 05:31) (85 - 94)  BP: 115/77 (01-14-24 @ 05:31) (103/62 - 115/77)  RR:  (18 - 18)  SpO2:  (100% - 100%)  Wt(kg): --  GENERAL: NAD, well-groomed, well-developed  EYES: No proptosis, no lid lag, anicteric  HEENT:  Atraumatic, Normocephalic, moist mucous membranes  THYROID: Normal size, no palpable nodules  RESPIRATORY: Normal respiratory effort; no audible wheezing  CARDIOVASCULAR: Regular rate and rhythm; No murmurs; no peripheral edema  GI: Soft, nontender, non distended, normal bowel sounds  SKIN: Dry, intact, No rashes or lesions  MUSCULOSKELETAL: Full range of motion, normal strength  NEURO: sensation intact, extraocular movements intact, no tremor  PSYCH: Alert and oriented x 3, normal affect, normal mood  CUSHING'S SIGNS: no striae      CAPILLARY BLOOD GLUCOSE                                8.1    15.13 )-----------( 406      ( 14 Jan 2024 06:23 )             27.3       01-14    139  |  104  |  12  ----------------------------<  99  4.5   |  27  |  0.55    eGFR: 96    Ca    10.9<H>      01-14  Mg     1.90     01-14  Phos  2.3     01-14    TPro  6.5  /  Alb  x   /  TBili  x   /  DBili  x   /  AST  x   /  ALT  x   /  AlkPhos  x   01-14      Thyroid Function Tests:  01-14 @ 06:23 TSH 1.27 FreeT4 -- T3 -- Anti TPO -- Anti Thyroglobulin Ab -- TSI --      A1C with Estimated Average Glucose Result: 5.4 % (01-11-24 @ 06:54)  A1C with Estimated Average Glucose Result: 5.9 % (10-08-23 @ 03:45)      01-11 Chol 127 Direct LDL -- LDL calculated 68 HDL 46<L> Trig 66    Radiology:                HPI:  Ms. 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  She has a history of severe bilateral hydronephrosis due to bladder obstruction. The patient was admitted to the ICU at that time and was given HDx1. The patient then underwent TURBT with Dr. Bradford 10/12/2023. The patient reportedly recently has had an admission at [facility] from 12/26-01/10. The patient was admitted for UTI and blocked nephrostomy tubes and given antibiotics at that time. The rest of the admission at [facility] is unknown (no records and patient did not want night admitting team to call daughter overnight to not wake her up). When she returned home yesterday the patient slid out of the wheelchair and onto the floor. EMS noted bleeding from the left back with dislodgement of the nephrostomy tube. Denies any fever, chills, cough, chest congestion, diarrhea, nausea, vomiting, melena, hematochezia. shortness of breath, chest pain, back pain. The patient is currently wheelchair bound at this time. Per the patient she has a history of being on coumadin a few years ago initially for a left DVT and was on it for 6 months. She was recently started on eliquis with right DVT in October. She denies any recent hematuria or melena. She reports recently over last couple weeks being taken off of eliquis and was unable to tell me why. Labs were sig for the following: WBC 16.35, H/H 8.9/29.2 (7.9/25.1-October 18, 2023). MCV 77.9, neutrophilic 79.3%, Na 132, Cl 97, BUN/Cr 19/0.49, , la 10.7, (previously 8.6), TP 7.3, Albumin 2.7, phos 2.3. CT abdomen, pelvis and chest showed thrombus in right common femoral vein extending into superficial vein (not seen on 03/2023), increase in hydronephrosis despite perc nephrostomy tubes and ureteral stent, prominent soft tissue in posterior bladder extending into ureters inseparable from vaginal cuff adjacent fat increased size of right external iliac LNs.  (11 Jan 2024 02:53)      ENDOCRINE HX:    Endocrinology consulted for hypercalcemia. Calcium normal on admission, now increasing to 12.3 corrected today. Being treated with IVF. Workup so far includes normal GFR, PTH 24 inappropriately normal), 25 OH vitamin D 17 (low), TSH normal. Calcium has previously been normal per chart review. Per patient's daughter she has never had a calcium disorder in the past. No offending medications noted. No family history of calcium disorders. Patient complains of abdominal pain due to nephrostomy tubes. She denies other symptoms of hypercalcemia including nausea, vomiting, bone pain, fatigue, muscle weakness.     PAST MEDICAL & SURGICAL HISTORY:  DVT (deep venous thrombosis)      Colon adenocarcinoma      Urothelial carcinoma of bladder      HLD (hyperlipidemia)      S/P hysterectomy      Displacement of nephrostomy tube      History of transurethral resection of bladder tumor (TURBT)          FAMILY HISTORY:  No pertinent family history in first degree relatives        Social History: unable to obtain    Outpatient Medications: unable to oobtain    MEDICATIONS  (STANDING):  apixaban 5 milliGRAM(s) Oral two times a day  influenza  Vaccine (HIGH DOSE) 0.7 milliLiter(s) IntraMuscular once  piperacillin/tazobactam IVPB.. 3.375 Gram(s) IV Intermittent every 8 hours  potassium phosphate / sodium phosphate Tablet (K-PHOS No. 2) 1 Tablet(s) Oral three times a day with meals  sodium chloride 0.9%. 1000 milliLiter(s) (75 mL/Hr) IV Continuous <Continuous>  sodium chloride 0.9%. 1000 milliLiter(s) (100 mL/Hr) IV Continuous <Continuous>    MEDICATIONS  (PRN):  acetaminophen     Tablet .. 650 milliGRAM(s) Oral every 6 hours PRN Mild Pain (1 - 3)  oxyCODONE    IR 5 milliGRAM(s) Oral every 4 hours PRN Moderate Pain (4 - 6)  polyethylene glycol 3350 17 Gram(s) Oral at bedtime PRN Constipation      Allergies    No Known Allergies    Intolerances      Review of Systems:  Constitutional: No fever  Eyes: No blurry vision  Neuro: No tremors  HEENT: No pain  Cardiovascular: No chest pain, palpitations  Respiratory: No SOB, no cough  GI: No nausea, vomiting, abdominal pain  : No dysuria  Skin: no rash  Psych: no depression  Endocrine: no polyuria, polydipsia  Hem/lymph: no swelling  Osteoporosis: no fractures    ALL OTHER SYSTEMS REVIEWED AND NEGATIVE    PHYSICAL EXAM:  VITALS: T(C): 36.9 (01-14-24 @ 05:31)  T(F): 98.4 (01-14-24 @ 05:31), Max: 99.1 (01-13-24 @ 22:00)  HR: 85 (01-14-24 @ 05:31) (85 - 94)  BP: 115/77 (01-14-24 @ 05:31) (103/62 - 115/77)  RR:  (18 - 18)  SpO2:  (100% - 100%)  Wt(kg): --  GENERAL: NAD, well-groomed, well-developed  EYES: No proptosis, no lid lag, anicteric  HEENT:  Atraumatic, Normocephalic, moist mucous membranes  THYROID: Normal size, no palpable nodules  RESPIRATORY: Normal respiratory effort; no audible wheezing  CARDIOVASCULAR: Regular rate and rhythm; No murmurs; no peripheral edema  GI: Soft, nontender, non distended, normal bowel sounds  SKIN: Dry, intact, No rashes or lesions  MUSCULOSKELETAL: Full range of motion, normal strength  NEURO: sensation intact, extraocular movements intact, no tremor  PSYCH: Alert and oriented x 3, normal affect, normal mood  CUSHING'S SIGNS: no striae      CAPILLARY BLOOD GLUCOSE                                8.1    15.13 )-----------( 406      ( 14 Jan 2024 06:23 )             27.3       01-14    139  |  104  |  12  ----------------------------<  99  4.5   |  27  |  0.55    eGFR: 96    Ca    10.9<H>      01-14  Mg     1.90     01-14  Phos  2.3     01-14    TPro  6.5  /  Alb  x   /  TBili  x   /  DBili  x   /  AST  x   /  ALT  x   /  AlkPhos  x   01-14      Thyroid Function Tests:  01-14 @ 06:23 TSH 1.27 FreeT4 -- T3 -- Anti TPO -- Anti Thyroglobulin Ab -- TSI --      A1C with Estimated Average Glucose Result: 5.4 % (01-11-24 @ 06:54)  A1C with Estimated Average Glucose Result: 5.9 % (10-08-23 @ 03:45)      01-11 Chol 127 Direct LDL -- LDL calculated 68 HDL 46<L> Trig 66    Radiology:                HPI:  Ms. Chen is a 73 year old F with history of HLD (not on med currently), DVT (recurrent, a few years ago with coumadin x6 months, left sided, now with right DVT on eliquis, but recently told at 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  She has a history of severe bilateral hydronephrosis due to bladder obstruction. The patient was admitted to the ICU at that time and was given HDx1. The patient then underwent TURBT with Dr. Bradford 10/12/2023. The patient reportedly recently has had an admission at [facility] from 12/26-01/10. The patient was admitted for UTI and blocked nephrostomy tubes and given antibiotics at that time. The rest of the admission at [facility] is unknown (no records and patient did not want night admitting team to call daughter overnight to not wake her up). When she returned home yesterday the patient slid out of the wheelchair and onto the floor. EMS noted bleeding from the left back with dislodgement of the nephrostomy tube. Denies any fever, chills, cough, chest congestion, diarrhea, nausea, vomiting, melena, hematochezia. shortness of breath, chest pain, back pain. The patient is currently wheelchair bound at this time. Per the patient she has a history of being on coumadin a few years ago initially for a left DVT and was on it for 6 months. She was recently started on eliquis with right DVT in October. She denies any recent hematuria or melena. She reports recently over last couple weeks being taken off of eliquis and was unable to tell me why. Labs were sig for the following: WBC 16.35, H/H 8.9/29.2 (7.9/25.1-October 18, 2023). MCV 77.9, neutrophilic 79.3%, Na 132, Cl 97, BUN/Cr 19/0.49, , la 10.7, (previously 8.6), TP 7.3, Albumin 2.7, phos 2.3. CT abdomen, pelvis and chest showed thrombus in right common femoral vein extending into superficial vein (not seen on 03/2023), increase in hydronephrosis despite perc nephrostomy tubes and ureteral stent, prominent soft tissue in posterior bladder extending into ureters inseparable from vaginal cuff adjacent fat increased size of right external iliac LNs.  (11 Jan 2024 02:53)      ENDOCRINE HX:    Endocrinology consulted for hypercalcemia. Calcium normal on admission, now increasing to 12.3 corrected today. Being treated with IVF. Workup so far includes normal GFR, PTH 24 inappropriately normal), 25 OH vitamin D 17 (low), TSH normal. Calcium has previously been normal per chart review. Per patient's daughter she has never had a calcium disorder in the past. No offending medications noted. No family history of calcium disorders. Patient complains of abdominal pain due to nephrostomy tubes. She denies other symptoms of hypercalcemia including nausea, vomiting, bone pain, fatigue, muscle weakness.     PAST MEDICAL & SURGICAL HISTORY:  DVT (deep venous thrombosis)      Colon adenocarcinoma      Urothelial carcinoma of bladder      HLD (hyperlipidemia)      S/P hysterectomy      Displacement of nephrostomy tube      History of transurethral resection of bladder tumor (TURBT)          FAMILY HISTORY:  No pertinent family history in first degree relatives        Social History: unable to obtain    Outpatient Medications: unable to oobtain    MEDICATIONS  (STANDING):  apixaban 5 milliGRAM(s) Oral two times a day  influenza  Vaccine (HIGH DOSE) 0.7 milliLiter(s) IntraMuscular once  piperacillin/tazobactam IVPB.. 3.375 Gram(s) IV Intermittent every 8 hours  potassium phosphate / sodium phosphate Tablet (K-PHOS No. 2) 1 Tablet(s) Oral three times a day with meals  sodium chloride 0.9%. 1000 milliLiter(s) (75 mL/Hr) IV Continuous <Continuous>  sodium chloride 0.9%. 1000 milliLiter(s) (100 mL/Hr) IV Continuous <Continuous>    MEDICATIONS  (PRN):  acetaminophen     Tablet .. 650 milliGRAM(s) Oral every 6 hours PRN Mild Pain (1 - 3)  oxyCODONE    IR 5 milliGRAM(s) Oral every 4 hours PRN Moderate Pain (4 - 6)  polyethylene glycol 3350 17 Gram(s) Oral at bedtime PRN Constipation      Allergies    No Known Allergies    Intolerances      Review of Systems:  Constitutional: No fever  Eyes: No blurry vision  Neuro: No tremors  HEENT: No pain  Cardiovascular: No chest pain, palpitations  Respiratory: No SOB, no cough  GI: No nausea, vomiting, abdominal pain  : No dysuria  Skin: no rash  Psych: no depression  Endocrine: no polyuria, polydipsia  Hem/lymph: no swelling  Osteoporosis: no fractures    ALL OTHER SYSTEMS REVIEWED AND NEGATIVE    PHYSICAL EXAM:  VITALS: T(C): 36.9 (01-14-24 @ 05:31)  T(F): 98.4 (01-14-24 @ 05:31), Max: 99.1 (01-13-24 @ 22:00)  HR: 85 (01-14-24 @ 05:31) (85 - 94)  BP: 115/77 (01-14-24 @ 05:31) (103/62 - 115/77)  RR:  (18 - 18)  SpO2:  (100% - 100%)  Wt(kg): --  GENERAL: NAD, well-groomed, well-developed  EYES: No proptosis, no lid lag, anicteric  HEENT:  Atraumatic, Normocephalic, moist mucous membranes  THYROID: Normal size, no palpable nodules  RESPIRATORY: Normal respiratory effort; no audible wheezing  CARDIOVASCULAR: Regular rate and rhythm; No murmurs; no peripheral edema  GI: Soft, nontender, non distended, normal bowel sounds  SKIN: Dry, intact, No rashes or lesions  MUSCULOSKELETAL: Full range of motion, normal strength  NEURO: sensation intact, extraocular movements intact, no tremor  PSYCH: Alert and oriented x 3, normal affect, normal mood  CUSHING'S SIGNS: no striae      CAPILLARY BLOOD GLUCOSE                                8.1    15.13 )-----------( 406      ( 14 Jan 2024 06:23 )             27.3       01-14    139  |  104  |  12  ----------------------------<  99  4.5   |  27  |  0.55    eGFR: 96    Ca    10.9<H>      01-14  Mg     1.90     01-14  Phos  2.3     01-14    TPro  6.5  /  Alb  x   /  TBili  x   /  DBili  x   /  AST  x   /  ALT  x   /  AlkPhos  x   01-14      Thyroid Function Tests:  01-14 @ 06:23 TSH 1.27 FreeT4 -- T3 -- Anti TPO -- Anti Thyroglobulin Ab -- TSI --      A1C with Estimated Average Glucose Result: 5.4 % (01-11-24 @ 06:54)  A1C with Estimated Average Glucose Result: 5.9 % (10-08-23 @ 03:45)      01-11 Chol 127 Direct LDL -- LDL calculated 68 HDL 46<L> Trig 66    Radiology:

## 2024-01-14 NOTE — PROGRESS NOTE ADULT - ASSESSMENT
Ms. 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 Ms. 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

## 2024-01-14 NOTE — PROGRESS NOTE ADULT - PROBLEM SELECTOR PLAN 3
UA- moderate LE. Blood culturex2- NGTD. UC not sent   Recently treated for UTI at [facility]  Reviewed charts- Pt with chronically elevated WBC, could be from underlying CA  On empiric treatment with IV Zosyn for now. Anticipate 5-7 day course  Continue to monitor fever/WBC curve UA- moderate LE. Blood culturex2- NGTD. UC not sent   Recently treated for UTI at [facility]  Reviewed charts- Pt with chronically elevated WBC, could be from underlying CA  On empiric treatment with IV Zosyn for now. Anticipate 5-7 day course  Continue to monitor fever/WBC curve

## 2024-01-14 NOTE — PROGRESS NOTE ADULT - PROBLEM SELECTOR PLAN 9
recurrent DVT  Currently extensive thrombus in right common femoral vein extending into superficial vein seen on CT from 11/2023  On eliquis recently, discontinued in last few weeks, per patient denies melena and hematochezia in the last few weeks to be the reason   Heparin drip  transitioned to Eliquis  FU US of the lower extremity bilaterally ordered to eval for DVT Extesnived bilateral recurent DVT, hypercoagulable state likely from malignancy   Dopplers perfromed on 1/14 showing right and left Femoral Vein: acute occlusive DVT. Right Saphenofemoral Junction: age-indeterminate non-occlusive superficial vein thrombosis. Left Saphenofemoral Junction: acute occlusive superficial vein thrombosis  Pt reports she was on Eliquis but was told to stop it few weeks by [facility] as her tests was normal  Called daughter Sera on 1/14 to go over doppler results- left VM    Continue Eliquis, monitor HH   Will dw Vascular Cardiology if need for IR eval for thrombectomy or TPA Extesnived bilateral recurent DVT, hypercoagulable state likely from malignancy   Dopplers perfromed on 1/14 showing right and left Femoral Vein: acute occlusive DVT. Right Saphenofemoral Junction: age-indeterminate non-occlusive superficial vein thrombosis. Left Saphenofemoral Junction: acute occlusive superficial vein thrombosis  Pt reports she was on Eliquis but was told to stop it few weeks by [facility] as her tests was normal  Called daughter Sera on 1/14 to go over doppler results- left VM    Continue Eliquis, monitor HH   Will dw Vascular Cardiology if need for IR eval for thrombectomy or TPA

## 2024-01-14 NOTE — PROGRESS NOTE ADULT - PROBLEM SELECTOR PLAN 5
Currently poor functional status, not on chemotherapy, awaiting treatment plan for urothelial cancer prior to hemicolectomy   Sees Dr Taveras and Dr Whitney as outpt   On Tylenol and oxycodone for pain PRN  Spoke with Daughter Sera on 1.13- As per daughter, Dr Taveras wants pt optimized better for the surgery  Pt sees  Dr Chand from Oncology

## 2024-01-14 NOTE — CONSULT NOTE ADULT - ASSESSMENT
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 Endocrinology consulted for hypercalcemia.    #Hypercalcemia  DDx includes dehydration, immobilization, primary hyperparathyroidism, malignancy, granulomatous disease, myeloma  Corrected 12.3 today, increasing over past few days  GFR 93  PTH 24, repeat 18, inappropriately normal   25 OH vitamin D 17, low  TSH 1.27, normal  Patient given IVF so far, also on 2000IU vitamin D  PLAN:  - f/u PTHrP, 1-25OH vitamin D, SPEP, serum immunofixation, UPEP, urine immunofixation  - trend CMP for calcium and albumin q12h  - stop vitamin D for now until 1-25OH vitamin D results  - c/w aggressive IVF as tolerated  - will consider IV bisphosphonate today given lack of response to IVF so far    Chi Briceno MD  Endocrine Fellow  For nonurgent matters, please email [email or web address] or [email or web address]. For urgent matters only, please call answering service at [phone] (weekdays), [phone] (nights/weekends).  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 Endocrinology consulted for hypercalcemia.    #Hypercalcemia  DDx includes dehydration, immobilization, primary hyperparathyroidism, malignancy, granulomatous disease, myeloma  Corrected 12.3 today, increasing over past few days  GFR 93  PTH 24, repeat 18, inappropriately normal   25 OH vitamin D 17, low  TSH 1.27, normal  Patient given IVF so far, also on 2000IU vitamin D  PLAN:  - f/u PTHrP, 1-25OH vitamin D, SPEP, serum immunofixation, UPEP, urine immunofixation  - trend CMP for calcium and albumin q12h  - stop vitamin D for now until 1-25OH vitamin D results  - c/w aggressive IVF as tolerated  - will consider IV bisphosphonate today given lack of response to IVF so far    Chi Briceno MD  Endocrine Fellow  For nonurgent matters, please email [email or web address] or [email or web address]. For urgent matters only, please call answering service at [phone] (weekdays), [phone] (nights/weekends).  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 Endocrinology consulted for hypercalcemia.    #Hypercalcemia  DDx includes dehydration, immobilization, primary hyperparathyroidism, malignancy, granulomatous disease, myeloma  Corrected 12.3 today, increasing over past few days  GFR 93  PTH 24, repeat 18, inappropriately normal   25 OH vitamin D 17, low  TSH 1.27, normal  Patient given IVF so far, also on 2000IU vitamin D  PLAN:  - f/u PTHrP, 1-25OH vitamin D, SPEP, serum immunofixation, UPEP, urine immunofixation  - trend CMP for calcium and albumin q12h  - c/w aggressive IVF as tolerated  - will give zoledronic acid 4mg IV x1  - restart vitamin D 2000 IU daily     Chi Briceno MD  Endocrine Fellow  For nonurgent matters, please email [email or web address] or [email or web address]. For urgent matters only, please call answering service at [phone] (weekdays), [phone] (nights/weekends).  The patient is a 73yFemale with history of HLD (not on med currently), DVT (recurrent, a few years ago with coumadin x6 months, left sided, now with right DVT on eliquis, but recently told at 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 Endocrinology consulted for hypercalcemia.    #Hypercalcemia  DDx includes dehydration, immobilization, primary hyperparathyroidism, malignancy, granulomatous disease, myeloma  Corrected 12.3 today, increasing over past few days  GFR 93  PTH 24, repeat 18, inappropriately normal   25 OH vitamin D 17, low  TSH 1.27, normal  Patient given IVF so far, also on 2000IU vitamin D  PLAN:  - f/u PTHrP, 1-25OH vitamin D, SPEP, serum immunofixation, UPEP, urine immunofixation  - trend CMP for calcium and albumin q12h  - c/w aggressive IVF as tolerated  - will give zoledronic acid 4mg IV x1  - restart vitamin D 2000 IU daily     Chi Briceno MD  Endocrine Fellow  For nonurgent matters, please email [email or web address] or [email or web address]. For urgent matters only, please call answering service at [phone] (weekdays), [phone] (nights/weekends).

## 2024-01-14 NOTE — CONSULT NOTE ADULT - ATTENDING COMMENTS
73F with urothelial carcinoma and colon cancer with hypercalcemia, PTH 24 then repeat lower at 18, suspecting hypercalcemia of malignancy. Corrected calcium 12.3 despite IV fluids. Agree with Zoledronic acid treatment.  Follow up labs as outlined above.    Lesley Avilez MD  Division of Endocrinology  Pager: [phone]    If after 6PM or before 9AM, or on weekends/holidays, please call endocrine answering service for assistance ([phone]).  For nonurgent matters email [email or web address] for assistance. 73F with urothelial carcinoma and colon cancer with hypercalcemia, PTH 24 then repeat lower at 18, suspecting hypercalcemia of malignancy. Corrected calcium 12.3 despite IV fluids. Agree with Zoledronic acid treatment.  Follow up labs as outlined above.    Lesley Avilez MD  Division of Endocrinology  Pager: [phone]    If after 6PM or before 9AM, or on weekends/holidays, please call endocrine answering service for assistance ([phone]).  For nonurgent matters email [email or web address] for assistance.

## 2024-01-14 NOTE — PROGRESS NOTE ADULT - PROBLEM SELECTOR PLAN 11
Extensively discussed with patient -would like trial of intubation, would not want to be long-term on ventilator. Patient is ok with dialysis if needed. Patient would not like feeding tube if needed. Daughters are her healthcare proxy.  Updated daughter Curt 1/13  [facility] Extensively discussed with patient -would like trial of intubation, would not want to be long-term on ventilator. Patient is ok with dialysis if needed. Patient would not like feeding tube if needed. Daughters are her healthcare proxy.  Updated daughter Curt 1/13, unable to reach on 1/14  [facility]- [facility]

## 2024-01-15 NOTE — CONSULT NOTE ADULT - ASSESSMENT
73F hx HLD, DVT (recurrent, a few years ago with coumadin x6 months, left sided, now with right DVT on eliquis, but recently told at [facility] to discontinue Eliquis as DVT was not visualized while hospitalized there ~ three weeks ago), invasive urothelial carcinoma and well differentiated adeno colon cancer T4 N1 Mo, stage III, MSI intact), s/p TURBT who presented for admission with dislodged left nephrostomy tube. The patient was admitted to [facility] a few weeks ago admitted for UTI and blocked nephrostomy tubes and given antibiotics at that time. The rest of the admission at [facility] is unknown (no records and patient did not want night admitting team to call daughter overnight to not wake her up). The patient is currently wheelchair bound at this time. Per the patient she has a history of being on coumadin a few years ago initially for a left DVT and was on it for 6 months. She was recently started on eliquis with right DVT in October. She denies any recent hematuria or melena. She reports recently over last couple weeks being taken off of eliquis and was unable to explain why.     Vascular surgery consulted 2/2 c/f DVT. Lower extremity duplex with bilateral acute DVTs of the femoral veins as well as acute DVT of the left common femoral vein, and acute occlusive SVT of the left sapheno-femoral junction. R CFV and right saphenofemoral junction with age-indeterminate nonocculsive DVT.    Recommendations:  - Pending.  -  -  -    To be discussed with vascular surgery fellow on behalf of Dr. Munoz.    C Team/Vascular Surgery  Please page [phone] for all questions.   73F hx HLD, DVT (recurrent, a few years ago with coumadin x6 months, left sided, now with right DVT on eliquis, but recently told at [facility] to discontinue Eliquis as DVT was not visualized while hospitalized there ~ three weeks ago), invasive urothelial carcinoma and well differentiated adeno colon cancer T4 N1 Mo, stage III, MSI intact), s/p TURBT who presented for admission with dislodged left nephrostomy tube. The patient was admitted to [facility] a few weeks ago admitted for UTI and blocked nephrostomy tubes and given antibiotics at that time. The rest of the admission at [facility] is unknown (no records and patient did not want night admitting team to call daughter overnight to not wake her up). The patient is currently wheelchair bound at this time. Per the patient she has a history of being on coumadin a few years ago initially for a left DVT and was on it for 6 months. She was recently started on eliquis with right DVT in October. She denies any recent hematuria or melena. She reports recently over last couple weeks being taken off of eliquis and was unable to explain why.     Vascular surgery consulted 2/2 c/f DVT. Lower extremity duplex with bilateral acute DVTs of the femoral veins as well as acute DVT of the left common femoral vein, and acute occlusive SVT of the left sapheno-femoral junction. R CFV and right saphenofemoral junction with age-indeterminate nonocculsive DVT.    Recommendations:  - Pending.  -  -  -    To be discussed with vascular surgery fellow on behalf of Dr. Munoz.    C Team/Vascular Surgery  Please page [phone] for all questions.   73F hx HLD, DVT (recurrent, a few years ago with coumadin x6 months, left sided, now with right DVT on eliquis, but recently told at [facility] to discontinue Eliquis as DVT was not visualized while hospitalized there ~ three weeks ago), invasive urothelial carcinoma and well differentiated adeno colon cancer T4 N1 Mo, stage III, MSI intact), s/p TURBT who presented for admission with dislodged left nephrostomy tube. The patient was admitted to [facility] a few weeks ago admitted for UTI and blocked nephrostomy tubes and given antibiotics at that time. The rest of the admission at [facility] is unknown (no records and patient did not want night admitting team to call daughter overnight to not wake her up). The patient is currently wheelchair bound at this time. Per the patient she has a history of being on coumadin a few years ago initially for a left DVT and was on it for 6 months. She was recently started on eliquis with right DVT in October. She denies any recent hematuria or melena. She reports recently over last couple weeks being taken off of eliquis and was unable to explain why.     Vascular surgery consulted 2/2 c/f DVT. Lower extremity duplex with bilateral acute DVTs of the femoral veins as well as acute DVT of the left common femoral vein, and acute occlusive SVT of the left sapheno-femoral junction. R CFV and right saphenofemoral junction with age-indeterminate nonocculsive DVT.    Recommendations:  - Pending.  -  -  -    To be discussed with vascular surgery fellow on behalf of Dr. Munoz.    C Team/Vascular Surgery  Please page [phone] for all questions.   73F hx HLD, DVT (recurrent, a few years ago with coumadin x6 months, left sided, now with right DVT on eliquis, but recently told at [facility] to discontinue Eliquis as DVT was not visualized while hospitalized there ~ three weeks ago), invasive urothelial carcinoma and well differentiated adeno colon cancer T4 N1 Mo, stage III, MSI intact), s/p TURBT who presented for admission with dislodged left nephrostomy tube. The patient was admitted to [facility] a few weeks ago admitted for UTI and blocked nephrostomy tubes and given antibiotics at that time. The rest of the admission at [facility] is unknown (no records and patient did not want night admitting team to call daughter overnight to not wake her up). The patient is currently wheelchair bound at this time. Per the patient she has a history of being on coumadin a few years ago initially for a left DVT and was on it for 6 months. She was recently started on eliquis with right DVT in October. She denies any recent hematuria or melena. She reports recently over last couple weeks being taken off of eliquis and was unable to explain why.     Vascular surgery consulted 2/2 c/f DVT. Lower extremity duplex with bilateral acute DVTs of the femoral veins as well as acute DVT of the left common femoral vein, and acute occlusive SVT of the left sapheno-femoral junction. R CFV and right saphenofemoral junction with age-indeterminate nonocculsive DVT.    Recommendations:  - Pending.  -  -  -    To be discussed with vascular surgery fellow on behalf of Dr. Munoz.    C Team/Vascular Surgery  Please page [phone] for all questions.   73F hx HLD, DVT (recurrent, a few years ago with coumadin x6 months, left sided, now with right DVT on eliquis, but recently told at [facility] to discontinue Eliquis as DVT was not visualized while hospitalized there ~ three weeks ago), invasive urothelial carcinoma and well differentiated adeno colon cancer T4 N1 Mo, stage III, MSI intact), s/p TURBT who presented for admission with dislodged left nephrostomy tube. The patient was admitted to [facility] a few weeks ago admitted for UTI and blocked nephrostomy tubes and given antibiotics at that time. The rest of the admission at [facility] is unknown (no records and patient did not want night admitting team to call daughter overnight to not wake her up). The patient is currently wheelchair bound at this time. Per the patient she has a history of being on coumadin a few years ago initially for a left DVT and was on it for 6 months. She was recently started on eliquis with right DVT in October. She denies any recent hematuria or melena. She reports recently over last couple weeks being taken off of eliquis and was unable to explain why.     Vascular surgery consulted 2/2 c/f DVT. Lower extremity duplex with bilateral acute DVTs of the femoral veins as well as acute DVT of the left common femoral vein, and acute occlusive SVT of the left sapheno-femoral junction. R CFV and right saphenofemoral junction with age-indeterminate nonocculsive DVT.    Recommendations:  - No acute vascular surgery intervention at this time.  - Patient already on Eliquis, continue AC.  - Care per primary/    Discussed with vascular surgery fellow on behalf of Dr. Munoz.    C Team/Vascular Surgery  Please page [phone] for all questions.   73F hx HLD, DVT (recurrent, a few years ago with coumadin x6 months, left sided, now with right DVT on eliquis, but recently told at [facility] to discontinue Eliquis as DVT was not visualized while hospitalized there ~ three weeks ago), invasive urothelial carcinoma and well differentiated adeno colon cancer T4 N1 Mo, stage III, MSI intact), s/p TURBT who presented for admission with dislodged left nephrostomy tube. The patient was admitted to [facility] a few weeks ago admitted for UTI and blocked nephrostomy tubes and given antibiotics at that time. The rest of the admission at [facility] is unknown (no records and patient did not want night admitting team to call daughter overnight to not wake her up). The patient is currently wheelchair bound at this time. Per the patient she has a history of being on coumadin a few years ago initially for a left DVT and was on it for 6 months. She was recently started on eliquis with right DVT in October. She denies any recent hematuria or melena. She reports recently over last couple weeks being taken off of eliquis and was unable to explain why.     Vascular surgery consulted 2/2 c/f DVT. Lower extremity duplex with bilateral acute DVTs of the femoral veins as well as acute DVT of the left common femoral vein, and acute occlusive SVT of the left sapheno-femoral junction. R CFV and right saphenofemoral junction with age-indeterminate nonocculsive DVT.    Recommendations:  - No acute vascular surgery intervention at this time.  - Patient already on Eliquis, continue AC.  - Care per primary/    Discussed with vascular surgery fellow on behalf of Dr. Munoz.    C Team/Vascular Surgery  Please page [phone] for all questions.   73F hx HLD, DVT (recurrent, a few years ago with coumadin x6 months, left sided, now with right DVT on eliquis, but recently told at [facility] to discontinue Eliquis as DVT was not visualized while hospitalized there ~ three weeks ago), invasive urothelial carcinoma and well differentiated adeno colon cancer T4 N1 Mo, stage III, MSI intact), s/p TURBT who presented for admission with dislodged left nephrostomy tube. The patient was admitted to [facility] a few weeks ago admitted for UTI and blocked nephrostomy tubes and given antibiotics at that time. The rest of the admission at [facility] is unknown (no records and patient did not want night admitting team to call daughter overnight to not wake her up). The patient is currently wheelchair bound at this time. Per the patient she has a history of being on coumadin a few years ago initially for a left DVT and was on it for 6 months. She was recently started on eliquis with right DVT in October. She denies any recent hematuria or melena. She reports recently over last couple weeks being taken off of eliquis and was unable to explain why.     Vascular surgery consulted 2/2 c/f DVT. Lower extremity duplex with bilateral acute DVTs of the femoral veins as well as acute DVT of the left common femoral vein, and acute occlusive SVT of the left sapheno-femoral junction. R CFV and right saphenofemoral junction with age-indeterminate nonocculsive DVT.    Recommendations:  - No acute vascular surgery intervention at this time.  - Patient already on Eliquis, continue AC.  - Care per primary/    Discussed with vascular surgery fellow on behalf of Dr. Munoz.    C Team/Vascular Surgery  Please page [phone] for all questions.   73F hx HLD, DVT (recurrent, a few years ago with coumadin x6 months, left sided, now with right DVT on eliquis, but recently told at [facility] to discontinue Eliquis as DVT was not visualized while hospitalized there ~ three weeks ago), invasive urothelial carcinoma and well differentiated adeno colon cancer T4 N1 Mo, stage III, MSI intact), s/p TURBT who presented for admission with dislodged left nephrostomy tube. The patient was admitted to [facility] a few weeks ago admitted for UTI and blocked nephrostomy tubes and given antibiotics at that time. The rest of the admission at [facility] is unknown (no records and patient did not want night admitting team to call daughter overnight to not wake her up). The patient is currently wheelchair bound at this time. Per the patient she has a history of being on coumadin a few years ago initially for a left DVT and was on it for 6 months. She was recently started on eliquis with right DVT in October. She denies any recent hematuria or melena. She reports recently over last couple weeks being taken off of eliquis and was unable to explain why.     Vascular surgery consulted 2/2 c/f DVT. Lower extremity duplex with bilateral acute DVTs of the femoral veins as well as acute DVT of the left common femoral vein, and acute occlusive SVT of the left sapheno-femoral junction. R CFV and right saphenofemoral junction with age-indeterminate nonocculsive DVT.    Recommendations:  - No acute vascular surgery intervention at this time.  - Patient already on Eliquis, continue AC.  - Care per primary/    Discussed with vascular surgery fellow on behalf of Dr. Munoz.    C Team/Vascular Surgery  Please page [phone] for all questions.   73F hx HLD, DVT (recurrent, a few years ago with coumadin x6 months, left sided, now with right DVT on eliquis, but recently told at [facility] to discontinue Eliquis as DVT was not visualized while hospitalized there ~ three weeks ago), invasive urothelial carcinoma and well differentiated adeno colon cancer T4 N1 Mo, stage III, MSI intact), s/p TURBT who presented for admission with dislodged left nephrostomy tube. The patient was admitted to [facility] a few weeks ago admitted for UTI and blocked nephrostomy tubes and given antibiotics at that time. The rest of the admission at [facility] is unknown (no records and patient did not want night admitting team to call daughter overnight to not wake her up). The patient is currently wheelchair bound at this time. Per the patient she has a history of being on coumadin a few years ago initially for a left DVT and was on it for 6 months. She was recently started on eliquis with right DVT in October. She denies any recent hematuria or melena. She reports recently over last couple weeks being taken off of eliquis and was unable to explain why.     Vascular surgery consulted 2/2 c/f DVT. Lower extremity duplex with bilateral acute DVTs of the femoral veins as well as acute DVT of the left common femoral vein, and acute occlusive SVT of the left sapheno-femoral junction. R CFV and right saphenofemoral junction with age-indeterminate nonocculsive DVT.    Recommendations:  - No acute vascular surgery intervention at this time.  - Patient already on Eliquis, continue AC.  - Reconsult vascular surgery as needed.  - Care per primary.    Discussed with vascular surgery fellow on behalf of Dr. Munoz.    C Team/Vascular Surgery  Please page [phone] for all questions.   73F hx HLD, DVT (recurrent, a few years ago with coumadin x6 months, left sided, now with right DVT on eliquis, but recently told at [facility] to discontinue Eliquis as DVT was not visualized while hospitalized there ~ three weeks ago), invasive urothelial carcinoma and well differentiated adeno colon cancer T4 N1 Mo, stage III, MSI intact), s/p TURBT who presented for admission with dislodged left nephrostomy tube. The patient was admitted to [facility] a few weeks ago admitted for UTI and blocked nephrostomy tubes and given antibiotics at that time. The rest of the admission at [facility] is unknown (no records and patient did not want night admitting team to call daughter overnight to not wake her up). The patient is currently wheelchair bound at this time. Per the patient she has a history of being on coumadin a few years ago initially for a left DVT and was on it for 6 months. She was recently started on eliquis with right DVT in October. She denies any recent hematuria or melena. She reports recently over last couple weeks being taken off of eliquis and was unable to explain why.     Vascular surgery consulted 2/2 c/f DVT. Lower extremity duplex with bilateral acute DVTs of the femoral veins as well as acute DVT of the left common femoral vein, and acute occlusive SVT of the left sapheno-femoral junction. R CFV and right saphenofemoral junction with age-indeterminate nonocculsive DVT.    Recommendations:  - No acute vascular surgery intervention at this time.  - Patient already on Eliquis, continue AC.  - Reconsult vascular surgery as needed.  - Care per primary.    Discussed with vascular surgery fellow on behalf of Dr. Munoz.    C Team/Vascular Surgery  Please page [phone] for all questions.   73F hx HLD, DVT (recurrent, a few years ago with coumadin x6 months, left sided, now with right DVT on eliquis, but recently told at [facility] to discontinue Eliquis as DVT was not visualized while hospitalized there ~ three weeks ago), invasive urothelial carcinoma and well differentiated adeno colon cancer T4 N1 Mo, stage III, MSI intact), s/p TURBT who presented for admission with dislodged left nephrostomy tube. The patient was admitted to [facility] a few weeks ago admitted for UTI and blocked nephrostomy tubes and given antibiotics at that time. The rest of the admission at [facility] is unknown (no records and patient did not want night admitting team to call daughter overnight to not wake her up). The patient is currently wheelchair bound at this time. Per the patient she has a history of being on coumadin a few years ago initially for a left DVT and was on it for 6 months. She was recently started on eliquis with right DVT in October. She denies any recent hematuria or melena. She reports recently over last couple weeks being taken off of eliquis and was unable to explain why.     Vascular surgery consulted 2/2 c/f DVT. Lower extremity duplex with bilateral acute DVTs of the femoral veins as well as acute DVT of the left common femoral vein, and acute occlusive SVT of the left sapheno-femoral junction. R CFV and right saphenofemoral junction with age-indeterminate nonocculsive DVT.    Recommendations:  - No acute vascular surgery intervention at this time.  - Patient already on Eliquis, continue AC.  - Reconsult vascular surgery as needed.  - Care per primary.    Discussed with vascular surgery fellow on behalf of Dr. Munoz.    C Team/Vascular Surgery  Please page [phone] for all questions.   73F hx HLD, DVT (recurrent, a few years ago with coumadin x6 months, left sided, now with right DVT on eliquis, but recently told at [facility] to discontinue Eliquis as DVT was not visualized while hospitalized there ~ three weeks ago), invasive urothelial carcinoma and well differentiated adeno colon cancer T4 N1 Mo, stage III, MSI intact), s/p TURBT who presented for admission with dislodged left nephrostomy tube. The patient was admitted to [facility] a few weeks ago admitted for UTI and blocked nephrostomy tubes and given antibiotics at that time. The rest of the admission at [facility] is unknown (no records and patient did not want night admitting team to call daughter overnight to not wake her up). The patient is currently wheelchair bound at this time. Per the patient she has a history of being on coumadin a few years ago initially for a left DVT and was on it for 6 months. She was recently started on eliquis with right DVT in October. She denies any recent hematuria or melena. She reports recently over last couple weeks being taken off of eliquis and was unable to explain why.     Vascular surgery consulted 2/2 c/f DVT. Lower extremity duplex with bilateral acute DVTs of the femoral veins as well as acute DVT of the left common femoral vein, and acute occlusive SVT of the left sapheno-femoral junction. R CFV and right saphenofemoral junction with age-indeterminate nonocculsive DVT.    Recommendations:  - No acute vascular surgery intervention at this time.  - Patient already on Eliquis, continue AC.  - Reconsult vascular surgery as needed.  - Care per primary.    Discussed with vascular surgery fellow on behalf of Dr. Munoz.    C Team/Vascular Surgery  Please page [phone] for all questions.

## 2024-01-15 NOTE — CHART NOTE - NSCHARTNOTEFT_GEN_A_CORE
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 Endocrinology consulted for hypercalcemia.    #Hypercalcemia  DDx includes dehydration, immobilization, primary hyperparathyroidism, malignancy, granulomatous disease, myeloma  GFR 93  PTH 24, repeat 18, inappropriately normal   25 OH vitamin D 17, low; 1-25 OH vit D 23, normal  TSH 1.27, normal  S/p 4mg IV zoledronic acid on 1/14  Corrected 11.3 today, improved from yesterday  PLAN:  - f/u PTHrP, SPEP, serum immunofixation, UPEP, urine immunofixation  - trend CMP for calcium and albumin daily  - encourage PO intake, c/w IVF as tolerated  - c/w vitamin D 2000 IU daily     Chi Briceno MD  Endocrine Fellow  For nonurgent matters, please email [email or web address] or [email or web address]. For urgent matters only, please call answering service at [phone] (weekdays), [phone] (nights/weekends).

## 2024-01-15 NOTE — PROGRESS NOTE ADULT - PROBLEM SELECTOR PLAN 1
Severe bilateral hydronephrosis with b/l outlet obstruction s/p bilateral nephrostomy tube placement 10/31, with exchange/replacement recently at [facility] due to blocked nephrostomy tube   Right nephrostomy tube in place, left nephrostomy tube displaced/dislodged  CT abdomen, pelvis and chest 11/23 showed thrombus in right common femoral vein extending into superficial vein (not seen on 03/2023), increase in hydronephrosis despite perc nephrostomy tubes and ureteral stent, prominent soft tissue in posterior bladder extending into ureters inseparable from vaginal cuff adjacent fat increased size of right external iliac LNs.  BUN/Creatinine stable  s/p L NT replaced on 1/11 - Flush with 5 cc NS qd  Continue to monitor Severe bilateral hydronephrosis with b/l outlet obstruction s/p bilateral nephrostomy tube placement 10/31, with exchange/replacement recently at [facility] due to blocked nephrostomy tube   Right nephrostomy tube in place, left nephrostomy tube displaced/dislodged  CT abdomen, pelvis and chest 11/23 showed thrombus in right common femoral vein extending into superficial vein (not seen on 03/2023), increase in hydronephrosis despite perc nephrostomy tubes and ureteral stent, prominent soft tissue in posterior bladder extending into ureters inseparable from vaginal cuff adjacent fat increased size of right external iliac LNs.  BUN/Creatinine stable  s/p L NT replaced on 1/11 - Flush with 5 cc NS qd  Continue to monitor Severe bilateral hydronephrosis with b/l outlet obstruction s/p bilateral nephrostomy tube placement 10/31, with exchange/replacement recently at [facility] due to blocked nephrostomy tube   Right nephrostomy tube in place, left nephrostomy tube displaced/dislodged  CT abdomen, pelvis and chest 11/23 showed thrombus in right common femoral vein extending into superficial vein (not seen on 03/2023), increase in hydronephrosis despite perc nephrostomy tubes and ureteral stent, prominent soft tissue in posterior bladder extending into ureters inseparable from vaginal cuff adjacent fat increased size of right external iliac LNs.  BUN/Creatinine stable  s/p L NT replaced on 1/11 - Flush with 5 cc NS qd  Continue to monitor

## 2024-01-15 NOTE — PROGRESS NOTE ADULT - ASSESSMENT
Ms. 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 Ms. 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 Ms. 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

## 2024-01-15 NOTE — PROGRESS NOTE ADULT - PROBLEM SELECTOR PLAN 8
Stage II-III sacral ulcer on admission > As per pt, she developed it in [facility]   Nutrition consult   [facility] Wound care-nursing and surgery consult placed on 1/12- Not seen yet.  DW  RN to do wound assessment  Turn every 4 hours, OOB to chair as tolerated   Continue to monitor Stage II-III sacral ulcer on admission > As per pt, she developed it in [facility]   Nutrition consult   FU Wound care-nursing and surgery consult placed on 1/12- Not seen yet.  DW  RN to do wound assessment  Turn every 4 hours, OOB to chair as tolerated   Continue to monitor Stage II-III sacral ulcer on admission > As per pt, she developed it in [facility]   Nutrition consult   FU Wound care-nursing and surgery consult placed on 1/12- Not seen yet.  DW  RN to do wound assessment  Turn every 4 hours, OOB to chair as tolerated   Continue to monitor

## 2024-01-15 NOTE — PROGRESS NOTE ADULT - PROBLEM SELECTOR PLAN 3
Extensive bilateral recurrent DVT, hypercoagulable state likely from malignancy   Dopplers perfromed on 1/14 showing right and left Femoral Vein: acute occlusive DVT. Right Saphenofemoral Junction: age-indeterminate non-occlusive superficial vein thrombosis. Left Saphenofemoral Junction: acute occlusive superficial vein thrombosis  Pt reports she was on Eliquis but was told to stop it few weeks by [facility] as her tests was normal  Called daughter Sera on 1/14 to go over doppler results- left VM    Continue Eliquis, monitor HH   Vascular consulted- FU recs Extensive bilateral recurrent DVT, hypercoagulable state likely from malignancy   Dopplers perfromed on 1/14 showing right and left Femoral Vein: acute occlusive DVT. Right Saphenofemoral Junction: age-indeterminate non-occlusive superficial vein thrombosis. Left Saphenofemoral Junction: acute occlusive superficial vein thrombosis  Pt reports she was on Eliquis but was told to stop it few weeks by [facility] as her tests was normal  Called daughter Sera on 1/14 to go over doppler results- left VM    Continue Eliquis, monitor HH   Vascular consulted- FU recs Extensive bilateral recurrent DVT, hypercoagulable state likely from malignancy   Dopplers perfromed on 1/14 showing right and left Femoral Vein: acute occlusive DVT. Right Saphenofemoral Junction: age-indeterminate non-occlusive superficial vein thrombosis. Left Saphenofemoral Junction: acute occlusive superficial vein thrombosis  Pt reports she was on Eliquis but was told to stop it few weeks by [facility] as her tests was normal  Called daughter Sera on 1/14 to go over doppler results- left VM    Continue Eliquis, monitor HH   Vascular consulted- FU recs Extensive bilateral recurrent DVT, hypercoagulable state likely from malignancy   Dopplers perfromed on 1/14 showing right and left Femoral Vein: acute occlusive DVT. Right Saphenofemoral Junction: age-indeterminate non-occlusive superficial vein thrombosis. Left Saphenofemoral Junction: acute occlusive superficial vein thrombosis  Pt reports she was on Eliquis but was told to stop it few weeks by [facility] as her tests was normal  Called daughter Sera on 1/14 to go over doppler results- left VM    Continue Eliquis, monitor    Vascular consulted- No acute vascular surgery intervention at this time, continue AC. Extensive bilateral recurrent DVT, hypercoagulable state likely from malignancy   Dopplers perfromed on 1/14 showing right and left Femoral Vein: acute occlusive DVT. Right Saphenofemoral Junction: age-indeterminate non-occlusive superficial vein thrombosis. Left Saphenofemoral Junction: acute occlusive superficial vein thrombosis  Pt reports she was on Eliquis but was told to stop it few weeks by [facility] as her tests was normal  Called daughter Sera on 1/14 to go over doppler results- left VM    Continue Eliquis, monitor    Vascular consulted- No acute vascular surgery intervention at this time, continue AC. Extensive bilateral recurrent DVT, hypercoagulable state likely from malignancy   Dopplers perfromed on 1/14 showing right and left Femoral Vein: acute occlusive DVT. Right Saphenofemoral Junction: age-indeterminate non-occlusive superficial vein thrombosis. Left Saphenofemoral Junction: acute occlusive superficial vein thrombosis  Pt reports she was on Eliquis but was told to stop it few weeks by [facility] as her tests was normal  Called daughter Sera on 1/14 to go over doppler results- left VM    Continue Eliquis, monitor    Vascular consulted- No acute vascular surgery intervention at this time, continue AC.

## 2024-01-15 NOTE — PROGRESS NOTE ADULT - SUBJECTIVE AND OBJECTIVE BOX
Jose Floyd  Hospitalist  Pager- [phone]    PROGRESS NOTE:     Patient is a 73y old  Female who presents with a chief complaint of dislodged nephrostomy tube (11 Jan 2024 02:53)      SUBJECTIVE / OVERNIGHT EVENTS: Pt seen and examined by me.   c/o Abdominal pain on touch or movement. pt ordered for Oxy PRN but hasnt taken it yet.   Advised pt to take as needed       ADDITIONAL REVIEW OF SYSTEMS:    MEDICATIONS  (STANDING):  apixaban 5 milliGRAM(s) Oral two times a day  cholecalciferol 2000 Unit(s) Oral daily  influenza  Vaccine (HIGH DOSE) 0.7 milliLiter(s) IntraMuscular once  piperacillin/tazobactam IVPB.. 3.375 Gram(s) IV Intermittent every 8 hours  sodium chloride 0.9%. 1000 milliLiter(s) (100 mL/Hr) IV Continuous <Continuous>  sodium chloride 0.9%. 1000 milliLiter(s) (75 mL/Hr) IV Continuous <Continuous>  sodium phosphate 15 milliMole(s)/250 mL IVPB 15 milliMole(s) IV Intermittent once    MEDICATIONS  (PRN):  acetaminophen     Tablet .. 650 milliGRAM(s) Oral every 6 hours PRN Mild Pain (1 - 3)  oxyCODONE    IR 5 milliGRAM(s) Oral every 4 hours PRN Moderate Pain (4 - 6)  polyethylene glycol 3350 17 Gram(s) Oral at bedtime PRN Constipation      Vital Signs Last 24 Hrs  T(C): 36.7 (15 Channing 2024 05:53), Max: 36.8 (14 Jan 2024 15:30)  T(F): 98 (15 Channing 2024 05:53), Max: 98.2 (14 Jan 2024 15:30)  HR: 86 (15 Channing 2024 05:53) (86 - 91)  BP: 122/76 (15 Channing 2024 05:53) (118/77 - 124/82)  BP(mean): --  RR: 18 (15 Channing 2024 05:53) (16 - 18)  SpO2: 100% (15 Channing 2024 05:53) (98% - 100%)    Parameters below as of 15 Channing 2024 05:53  Patient On (Oxygen Delivery Method): room air      PHYSICAL EXAM:    CONSTITUTIONAL: NAD, well-developed  RESPIRATORY: Normal respiratory effort; lungs are clear to auscultation bilaterally  CARDIOVASCULAR: Regular rate and rhythm, normal S1 and S2, no murmur/rub/gallop; No lower extremity edema; Peripheral pulses are 2+ bilaterally  ABDOMEN: Midly distended, tender  normoactive bowel sounds, no rebound/guarding; No hepatosplenomegaly. Nephrostomy tube present  MUSCLOSKELETAL: no clubbing or cyanosis of digits; no joint swelling or tenderness to palpation  PSYCH: A+O to person, place, and time; affect appropriate  NEURO: No gross deficits  SKIN: No rash   LE swelling present     LABS:                                                    8.2    16.34 )-----------( 443      ( 15 Channing 2024 06:09 )             27.0     01-15    137  |  104  |  15  ----------------------------<  112<H>  4.0   |  24  |  0.45<L>    Ca    10.3      15 Channing 2024 06:09  Phos  2.3     01-15  Mg     1.90     01-15    TPro  6.7  /  Alb  2.7<L>  /  TBili  <0.2  /  DBili  x   /  AST  11  /  ALT  7   /  AlkPhos  95  01-14        RADIOLOGY & ADDITIONAL TESTS:  Results Reviewed:   Imaging Personally Reviewed:  Electrocardiogram Personally Reviewed:    COORDINATION OF CARE:  Care Discussed with Consultants/Other Providers [Y/N]:  Prior or Outpatient Records Reviewed [Y/N]:   Jose Floyd  Hospitalist  Pager- [phone]    PROGRESS NOTE:     Patient is a 73y old  Female who presents with a chief complaint of dislodged nephrostomy tube (11 Jan 2024 02:53)      SUBJECTIVE / OVERNIGHT EVENTS: Pt seen and examined by me.   c/o Abdominal pain on touch or movement. pt ordered for Oxy PRN but hasnt taken it yet.   Advised pt to take as needed       ADDITIONAL REVIEW OF SYSTEMS:    MEDICATIONS  (STANDING):  apixaban 5 milliGRAM(s) Oral two times a day  cholecalciferol 2000 Unit(s) Oral daily  influenza  Vaccine (HIGH DOSE) 0.7 milliLiter(s) IntraMuscular once  piperacillin/tazobactam IVPB.. 3.375 Gram(s) IV Intermittent every 8 hours  sodium chloride 0.9%. 1000 milliLiter(s) (100 mL/Hr) IV Continuous <Continuous>  sodium chloride 0.9%. 1000 milliLiter(s) (75 mL/Hr) IV Continuous <Continuous>  sodium phosphate 15 milliMole(s)/250 mL IVPB 15 milliMole(s) IV Intermittent once    MEDICATIONS  (PRN):  acetaminophen     Tablet .. 650 milliGRAM(s) Oral every 6 hours PRN Mild Pain (1 - 3)  oxyCODONE    IR 5 milliGRAM(s) Oral every 4 hours PRN Moderate Pain (4 - 6)  polyethylene glycol 3350 17 Gram(s) Oral at bedtime PRN Constipation      Vital Signs Last 24 Hrs  T(C): 36.7 (15 Channing 2024 05:53), Max: 36.8 (14 Jan 2024 15:30)  T(F): 98 (15 Channing 2024 05:53), Max: 98.2 (14 Jan 2024 15:30)  HR: 86 (15 Channing 2024 05:53) (86 - 91)  BP: 122/76 (15 Channing 2024 05:53) (118/77 - 124/82)  BP(mean): --  RR: 18 (15 Channing 2024 05:53) (16 - 18)  SpO2: 100% (15 Channing 2024 05:53) (98% - 100%)    Parameters below as of 15 Channing 2024 05:53  Patient On (Oxygen Delivery Method): room air      PHYSICAL EXAM:    CONSTITUTIONAL: NAD, well-developed  RESPIRATORY: Normal respiratory effort; lungs are clear to auscultation bilaterally  CARDIOVASCULAR: Regular rate and rhythm, normal S1 and S2, no murmur/rub/gallop; No lower extremity edema; Peripheral pulses are 2+ bilaterally  ABDOMEN: Midly distended, tender  normoactive bowel sounds, no rebound/guarding; No hepatosplenomegaly. Nephrostomy tube present  MUSCLOSKELETAL: no clubbing or cyanosis of digits; no joint swelling or tenderness to palpation  PSYCH: A+O to person, place, and time; affect appropriate  NEURO: No gross deficits  SKIN: No rash   LE swelling present     LABS:                                                    8.2    16.34 )-----------( 443      ( 15 Channing 2024 06:09 )             27.0     01-15    137  |  104  |  15  ----------------------------<  112<H>  4.0   |  24  |  0.45<L>    Ca    10.3      15 Channing 2024 06:09  Phos  2.3     01-15  Mg     1.90     01-15    TPro  6.7  /  Alb  2.7<L>  /  TBili  <0.2  /  DBili  x   /  AST  11  /  ALT  7   /  AlkPhos  95  01-14        RADIOLOGY & ADDITIONAL TESTS:  Results Reviewed:   Imaging Personally Reviewed:  Electrocardiogram Personally Reviewed:    COORDINATION OF CARE:  Care Discussed with Consultants/Other Providers [Y/N]:  Prior or Outpatient Records Reviewed [Y/N]:   Jose Floyd  Hospitalist  Pager- [phone]    PROGRESS NOTE:     Patient is a 73y old  Female who presents with a chief complaint of dislodged nephrostomy tube (11 Jan 2024 02:53)      SUBJECTIVE / OVERNIGHT EVENTS: Pt seen and examined by me.   c/o Abdominal pain on touch or movement. pt ordered for Oxy PRN but hasnt taken it yet.   Advised pt to take as needed       ADDITIONAL REVIEW OF SYSTEMS:    MEDICATIONS  (STANDING):  apixaban 5 milliGRAM(s) Oral two times a day  cholecalciferol 2000 Unit(s) Oral daily  influenza  Vaccine (HIGH DOSE) 0.7 milliLiter(s) IntraMuscular once  piperacillin/tazobactam IVPB.. 3.375 Gram(s) IV Intermittent every 8 hours  sodium chloride 0.9%. 1000 milliLiter(s) (100 mL/Hr) IV Continuous <Continuous>  sodium chloride 0.9%. 1000 milliLiter(s) (75 mL/Hr) IV Continuous <Continuous>  sodium phosphate 15 milliMole(s)/250 mL IVPB 15 milliMole(s) IV Intermittent once    MEDICATIONS  (PRN):  acetaminophen     Tablet .. 650 milliGRAM(s) Oral every 6 hours PRN Mild Pain (1 - 3)  oxyCODONE    IR 5 milliGRAM(s) Oral every 4 hours PRN Moderate Pain (4 - 6)  polyethylene glycol 3350 17 Gram(s) Oral at bedtime PRN Constipation      Vital Signs Last 24 Hrs  T(C): 36.7 (15 Channing 2024 05:53), Max: 36.8 (14 Jan 2024 15:30)  T(F): 98 (15 Channing 2024 05:53), Max: 98.2 (14 Jan 2024 15:30)  HR: 86 (15 Channing 2024 05:53) (86 - 91)  BP: 122/76 (15 Channing 2024 05:53) (118/77 - 124/82)  BP(mean): --  RR: 18 (15 Channing 2024 05:53) (16 - 18)  SpO2: 100% (15 Channing 2024 05:53) (98% - 100%)    Parameters below as of 15 Channing 2024 05:53  Patient On (Oxygen Delivery Method): room air      PHYSICAL EXAM:    CONSTITUTIONAL: NAD, well-developed  RESPIRATORY: Normal respiratory effort; lungs are clear to auscultation bilaterally  CARDIOVASCULAR: Regular rate and rhythm, normal S1 and S2, no murmur/rub/gallop; No lower extremity edema; Peripheral pulses are 2+ bilaterally  ABDOMEN: Midly distended, tender  normoactive bowel sounds, no rebound/guarding; No hepatosplenomegaly. Nephrostomy tube present  MUSCLOSKELETAL: no clubbing or cyanosis of digits; no joint swelling or tenderness to palpation  PSYCH: A+O to person, place, and time; affect appropriate  NEURO: No gross deficits  SKIN: No rash   LE swelling present     LABS:                                                    8.2    16.34 )-----------( 443      ( 15 Channing 2024 06:09 )             27.0     01-15    137  |  104  |  15  ----------------------------<  112<H>  4.0   |  24  |  0.45<L>    Ca    10.3      15 Channing 2024 06:09  Phos  2.3     01-15  Mg     1.90     01-15    TPro  6.7  /  Alb  2.7<L>  /  TBili  <0.2  /  DBili  x   /  AST  11  /  ALT  7   /  AlkPhos  95  01-14        RADIOLOGY & ADDITIONAL TESTS:  Results Reviewed:   Imaging Personally Reviewed:  Electrocardiogram Personally Reviewed:    COORDINATION OF CARE:  Care Discussed with Consultants/Other Providers [Y/N]:  Prior or Outpatient Records Reviewed [Y/N]:   Jose Floyd  Hospitalist  Pager- [phone]    PROGRESS NOTE:     Patient is a 73y old  Female who presents with a chief complaint of dislodged nephrostomy tube (11 Jan 2024 02:53)      SUBJECTIVE / OVERNIGHT EVENTS: Pt seen and examined by me.   c/o Reports chronic abdominal pain on touch or movement., No pain at rest. pt ordered for Oxy PRN but hasn't taken it yet.   Advised pt to take as needed but she states she doesn't like to take meds  Had BM yday . Has not ambulated much. States that since the past few months, she has not been able to walk much     ADDITIONAL REVIEW OF SYSTEMS:    MEDICATIONS  (STANDING):  apixaban 5 milliGRAM(s) Oral two times a day  cholecalciferol 2000 Unit(s) Oral daily  influenza  Vaccine (HIGH DOSE) 0.7 milliLiter(s) IntraMuscular once  piperacillin/tazobactam IVPB.. 3.375 Gram(s) IV Intermittent every 8 hours  sodium chloride 0.9%. 1000 milliLiter(s) (100 mL/Hr) IV Continuous <Continuous>  sodium chloride 0.9%. 1000 milliLiter(s) (75 mL/Hr) IV Continuous <Continuous>  sodium phosphate 15 milliMole(s)/250 mL IVPB 15 milliMole(s) IV Intermittent once    MEDICATIONS  (PRN):  acetaminophen     Tablet .. 650 milliGRAM(s) Oral every 6 hours PRN Mild Pain (1 - 3)  oxyCODONE    IR 5 milliGRAM(s) Oral every 4 hours PRN Moderate Pain (4 - 6)  polyethylene glycol 3350 17 Gram(s) Oral at bedtime PRN Constipation      Vital Signs Last 24 Hrs  T(C): 36.7 (15 Channing 2024 05:53), Max: 36.8 (14 Jan 2024 15:30)  T(F): 98 (15 Channing 2024 05:53), Max: 98.2 (14 Jan 2024 15:30)  HR: 86 (15 Channing 2024 05:53) (86 - 91)  BP: 122/76 (15 Channing 2024 05:53) (118/77 - 124/82)  BP(mean): --  RR: 18 (15 Channing 2024 05:53) (16 - 18)  SpO2: 100% (15 Channing 2024 05:53) (98% - 100%)    Parameters below as of 15 Channing 2024 05:53  Patient On (Oxygen Delivery Method): room air      PHYSICAL EXAM:    CONSTITUTIONAL: NAD, well-developed  RESPIRATORY: Normal respiratory effort; lungs are clear to auscultation bilaterally  CARDIOVASCULAR: Regular rate and rhythm, normal S1 and S2, no murmur/rub/gallop; No lower extremity edema; Peripheral pulses are 2+ bilaterally  ABDOMEN: Midly distended, tender  normoactive bowel sounds, no rebound/guarding; No hepatosplenomegaly. Bilateral  Nephrostomy tube present  MUSCLOSKELETAL: no clubbing or cyanosis of digits; no joint swelling or tenderness to palpation  PSYCH: A+O to person, place, and time; affect appropriate  NEURO: No gross deficits  SKIN:  Stage 2-3 sacral wound present   LE swelling present     LABS:                                                    8.2    16.34 )-----------( 443      ( 15 Channing 2024 06:09 )             27.0     01-15    137  |  104  |  15  ----------------------------<  112<H>  4.0   |  24  |  0.45<L>    Ca    10.3      15 Channing 2024 06:09  Phos  2.3     01-15  Mg     1.90     01-15    TPro  6.7  /  Alb  2.7<L>  /  TBili  <0.2  /  DBili  x   /  AST  11  /  ALT  7   /  AlkPhos  95  01-14        RADIOLOGY & ADDITIONAL TESTS:  Results Reviewed:   Imaging Personally Reviewed:  Electrocardiogram Personally Reviewed:    COORDINATION OF CARE:  Care Discussed with Consultants/Other Providers [Y/N]:  Prior or Outpatient Records Reviewed [Y/N]:   Jose Floyd  Hospitalist  Pager- [phone]    PROGRESS NOTE:     Patient is a 73y old  Female who presents with a chief complaint of dislodged nephrostomy tube (11 Jan 2024 02:53)      SUBJECTIVE / OVERNIGHT EVENTS: Pt seen and examined by me.   c/o Reports chronic abdominal pain on touch or movement., No pain at rest. pt ordered for Oxy PRN but hasn't taken it yet.   Advised pt to take as needed but she states she doesn't like to take meds  Had BM yday . Has not ambulated much. States that since the past few months, she has not been able to walk much     ADDITIONAL REVIEW OF SYSTEMS:    MEDICATIONS  (STANDING):  apixaban 5 milliGRAM(s) Oral two times a day  cholecalciferol 2000 Unit(s) Oral daily  influenza  Vaccine (HIGH DOSE) 0.7 milliLiter(s) IntraMuscular once  piperacillin/tazobactam IVPB.. 3.375 Gram(s) IV Intermittent every 8 hours  sodium chloride 0.9%. 1000 milliLiter(s) (100 mL/Hr) IV Continuous <Continuous>  sodium chloride 0.9%. 1000 milliLiter(s) (75 mL/Hr) IV Continuous <Continuous>  sodium phosphate 15 milliMole(s)/250 mL IVPB 15 milliMole(s) IV Intermittent once    MEDICATIONS  (PRN):  acetaminophen     Tablet .. 650 milliGRAM(s) Oral every 6 hours PRN Mild Pain (1 - 3)  oxyCODONE    IR 5 milliGRAM(s) Oral every 4 hours PRN Moderate Pain (4 - 6)  polyethylene glycol 3350 17 Gram(s) Oral at bedtime PRN Constipation      Vital Signs Last 24 Hrs  T(C): 36.7 (15 Channing 2024 05:53), Max: 36.8 (14 Jan 2024 15:30)  T(F): 98 (15 Channing 2024 05:53), Max: 98.2 (14 Jan 2024 15:30)  HR: 86 (15 Channing 2024 05:53) (86 - 91)  BP: 122/76 (15 Channing 2024 05:53) (118/77 - 124/82)  BP(mean): --  RR: 18 (15 Channing 2024 05:53) (16 - 18)  SpO2: 100% (15 Channing 2024 05:53) (98% - 100%)    Parameters below as of 15 Channing 2024 05:53  Patient On (Oxygen Delivery Method): room air      PHYSICAL EXAM:    CONSTITUTIONAL: NAD, well-developed  RESPIRATORY: Normal respiratory effort; lungs are clear to auscultation bilaterally  CARDIOVASCULAR: Regular rate and rhythm, normal S1 and S2, no murmur/rub/gallop; No lower extremity edema; Peripheral pulses are 2+ bilaterally  ABDOMEN: Midly distended, tender  normoactive bowel sounds, no rebound/guarding; No hepatosplenomegaly. Bilateral  Nephrostomy tube present  MUSCLOSKELETAL: no clubbing or cyanosis of digits; no joint swelling or tenderness to palpation  PSYCH: A+O to person, place, and time; affect appropriate  NEURO: No gross deficits  SKIN:  Stage 2-3 sacral wound present   LE swelling present     LABS:                                                    8.2    16.34 )-----------( 443      ( 15 Channing 2024 06:09 )             27.0     01-15    137  |  104  |  15  ----------------------------<  112<H>  4.0   |  24  |  0.45<L>    Ca    10.3      15 Channing 2024 06:09  Phos  2.3     01-15  Mg     1.90     01-15    TPro  6.7  /  Alb  2.7<L>  /  TBili  <0.2  /  DBili  x   /  AST  11  /  ALT  7   /  AlkPhos  95  01-14        RADIOLOGY & ADDITIONAL TESTS:  Results Reviewed:   Imaging Personally Reviewed:  Electrocardiogram Personally Reviewed:    COORDINATION OF CARE:  Care Discussed with Consultants/Other Providers [Y/N]:  Prior or Outpatient Records Reviewed [Y/N]:   Jose Floyd  Hospitalist  Pager- [phone]    PROGRESS NOTE:     Patient is a 73y old  Female who presents with a chief complaint of dislodged nephrostomy tube (11 Jan 2024 02:53)      SUBJECTIVE / OVERNIGHT EVENTS: Pt seen and examined by me.   c/o Reports chronic abdominal pain on touch or movement., No pain at rest. pt ordered for Oxy PRN but hasn't taken it yet.   Advised pt to take as needed but she states she doesn't like to take meds  Had BM yday . Has not ambulated much. States that since the past few months, she has not been able to walk much     ADDITIONAL REVIEW OF SYSTEMS:    MEDICATIONS  (STANDING):  apixaban 5 milliGRAM(s) Oral two times a day  cholecalciferol 2000 Unit(s) Oral daily  influenza  Vaccine (HIGH DOSE) 0.7 milliLiter(s) IntraMuscular once  piperacillin/tazobactam IVPB.. 3.375 Gram(s) IV Intermittent every 8 hours  sodium chloride 0.9%. 1000 milliLiter(s) (100 mL/Hr) IV Continuous <Continuous>  sodium chloride 0.9%. 1000 milliLiter(s) (75 mL/Hr) IV Continuous <Continuous>  sodium phosphate 15 milliMole(s)/250 mL IVPB 15 milliMole(s) IV Intermittent once    MEDICATIONS  (PRN):  acetaminophen     Tablet .. 650 milliGRAM(s) Oral every 6 hours PRN Mild Pain (1 - 3)  oxyCODONE    IR 5 milliGRAM(s) Oral every 4 hours PRN Moderate Pain (4 - 6)  polyethylene glycol 3350 17 Gram(s) Oral at bedtime PRN Constipation      Vital Signs Last 24 Hrs  T(C): 36.7 (15 Channing 2024 05:53), Max: 36.8 (14 Jan 2024 15:30)  T(F): 98 (15 Channing 2024 05:53), Max: 98.2 (14 Jan 2024 15:30)  HR: 86 (15 Channing 2024 05:53) (86 - 91)  BP: 122/76 (15 Channing 2024 05:53) (118/77 - 124/82)  BP(mean): --  RR: 18 (15 Channing 2024 05:53) (16 - 18)  SpO2: 100% (15 Channing 2024 05:53) (98% - 100%)    Parameters below as of 15 Channing 2024 05:53  Patient On (Oxygen Delivery Method): room air      PHYSICAL EXAM:    CONSTITUTIONAL: NAD, well-developed  RESPIRATORY: Normal respiratory effort; lungs are clear to auscultation bilaterally  CARDIOVASCULAR: Regular rate and rhythm, normal S1 and S2, no murmur/rub/gallop; No lower extremity edema; Peripheral pulses are 2+ bilaterally  ABDOMEN: Midly distended, tender  normoactive bowel sounds, no rebound/guarding; No hepatosplenomegaly. Bilateral  Nephrostomy tube present  MUSCLOSKELETAL: no clubbing or cyanosis of digits; no joint swelling or tenderness to palpation  PSYCH: A+O to person, place, and time; affect appropriate  NEURO: No gross deficits  SKIN:  Stage 2-3 sacral wound present   LE swelling present     LABS:                                                    8.2    16.34 )-----------( 443      ( 15 Channing 2024 06:09 )             27.0     01-15    137  |  104  |  15  ----------------------------<  112<H>  4.0   |  24  |  0.45<L>    Ca    10.3      15 Channing 2024 06:09  Phos  2.3     01-15  Mg     1.90     01-15    TPro  6.7  /  Alb  2.7<L>  /  TBili  <0.2  /  DBili  x   /  AST  11  /  ALT  7   /  AlkPhos  95  01-14        RADIOLOGY & ADDITIONAL TESTS:  Results Reviewed:   Imaging Personally Reviewed:  Electrocardiogram Personally Reviewed:    COORDINATION OF CARE:  Care Discussed with Consultants/Other Providers [Y/N]:  Prior or Outpatient Records Reviewed [Y/N]:

## 2024-01-15 NOTE — PROGRESS NOTE ADULT - PROBLEM SELECTOR PLAN 4
UA- moderate LE. Blood culturex2- NGTD. UC not sent   Recently treated for UTI at [facility]  Reviewed charts- Pt with chronically elevated WBC, could be from underlying CA  On empiric treatment with IV Zosyn for now. Anticipate 5-7 day course  Continue to monitor fever/WBC curve UA- moderate LE. Blood culturex2- NGTD. UC not sent   Recently treated for UTI at [facility]  Reviewed charts- Pt with chronically elevated WBC, could be from underlying CA  On empiric treatment with IV Zosyn for now. Anticipate 5-7 day course  Continue to monitor fever/WBC curve UA- moderate LE. Blood culturex2- NGTD. UC not sent   Recently treated for UTI at [facility]  Reviewed charts- Pt with chronically elevated WBC, could be from underlying CA  On empiric treatment with IV Zosyn for now. Anticipate 5-7 day course  Continue to monitor fever/WBC curve UA- moderate LE. Blood culturex2- NGTD. UC not sent   Recently treated for UTI at [facility]  Reviewed charts- Pt with chronically elevated WBC, could be from underlying CA  On admission,pt was started on  empiric treatment with IV Zosyn, receieved ~ 5 day course  Will dc as pt afebrile, BC -NGTD and leukocytosis appears chronic UA- moderate LE. Blood culturex2- NGTD. UC not sent   Recently treated for UTI at [facility]  Reviewed charts- Pt with chronically elevated WBC, could be from underlying CA  On admission,pt was started on  empiric treatment with IV Zosyn, receieved ~ 5 day course  Will dc as pt afebrile, BC -NGTD and leukocytosis appears chronic UA- moderate LE. Blood culturex2- NGTD. UC not sent   Recently treated for UTI at [facility]  Reviewed charts- Pt with chronically elevated WBC, could be from underlying CA  On admission,pt was started on  empiric treatment with IV Zosyn, receieved ~ 5 day course  Will dc as pt afebrile, BC -NGTD and leukocytosis appears chronic

## 2024-01-15 NOTE — PROGRESS NOTE ADULT - PROBLEM SELECTOR PLAN 2
Ca 10.7 from 8.6 , increased from 10.2-->10.6-->10.9, now improving to 10.3   Suspect dehydration vs malignancy   s/p IVF, no significant improvement with IVF    PTH- 24, Low normal. Vitamin D- 17.1  Continue to monitor BMP daily  FU  PTHrP  Endo consulted- f/u PTHrP, 1-25OH vitamin D, SPEP, serum immunofixation, UPEP, urine immunofixation, stop vitamin D for now until 1-25OH vitamin D results. c/w aggressive IVF as tolerated. Will consider IV bisphosphonate today given lack of response to IVF so far Ca 10.7 from 8.6 , increased from 10.2-->10.6-->10.9, now improving to 10.3   Suspect dehydration vs malignancy   On  IVF, though pt did not have significant improvement with IVF    PTH- 24, Low normal. Vitamin D- 17.1  Continue to monitor BMP daily  Endo consulted- f/u PTHrP, 1-25OH vitamin D, SPEP, serum immunofixation, UPEP, urine immunofixation,   s/p Zoledronic acid x1 on 1/14   Repeat Calcium improving to 10.3 this AM

## 2024-01-15 NOTE — PROGRESS NOTE ADULT - PROBLEM SELECTOR PLAN 11
Extensively discussed with patient -would like trial of intubation, would not want to be long-term on ventilator. Patient is ok with dialysis if needed. Patient would not like feeding tube if needed. Daughters are her healthcare proxy.  Updated daughter Curt 1/13, unable to reach on 1/14  [facility]

## 2024-01-15 NOTE — CONSULT NOTE ADULT - SUBJECTIVE AND OBJECTIVE BOX
General Surgery Consult  Consulting surgical team: C Team  Consulting attending: Dr. Munoz    HPI:  Ms. 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 She has a history of severe bilateral hydronephrosis due to bladder obstruction. The patient was admitted to the ICU at that time and was given HDx1. The patient then underwent TURBT with Dr. Bradford 10/12/2023. The patient reportedly recently has had an admission at [facility] from 12/26-01/10. The patient was admitted for UTI and blocked nephrostomy tubes and given antibiotics at that time. The rest of the admission at [facility] is unknown (no records and patient did not want night admitting team to call daughter overnight to not wake her up). When she returned home the patient slid out of her wheelchair and onto the floor. EMS noted bleeding from the left back with dislodgement of the nephrostomy tube. Denies any fever, chills, cough, chest congestion, diarrhea, nausea, vomiting, melena, hematochezia, shortness of breath, chest pain, back pain. The patient is currently wheelchair bound at this time. Per the patient she has a history of being on coumadin a few years ago initially for a left DVT and was on it for 6 months. She was recently started on eliquis with right DVT in October. She denies any recent hematuria or melena. She reports recently over last couple weeks being taken off of eliquis and was unable to explain why. Labs were sig for the following: WBC 16.35, H/H 8.9/29.2 (7.9/25.1-October 18, 2023). MCV 77.9, neutrophilic 79.3%, Na 132, Cl 97, BUN/Cr 19/0.49, , la 10.7, (previously 8.6), TP 7.3, Albumin 2.7, phos 2.3. CT abdomen, pelvis and chest showed thrombus in right common femoral vein extending into superficial vein (not seen on 03/2023), increase in hydronephrosis despite perc nephrostomy tubes and ureteral stent, prominent soft tissue in posterior bladder extending into ureters inseparable from vaginal cuff adjacent fat increased size of right external iliac LNs.  (11 Jan 2024 02:53)    Vascular surgery consulted 2/2 c/f DVT.       PAST MEDICAL HISTORY:  DVT (deep venous thrombosis)    Colon adenocarcinoma    Urothelial carcinoma of bladder    HLD (hyperlipidemia)        PAST SURGICAL HISTORY:  No significant past surgical history    S/P hysterectomy    Displacement of nephrostomy tube    History of transurethral resection of bladder tumor (TURBT)        MEDICATIONS:  acetaminophen     Tablet .. 650 milliGRAM(s) Oral every 6 hours PRN  apixaban 5 milliGRAM(s) Oral two times a day  cholecalciferol 2000 Unit(s) Oral daily  influenza  Vaccine (HIGH DOSE) 0.7 milliLiter(s) IntraMuscular once  oxyCODONE    IR 5 milliGRAM(s) Oral every 4 hours PRN  piperacillin/tazobactam IVPB.. 3.375 Gram(s) IV Intermittent every 8 hours  polyethylene glycol 3350 17 Gram(s) Oral at bedtime PRN  sodium chloride 0.9%. 1000 milliLiter(s) IV Continuous <Continuous>  sodium chloride 0.9%. 1000 milliLiter(s) IV Continuous <Continuous>  sodium phosphate 15 milliMole(s)/250 mL IVPB 15 milliMole(s) IV Intermittent once      ALLERGIES:  No Known Allergies      VITALS & I/Os:  Vital Signs Last 24 Hrs  T(C): 36.7 (15 Channing 2024 05:53), Max: 36.8 (14 Jan 2024 15:30)  T(F): 98 (15 Channing 2024 05:53), Max: 98.2 (14 Jan 2024 15:30)  HR: 86 (15 Channing 2024 05:53) (86 - 91)  BP: 122/76 (15 Channing 2024 05:53) (118/77 - 124/82)  BP(mean): --  RR: 18 (15 Channing 2024 05:53) (16 - 18)  SpO2: 100% (15 Channing 2024 05:53) (98% - 100%)    Parameters below as of 15 Channing 2024 05:53  Patient On (Oxygen Delivery Method): room air        I&O's Summary    14 Jan 2024 07:01  -  15 Channing 2024 07:00  --------------------------------------------------------  IN: 1380 mL / OUT: 1170 mL / NET: 210 mL        PHYSICAL EXAM:      LABS:                        8.2    16.34 )-----------( 443      ( 15 Channing 2024 06:09 )             27.0     01-15    137  |  104  |  15  ----------------------------<  112<H>  4.0   |  24  |  0.45<L>    Ca    10.3      15 Channing 2024 06:09  Phos  2.3     01-15  Mg     1.90     01-15    TPro  6.7  /  Alb  2.7<L>  /  TBili  <0.2  /  DBili  x   /  AST  11  /  ALT  7   /  AlkPhos  95  01-14    Lactate:              Urinalysis Basic - ( 15 Channing 2024 06:09 )    Color: x / Appearance: x / SG: x / pH: x  Gluc: 112 mg/dL / Ketone: x  / Bili: x / Urobili: x   Blood: x / Protein: x / Nitrite: x   Leuk Esterase: x / RBC: x / WBC x   Sq Epi: x / Non Sq Epi: x / Bacteria: x        IMAGING:                                                                                               General Surgery Consult  Consulting surgical team: C Team  Consulting attending: Dr. Munoz    HPI:  Ms. 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 She has a history of severe bilateral hydronephrosis due to bladder obstruction. The patient was admitted to the ICU at that time and was given HDx1. The patient then underwent TURBT with Dr. Bradford 10/12/2023. The patient reportedly recently has had an admission at [facility] from 12/26-01/10. The patient was admitted for UTI and blocked nephrostomy tubes and given antibiotics at that time. The rest of the admission at [facility] is unknown (no records and patient did not want night admitting team to call daughter overnight to not wake her up). When she returned home the patient slid out of her wheelchair and onto the floor. EMS noted bleeding from the left back with dislodgement of the nephrostomy tube. Denies any fever, chills, cough, chest congestion, diarrhea, nausea, vomiting, melena, hematochezia, shortness of breath, chest pain, back pain. The patient is currently wheelchair bound at this time. Per the patient she has a history of being on coumadin a few years ago initially for a left DVT and was on it for 6 months. She was recently started on eliquis with right DVT in October. She denies any recent hematuria or melena. She reports recently over last couple weeks being taken off of eliquis and was unable to explain why. Labs were sig for the following: WBC 16.35, H/H 8.9/29.2 (7.9/25.1-October 18, 2023). MCV 77.9, neutrophilic 79.3%, Na 132, Cl 97, BUN/Cr 19/0.49, , la 10.7, (previously 8.6), TP 7.3, Albumin 2.7, phos 2.3. CT abdomen, pelvis and chest showed thrombus in right common femoral vein extending into superficial vein (not seen on 03/2023), increase in hydronephrosis despite perc nephrostomy tubes and ureteral stent, prominent soft tissue in posterior bladder extending into ureters inseparable from vaginal cuff adjacent fat increased size of right external iliac LNs.  (11 Jan 2024 02:53)    Vascular surgery consulted 2/2 c/f DVT.       PAST MEDICAL HISTORY:  DVT (deep venous thrombosis)    Colon adenocarcinoma    Urothelial carcinoma of bladder    HLD (hyperlipidemia)        PAST SURGICAL HISTORY:  No significant past surgical history    S/P hysterectomy    Displacement of nephrostomy tube    History of transurethral resection of bladder tumor (TURBT)        MEDICATIONS:  acetaminophen     Tablet .. 650 milliGRAM(s) Oral every 6 hours PRN  apixaban 5 milliGRAM(s) Oral two times a day  cholecalciferol 2000 Unit(s) Oral daily  influenza  Vaccine (HIGH DOSE) 0.7 milliLiter(s) IntraMuscular once  oxyCODONE    IR 5 milliGRAM(s) Oral every 4 hours PRN  piperacillin/tazobactam IVPB.. 3.375 Gram(s) IV Intermittent every 8 hours  polyethylene glycol 3350 17 Gram(s) Oral at bedtime PRN  sodium chloride 0.9%. 1000 milliLiter(s) IV Continuous <Continuous>  sodium chloride 0.9%. 1000 milliLiter(s) IV Continuous <Continuous>  sodium phosphate 15 milliMole(s)/250 mL IVPB 15 milliMole(s) IV Intermittent once      ALLERGIES:  No Known Allergies      VITALS & I/Os:  Vital Signs Last 24 Hrs  T(C): 36.7 (15 Channing 2024 05:53), Max: 36.8 (14 Jan 2024 15:30)  T(F): 98 (15 Channing 2024 05:53), Max: 98.2 (14 Jan 2024 15:30)  HR: 86 (15 Channing 2024 05:53) (86 - 91)  BP: 122/76 (15 Channing 2024 05:53) (118/77 - 124/82)  BP(mean): --  RR: 18 (15 Channing 2024 05:53) (16 - 18)  SpO2: 100% (15 Channing 2024 05:53) (98% - 100%)    Parameters below as of 15 Channing 2024 05:53  Patient On (Oxygen Delivery Method): room air        I&O's Summary    14 Jan 2024 07:01  -  15 Channing 2024 07:00  --------------------------------------------------------  IN: 1380 mL / OUT: 1170 mL / NET: 210 mL        PHYSICAL EXAM:      LABS:                        8.2    16.34 )-----------( 443      ( 15 Channing 2024 06:09 )             27.0     01-15    137  |  104  |  15  ----------------------------<  112<H>  4.0   |  24  |  0.45<L>    Ca    10.3      15 Channing 2024 06:09  Phos  2.3     01-15  Mg     1.90     01-15    TPro  6.7  /  Alb  2.7<L>  /  TBili  <0.2  /  DBili  x   /  AST  11  /  ALT  7   /  AlkPhos  95  01-14    Lactate:              Urinalysis Basic - ( 15 Channing 2024 06:09 )    Color: x / Appearance: x / SG: x / pH: x  Gluc: 112 mg/dL / Ketone: x  / Bili: x / Urobili: x   Blood: x / Protein: x / Nitrite: x   Leuk Esterase: x / RBC: x / WBC x   Sq Epi: x / Non Sq Epi: x / Bacteria: x        IMAGING:                                                                                               General Surgery Consult  Consulting surgical team: C Team  Consulting attending: Dr. Munoz    HPI:  Ms. 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 She has a history of severe bilateral hydronephrosis due to bladder obstruction. The patient was admitted to the ICU at that time and was given HDx1. The patient then underwent TURBT with Dr. Bradford 10/12/2023. The patient reportedly recently has had an admission at [facility] from 12/26-01/10. The patient was admitted for UTI and blocked nephrostomy tubes and given antibiotics at that time. The rest of the admission at [facility] is unknown (no records and patient did not want night admitting team to call daughter overnight to not wake her up). When she returned home the patient slid out of her wheelchair and onto the floor. EMS noted bleeding from the left back with dislodgement of the nephrostomy tube. Denies any fever, chills, cough, chest congestion, diarrhea, nausea, vomiting, melena, hematochezia, shortness of breath, chest pain, back pain. The patient is currently wheelchair bound at this time. Per the patient she has a history of being on coumadin a few years ago initially for a left DVT and was on it for 6 months. She was recently started on eliquis with right DVT in October. She denies any recent hematuria or melena. She reports recently over last couple weeks being taken off of eliquis and was unable to explain why. Labs were sig for the following: WBC 16.35, H/H 8.9/29.2 (7.9/25.1-October 18, 2023). MCV 77.9, neutrophilic 79.3%, Na 132, Cl 97, BUN/Cr 19/0.49, , la 10.7, (previously 8.6), TP 7.3, Albumin 2.7, phos 2.3. CT abdomen, pelvis and chest showed thrombus in right common femoral vein extending into superficial vein (not seen on 03/2023), increase in hydronephrosis despite perc nephrostomy tubes and ureteral stent, prominent soft tissue in posterior bladder extending into ureters inseparable from vaginal cuff adjacent fat increased size of right external iliac LNs.  (11 Jan 2024 02:53)    Vascular surgery consulted 2/2 c/f DVT.       PAST MEDICAL HISTORY:  DVT (deep venous thrombosis)    Colon adenocarcinoma    Urothelial carcinoma of bladder    HLD (hyperlipidemia)        PAST SURGICAL HISTORY:  No significant past surgical history    S/P hysterectomy    Displacement of nephrostomy tube    History of transurethral resection of bladder tumor (TURBT)        MEDICATIONS:  acetaminophen     Tablet .. 650 milliGRAM(s) Oral every 6 hours PRN  apixaban 5 milliGRAM(s) Oral two times a day  cholecalciferol 2000 Unit(s) Oral daily  influenza  Vaccine (HIGH DOSE) 0.7 milliLiter(s) IntraMuscular once  oxyCODONE    IR 5 milliGRAM(s) Oral every 4 hours PRN  piperacillin/tazobactam IVPB.. 3.375 Gram(s) IV Intermittent every 8 hours  polyethylene glycol 3350 17 Gram(s) Oral at bedtime PRN  sodium chloride 0.9%. 1000 milliLiter(s) IV Continuous <Continuous>  sodium chloride 0.9%. 1000 milliLiter(s) IV Continuous <Continuous>  sodium phosphate 15 milliMole(s)/250 mL IVPB 15 milliMole(s) IV Intermittent once      ALLERGIES:  No Known Allergies      VITALS & I/Os:  Vital Signs Last 24 Hrs  T(C): 36.7 (15 Channing 2024 05:53), Max: 36.8 (14 Jan 2024 15:30)  T(F): 98 (15 Channing 2024 05:53), Max: 98.2 (14 Jan 2024 15:30)  HR: 86 (15 Channing 2024 05:53) (86 - 91)  BP: 122/76 (15 Channing 2024 05:53) (118/77 - 124/82)  BP(mean): --  RR: 18 (15 Channing 2024 05:53) (16 - 18)  SpO2: 100% (15 Channing 2024 05:53) (98% - 100%)    Parameters below as of 15 Channing 2024 05:53  Patient On (Oxygen Delivery Method): room air        I&O's Summary    14 Jan 2024 07:01  -  15 Channing 2024 07:00  --------------------------------------------------------  IN: 1380 mL / OUT: 1170 mL / NET: 210 mL        PHYSICAL EXAM:      LABS:                        8.2    16.34 )-----------( 443      ( 15 Channing 2024 06:09 )             27.0     01-15    137  |  104  |  15  ----------------------------<  112<H>  4.0   |  24  |  0.45<L>    Ca    10.3      15 Channing 2024 06:09  Phos  2.3     01-15  Mg     1.90     01-15    TPro  6.7  /  Alb  2.7<L>  /  TBili  <0.2  /  DBili  x   /  AST  11  /  ALT  7   /  AlkPhos  95  01-14    Lactate:              Urinalysis Basic - ( 15 Channing 2024 06:09 )    Color: x / Appearance: x / SG: x / pH: x  Gluc: 112 mg/dL / Ketone: x  / Bili: x / Urobili: x   Blood: x / Protein: x / Nitrite: x   Leuk Esterase: x / RBC: x / WBC x   Sq Epi: x / Non Sq Epi: x / Bacteria: x        IMAGING:                                                                                               General Surgery Consult  Consulting surgical team: C Team  Consulting attending: Dr. Munoz    HPI:  Ms. 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 She has a history of severe bilateral hydronephrosis due to bladder obstruction. The patient was admitted to the ICU at that time and was given HDx1. The patient then underwent TURBT with Dr. Bradford 10/12/2023. The patient reportedly recently has had an admission at [facility] from 12/26-01/10. The patient was admitted for UTI and blocked nephrostomy tubes and given antibiotics at that time. The rest of the admission at [facility] is unknown (no records and patient did not want night admitting team to call daughter overnight to not wake her up). When she returned home the patient slid out of her wheelchair and onto the floor. EMS noted bleeding from the left back with dislodgement of the nephrostomy tube. Denies any fever, chills, cough, chest congestion, diarrhea, nausea, vomiting, melena, hematochezia, shortness of breath, chest pain, back pain. The patient is currently wheelchair bound at this time. Per the patient she has a history of being on coumadin a few years ago initially for a left DVT and was on it for 6 months. She was recently started on eliquis with right DVT in October. She denies any recent hematuria or melena. She reports recently over last couple weeks being taken off of eliquis and was unable to explain why. Labs were sig for the following: WBC 16.35, H/H 8.9/29.2 (7.9/25.1-October 18, 2023). MCV 77.9, neutrophilic 79.3%, Na 132, Cl 97, BUN/Cr 19/0.49, , la 10.7, (previously 8.6), TP 7.3, Albumin 2.7, phos 2.3. CT abdomen, pelvis and chest showed thrombus in right common femoral vein extending into superficial vein (not seen on 03/2023), increase in hydronephrosis despite perc nephrostomy tubes and ureteral stent, prominent soft tissue in posterior bladder extending into ureters inseparable from vaginal cuff adjacent fat increased size of right external iliac LNs.  (11 Jan 2024 02:53)    Vascular surgery consulted 2/2 c/f DVT.       PAST MEDICAL HISTORY:  DVT (deep venous thrombosis)    Colon adenocarcinoma    Urothelial carcinoma of bladder    HLD (hyperlipidemia)        PAST SURGICAL HISTORY:  No significant past surgical history    S/P hysterectomy    Displacement of nephrostomy tube    History of transurethral resection of bladder tumor (TURBT)        MEDICATIONS:  acetaminophen     Tablet .. 650 milliGRAM(s) Oral every 6 hours PRN  apixaban 5 milliGRAM(s) Oral two times a day  cholecalciferol 2000 Unit(s) Oral daily  influenza  Vaccine (HIGH DOSE) 0.7 milliLiter(s) IntraMuscular once  oxyCODONE    IR 5 milliGRAM(s) Oral every 4 hours PRN  piperacillin/tazobactam IVPB.. 3.375 Gram(s) IV Intermittent every 8 hours  polyethylene glycol 3350 17 Gram(s) Oral at bedtime PRN  sodium chloride 0.9%. 1000 milliLiter(s) IV Continuous <Continuous>  sodium chloride 0.9%. 1000 milliLiter(s) IV Continuous <Continuous>  sodium phosphate 15 milliMole(s)/250 mL IVPB 15 milliMole(s) IV Intermittent once      ALLERGIES:  No Known Allergies      VITALS & I/Os:  Vital Signs Last 24 Hrs  T(C): 36.7 (15 Channing 2024 05:53), Max: 36.8 (14 Jan 2024 15:30)  T(F): 98 (15 Channing 2024 05:53), Max: 98.2 (14 Jan 2024 15:30)  HR: 86 (15 Channing 2024 05:53) (86 - 91)  BP: 122/76 (15 Channing 2024 05:53) (118/77 - 124/82)  BP(mean): --  RR: 18 (15 Channing 2024 05:53) (16 - 18)  SpO2: 100% (15 Channing 2024 05:53) (98% - 100%)    Parameters below as of 15 Channing 2024 05:53  Patient On (Oxygen Delivery Method): room air        I&O's Summary    14 Jan 2024 07:01  -  15 Channing 2024 07:00  --------------------------------------------------------  IN: 1380 mL / OUT: 1170 mL / NET: 210 mL        PHYSICAL EXAM:      LABS:                        8.2    16.34 )-----------( 443      ( 15 Channing 2024 06:09 )             27.0     01-15    137  |  104  |  15  ----------------------------<  112<H>  4.0   |  24  |  0.45<L>    Ca    10.3      15 Channing 2024 06:09  Phos  2.3     01-15  Mg     1.90     01-15    TPro  6.7  /  Alb  2.7<L>  /  TBili  <0.2  /  DBili  x   /  AST  11  /  ALT  7   /  AlkPhos  95  01-14    Lactate:              Urinalysis Basic - ( 15 Channing 2024 06:09 )    Color: x / Appearance: x / SG: x / pH: x  Gluc: 112 mg/dL / Ketone: x  / Bili: x / Urobili: x   Blood: x / Protein: x / Nitrite: x   Leuk Esterase: x / RBC: x / WBC x   Sq Epi: x / Non Sq Epi: x / Bacteria: x        IMAGING:                                                                                               General Surgery Consult  Consulting surgical team: C Team  Consulting attending: Dr. Munoz    HPI:  Ms. 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 She has a history of severe bilateral hydronephrosis due to bladder obstruction. The patient was admitted to the ICU at that time and was given HDx1. The patient then underwent TURBT with Dr. Bradford 10/12/2023. The patient reportedly recently has had an admission at [facility] from 12/26-01/10. The patient was admitted for UTI and blocked nephrostomy tubes and given antibiotics at that time. The rest of the admission at [facility] is unknown (no records and patient did not want night admitting team to call daughter overnight to not wake her up). When she returned home the patient slid out of her wheelchair and onto the floor. EMS noted bleeding from the left back with dislodgement of the nephrostomy tube. Denies any fever, chills, cough, chest congestion, diarrhea, nausea, vomiting, melena, hematochezia, shortness of breath, chest pain, back pain. The patient is currently wheelchair bound at this time. Per the patient she has a history of being on coumadin a few years ago initially for a left DVT and was on it for 6 months. She was recently started on eliquis with right DVT in October. She denies any recent hematuria or melena. She reports recently over last couple weeks being taken off of eliquis and was unable to explain why. Labs were sig for the following: WBC 16.35, H/H 8.9/29.2 (7.9/25.1-October 18, 2023). MCV 77.9, neutrophilic 79.3%, Na 132, Cl 97, BUN/Cr 19/0.49, , la 10.7, (previously 8.6), TP 7.3, Albumin 2.7, phos 2.3. CT abdomen, pelvis and chest showed thrombus in right common femoral vein extending into superficial vein (not seen on 03/2023), increase in hydronephrosis despite perc nephrostomy tubes and ureteral stent, prominent soft tissue in posterior bladder extending into ureters inseparable from vaginal cuff adjacent fat increased size of right external iliac LNs.  (11 Jan 2024 02:53)    Vascular surgery consulted 2/2 c/f DVT.       PAST MEDICAL HISTORY:  DVT (deep venous thrombosis)    Colon adenocarcinoma    Urothelial carcinoma of bladder    HLD (hyperlipidemia)        PAST SURGICAL HISTORY:  No significant past surgical history    S/P hysterectomy    Displacement of nephrostomy tube    History of transurethral resection of bladder tumor (TURBT)        MEDICATIONS:  acetaminophen     Tablet .. 650 milliGRAM(s) Oral every 6 hours PRN  apixaban 5 milliGRAM(s) Oral two times a day  cholecalciferol 2000 Unit(s) Oral daily  influenza  Vaccine (HIGH DOSE) 0.7 milliLiter(s) IntraMuscular once  oxyCODONE    IR 5 milliGRAM(s) Oral every 4 hours PRN  piperacillin/tazobactam IVPB.. 3.375 Gram(s) IV Intermittent every 8 hours  polyethylene glycol 3350 17 Gram(s) Oral at bedtime PRN  sodium chloride 0.9%. 1000 milliLiter(s) IV Continuous <Continuous>  sodium chloride 0.9%. 1000 milliLiter(s) IV Continuous <Continuous>  sodium phosphate 15 milliMole(s)/250 mL IVPB 15 milliMole(s) IV Intermittent once      ALLERGIES:  No Known Allergies      VITALS & I/Os:  Vital Signs Last 24 Hrs  T(C): 36.7 (15 Channing 2024 05:53), Max: 36.8 (14 Jan 2024 15:30)  T(F): 98 (15 Channing 2024 05:53), Max: 98.2 (14 Jan 2024 15:30)  HR: 86 (15 Channing 2024 05:53) (86 - 91)  BP: 122/76 (15 Channing 2024 05:53) (118/77 - 124/82)  BP(mean): --  RR: 18 (15 Channing 2024 05:53) (16 - 18)  SpO2: 100% (15 Channing 2024 05:53) (98% - 100%)    Parameters below as of 15 Channing 2024 05:53  Patient On (Oxygen Delivery Method): room air        I&O's Summary    14 Jan 2024 07:01  -  15 Channing 2024 07:00  --------------------------------------------------------  IN: 1380 mL / OUT: 1170 mL / NET: 210 mL        PHYSICAL EXAM:  General: not acutely distressed  Neurological: AO X4  Respiratory: nonlabored respirations  Cardiac: pulse present  Abdominal: soft, nontender, nondistended, no rebound, no guarding, no palpable mass  Extremities: warm. Sensation and motor function intact in b/l feet. No significant edema, no wounds noted  Vascular: palpable femoral and DP pulses bilaterally      LABS:                        8.2    16.34 )-----------( 443      ( 15 Channing 2024 06:09 )             27.0     01-15    137  |  104  |  15  ----------------------------<  112<H>  4.0   |  24  |  0.45<L>    Ca    10.3      15 Channing 2024 06:09  Phos  2.3     01-15  Mg     1.90     01-15    TPro  6.7  /  Alb  2.7<L>  /  TBili  <0.2  /  DBili  x   /  AST  11  /  ALT  7   /  AlkPhos  95  01-14    Lactate:              Urinalysis Basic - ( 15 Channing 2024 06:09 )    Color: x / Appearance: x / SG: x / pH: x  Gluc: 112 mg/dL / Ketone: x  / Bili: x / Urobili: x   Blood: x / Protein: x / Nitrite: x   Leuk Esterase: x / RBC: x / WBC x   Sq Epi: x / Non Sq Epi: x / Bacteria: x        IMAGING:                                                                                               General Surgery Consult  Consulting surgical team: C Team  Consulting attending: Dr. Munoz    HPI:  Ms. 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 She has a history of severe bilateral hydronephrosis due to bladder obstruction. The patient was admitted to the ICU at that time and was given HDx1. The patient then underwent TURBT with Dr. Bradford 10/12/2023. The patient reportedly recently has had an admission at [facility] from 12/26-01/10. The patient was admitted for UTI and blocked nephrostomy tubes and given antibiotics at that time. The rest of the admission at [facility] is unknown (no records and patient did not want night admitting team to call daughter overnight to not wake her up). When she returned home the patient slid out of her wheelchair and onto the floor. EMS noted bleeding from the left back with dislodgement of the nephrostomy tube. Denies any fever, chills, cough, chest congestion, diarrhea, nausea, vomiting, melena, hematochezia, shortness of breath, chest pain, back pain. The patient is currently wheelchair bound at this time. Per the patient she has a history of being on coumadin a few years ago initially for a left DVT and was on it for 6 months. She was recently started on eliquis with right DVT in October. She denies any recent hematuria or melena. She reports recently over last couple weeks being taken off of eliquis and was unable to explain why. Labs were sig for the following: WBC 16.35, H/H 8.9/29.2 (7.9/25.1-October 18, 2023). MCV 77.9, neutrophilic 79.3%, Na 132, Cl 97, BUN/Cr 19/0.49, , la 10.7, (previously 8.6), TP 7.3, Albumin 2.7, phos 2.3. CT abdomen, pelvis and chest showed thrombus in right common femoral vein extending into superficial vein (not seen on 03/2023), increase in hydronephrosis despite perc nephrostomy tubes and ureteral stent, prominent soft tissue in posterior bladder extending into ureters inseparable from vaginal cuff adjacent fat increased size of right external iliac LNs.  (11 Jan 2024 02:53)    Vascular surgery consulted 2/2 c/f DVT.       PAST MEDICAL HISTORY:  DVT (deep venous thrombosis)    Colon adenocarcinoma    Urothelial carcinoma of bladder    HLD (hyperlipidemia)        PAST SURGICAL HISTORY:  No significant past surgical history    S/P hysterectomy    Displacement of nephrostomy tube    History of transurethral resection of bladder tumor (TURBT)        MEDICATIONS:  acetaminophen     Tablet .. 650 milliGRAM(s) Oral every 6 hours PRN  apixaban 5 milliGRAM(s) Oral two times a day  cholecalciferol 2000 Unit(s) Oral daily  influenza  Vaccine (HIGH DOSE) 0.7 milliLiter(s) IntraMuscular once  oxyCODONE    IR 5 milliGRAM(s) Oral every 4 hours PRN  piperacillin/tazobactam IVPB.. 3.375 Gram(s) IV Intermittent every 8 hours  polyethylene glycol 3350 17 Gram(s) Oral at bedtime PRN  sodium chloride 0.9%. 1000 milliLiter(s) IV Continuous <Continuous>  sodium chloride 0.9%. 1000 milliLiter(s) IV Continuous <Continuous>  sodium phosphate 15 milliMole(s)/250 mL IVPB 15 milliMole(s) IV Intermittent once      ALLERGIES:  No Known Allergies      VITALS & I/Os:  Vital Signs Last 24 Hrs  T(C): 36.7 (15 Channing 2024 05:53), Max: 36.8 (14 Jan 2024 15:30)  T(F): 98 (15 Channing 2024 05:53), Max: 98.2 (14 Jan 2024 15:30)  HR: 86 (15 Channing 2024 05:53) (86 - 91)  BP: 122/76 (15 Channing 2024 05:53) (118/77 - 124/82)  BP(mean): --  RR: 18 (15 Channing 2024 05:53) (16 - 18)  SpO2: 100% (15 Channing 2024 05:53) (98% - 100%)    Parameters below as of 15 Channing 2024 05:53  Patient On (Oxygen Delivery Method): room air        I&O's Summary    14 Jan 2024 07:01  -  15 Channing 2024 07:00  --------------------------------------------------------  IN: 1380 mL / OUT: 1170 mL / NET: 210 mL        PHYSICAL EXAM:  General: not acutely distressed  Neurological: AO X4  Respiratory: nonlabored respirations  Cardiac: pulse present  Abdominal: soft, nontender, nondistended, no rebound, no guarding, no palpable mass  Extremities: warm. Sensation and motor function intact in b/l feet. No significant edema, no wounds noted  Vascular: palpable femoral and DP pulses bilaterally      LABS:                        8.2    16.34 )-----------( 443      ( 15 Channing 2024 06:09 )             27.0     01-15    137  |  104  |  15  ----------------------------<  112<H>  4.0   |  24  |  0.45<L>    Ca    10.3      15 Channing 2024 06:09  Phos  2.3     01-15  Mg     1.90     01-15    TPro  6.7  /  Alb  2.7<L>  /  TBili  <0.2  /  DBili  x   /  AST  11  /  ALT  7   /  AlkPhos  95  01-14    Lactate:              Urinalysis Basic - ( 15 Channing 2024 06:09 )    Color: x / Appearance: x / SG: x / pH: x  Gluc: 112 mg/dL / Ketone: x  / Bili: x / Urobili: x   Blood: x / Protein: x / Nitrite: x   Leuk Esterase: x / RBC: x / WBC x   Sq Epi: x / Non Sq Epi: x / Bacteria: x        IMAGING:                                                                                               General Surgery Consult  Consulting surgical team: C Team  Consulting attending: Dr. Munoz    HPI:  Ms. 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 She has a history of severe bilateral hydronephrosis due to bladder obstruction. The patient was admitted to the ICU at that time and was given HDx1. The patient then underwent TURBT with Dr. Bradford 10/12/2023. The patient reportedly recently has had an admission at [facility] from 12/26-01/10. The patient was admitted for UTI and blocked nephrostomy tubes and given antibiotics at that time. The rest of the admission at [facility] is unknown (no records and patient did not want night admitting team to call daughter overnight to not wake her up). When she returned home the patient slid out of her wheelchair and onto the floor. EMS noted bleeding from the left back with dislodgement of the nephrostomy tube. Denies any fever, chills, cough, chest congestion, diarrhea, nausea, vomiting, melena, hematochezia, shortness of breath, chest pain, back pain. The patient is currently wheelchair bound at this time. Per the patient she has a history of being on coumadin a few years ago initially for a left DVT and was on it for 6 months. She was recently started on eliquis with right DVT in October. She denies any recent hematuria or melena. She reports recently over last couple weeks being taken off of eliquis and was unable to explain why. Labs were sig for the following: WBC 16.35, H/H 8.9/29.2 (7.9/25.1-October 18, 2023). MCV 77.9, neutrophilic 79.3%, Na 132, Cl 97, BUN/Cr 19/0.49, , la 10.7, (previously 8.6), TP 7.3, Albumin 2.7, phos 2.3. CT abdomen, pelvis and chest showed thrombus in right common femoral vein extending into superficial vein (not seen on 03/2023), increase in hydronephrosis despite perc nephrostomy tubes and ureteral stent, prominent soft tissue in posterior bladder extending into ureters inseparable from vaginal cuff adjacent fat increased size of right external iliac LNs.  (11 Jan 2024 02:53)    Vascular surgery consulted 2/2 c/f DVT.       PAST MEDICAL HISTORY:  DVT (deep venous thrombosis)    Colon adenocarcinoma    Urothelial carcinoma of bladder    HLD (hyperlipidemia)        PAST SURGICAL HISTORY:  No significant past surgical history    S/P hysterectomy    Displacement of nephrostomy tube    History of transurethral resection of bladder tumor (TURBT)        MEDICATIONS:  acetaminophen     Tablet .. 650 milliGRAM(s) Oral every 6 hours PRN  apixaban 5 milliGRAM(s) Oral two times a day  cholecalciferol 2000 Unit(s) Oral daily  influenza  Vaccine (HIGH DOSE) 0.7 milliLiter(s) IntraMuscular once  oxyCODONE    IR 5 milliGRAM(s) Oral every 4 hours PRN  piperacillin/tazobactam IVPB.. 3.375 Gram(s) IV Intermittent every 8 hours  polyethylene glycol 3350 17 Gram(s) Oral at bedtime PRN  sodium chloride 0.9%. 1000 milliLiter(s) IV Continuous <Continuous>  sodium chloride 0.9%. 1000 milliLiter(s) IV Continuous <Continuous>  sodium phosphate 15 milliMole(s)/250 mL IVPB 15 milliMole(s) IV Intermittent once      ALLERGIES:  No Known Allergies      VITALS & I/Os:  Vital Signs Last 24 Hrs  T(C): 36.7 (15 Channing 2024 05:53), Max: 36.8 (14 Jan 2024 15:30)  T(F): 98 (15 Channing 2024 05:53), Max: 98.2 (14 Jan 2024 15:30)  HR: 86 (15 Channing 2024 05:53) (86 - 91)  BP: 122/76 (15 Channing 2024 05:53) (118/77 - 124/82)  BP(mean): --  RR: 18 (15 Channing 2024 05:53) (16 - 18)  SpO2: 100% (15 Channing 2024 05:53) (98% - 100%)    Parameters below as of 15 Channing 2024 05:53  Patient On (Oxygen Delivery Method): room air        I&O's Summary    14 Jan 2024 07:01  -  15 Channing 2024 07:00  --------------------------------------------------------  IN: 1380 mL / OUT: 1170 mL / NET: 210 mL        PHYSICAL EXAM:  General: not acutely distressed  Neurological: AO X4  Respiratory: nonlabored respirations  Cardiac: pulse present  Abdominal: soft, nontender, nondistended, no rebound, no guarding, no palpable mass  Extremities: warm. Sensation and motor function intact in b/l feet. No significant edema, no wounds noted  Vascular: palpable femoral and DP pulses bilaterally      LABS:                        8.2    16.34 )-----------( 443      ( 15 Channing 2024 06:09 )             27.0     01-15    137  |  104  |  15  ----------------------------<  112<H>  4.0   |  24  |  0.45<L>    Ca    10.3      15 Channing 2024 06:09  Phos  2.3     01-15  Mg     1.90     01-15    TPro  6.7  /  Alb  2.7<L>  /  TBili  <0.2  /  DBili  x   /  AST  11  /  ALT  7   /  AlkPhos  95  01-14    Lactate:              Urinalysis Basic - ( 15 Channing 2024 06:09 )    Color: x / Appearance: x / SG: x / pH: x  Gluc: 112 mg/dL / Ketone: x  / Bili: x / Urobili: x   Blood: x / Protein: x / Nitrite: x   Leuk Esterase: x / RBC: x / WBC x   Sq Epi: x / Non Sq Epi: x / Bacteria: x        IMAGING:                                                                                               General Surgery Consult  Consulting surgical team: C Team  Consulting attending: Dr. Munoz    HPI:  Ms. 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 She has a history of severe bilateral hydronephrosis due to bladder obstruction. The patient was admitted to the ICU at that time and was given HDx1. The patient then underwent TURBT with Dr. Bradford 10/12/2023. The patient reportedly recently has had an admission at [facility] from 12/26-01/10. The patient was admitted for UTI and blocked nephrostomy tubes and given antibiotics at that time. The rest of the admission at [facility] is unknown (no records and patient did not want night admitting team to call daughter overnight to not wake her up). When she returned home the patient slid out of her wheelchair and onto the floor. EMS noted bleeding from the left back with dislodgement of the nephrostomy tube. Denies any fever, chills, cough, chest congestion, diarrhea, nausea, vomiting, melena, hematochezia, shortness of breath, chest pain, back pain. The patient is currently wheelchair bound at this time. Per the patient she has a history of being on coumadin a few years ago initially for a left DVT and was on it for 6 months. She was recently started on eliquis with right DVT in October. She denies any recent hematuria or melena. She reports recently over last couple weeks being taken off of eliquis and was unable to explain why. Labs were sig for the following: WBC 16.35, H/H 8.9/29.2 (7.9/25.1-October 18, 2023). MCV 77.9, neutrophilic 79.3%, Na 132, Cl 97, BUN/Cr 19/0.49, , la 10.7, (previously 8.6), TP 7.3, Albumin 2.7, phos 2.3. CT abdomen, pelvis and chest showed thrombus in right common femoral vein extending into superficial vein (not seen on 03/2023), increase in hydronephrosis despite perc nephrostomy tubes and ureteral stent, prominent soft tissue in posterior bladder extending into ureters inseparable from vaginal cuff adjacent fat increased size of right external iliac LNs.  (11 Jan 2024 02:53)    Vascular surgery consulted 2/2 c/f DVT.       PAST MEDICAL HISTORY:  DVT (deep venous thrombosis)    Colon adenocarcinoma    Urothelial carcinoma of bladder    HLD (hyperlipidemia)        PAST SURGICAL HISTORY:  No significant past surgical history    S/P hysterectomy    Displacement of nephrostomy tube    History of transurethral resection of bladder tumor (TURBT)        MEDICATIONS:  acetaminophen     Tablet .. 650 milliGRAM(s) Oral every 6 hours PRN  apixaban 5 milliGRAM(s) Oral two times a day  cholecalciferol 2000 Unit(s) Oral daily  influenza  Vaccine (HIGH DOSE) 0.7 milliLiter(s) IntraMuscular once  oxyCODONE    IR 5 milliGRAM(s) Oral every 4 hours PRN  piperacillin/tazobactam IVPB.. 3.375 Gram(s) IV Intermittent every 8 hours  polyethylene glycol 3350 17 Gram(s) Oral at bedtime PRN  sodium chloride 0.9%. 1000 milliLiter(s) IV Continuous <Continuous>  sodium chloride 0.9%. 1000 milliLiter(s) IV Continuous <Continuous>  sodium phosphate 15 milliMole(s)/250 mL IVPB 15 milliMole(s) IV Intermittent once      ALLERGIES:  No Known Allergies      VITALS & I/Os:  Vital Signs Last 24 Hrs  T(C): 36.7 (15 Channing 2024 05:53), Max: 36.8 (14 Jan 2024 15:30)  T(F): 98 (15 Channing 2024 05:53), Max: 98.2 (14 Jan 2024 15:30)  HR: 86 (15 Channing 2024 05:53) (86 - 91)  BP: 122/76 (15 Channing 2024 05:53) (118/77 - 124/82)  BP(mean): --  RR: 18 (15 Channing 2024 05:53) (16 - 18)  SpO2: 100% (15 Channing 2024 05:53) (98% - 100%)    Parameters below as of 15 Channing 2024 05:53  Patient On (Oxygen Delivery Method): room air        I&O's Summary    14 Jan 2024 07:01  -  15 Channing 2024 07:00  --------------------------------------------------------  IN: 1380 mL / OUT: 1170 mL / NET: 210 mL        PHYSICAL EXAM:  General: not acutely distressed  Neurological: AO X4  Respiratory: nonlabored respirations  Cardiac: pulse present  Abdominal: soft, nontender, nondistended, no rebound, no guarding, no palpable mass  Extremities: warm. Sensation and motor function intact in b/l feet. No significant edema, no wounds noted  Vascular: palpable femoral and DP pulses bilaterally      LABS:                        8.2    16.34 )-----------( 443      ( 15 Channing 2024 06:09 )             27.0     01-15    137  |  104  |  15  ----------------------------<  112<H>  4.0   |  24  |  0.45<L>    Ca    10.3      15 Channing 2024 06:09  Phos  2.3     01-15  Mg     1.90     01-15    TPro  6.7  /  Alb  2.7<L>  /  TBili  <0.2  /  DBili  x   /  AST  11  /  ALT  7   /  AlkPhos  95  01-14    Lactate:              Urinalysis Basic - ( 15 Channing 2024 06:09 )    Color: x / Appearance: x / SG: x / pH: x  Gluc: 112 mg/dL / Ketone: x  / Bili: x / Urobili: x   Blood: x / Protein: x / Nitrite: x   Leuk Esterase: x / RBC: x / WBC x   Sq Epi: x / Non Sq Epi: x / Bacteria: x        IMAGING:

## 2024-01-16 NOTE — PROGRESS NOTE ADULT - PROBLEM SELECTOR PLAN 8
Stage II-III sacral ulcer on admission > As per pt, she developed it in [facility]   Nutrition consult   FU Wound care-nursing and surgery consult placed on 1/12- Not seen yet.  Turn every 4 hours, OOB to chair as tolerated   Continue to monitor

## 2024-01-16 NOTE — PROGRESS NOTE ADULT - PROBLEM SELECTOR PLAN 2
Ca 10.7 on admission likely 2.2 malignancy. now normalized   s/p IVF  s/p Zoledronic acid x1 on 1/14

## 2024-01-16 NOTE — PROGRESS NOTE ADULT - PROBLEM SELECTOR PLAN 4
UA- moderate LE. Blood culturex2- NGTD. UC not sent   Recently treated for UTI at [facility]  Reviewed charts- Pt with chronically elevated WBC, could be from underlying CA  On admission,pt was started on  empiric treatment with IV Zosyn, receieved ~ 5 day course  Will dc as pt afebrile, BC -NGTD and leukocytosis appears chronic

## 2024-01-16 NOTE — PROGRESS NOTE ADULT - PROBLEM SELECTOR PLAN 1
Severe bilateral hydronephrosis with b/l outlet obstruction s/p bilateral nephrostomy tube placement 10/31, with exchange/replacement recently at [facility] due to blocked nephrostomy tube   Right nephrostomy tube in place, left nephrostomy tube displaced/dislodged  CT abdomen, pelvis and chest 11/23 showed thrombus in right common femoral vein extending into superficial vein (not seen on 03/2023), increase in hydronephrosis despite perc nephrostomy tubes and ureteral stent, prominent soft tissue in posterior bladder extending into ureters inseparable from vaginal cuff adjacent fat increased size of right external iliac LNs.  BUN/Creatinine stable  s/p L NT replaced on 1/11 - Flush with 5 cc NS qd  Continue to monitor

## 2024-01-16 NOTE — PROGRESS NOTE ADULT - PROBLEM SELECTOR PLAN 11
Extensively discussed with patient -would like trial of intubation, would not want to be long-term on ventilator. Patient is ok with dialysis if needed. Patient would not like feeding tube if needed. Daughters are her healthcare proxy.  Updated daughter Curt 1/16   [facility]

## 2024-01-16 NOTE — PROGRESS NOTE ADULT - SUBJECTIVE AND OBJECTIVE BOX
Admitted for: Displacement of nephrostomy catheter, initial encounter        Following for:    Subjective:       MEDICATIONS  (STANDING):  apixaban 5 milliGRAM(s) Oral two times a day  cholecalciferol 2000 Unit(s) Oral daily  influenza  Vaccine (HIGH DOSE) 0.7 milliLiter(s) IntraMuscular once  sodium chloride 0.9%. 1000 milliLiter(s) (75 mL/Hr) IV Continuous <Continuous>  sodium chloride 0.9%. 1000 milliLiter(s) (100 mL/Hr) IV Continuous <Continuous>  sodium phosphate 15 milliMole(s)/250 mL IVPB 15 milliMole(s) IV Intermittent once    MEDICATIONS  (PRN):  acetaminophen     Tablet .. 650 milliGRAM(s) Oral every 6 hours PRN Mild Pain (1 - 3)  oxyCODONE    IR 5 milliGRAM(s) Oral every 4 hours PRN Moderate Pain (4 - 6)  polyethylene glycol 3350 17 Gram(s) Oral at bedtime PRN Constipation      Allergies    No Known Allergies    Intolerances          PHYSICAL EXAM:  VITALS: T(C): 36.7 (01-15-24 @ 23:23)  T(F): 98 (01-15-24 @ 23:23), Max: 98 (01-15-24 @ 23:23)  HR: 93 (01-15-24 @ 23:23) (93 - 93)  BP: 121/74 (01-15-24 @ 23:23) (121/74 - 121/74)  RR:  (18 - 18)  SpO2:  (98% - 98%)  Wt(kg): --  GENERAL: NAD  EYES: No proptosis, no lid lag, anicteric  RESPIRATORY: Clear to auscultation bilaterally  CARDIOVASCULAR: Regular rate and rhythm  GI: Soft, nontender, non distended  EXT: b/l feet without wounds, 2+ pulses  PSYCH: Alert and oriented x 3, reactive affect      A1C with Estimated Average Glucose Result: 5.4 % (01-11-24 @ 06:54)  A1C with Estimated Average Glucose Result: 5.9 % (10-08-23 @ 03:45)          01-16    138  |  104  |  14  ----------------------------<  105<H>  4.1   |  24  |  0.39<L>    eGFR: 104    Ca    9.6      01-16  Mg     1.70     01-16  Phos  1.7     01-16    TPro  6.7  /  Alb  2.7<L>  /  TBili  <0.2  /  DBili  x   /  AST  11  /  ALT  7   /  AlkPhos  95  01-14      Thyroid Function Tests:  01-14 @ 06:23 TSH 1.27 FreeT4 -- T3 -- Anti TPO -- Anti Thyroglobulin Ab -- TSI --                         Admitted for: Displacement of nephrostomy catheter, initial encounter        Following for: hypercalcemia      Subjective:   - reports dysuria. denies n/v/abd pain, confusion, muscle pain      MEDICATIONS  (STANDING):  apixaban 5 milliGRAM(s) Oral two times a day  cholecalciferol 2000 Unit(s) Oral daily  influenza  Vaccine (HIGH DOSE) 0.7 milliLiter(s) IntraMuscular once  sodium chloride 0.9%. 1000 milliLiter(s) (75 mL/Hr) IV Continuous <Continuous>  sodium chloride 0.9%. 1000 milliLiter(s) (100 mL/Hr) IV Continuous <Continuous>  sodium phosphate 15 milliMole(s)/250 mL IVPB 15 milliMole(s) IV Intermittent once    MEDICATIONS  (PRN):  acetaminophen     Tablet .. 650 milliGRAM(s) Oral every 6 hours PRN Mild Pain (1 - 3)  oxyCODONE    IR 5 milliGRAM(s) Oral every 4 hours PRN Moderate Pain (4 - 6)  polyethylene glycol 3350 17 Gram(s) Oral at bedtime PRN Constipation      Allergies    No Known Allergies    Intolerances          PHYSICAL EXAM:  VITALS: T(C): 36.7 (01-15-24 @ 23:23)  T(F): 98 (01-15-24 @ 23:23), Max: 98 (01-15-24 @ 23:23)  HR: 93 (01-15-24 @ 23:23) (93 - 93)  BP: 121/74 (01-15-24 @ 23:23) (121/74 - 121/74)  RR:  (18 - 18)  SpO2:  (98% - 98%)  Wt(kg): --  GENERAL: NAD, well-groomed, well-developed  EYES: No proptosis, no lid lag, anicteric  HEENT:  Atraumatic, Normocephalic, moist mucous membranes  RESPIRATORY: Normal respiratory effort; no audible wheezing  CARDIOVASCULAR: Regular rate and rhythm; No murmurs; no peripheral edema  GI: mild distended  MUSCULOSKELETAL: YUSUF  NEURO: no tremor  PSYCH: Alert and oriented x 3, normal affect, normal mood  CUSHING'S SIGNS: no striae        A1C with Estimated Average Glucose Result: 5.4 % (01-11-24 @ 06:54)  A1C with Estimated Average Glucose Result: 5.9 % (10-08-23 @ 03:45)          01-16    138  |  104  |  14  ----------------------------<  105<H>  4.1   |  24  |  0.39<L>    eGFR: 104    Ca    9.6      01-16  Mg     1.70     01-16  Phos  1.7     01-16    TPro  6.7  /  Alb  2.7<L>  /  TBili  <0.2  /  DBili  x   /  AST  11  /  ALT  7   /  AlkPhos  95  01-14      Thyroid Function Tests:  01-14 @ 06:23 TSH 1.27 FreeT4 -- T3 -- Anti TPO -- Anti Thyroglobulin Ab -- TSI --

## 2024-01-16 NOTE — CHART NOTE - NSCHARTNOTEFT_GEN_A_CORE
Noted bright red vaginal bleeding with clots. Per daughter, patient was unable to tolerate Eliquis in the past for the same reason. Will STOP Eliquis at this time and discuss with daughter given new acute occlusive DVT. IVC candidate? Will discuss with IR in the morning. Will repeat CBC in AM. Additionally, flushed left nephrostomy tube. Unable to uncap R NT. Will continue to attempt. K 6.1 on admission   s/p hyperkalemia cocktail and lokelma  - Monitor potassium  RESOLVED

## 2024-01-16 NOTE — PROGRESS NOTE ADULT - ASSESSMENT
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

## 2024-01-16 NOTE — PROGRESS NOTE ADULT - SUBJECTIVE AND OBJECTIVE BOX
[facility]  Haseeb Edwards MD  Pager [phone]      Patient is a 73y old  Female who presents with a chief complaint of dislodged nephrostomy tube (16 Jan 2024 12:23)      SUBJECTIVE / OVERNIGHT EVENTS:    pt was noted to have vaginal bleeding with blood clots this pm. eliquis on hold     ADDITIONAL REVIEW OF SYSTEMS:    RESPIRATORY: No cough, wheezing, chills or hemoptysis; No shortness of breath  CARDIOVASCULAR: No chest pain, palpitations, dizziness, or leg swelling  GASTROINTESTINAL: No abdominal or epigastric pain. No nausea, vomiting, or hematemesis; No diarrhea or constipation. No melena or hematochezia.      MEDICATIONS  (STANDING):  cholecalciferol 2000 Unit(s) Oral daily  influenza  Vaccine (HIGH DOSE) 0.7 milliLiter(s) IntraMuscular once  sodium chloride 0.9%. 1000 milliLiter(s) (75 mL/Hr) IV Continuous <Continuous>  sodium chloride 0.9%. 1000 milliLiter(s) (100 mL/Hr) IV Continuous <Continuous>  sodium phosphate 15 milliMole(s)/250 mL IVPB 15 milliMole(s) IV Intermittent once    MEDICATIONS  (PRN):  acetaminophen     Tablet .. 650 milliGRAM(s) Oral every 6 hours PRN Mild Pain (1 - 3)  oxyCODONE    IR 5 milliGRAM(s) Oral every 4 hours PRN Moderate Pain (4 - 6)  polyethylene glycol 3350 17 Gram(s) Oral at bedtime PRN Constipation      CAPILLARY BLOOD GLUCOSE        I&O's Summary    15 Channing 2024 07:01  -  16 Jan 2024 07:00  --------------------------------------------------------  IN: 240 mL / OUT: 2435 mL / NET: -2195 mL    16 Jan 2024 07:01  -  16 Jan 2024 17:34  --------------------------------------------------------  IN: 200 mL / OUT: 350 mL / NET: -150 mL        PHYSICAL EXAM:  Vital Signs Last 24 Hrs  T(C): 36.9 (16 Jan 2024 12:00), Max: 36.9 (16 Jan 2024 12:00)  T(F): 98.5 (16 Jan 2024 12:00), Max: 98.5 (16 Jan 2024 12:00)  HR: 99 (16 Jan 2024 12:00) (93 - 99)  BP: 113/70 (16 Jan 2024 12:00) (113/70 - 121/74)  BP(mean): --  RR: 18 (16 Jan 2024 12:00) (18 - 18)  SpO2: 100% (16 Jan 2024 12:00) (98% - 100%)    Parameters below as of 16 Jan 2024 12:00  Patient On (Oxygen Delivery Method): room air        CONSTITUTIONAL: NAD,  EYES: PERRLA; conjunctiva and sclera clear  ENMT: Moist oral mucosa, no pharyngeal injection or exudates;   NECK: Supple, no palpable masses;  RESPIRATORY: Normal respiratory effort; lungs are clear to auscultation bilaterally  CARDIOVASCULAR: Regular rate and rhythm, normal S1 and S2, no murmur/rub/gallop; b/l 2+  lower extremity edema; Peripheral pulses are 2+ bilaterally  ABDOMEN: Nontender to palpation, normoactive bowel sounds, no rebound/guarding;   MUSCLOSKELETAL:   no clubbing or cyanosis of digits; no joint swelling or tenderness to palpation  PSYCH: A+O to person, place;  affect appropriate  NEUROLOGY: CN 2-12 are intact and symmetric; no gross sensory deficits;   SKIN: No rashes;     LABS:                        8.0    13.04 )-----------( 434      ( 16 Jan 2024 06:23 )             26.6     01-16    138  |  104  |  14  ----------------------------<  105<H>  4.1   |  24  |  0.39<L>    Ca    9.6      16 Jan 2024 06:23  Phos  1.7     01-16  Mg     1.70     01-16    TPro  6.7  /  Alb  2.7<L>  /  TBili  <0.2  /  DBili  x   /  AST  11  /  ALT  7   /  AlkPhos  95  01-14          Urinalysis Basic - ( 16 Jan 2024 06:23 )    Color: x / Appearance: x / SG: x / pH: x  Gluc: 105 mg/dL / Ketone: x  / Bili: x / Urobili: x   Blood: x / Protein: x / Nitrite: x   Leuk Esterase: x / RBC: x / WBC x   Sq Epi: x / Non Sq Epi: x / Bacteria: x          RADIOLOGY & ADDITIONAL TESTS:  Results Reviewed:   Imaging Personally Reviewed:  Electrocardiogram Personally Reviewed:    COORDINATION OF CARE:  Care Discussed with Consultants/Other Providers [Y/N]:  Prior or Outpatient Records Reviewed [Y/N]:

## 2024-01-16 NOTE — PROGRESS NOTE ADULT - PROBLEM SELECTOR PLAN 3
Extensive bilateral recurrent DVT, hypercoagulable state likely from malignancy   Dopplers performed on 1/14 showing right and left Femoral Vein: acute occlusive DVT. Right Saphenofemoral Junction: age-indeterminate non-occlusive superficial vein thrombosis. Left Saphenofemoral Junction: acute occlusive superficial vein thrombosis  Vascular consulted- No acute vascular surgery intervention at this time  unable to tolerated eliquis d/t vaginal bleed. will discuss with vascular re: IVC filter Extensive bilateral recurrent DVT, hypercoagulable state likely from malignancy   Dopplers performed on 1/14 showing right and left Femoral Vein: acute occlusive DVT. Right Saphenofemoral Junction: age-indeterminate non-occlusive superficial vein thrombosis. Left Saphenofemoral Junction: acute occlusive superficial vein thrombosis  Vascular consulted- No acute vascular surgery intervention at this time  unable to tolerated eliquis d/t vaginal bleed. currently on hold.  discuss with vascular re: IVC filter, will f/u recs

## 2024-01-16 NOTE — PROGRESS NOTE ADULT - ASSESSMENT
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 Endocrinology consulted for hypercalcemia.    #Hypercalcemia  Likely due to malignanacy (has urothelial and colon cancer)  Unlikely primary hyperparathyroidism, granulomatous disease  - PTH 24, repeat 18, inappropriately normal   - 25 OH vitamin D 17, low; 1-25 OH vit D 23, normal  - TSH 1.27, normal  - SPEP normal  - S/p 4mg IV zoledronic acid on 1/14  - on 2000IU vitamin D  - Corrected calcium improved to 10.6 today    PLAN:  - f/u PTHrP  - trend CMP for calcium and albumin q24h  - continue vitamin D 2000 IU daily  - Phosphorus likely low from zoledronic acid, please replete      Jose Luis Mcallister MD  Endocrine Fellow  Can be reached via teams. For follow up questions, discharge recommendations, or new consults, please call answering service at [phone] (weekdays); [phone] (nights/weekends).   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 Endocrinology consulted for hypercalcemia.    #Hypercalcemia  Likely due to malignanacy (has urothelial and colon cancer)  Unlikely primary hyperparathyroidism, granulomatous disease  - PTH 24, repeat 18, inappropriately normal   - 25 OH vitamin D 17, low; 1-25 OH vit D 23, normal  - TSH 1.27, normal  - SPEP normal  - S/p 4mg IV zoledronic acid on 1/14  - on 2000IU vitamin D  - Corrected calcium improved to 10.6 today    PLAN:  - f/u PTHrP  - trend CMP for calcium and albumin q24h  - continue vitamin D 2000 IU daily  - Phosphorus likely low from zoledronic acid, please replete      Jose Luis Mcallister MD  Endocrine Fellow  Can be reached via teams. For follow up questions, discharge recommendations, or new consults, please call answering service at [phone] (weekdays); [phone] (nights/weekends).   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 Endocrinology consulted for hypercalcemia.    #Hypercalcemia  Likely due to malignancy (has urothelial and colon cancer)  Unlikely primary hyperparathyroidism, granulomatous disease  - PTH 24, repeat 18, inappropriately normal   - 25 OH vitamin D 17, low; 1-25 OH vit D 23, normal  - TSH 1.27, normal  - SPEP normal  - S/p 4mg IV zoledronic acid on 1/14  - on 2000IU vitamin D  - Corrected calcium improved to 10.6 today    PLAN:  - f/u PTHrP  - trend CMP for calcium and albumin q24h  - continue vitamin D 2000 IU daily  - Phosphorus likely low from zoledronic acid, please replete      Jose Luis Mcallister MD  Endocrine Fellow  Can be reached via teams. For follow up questions, discharge recommendations, or new consults, please call answering service at [phone] (weekdays); [phone] (nights/weekends).   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 Endocrinology consulted for hypercalcemia.    #Hypercalcemia  Likely due to malignancy (has urothelial and colon cancer)  Unlikely primary hyperparathyroidism, granulomatous disease  - PTH 24, repeat 18, inappropriately normal   - 25 OH vitamin D 17, low; 1-25 OH vit D 23, normal  - TSH 1.27, normal  - SPEP normal  - S/p 4mg IV zoledronic acid on 1/14  - on 2000IU vitamin D  - Corrected calcium improved to 10.6 today    PLAN:  - f/u PTHrP  - trend CMP for calcium and albumin q24h  - continue vitamin D 2000 IU daily  - Phosphorus likely low from zoledronic acid, please replete      Jose Luis Mcallister MD  Endocrine Fellow  Can be reached via teams. For follow up questions, discharge recommendations, or new consults, please call answering service at [phone] (weekdays); [phone] (nights/weekends).

## 2024-01-16 NOTE — PROGRESS NOTE ADULT - ATTENDING COMMENTS
73F with urothelial carcinoma and colon cancer with hypercalcemia, PTH 24 then repeat lower at 18, suspecting hypercalcemia of malignancy. Calcium improved s/p Zoledronic acid treatment.  Follow up PTHrp.    Lesley Avilez MD  Division of Endocrinology  Pager: [phone]    If after 6PM or before 9AM, or on weekends/holidays, please call endocrine answering service for assistance ([phone]).  For nonurgent matters email [email or web address] for assistance. 73F with urothelial carcinoma and colon cancer with hypercalcemia, PTH 24 then repeat lower at 18, suspecting hypercalcemia of malignancy. Calcium improved s/p Zoledronic acid treatment.  Follow up PTHrp.    Lesley Avilez MD  Division of Endocrinology  Pager: [phone]    If after 6PM or before 9AM, or on weekends/holidays, please call endocrine answering service for assistance ([phone]).  For nonurgent matters email [email or web address] for assistance.

## 2024-01-17 NOTE — PROGRESS NOTE ADULT - PROBLEM SELECTOR PLAN 8
Stage II-III sacral ulcer on admission > As per pt, she developed it in [facility]   Nutrition consult   FU Wound care-nursing and surgery consult placed on 1/12- Not seen yet.  Turn every 4 hours, OOB to chair as tolerated   Continue to monitor

## 2024-01-17 NOTE — PROGRESS NOTE ADULT - PROBLEM/PLAN-11
DISPLAY PLAN FREE TEXT
headaches. Negative for dizziness, weakness and light-headedness. Psychiatric/Behavioral: Negative for agitation, behavioral problems and confusion. Pertinent positives and negatives are stated within HPI, all other systems reviewed and are negative.      --------------------------------------------- PAST HISTORY ---------------------------------------------  Past Medical History:  has a past medical history of Crying for unknown reason and Headache(784.0). Past Surgical History:  has a past surgical history that includes other surgical history. Social History:  reports that she has never smoked. She has never used smokeless tobacco. She reports that she does not drink alcohol or use drugs. Family History: family history includes Bipolar Disorder in her father; Other in her father and mother; Seizures in her father. The patients home medications have been reviewed. Allergies: Aripiprazole    -------------------------------------------------- RESULTS -------------------------------------------------  All laboratory and radiology results have been personally reviewed by myself   LABS:  Results for orders placed or performed during the hospital encounter of 03/03/20   POC Pregnancy Urine   Result Value Ref Range    HCG, Urine, POC Negative Negative    Lot Number [id number]     Positive QC Pass/Fail Pass     Negative QC Pass/Fail Pass        RADIOLOGY:  Interpreted by Radiologist.  CT Head WO Contrast   Final Result      No evidence for acute intracranial process. CT of the brain is grossly unremarkable.          ------------------------- NURSING NOTES AND VITALS REVIEWED ---------------------------   The nursing notes within the ED encounter and vital signs as below have been reviewed.    /84   Pulse 74   Temp 98.4 °F (36.9 °C) (Oral)   Resp 16   Ht 5' 4\" (1.626 m)   Wt 195 lb (88.5 kg)   LMP  (LMP Unknown)   SpO2 99%   BMI 33.47 kg/m²   Oxygen Saturation Interpretation:
DISPLAY PLAN FREE TEXT

## 2024-01-17 NOTE — CONSULT NOTE ADULT - ASSESSMENT
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 Hospital course c/b bilateral above the knee DVT's on 1/14. Per primary team, anti coagulation (Eliquis was DC'd on 1/16 due to patient intolerance for vaginal bleeding). IR consulted for evaluation of IVC filter placement.     Plan:   -Please obtain CT venogram to assess for central clot burden, prior to planning for IVC filter placement TBD. IR to follow up upon completion of venogram.     Doroteo Quach MD PGY3  Interventional Radiology    For EMERGENT inquiries/questions:  [facility]-[phone]  [facility]-[phone] ([phone])    Available on [employer] TEAMS for all non-urgent questions  Non-emergent consults: Please place a sunrise order "Consult-Interventional Radiology" with an appropriate callback number.    For questions about scheduling during appropriate work hours, call IR :  [facility]: [phone]  [facility]: [phone]    For outpatient IR booking:  [facility]: [phone]  [facility]: [phone]

## 2024-01-17 NOTE — PROGRESS NOTE ADULT - ATTENDING COMMENTS
73F with urothelial carcinoma and colon cancer with hypercalcemia, PTH 24 then repeat lower at 18, suspecting hypercalcemia of malignancy. Calcium improved s/p Zoledronic acid treatment.  Follow up PTHrp. do not aggressively replete phos, can encourage po intake of phos containing foods (nuts for example)

## 2024-01-17 NOTE — PROGRESS NOTE ADULT - PROBLEM SELECTOR PLAN 4
UA- moderate LE. Blood culturex2- NGTD. UC not sent   Recently treated for UTI at [facility]  Reviewed charts- Pt with chronically elevated WBC, could be from underlying CA  On admission,pt was started on  empiric treatment with IV Zosyn, receieved ~ 5 day course  monitor off abx

## 2024-01-17 NOTE — CONSULT NOTE ADULT - SUBJECTIVE AND OBJECTIVE BOX
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 Hospital course c/b bilateral above the knee DVT's on 1/14. Per primary team, anti coagulation (Eliquis was DC'd on 1/16 due to patient intolerance for vaginal bleeding). IR consulted for evaluation of IVC filter placement.     Allergies: No Known Allergies    Medications (Abx/Cardiac/Anticoagulation/Blood Products)  apixaban: 5 milliGRAM(s) Oral (01-16 @ 06:12)  piperacillin/tazobactam IVPB..: 25 mL/Hr IV Intermittent (01-15 @ 13:45)    Data:    T(C): 36.7  HR: 87  BP: 107/64  RR: 18  SpO2: 99%    -WBC 16.99 / HgB 7.8 / Hct 25.6 / Plt 424  -Na 135 / Cl 104 / BUN 18 / Glucose 71  -K 3.9 / CO2 21 / Cr 0.45  -ALT -- / Alk Phos -- / T.Bili --  -INR -- / PTT 28.5    Radiology: Reviewed with attending

## 2024-01-17 NOTE — PROGRESS NOTE ADULT - SUBJECTIVE AND OBJECTIVE BOX
ENDOCRINE FOLLOW UP     Chief Complaint: hypercalcemia    History:     MEDICATIONS  (STANDING):  cholecalciferol 2000 Unit(s) Oral daily  influenza  Vaccine (HIGH DOSE) 0.7 milliLiter(s) IntraMuscular once  sodium chloride 0.9%. 1000 milliLiter(s) (75 mL/Hr) IV Continuous <Continuous>  sodium chloride 0.9%. 1000 milliLiter(s) (100 mL/Hr) IV Continuous <Continuous>  sodium phosphate 15 milliMole(s)/250 mL IVPB 15 milliMole(s) IV Intermittent once    MEDICATIONS  (PRN):  acetaminophen     Tablet .. 650 milliGRAM(s) Oral every 6 hours PRN Mild Pain (1 - 3)  oxyCODONE    IR 5 milliGRAM(s) Oral every 4 hours PRN Moderate Pain (4 - 6)  polyethylene glycol 3350 17 Gram(s) Oral at bedtime PRN Constipation      Allergies    No Known Allergies    Intolerances        ROS: All other systems reviewed and negative    PHYSICAL EXAM:  VITALS: T(C): 36.7 (01-17-24 @ 06:00)  T(F): 98 (01-17-24 @ 06:00), Max: 99.7 (01-16-24 @ 17:00)  HR: 87 (01-17-24 @ 06:00) (87 - 99)  BP: 107/64 (01-17-24 @ 06:00) (94/65 - 113/70)  RR:  (18 - 18)  SpO2:  (99% - 100%)  Wt(kg): --  GENERAL: NAD, resting comfortably   EYES: No proptosis,  anicteric  HEENT:  Atraumatic, Normocephalic, moist mucous membranes  RESPIRATORY: Nonlabored respirations on room air, normal rate/effort   CARDIOVASCULAR: Did not appear cyanotic, no lower extremity swelling visualized  GI: Soft, nontender, non distended  NEURO: Answering questions appropriately, moves all extremities spontaneously  PSYCH:  reactive affect, euthymic mood    POCT Blood Glucose.: 100 mg/dL (01-17-24 @ 08:01)      01-17    135  |  104  |  18  ----------------------------<  71  3.9   |  21<L>  |  0.45<L>    eGFR: 101    Ca    9.1      01-17  Mg     1.90     01-17  Phos  1.9     01-17    TPro  6.7  /  Alb  2.7<L>  /  TBili  <0.2  /  DBili  x   /  AST  11  /  ALT  7   /  AlkPhos  95  01-14      A1C with Estimated Average Glucose Result: 5.4 % (01-11-24 @ 06:54)  A1C with Estimated Average Glucose Result: 5.9 % (10-08-23 @ 03:45)      Thyroid Stimulating Hormone, Serum: 1.27 uIU/mL (01-14-24 @ 06:23)   ENDOCRINE FOLLOW UP     Chief Complaint: hypercalcemia    History:   Patient reports not feeling well, nephrostomy tune giving her pain, denies nausea, vomiting, diarrhea, constipation. She endorses muscle aches in legs.    MEDICATIONS  (STANDING):  cholecalciferol 2000 Unit(s) Oral daily  influenza  Vaccine (HIGH DOSE) 0.7 milliLiter(s) IntraMuscular once  sodium chloride 0.9%. 1000 milliLiter(s) (75 mL/Hr) IV Continuous <Continuous>  sodium chloride 0.9%. 1000 milliLiter(s) (100 mL/Hr) IV Continuous <Continuous>  sodium phosphate 15 milliMole(s)/250 mL IVPB 15 milliMole(s) IV Intermittent once    MEDICATIONS  (PRN):  acetaminophen     Tablet .. 650 milliGRAM(s) Oral every 6 hours PRN Mild Pain (1 - 3)  oxyCODONE    IR 5 milliGRAM(s) Oral every 4 hours PRN Moderate Pain (4 - 6)  polyethylene glycol 3350 17 Gram(s) Oral at bedtime PRN Constipation      Allergies    No Known Allergies    Intolerances        ROS: All other systems reviewed and negative    PHYSICAL EXAM:  VITALS: T(C): 36.7 (01-17-24 @ 06:00)  T(F): 98 (01-17-24 @ 06:00), Max: 99.7 (01-16-24 @ 17:00)  HR: 87 (01-17-24 @ 06:00) (87 - 99)  BP: 107/64 (01-17-24 @ 06:00) (94/65 - 113/70)  RR:  (18 - 18)  SpO2:  (99% - 100%)  Wt(kg): --  GENERAL: ill appearing, lying in bed  EYES: No proptosis,  anicteric  HEENT:  Atraumatic, Normocephalic, mildly dry mucous membranes  RESPIRATORY: Nonlabored respirations on room air, normal rate/effort   CARDIOVASCULAR: Did not appear cyanotic  GI: did not appear distended  NEURO: Answering questions appropriately, moves all extremities spontaneously  PSYCH:  flat affect, dysthymic mood    POCT Blood Glucose.: 100 mg/dL (01-17-24 @ 08:01)      01-17    135  |  104  |  18  ----------------------------<  71  3.9   |  21<L>  |  0.45<L>    eGFR: 101    Ca    9.1      01-17  Mg     1.90     01-17  Phos  1.9     01-17    TPro  6.7  /  Alb  2.7<L>  /  TBili  <0.2  /  DBili  x   /  AST  11  /  ALT  7   /  AlkPhos  95  01-14      A1C with Estimated Average Glucose Result: 5.4 % (01-11-24 @ 06:54)  A1C with Estimated Average Glucose Result: 5.9 % (10-08-23 @ 03:45)      Thyroid Stimulating Hormone, Serum: 1.27 uIU/mL (01-14-24 @ 06:23)

## 2024-01-17 NOTE — PROGRESS NOTE ADULT - SUBJECTIVE AND OBJECTIVE BOX
[facility] Division of Hospital Medicine  Haseeb Edwards MD  Pager [phone]      Patient is a 73y old  Female who presents with a chief complaint of dislodged nephrostomy tube (17 Jan 2024 12:39)      SUBJECTIVE / OVERNIGHT EVENTS:    no acute event. pt cont to report suprapubic pain. denies chest pain, sob    ADDITIONAL REVIEW OF SYSTEMS:    CONSTITUTIONAL: No fever, weight loss, or fatigue  EYES: No eye pain, visual disturbances, or discharge  ENMT:  No difficulty hearing, tinnitus, vertigo; No sinus or throat pain  NECK: No pain or stiffness  RESPIRATORY: No cough, wheezing, chills or hemoptysis; No shortness of breath  CARDIOVASCULAR: No chest pain, palpitations, dizziness, or leg swelling  GASTROINTESTINAL: No abdominal or epigastric pain. No nausea, vomiting, or hematemesis; No diarrhea or constipation. No melena or hematochezia.  GENITOURINARY: No dysuria, frequency, hematuria, or incontinence  NEUROLOGICAL: No headaches, memory loss, loss of strength, numbness, or tremors  SKIN: No itching, burning, rashes, or lesions   MUSCULOSKELETAL: No joint pain or swelling; No muscle, back, or extremity pain  PSYCHIATRIC: No depression, anxiety, mood swings, or difficulty sleeping    MEDICATIONS  (STANDING):  chlorhexidine 2% Cloths 1 Application(s) Topical daily  cholecalciferol 2000 Unit(s) Oral daily  influenza  Vaccine (HIGH DOSE) 0.7 milliLiter(s) IntraMuscular once  sodium chloride 0.9%. 1000 milliLiter(s) (75 mL/Hr) IV Continuous <Continuous>  sodium chloride 0.9%. 1000 milliLiter(s) (100 mL/Hr) IV Continuous <Continuous>    MEDICATIONS  (PRN):  acetaminophen     Tablet .. 650 milliGRAM(s) Oral every 6 hours PRN Mild Pain (1 - 3)  oxyCODONE    IR 5 milliGRAM(s) Oral every 4 hours PRN Moderate Pain (4 - 6)  polyethylene glycol 3350 17 Gram(s) Oral at bedtime PRN Constipation      CAPILLARY BLOOD GLUCOSE      POCT Blood Glucose.: 100 mg/dL (17 Jan 2024 08:01)    I&O's Summary    16 Jan 2024 07:01  -  17 Jan 2024 07:00  --------------------------------------------------------  IN: 440 mL / OUT: 1000 mL / NET: -560 mL    17 Jan 2024 07:01  -  17 Jan 2024 22:10  --------------------------------------------------------  IN: 480 mL / OUT: 0 mL / NET: 480 mL        PHYSICAL EXAM:  Vital Signs Last 24 Hrs  T(C): 36.7 (17 Jan 2024 12:10), Max: 36.8 (16 Jan 2024 22:30)  T(F): 98 (17 Jan 2024 12:10), Max: 98.3 (16 Jan 2024 22:30)  HR: 96 (17 Jan 2024 12:10) (87 - 96)  BP: 110/61 (17 Jan 2024 12:10) (94/65 - 110/61)  BP(mean): --  RR: 18 (17 Jan 2024 12:10) (18 - 18)  SpO2: 99% (17 Jan 2024 12:10) (99% - 99%)    Parameters below as of 17 Jan 2024 12:10  Patient On (Oxygen Delivery Method): room air        CONSTITUTIONAL: NAD,  EYES: PERRLA; conjunctiva and sclera clear  ENMT: Moist oral mucosa, no pharyngeal injection or exudates;   NECK: Supple, no palpable masses;  RESPIRATORY: Normal respiratory effort; lungs are clear to auscultation bilaterally  CARDIOVASCULAR: Regular rate and rhythm, normal S1 and S2, no murmur/rub/gallop; b/l 2+  lower extremity edema; Peripheral pulses are 2+ bilaterally  ABDOMEN: Nontender to palpation, normoactive bowel sounds, no rebound/guarding;   MUSCLOSKELETAL:   no clubbing or cyanosis of digits; no joint swelling or tenderness to palpation  PSYCH: A+O to person, place;  affect appropriate  NEUROLOGY: CN 2-12 are intact and symmetric; no gross sensory deficits;   SKIN: No rashes;       LABS:                        7.8    16.99 )-----------( 424      ( 17 Jan 2024 06:00 )             25.6     01-17    135  |  104  |  18  ----------------------------<  71  3.9   |  21<L>  |  0.45<L>    Ca    9.1      17 Jan 2024 06:00  Phos  1.9     01-17  Mg     1.90     01-17    TPro  6.3  /  Alb  2.3<L>  /  TBili  <0.2  /  DBili  <0.2  /  AST  27  /  ALT  10  /  AlkPhos  82  01-17          Urinalysis Basic - ( 17 Jan 2024 06:00 )    Color: x / Appearance: x / SG: x / pH: x  Gluc: 71 mg/dL / Ketone: x  / Bili: x / Urobili: x   Blood: x / Protein: x / Nitrite: x   Leuk Esterase: x / RBC: x / WBC x   Sq Epi: x / Non Sq Epi: x / Bacteria: x          RADIOLOGY & ADDITIONAL TESTS:  Results Reviewed:   Imaging Personally Reviewed:  Electrocardiogram Personally Reviewed:    COORDINATION OF CARE:  Care Discussed with Consultants/Other Providers [Y/N]:  Prior or Outpatient Records Reviewed [Y/N]:

## 2024-01-17 NOTE — PROGRESS NOTE ADULT - ASSESSMENT
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 Endocrinology consulted for hypercalcemia.    #Hypercalcemia  Likely due to malignancy (has urothelial and colon cancer)  Unlikely primary hyperparathyroidism, granulomatous disease  - PTH 24, repeat 18, inappropriately normal   - 25 OH vitamin D 17, low; 1-25 OH vit D 23, normal  - TSH 1.27, normal  - SPEP normal  - corrected calcium 10.1 from 10.6   - S/p 4mg IV zoledronic acid on 1/14  PLAN:  - f/u PTHrP  - trend CMP for calcium and albumin q24h  - continue vitamin D 2000 IU daily  - Phosphorus likely low from zoledronic acid, please replete, check phosphorus daily  - goal corrected calcium 8-8.5    Discussed with primary team.     Thank you for the interesting consult.    Alverto Pitts MD, Endocrinology Fellow  For non-urgent follow up questions, please email [email or web address] or [email or web address].  Pager [phone] from 9am to 5pm. After hours and on weekends, please call [phone].   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 Endocrinology consulted for hypercalcemia.    #Hypercalcemia  Likely due to malignancy (has urothelial and colon cancer) with possible small component of primary hyperparathyroidism given inappropriately low/normal PTH, less likely granulomatous disease given low/normal 1,25 vitmain D  - PTH 24, repeat 18, inappropriately normal   - 25 OH vitamin D 17, low; 1-25 OH vit D 23, normal  - TSH 1.27, normal  - SPEP normal  - corrected calcium 10.1 from 10.6   - S/p 4mg IV zoledronic acid on 1/14  PLAN:  - f/u PTHrP  - trend CMP for calcium and albumin q24h  - continue vitamin D 2000 IU daily  - Phosphorus likely low from zoledronic acid, please replete, check phosphorus daily  - goal corrected calcium 8-8.5    Discussed with primary team.     Thank you for the interesting consult.    Alverto Pitts MD, Endocrinology Fellow  For non-urgent follow up questions, please email [email or web address] or [email or web address].  Pager [phone] from 9am to 5pm. After hours and on weekends, please call [phone].   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 Endocrinology consulted for hypercalcemia.    #Hypercalcemia  Likely due to malignancy (has urothelial and colon cancer) with possible small component of primary hyperparathyroidism given inappropriately low/normal PTH, less likely granulomatous disease given low/normal 1,25 vitmain D  - PTH 24, repeat 18, inappropriately normal   - 25 OH vitamin D 17, low; 1-25 OH vit D 23, normal  - TSH 1.27, normal  - SPEP normal  - corrected calcium 10.5 from 10.6   - S/p 4mg IV zoledronic acid on 1/14  PLAN:  - f/u PTHrP  - trend CMP for calcium and albumin q24h  - continue vitamin D 2000 IU daily  - Phosphorus likely low from zoledronic acid, please replete, check phosphorus daily  - goal corrected calcium 8-8.5    Discussed with primary team.     Thank you for the interesting consult.    Alverto Pitts MD, Endocrinology Fellow  For non-urgent follow up questions, please email [email or web address] or [email or web address].  Pager [phone] from 9am to 5pm. After hours and on weekends, please call [phone].   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 Endocrinology consulted for hypercalcemia.    #Hypercalcemia  Likely due to malignancy (has urothelial and colon cancer) with possible small component of primary hyperparathyroidism given inappropriately low/normal PTH, less likely granulomatous disease given low/normal 1,25 vitmain D  - PTH 24, repeat 18, inappropriately normal   - 25 OH vitamin D 17, low; 1-25 OH vit D 23, normal  - TSH 1.27, normal  - SPEP normal  - corrected calcium 10.5 from 10.6   - S/p 4mg IV zoledronic acid on 1/14  PLAN:  - f/u PTHrP  - encourage po intake (at least 1.5L daily if able)  - trend CMP for calcium and albumin q24h  - continue vitamin D 2000 IU daily  - Phosphorus likely low from zoledronic acid, consider gentle repletion (aggressive repletion can potentially worsen calcium), monitor phosphorus daily  - goal corrected calcium 8-8.5    Discussed with primary team.     Thank you for the interesting consult.    Alverto Pitts MD, Endocrinology Fellow  For non-urgent follow up questions, please email [email or web address] or [email or web address].  Pager [phone] from 9am to 5pm. After hours and on weekends, please call [phone].

## 2024-01-17 NOTE — PROGRESS NOTE ADULT - PROBLEM SELECTOR PLAN 11
Extensively discussed with patient -would like trial of intubation, would not want to be long-term on ventilator. Patient is ok with dialysis if needed. Patient would not like feeding tube if needed. Daughters are her healthcare proxy.  Updated daughter Curt 1/17   [facility]

## 2024-01-17 NOTE — PROGRESS NOTE ADULT - PROBLEM SELECTOR PLAN 1
Severe bilateral hydronephrosis with b/l outlet obstruction s/p bilateral nephrostomy tube placement 10/31, with exchange/replacement recently at [facility] due to blocked nephrostomy tube   Right nephrostomy tube in place, left nephrostomy tube displaced/dislodged  CT abdomen, pelvis and chest 11/23 showed thrombus in right common femoral vein extending into superficial vein (not seen on 03/2023), increase in hydronephrosis despite perc nephrostomy tubes and ureteral stent, prominent soft tissue in posterior bladder extending into ureters inseparable from vaginal cuff adjacent fat increased size of right external iliac LNs.  BUN/Creatinine stable  s/p L NT replaced on 1/11 - Flush with 5 cc NS qd  Continue to monitor

## 2024-01-17 NOTE — PROGRESS NOTE ADULT - PROBLEM SELECTOR PLAN 3
Extensive bilateral recurrent DVT, hypercoagulable state likely from malignancy   Dopplers performed on 1/14 showing right and left Femoral Vein: acute occlusive DVT. Right Saphenofemoral Junction: age-indeterminate non-occlusive superficial vein thrombosis. Left Saphenofemoral Junction: acute occlusive superficial vein thrombosis  Vascular consulted- No acute vascular surgery intervention at this time  unable to tolerated eliquis d/t vaginal bleed. currently on hold.    discussed with IR- plan for IVC filter placement after CT venogram

## 2024-01-18 NOTE — PROGRESS NOTE ADULT - SUBJECTIVE AND OBJECTIVE BOX
[facility] Division of [facility] Medicine  Haseeb Edwards MD  Pager [phone]      Patient is a 73y old  Female who presents with a chief complaint of dislodged nephrostomy tube (2024 22:10)      SUBJECTIVE / OVERNIGHT EVENTS:    fever 101F o/n. started on vanco/zosyn. pt reports feeling well this am. denies chest pain, sob, abd pain    ADDITIONAL REVIEW OF SYSTEMS:    RESPIRATORY: No cough, wheezing, chills or hemoptysis; No shortness of breath  CARDIOVASCULAR: No chest pain, palpitations, dizziness, or leg swelling  GASTROINTESTINAL: No abdominal or epigastric pain. No nausea, vomiting, or hematemesis; No diarrhea or constipation. No melena or hematochezia.      MEDICATIONS  (STANDING):  bisacodyl 5 milliGRAM(s) Oral once  chlorhexidine 2% Cloths 1 Application(s) Topical daily  cholecalciferol 2000 Unit(s) Oral daily  influenza  Vaccine (HIGH DOSE) 0.7 milliLiter(s) IntraMuscular once  lactated ringers. 1000 milliLiter(s) (100 mL/Hr) IV Continuous <Continuous>  piperacillin/tazobactam IVPB.. 3.375 Gram(s) IV Intermittent every 8 hours  potassium chloride   Powder 40 milliEquivalent(s) Oral once  senna 2 Tablet(s) Oral at bedtime  sodium chloride 0.9%. 1000 milliLiter(s) (100 mL/Hr) IV Continuous <Continuous>  sodium chloride 0.9%. 1000 milliLiter(s) (75 mL/Hr) IV Continuous <Continuous>    MEDICATIONS  (PRN):  acetaminophen     Tablet .. 650 milliGRAM(s) Oral every 6 hours PRN Mild Pain (1 - 3)  oxyCODONE    IR 5 milliGRAM(s) Oral every 4 hours PRN Moderate Pain (4 - 6)  polyethylene glycol 3350 17 Gram(s) Oral at bedtime PRN Constipation      CAPILLARY BLOOD GLUCOSE        I&O's Summary    2024 07:01  -  2024 07:00  --------------------------------------------------------  IN: 580 mL / OUT: 655 mL / NET: -75 mL        PHYSICAL EXAM:  Vital Signs Last 24 Hrs  T(C): 37.1 (2024 06:10), Max: 38.4 (2024 22:30)  T(F): 98.7 (2024 06:10), Max: 101.2 (2024 22:30)  HR: 84 (2024 06:10) (84 - 103)  BP: 112/72 (2024 06:10) (96/58 - 112/72)  BP(mean): --  RR: 17 (2024 06:10) (17 - 18)  SpO2: 100% (2024 06:10) (100% - 100%)    Parameters below as of 2024 06:10  Patient On (Oxygen Delivery Method): room air        CONSTITUTIONAL: NAD,  EYES: PERRLA; conjunctiva and sclera clear  ENMT: Moist oral mucosa, no pharyngeal injection or exudates;   NECK: Supple, no palpable masses;  RESPIRATORY: Normal respiratory effort; lungs are clear to auscultation bilaterally  CARDIOVASCULAR: Regular rate and rhythm, normal S1 and S2, no murmur/rub/gallop; 2+ lower extremity edema; Peripheral pulses are 2+ bilaterally  ABDOMEN: Nontender to palpation, normoactive bowel sounds, no rebound/guarding; b/l PCN in place   MUSCLOSKELETAL:   no clubbing or cyanosis of digits; no joint swelling or tenderness to palpation  PSYCH: A+O to person, place,  NEUROLOGY: CN 2-12 are intact and symmetric; no gross sensory deficits;   SKIN: No rashes;     LABS:                        7.2    18.96 )-----------( 403      ( 2024 03:16 )             23.1     18    139  |  106  |  22  ----------------------------<  157<H>  3.4<L>   |  22  |  0.53    Ca    8.6      2024 03:16  Phos  1.8       Mg     1.90         TPro  6.1  /  Alb  2.4<L>  /  TBili  <0.2  /  DBili  <0.2  /  AST  12  /  ALT  5   /  AlkPhos  87            Urinalysis Basic - ( 2024 13:30 )    Color: Yellow / Appearance: Clear / S.042 / pH: x  Gluc: x / Ketone: Negative mg/dL  / Bili: Negative / Urobili: 0.2 mg/dL   Blood: x / Protein: 100 mg/dL / Nitrite: Negative   Leuk Esterase: Trace / RBC: 21 /HPF / WBC 17 /HPF   Sq Epi: x / Non Sq Epi: 2 /HPF / Bacteria: Negative /HPF          RADIOLOGY & ADDITIONAL TESTS:  Results Reviewed:   Imaging Personally Reviewed:  Electrocardiogram Personally Reviewed:    COORDINATION OF CARE:  Care Discussed with Consultants/Other Providers [Y/N]:  Prior or Outpatient Records Reviewed [Y/N]:

## 2024-01-18 NOTE — CHART NOTE - NSCHARTNOTEFT_GEN_A_CORE
Notified by RN pt w oral temp 101.2. Pt without complaint. Denies CP, SOB, chills, new abd pain, or any other complaints. Abdominal pain at baseline 2/2 colon cancer.     HPI: 73 year old F with history of HLD (not on med currently), DVT (recurrent, a few years ago with coumadin x6 months, left sided, now with right DVT on eliquis, but recently told at [facility] to discontinue eliquis?), invasive urothelial carcinoma both conventionally and focally sarcomatoid subtype  and high grade carcinoma invaded muscularis propria and well differentiated adeno colon cancer T4 N1 Mo, stage III, MSI intact), s/p TURBT who presents for admission with dislodged left nephrostomy tube.  She has a history of severe bilateral hydronephrosis due to bladder obstruction. Now with b/l nephrostomy tubes.     Vital Signs Last 24 Hrs  T(C): 38.4 (17 Jan 2024 22:30), Max: 38.4 (17 Jan 2024 22:30)  T(F): 101.2 (17 Jan 2024 22:30), Max: 101.2 (17 Jan 2024 22:30)  HR: 103 (17 Jan 2024 22:30) (87 - 103)  BP: 96/58 (17 Jan 2024 22:30) (96/58 - 110/61)  BP(mean): --  RR: 18 (17 Jan 2024 22:30) (18 - 18)  SpO2: 100% (17 Jan 2024 22:30) (99% - 100%)    PHYSICAL EXAM:  General: Awake and alert.  No acute distress.  Head: Normocephalic, atraumatic.    Eyes: PERRL.    Heart: RRR.  Normal S1 and S2.  No murmurs, rubs, or gallops.  No LE edema b/l.   Lungs: Nonlabored breathing.  Good inspiratory effort. CTAB.  No wheezes, crackles, or rhonchi.    Abdomen: BS+, abdomen tender and distended   Skin: Warm and dry.  No rashes.  Extremities: No cyanosis.  2+ peripheral pulses b/l.  Musculoskeletal: No joint deformities.  No spinal or paraspinal tenderness.  Neuro: A&Ox3.     SIRS   - s/p IV tylenol  - f/u BCx x2, CBC, BMP, VBG, UA  - f/u with attending in AM regarding UCx via nephrostomy tubes   - f/u CT chest read   - started vanc and zosyn empirically   - IVF NS 500cc/hr     Cont to monitor Notified by RN pt w oral temp 101.2. Pt without complaint. Denies CP, SOB, chills, new abd pain, or any other complaints. Abdominal pain at baseline 2/2 colon cancer.     HPI: 73 year old F with history of HLD (not on med currently), DVT (recurrent, a few years ago with coumadin x6 months, left sided, now with right DVT on eliquis, but recently told at [facility] to discontinue eliquis?), invasive urothelial carcinoma both conventionally and focally sarcomatoid subtype  and high grade carcinoma invaded muscularis propria and well differentiated adeno colon cancer T4 N1 Mo, stage III, MSI intact), s/p TURBT who presents for admission with dislodged left nephrostomy tube.  She has a history of severe bilateral hydronephrosis due to bladder obstruction. Now with b/l nephrostomy tubes.     Vital Signs Last 24 Hrs  T(C): 38.4 (17 Jan 2024 22:30), Max: 38.4 (17 Jan 2024 22:30)  T(F): 101.2 (17 Jan 2024 22:30), Max: 101.2 (17 Jan 2024 22:30)  HR: 103 (17 Jan 2024 22:30) (87 - 103)  BP: 96/58 (17 Jan 2024 22:30) (96/58 - 110/61)  BP(mean): --  RR: 18 (17 Jan 2024 22:30) (18 - 18)  SpO2: 100% (17 Jan 2024 22:30) (99% - 100%)    PHYSICAL EXAM:  General: Awake and alert.  No acute distress.  Head: Normocephalic, atraumatic.    Eyes: PERRL.    Heart: RRR.  Normal S1 and S2.  No murmurs, rubs, or gallops.  No LE edema b/l.   Lungs: Nonlabored breathing.  Good inspiratory effort. CTAB.  No wheezes, crackles, or rhonchi.    Abdomen: BS+, abdomen tender and distended   Skin: Warm and dry.  No rashes.  Extremities: No cyanosis.  2+ peripheral pulses b/l.  Musculoskeletal: No joint deformities.  No spinal or paraspinal tenderness.  Neuro: A&Ox3.     SIRS   - s/p IV tylenol  - f/u BCx x2, CBC, BMP, VBG, UA  - RVP neg  - f/u with attending in AM regarding UCx via nephrostomy tubes   - f/u CT chest read   - started vanc and zosyn empirically   - IVF NS 500cc/hr     Cont to monitor Notified by RN pt w oral temp 101.2. Pt without complaint. Denies CP, SOB, chills, new abd pain, or any other complaints. Abdominal pain at baseline 2/2 colon cancer.     HPI: 73 year old F with history of HLD (not on med currently), DVT (recurrent, a few years ago with coumadin x6 months, left sided, now with right DVT on eliquis, but recently told at [facility] to discontinue eliquis?), invasive urothelial carcinoma both conventionally and focally sarcomatoid subtype  and high grade carcinoma invaded muscularis propria and well differentiated adeno colon cancer T4 N1 Mo, stage III, MSI intact), s/p TURBT who presents for admission with dislodged left nephrostomy tube.  She has a history of severe bilateral hydronephrosis due to bladder obstruction. Now with b/l nephrostomy tubes.     Vital Signs Last 24 Hrs  T(C): 38.4 (17 Jan 2024 22:30), Max: 38.4 (17 Jan 2024 22:30)  T(F): 101.2 (17 Jan 2024 22:30), Max: 101.2 (17 Jan 2024 22:30)  HR: 103 (17 Jan 2024 22:30) (87 - 103)  BP: 96/58 (17 Jan 2024 22:30) (96/58 - 110/61)  BP(mean): --  RR: 18 (17 Jan 2024 22:30) (18 - 18)  SpO2: 100% (17 Jan 2024 22:30) (99% - 100%)    PHYSICAL EXAM:  General: Awake and alert.  No acute distress.  Head: Normocephalic, atraumatic.    Eyes: PERRL.    Heart: RRR.  Normal S1 and S2.  No murmurs, rubs, or gallops.  No LE edema b/l.   Lungs: Nonlabored breathing.  Good inspiratory effort. CTAB.  No wheezes, crackles, or rhonchi.    Abdomen: BS+, abdomen tender and distended   Skin: Warm and dry.  No rashes.  Extremities: No cyanosis.  2+ peripheral pulses b/l.  Musculoskeletal: No joint deformities.  No spinal or paraspinal tenderness.  Neuro: A&Ox3.     SIRS   - s/p IV tylenol  - f/u BCx x2, CBC, BMP, VBG, UA  - RVP neg  - f/u with attending in AM regarding UCx via nephrostomy tubes   - f/u CT chest read   - started vanc and zosyn empirically   - IVF NS 1L bolus     Cont to monitor

## 2024-01-18 NOTE — PROGRESS NOTE ADULT - PROBLEM SELECTOR PLAN 2
new fever 1/18 with slightly uptrending WBC, ddx. inc DVT vs. malignancy vs. infection   -s/p zosyn x 5 day earlier this admission  -CT c/a/p 1/17 no e/o new source of infection   -f/u blood and urine cx 1/17  -empiric abx zosyn started 1/17-

## 2024-01-18 NOTE — PROGRESS NOTE ADULT - PROBLEM SELECTOR PLAN 8
Stage II-III sacral ulcer on admission > As per pt, she developed it in [facility]   Nutrition consult   FU Wound care-nursing and surgery consult placed on 1/12- Not seen yet.  Turn every 4 hours, OOB to chair as tolerated   Continue to monitor

## 2024-01-18 NOTE — PROGRESS NOTE ADULT - SUBJECTIVE AND OBJECTIVE BOX
Chief Complaint: dislodged nephrostomy tube    History: Has hypercalcemia of malignancy. Calcium improving. Patient received zometa on 1/14. Reports eating and drinking okay. No SOB.    MEDICATIONS  (STANDING):  bisacodyl 5 milliGRAM(s) Oral once  chlorhexidine 2% Cloths 1 Application(s) Topical daily  cholecalciferol 2000 Unit(s) Oral daily  influenza  Vaccine (HIGH DOSE) 0.7 milliLiter(s) IntraMuscular once  lactated ringers. 1000 milliLiter(s) (100 mL/Hr) IV Continuous <Continuous>  piperacillin/tazobactam IVPB.. 3.375 Gram(s) IV Intermittent every 8 hours  potassium chloride   Powder 40 milliEquivalent(s) Oral once  senna 2 Tablet(s) Oral at bedtime  sodium chloride 0.9%. 1000 milliLiter(s) (100 mL/Hr) IV Continuous <Continuous>  sodium chloride 0.9%. 1000 milliLiter(s) (75 mL/Hr) IV Continuous <Continuous>    MEDICATIONS  (PRN):  acetaminophen     Tablet .. 650 milliGRAM(s) Oral every 6 hours PRN Mild Pain (1 - 3)  oxyCODONE    IR 5 milliGRAM(s) Oral every 4 hours PRN Moderate Pain (4 - 6)  polyethylene glycol 3350 17 Gram(s) Oral at bedtime PRN Constipation      Allergies    No Known Allergies    Intolerances    PHYSICAL EXAM:  VITALS: T(C): 37.1 (01-18-24 @ 06:10)  T(F): 98.7 (01-18-24 @ 06:10), Max: 101.2 (01-17-24 @ 22:30)  HR: 84 (01-18-24 @ 06:10) (84 - 103)  BP: 112/72 (01-18-24 @ 06:10) (96/58 - 112/72)  RR:  (17 - 18)  SpO2:  (100% - 100%)  Wt(kg): --  General: Well-developed female, No acute distress, Speaking full sentences.   Eye:  Extraocular movements are intact, No proptosis or lid lag, No scleral icterus.   Respiratory:  Respirations are non-labored, Symmetric chest wall expansion.  Cardiovascular:  Normal rate, Regular rhythm, 1+ pitting edema b/l LE.  Gastrointestinal:  Soft, moderately tender to palpation diffusely, mildly distended.   Integumentary:  Warm, dry.    POCT Blood Glucose.: 100 mg/dL (01-17-24 @ 08:01)      01-18    139  |  106  |  22  ----------------------------<  157<H>  3.4<L>   |  22  |  0.53    eGFR: 97    Ca    8.6      01-18  Mg     1.90     01-18  Phos  1.8     01-18    TPro  6.1  /  Alb  2.4<L>  /  TBili  <0.2  /  DBili  <0.2  /  AST  12  /  ALT  5   /  AlkPhos  87  01-18          Thyroid Function Tests:  01-14 @ 06:23 TSH 1.27 FreeT4 -- T3 -- Anti TPO -- Anti Thyroglobulin Ab -- TSI --

## 2024-01-18 NOTE — CHART NOTE - NSCHARTNOTEFT_GEN_A_CORE
Interventional Radiology    CT Venogram reviewed with Dr. Marino. Will add patient on for IVC filter placement today 1/18/2024    Plan:   -Will add on for IVC filter placement for today 1/18/24  -Please place order for IR Procedure, approving attending Dr. Marino  -Please complete IR pre procedure note    -discussed with primary team    Doroteo Quach MD PGY3   Interventional Radiology    For EMERGENT inquiries/questions:  [facility]-[phone]  [facility]-[phone] ([phone])    Available on [employer] TEAMS for all non-urgent questions  Non-emergent consults: Please place a sunrise order "Consult-Interventional Radiology" with an appropriate callback number.    For questions about scheduling during appropriate work hours, call IR :  [facility]: [phone]  [facility]: [phone]    For outpatient IR booking:  [facility]: [phone]  [facility]: [phone]

## 2024-01-18 NOTE — PRE PROCEDURE NOTE - PRE PROCEDURE EVALUATION
Interventional Radiology Pre-Procedure Note      Diagnosis/Indication: Patient is a 73y old female with cervical cancer and history of DVT referred for IVC filter placement due to inability to tolerate anticoagulation due to significant vaginal bleeding.       PAST MEDICAL & SURGICAL HISTORY:  DVT (deep venous thrombosis)      Colon adenocarcinoma      Urothelial carcinoma of bladder      HLD (hyperlipidemia)      S/P hysterectomy      Displacement of nephrostomy tube      History of transurethral resection of bladder tumor (TURBT)           Allergies: No Known Allergies      LABS:                        7.2    18.96 )-----------( 403      ( 18 Jan 2024 03:16 )             23.1     01-18    139  |  106  |  22  ----------------------------<  157<H>  3.4<L>   |  22  |  0.53    Ca    8.6      18 Jan 2024 03:16  Phos  1.8     01-18  Mg     1.90     01-18    TPro  6.1  /  Alb  2.4<L>  /  TBili  <0.2  /  DBili  <0.2  /  AST  12  /  ALT  5   /  AlkPhos  87  01-18        Procedure/ risks/ benefits were explained, informed consent obtained from patient, verbalizes understanding.

## 2024-01-18 NOTE — PROGRESS NOTE ADULT - PROBLEM SELECTOR PLAN 10
Extensively discussed with patient -would like trial of intubation, would not want to be long-term on ventilator. Patient is ok with dialysis if needed. Patient would not like feeding tube if needed. Daughters are her healthcare proxy.  Updated daughter Curt 1/18   [facility]

## 2024-01-18 NOTE — PROGRESS NOTE ADULT - PROBLEM SELECTOR PLAN 1
Extensive bilateral recurrent DVT, hypercoagulable state likely from malignancy   Dopplers performed on 1/14 showing right and left Femoral Vein: acute occlusive DVT. Right Saphenofemoral Junction: age-indeterminate non-occlusive superficial vein thrombosis. Left Saphenofemoral Junction: acute occlusive superficial vein thrombosis  Vascular consulted- No acute vascular surgery intervention at this time  unable to tolerated eliquis d/t vaginal bleed. currently on hold.    discussed with IR- plan for IVC filter placement today

## 2024-01-18 NOTE — PROGRESS NOTE ADULT - PROBLEM SELECTOR PLAN 4
Severe bilateral hydronephrosis with b/l outlet obstruction s/p bilateral nephrostomy tube placement 10/31, with exchange/replacement recently at [facility] due to blocked nephrostomy tube   Right nephrostomy tube in place, left nephrostomy tube displaced/dislodged  CT abdomen, pelvis and chest 11/23 showed thrombus in right common femoral vein extending into superficial vein (not seen on 03/2023), increase in hydronephrosis despite perc nephrostomy tubes and ureteral stent, prominent soft tissue in posterior bladder extending into ureters inseparable from vaginal cuff adjacent fat increased size of right external iliac LNs.  BUN/Creatinine stable  s/p L NT replaced on 1/11 - Flush with 5 cc NS qd  Continue to monitor

## 2024-01-18 NOTE — PROCEDURE NOTE - PLAN
- Monitor output  - Flush with 5 cc NS qd
- patient to return to room  - remainder of care per primary team

## 2024-01-18 NOTE — PROGRESS NOTE ADULT - ASSESSMENT
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 Endocrinology consulted for hypercalcemia.    #Hypercalcemia  Likely due to malignancy (has urothelial and colon cancer) with less likely primary hyperparathyroidism given low normal PTH, less likely granulomatous disease given low/normal 1,25 vitamin D  - PTH 24, repeat 18   - 25 OH vitamin D 17, low; 1-25 OH vit D 23, normal  - TSH 1.27, normal  - SPEP normal  - corrected calcium improving, 9.86 today   - S/p 4mg IV zoledronic acid on 1/14  PLAN:  - f/u PTHrP  - encourage po intake. Also receiving IVF.  - trend CMP for calcium and albumin q24h  - continue vitamin D3 2000 IU daily  - monitor phosphorus daily and replete as needed    Bhupinder Peres DO  Endocrinology    [address] [place] [address] [phone]    [address], [address] [place] [id number]  [phone]    Please note that a different Endocrine provider will be following this patient tomorrow.

## 2024-01-18 NOTE — CHART NOTE - NSCHARTNOTEFT_GEN_A_CORE
PRE-INTERVENTIONAL RADIOLOGY  PROCEDURE NOTE  Patient Age: 74yo   Patient Gender: F  Procedure (including site / side if known): IVC Filter   Diagnosis / Indication: acute bilateral DVT  Interventional Radiology Attending Physician: Dr. Cohen  Ordering Attending Physician: Dr. Edwards  Pertinent medical history: b/l DVT off AC d/t vaginal bleed, invasive urothelial carcinoma both conventionally and focally sarcomatoid subtype and high grade carcinoma invaded muscularis propria and well differentiated adeno colon cancer T4 N1 Mo, stage III, MSI intact), s/p TURBT who presents for admission with dislodged left nephrostomy tube     Pertinent labs:                        7.2    18.96 )-----------( 403      ( 18 Jan 2024 03:16 )             23.1   01-18    139  |  106  |  22  ----------------------------<  157<H>  3.4<L>   |  22  |  0.53    Ca    8.6      18 Jan 2024 03:16  Phos  1.8     01-18  Mg     1.90     01-18    TPro  6.1  /  Alb  2.4<L>  /  TBili  <0.2  /  DBili  <0.2  /  AST  12  /  ALT  5   /  AlkPhos  87  01-18    Patient and Family aware? Yes  Attending Dr. Edwards

## 2024-01-19 NOTE — PROGRESS NOTE ADULT - SUBJECTIVE AND OBJECTIVE BOX
ENDOCRINE FOLLOW UP     Chief Complaint: hypercalcemia    History: patient seen earlier this morning, resting comfortably. Reports she feels well. Tolerating po intake. Phos repletion seen running, not on IVF when seen.   Corrected Ca this morning stable at 9.68.    MEDICATIONS  (STANDING):  bisacodyl 5 milliGRAM(s) Oral once  chlorhexidine 2% Cloths 1 Application(s) Topical daily  cholecalciferol 2000 Unit(s) Oral daily  influenza  Vaccine (HIGH DOSE) 0.7 milliLiter(s) IntraMuscular once  lactated ringers. 1000 milliLiter(s) (100 mL/Hr) IV Continuous <Continuous>  piperacillin/tazobactam IVPB.. 3.375 Gram(s) IV Intermittent every 8 hours  potassium chloride   Powder 40 milliEquivalent(s) Oral once  senna 2 Tablet(s) Oral at bedtime  sodium chloride 0.9%. 1000 milliLiter(s) (100 mL/Hr) IV Continuous <Continuous>  sodium chloride 0.9%. 1000 milliLiter(s) (75 mL/Hr) IV Continuous <Continuous>    MEDICATIONS  (PRN):  acetaminophen     Tablet .. 650 milliGRAM(s) Oral every 6 hours PRN Mild Pain (1 - 3)  polyethylene glycol 3350 17 Gram(s) Oral at bedtime PRN Constipation      Allergies    No Known Allergies    Intolerances        ROS: All other systems reviewed and negative    PHYSICAL EXAM:  VITALS: T(C): 36.9 (01-19-24 @ 13:45)  T(F): 98.4 (01-19-24 @ 13:45), Max: 101.1 (01-19-24 @ 04:21)  HR: 90 (01-19-24 @ 13:45) (86 - 96)  BP: 104/68 (01-19-24 @ 13:45) (101/66 - 106/64)  RR:  (17 - 18)  SpO2:  (99% - 100%)  Wt(kg): --  GENERAL: NAD, resting comfortably   EYES: No proptosis,  anicteric  HEENT:  Atraumatic, Normocephalic, moist mucous membranes  RESPIRATORY: Nonlabored respirations on room air, normal rate/effort   NEURO: AOx3, answering questions & following commands appropriately  PSYCH:  reactive affect, euthymic mood    POCT Blood Glucose.: 100 mg/dL (01-17-24 @ 08:01)      01-19    138  |  104  |  14  ----------------------------<  83  3.7   |  20<L>  |  0.45<L>    eGFR: 101    Ca    8.4      01-19  Mg     1.80     01-19  Phos  1.5     01-19    TPro  6.1  /  Alb  2.4<L>  /  TBili  <0.2  /  DBili  <0.2  /  AST  12  /  ALT  5   /  AlkPhos  87  01-18      A1C with Estimated Average Glucose Result: 5.4 % (01-11-24 @ 06:54)  A1C with Estimated Average Glucose Result: 5.9 % (10-08-23 @ 03:45)      Thyroid Stimulating Hormone, Serum: 1.27 uIU/mL (01-14-24 @ 06:23)

## 2024-01-19 NOTE — PROGRESS NOTE ADULT - PROBLEM SELECTOR PLAN 2
new fever 1/18 and 1/19 with slightly uptrending WBC, ddx. inc DVT vs. malignancy vs. infection   -s/p zosyn x 5 day earlier this admission  -CT c/a/p 1/17 no e/o new source of infection   -f/u blood and urine cx 1/17  -empiric abx zosyn started 1/17-

## 2024-01-19 NOTE — PROGRESS NOTE ADULT - PROBLEM SELECTOR PLAN 1
Extensive bilateral recurrent DVT, hypercoagulable state likely from malignancy   Dopplers performed on 1/14 showing right and left Femoral Vein: acute occlusive DVT. Right Saphenofemoral Junction: age-indeterminate non-occlusive superficial vein thrombosis. Left Saphenofemoral Junction: acute occlusive superficial vein thrombosis  Vascular consulted- No acute vascular surgery intervention at this time  unable to tolerated eliquis d/t vaginal bleed. currently on hold.    s/p  IVC filter placement 1/18

## 2024-01-19 NOTE — PROGRESS NOTE ADULT - PROBLEM SELECTOR PLAN 8
Stage II-III sacral ulcer on admission > As per pt, she developed it in [facility]   Nutrition consult   FU Wound care-nursing and surgery consult placed on 1/12- Not seen yet.  Turn every 4 hours, OOB to chair as tolerated   Continue to monitor

## 2024-01-19 NOTE — PROGRESS NOTE ADULT - PROBLEM SELECTOR PLAN 4
Severe bilateral hydronephrosis with b/l outlet obstruction s/p bilateral nephrostomy tube placement 10/31, with exchange/replacement recently at [facility] due to blocked nephrostomy tube   Right nephrostomy tube in place, left nephrostomy tube displaced/dislodged  CT abdomen, pelvis and chest 11/23 showed thrombus in right common femoral vein extending into superficial vein (not seen on 03/2023), increase in hydronephrosis despite perc nephrostomy tubes and ureteral stent, prominent soft tissue in posterior bladder extending into ureters inseparable from vaginal cuff adjacent fat increased size of right external iliac LNs.  BUN/Creatinine stable  s/p L NT replaced on 1/11 - Flush with 5 cc NS qd  Continue to monitor

## 2024-01-19 NOTE — PROGRESS NOTE ADULT - ASSESSMENT
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 Endocrinology consulted for hypercalcemia evaluation & management.     #Hypercalcemia  #Hypophosphatemia   #vitamin d deficiency   Hypercalcemia likely due to malignancy (has urothelial and colon cancer), less likely granulomatous disease given low/normal 1,25 vitamin D  Possible that there may also be a component of secondary hyperparathyroidism given PTH not suppressed as would be expected and given patient also with vitamin d deficiency & hypophosphatemia   - PTH 24, repeat 18   - 25 OH vitamin D 17, low; 1-25 OH vit D 23, normal  - TSH 1.27, normal  - SPEP normal  - corrected calcium improving, 9.68   - S/p 4mg IV zoledronic acid on 1/14, corrected ca > 12  PLAN:  - f/u PTHrP  - encourage po intake & hydration   - ordered for IVF, not seen running when seen earlier this morning   - trend CMP for calcium and albumin q24h  - continue vitamin D3 2000 IU daily   - monitor mag & phosphorus daily and replete as needed    #hx of preDM  - most recent A1c 5.4%  - a1c 5.9% in 10/2023   - monitor serum glucose     Chrystal Riley DO   Attending Physician   Department of Endocrinology, Diabetes and Metabolism     If before 9AM or after 5PM, or on weekends/holidays, please call the Endocrine answering service for assistance ([phone]).  For nonurgent matters, please email [email or web address] or [email or web address]     Please note that this patient may be followed by different provider tomorrow.

## 2024-01-19 NOTE — PROGRESS NOTE ADULT - SUBJECTIVE AND OBJECTIVE BOX
[facility] Division of [facility] Medicine  Haseeb Edwards MD  Pager [phone]      Patient is a 73y old  Female who presents with a chief complaint of dislodged nephrostomy tube (19 Jan 2024 14:58)      SUBJECTIVE / OVERNIGHT EVENTS:    pt had IVC filter placed yesterday pm, tolerated procedure well. fever 101F earlier am. no new complaints. pain controlled.     ADDITIONAL REVIEW OF SYSTEMS:    RESPIRATORY: No cough, wheezing, chills or hemoptysis; No shortness of breath  CARDIOVASCULAR: No chest pain, palpitations, dizziness, or leg swelling  GASTROINTESTINAL: No abdominal or epigastric pain. No nausea, vomiting, or hematemesis; No diarrhea or constipation. No melena or hematochezia.      MEDICATIONS  (STANDING):  bisacodyl 5 milliGRAM(s) Oral once  chlorhexidine 2% Cloths 1 Application(s) Topical daily  cholecalciferol 2000 Unit(s) Oral daily  influenza  Vaccine (HIGH DOSE) 0.7 milliLiter(s) IntraMuscular once  lactated ringers. 1000 milliLiter(s) (100 mL/Hr) IV Continuous <Continuous>  piperacillin/tazobactam IVPB.. 3.375 Gram(s) IV Intermittent every 8 hours  potassium chloride   Powder 40 milliEquivalent(s) Oral once  senna 2 Tablet(s) Oral at bedtime  sodium chloride 0.9%. 1000 milliLiter(s) (75 mL/Hr) IV Continuous <Continuous>  sodium chloride 0.9%. 1000 milliLiter(s) (100 mL/Hr) IV Continuous <Continuous>    MEDICATIONS  (PRN):  acetaminophen     Tablet .. 650 milliGRAM(s) Oral every 6 hours PRN Mild Pain (1 - 3)  polyethylene glycol 3350 17 Gram(s) Oral at bedtime PRN Constipation      CAPILLARY BLOOD GLUCOSE        I&O's Summary    18 Jan 2024 07:01  -  19 Jan 2024 07:00  --------------------------------------------------------  IN: 700 mL / OUT: 1085 mL / NET: -385 mL        PHYSICAL EXAM:  Vital Signs Last 24 Hrs  T(C): 36.9 (19 Jan 2024 13:45), Max: 38.4 (19 Jan 2024 04:21)  T(F): 98.4 (19 Jan 2024 13:45), Max: 101.1 (19 Jan 2024 04:21)  HR: 90 (19 Jan 2024 13:45) (86 - 96)  BP: 104/68 (19 Jan 2024 13:45) (101/66 - 106/64)  BP(mean): --  RR: 18 (19 Jan 2024 13:45) (17 - 18)  SpO2: 100% (19 Jan 2024 13:45) (99% - 100%)    Parameters below as of 19 Jan 2024 13:45  Patient On (Oxygen Delivery Method): room air        CONSTITUTIONAL: NAD,  EYES: PERRLA; conjunctiva and sclera clear  ENMT: Moist oral mucosa, no pharyngeal injection or exudates;   NECK: Supple, no palpable masses;  RESPIRATORY: Normal respiratory effort; lungs are clear to auscultation bilaterally  CARDIOVASCULAR: Regular rate and rhythm, normal S1 and S2, no murmur/rub/gallop; 2+ lower extremity edema; Peripheral pulses are 2+ bilaterally  ABDOMEN: Nontender to palpation, normoactive bowel sounds, no rebound/guarding; b/l PCN in place   MUSCLOSKELETAL:   no clubbing or cyanosis of digits; no joint swelling or tenderness to palpation  PSYCH: A+O to person, place,  NEUROLOGY: CN 2-12 are intact and symmetric; no gross sensory deficits;   SKIN: No rashes;     LABS:                        7.4    16.34 )-----------( 429      ( 19 Jan 2024 03:57 )             24.4     01-19    138  |  104  |  14  ----------------------------<  83  3.7   |  20<L>  |  0.45<L>    Ca    8.4      19 Jan 2024 03:57  Phos  1.5     01-19  Mg     1.80     01-19    TPro  6.1  /  Alb  2.4<L>  /  TBili  <0.2  /  DBili  <0.2  /  AST  12  /  ALT  5   /  AlkPhos  87  01-18          Urinalysis Basic - ( 19 Jan 2024 03:57 )    Color: x / Appearance: x / SG: x / pH: x  Gluc: 83 mg/dL / Ketone: x  / Bili: x / Urobili: x   Blood: x / Protein: x / Nitrite: x   Leuk Esterase: x / RBC: x / WBC x   Sq Epi: x / Non Sq Epi: x / Bacteria: x        Culture - Urine (collected 18 Jan 2024 13:30)  Source: .Urine Nephrostomy - Right  Final Report (19 Jan 2024 15:09):    <10,000 CFU/mL Normal Urogenital Veronica    Culture - Blood (collected 18 Jan 2024 00:32)  Source: .Blood Blood-Peripheral  Preliminary Report (19 Jan 2024 10:02):    No growth at 24 hours    Culture - Blood (collected 18 Jan 2024 00:32)  Source: .Blood Blood  Preliminary Report (19 Jan 2024 10:02):    No growth at 24 hours        RADIOLOGY & ADDITIONAL TESTS:  Results Reviewed:   Imaging Personally Reviewed:  Electrocardiogram Personally Reviewed:    COORDINATION OF CARE:  Care Discussed with Consultants/Other Providers [Y/N]:  Prior or Outpatient Records Reviewed [Y/N]:

## 2024-01-20 NOTE — PROGRESS NOTE ADULT - PROBLEM SELECTOR PLAN 8
Stage II-III sacral ulcer on admission > As per pt, she developed it in [facility]   Nutrition consult   FU Wound care-nursing and surgery consult placed on 1/12- Not seen yet.  Turn every 4 hours, OOB to chair as tolerated   Continue to monitor

## 2024-01-20 NOTE — PROGRESS NOTE ADULT - PROBLEM SELECTOR PLAN 4
Severe bilateral hydronephrosis with b/l outlet obstruction s/p bilateral nephrostomy tube placement 10/31, with exchange/replacement recently at [facility] due to blocked nephrostomy tube   Right nephrostomy tube in place, left nephrostomy tube displaced/dislodged  CT abdomen, pelvis and chest 11/23 showed thrombus in right common femoral vein extending into superficial vein (not seen on 03/2023), increase in hydronephrosis despite perc nephrostomy tubes and ureteral stent, prominent soft tissue in posterior bladder extending into ureters inseparable from vaginal cuff adjacent fat increased size of right external iliac LNs.  BUN/Creatinine stable  s/p L NT replaced on 1/11 - Flush with 5 cc NS qd  Continue to monitor

## 2024-01-20 NOTE — PROGRESS NOTE ADULT - SUBJECTIVE AND OBJECTIVE BOX
[facility] Division of [facility] Medicine  Haseeb Edwards MD  Pager [phone]      Patient is a 73y old  Female who presents with a chief complaint of dislodged nephrostomy tube (19 Jan 2024 16:12)      SUBJECTIVE / OVERNIGHT EVENTS:    no fever o/n. no new complaints     ADDITIONAL REVIEW OF SYSTEMS:    RESPIRATORY: No cough, wheezing, chills or hemoptysis; No shortness of breath  CARDIOVASCULAR: No chest pain, palpitations, dizziness, or leg swelling  GASTROINTESTINAL: No abdominal or epigastric pain. No nausea, vomiting, or hematemesis; No diarrhea or constipation. No melena or hematochezia.      MEDICATIONS  (STANDING):  bisacodyl 5 milliGRAM(s) Oral once  chlorhexidine 2% Cloths 1 Application(s) Topical daily  cholecalciferol 2000 Unit(s) Oral daily  influenza  Vaccine (HIGH DOSE) 0.7 milliLiter(s) IntraMuscular once  lactated ringers. 1000 milliLiter(s) (100 mL/Hr) IV Continuous <Continuous>  piperacillin/tazobactam IVPB.. 3.375 Gram(s) IV Intermittent every 8 hours  potassium chloride   Powder 40 milliEquivalent(s) Oral once  senna 2 Tablet(s) Oral at bedtime  sodium chloride 0.9%. 1000 milliLiter(s) (75 mL/Hr) IV Continuous <Continuous>  sodium chloride 0.9%. 1000 milliLiter(s) (100 mL/Hr) IV Continuous <Continuous>    MEDICATIONS  (PRN):  acetaminophen     Tablet .. 650 milliGRAM(s) Oral every 6 hours PRN Mild Pain (1 - 3)  polyethylene glycol 3350 17 Gram(s) Oral at bedtime PRN Constipation      CAPILLARY BLOOD GLUCOSE        I&O's Summary    19 Jan 2024 07:01  -  20 Jan 2024 07:00  --------------------------------------------------------  IN: 0 mL / OUT: 1195 mL / NET: -1195 mL    20 Jan 2024 07:01  -  20 Jan 2024 15:28  --------------------------------------------------------  IN: 500 mL / OUT: 250 mL / NET: 250 mL        PHYSICAL EXAM:  Vital Signs Last 24 Hrs  T(C): 37.1 (20 Jan 2024 12:30), Max: 37.1 (19 Jan 2024 18:05)  T(F): 98.8 (20 Jan 2024 12:30), Max: 98.8 (19 Jan 2024 18:05)  HR: 93 (20 Jan 2024 12:30) (91 - 97)  BP: 108/66 (20 Jan 2024 12:30) (100/64 - 112/69)  BP(mean): --  RR: 17 (20 Jan 2024 12:30) (17 - 18)  SpO2: 99% (20 Jan 2024 12:30) (99% - 100%)    Parameters below as of 20 Jan 2024 12:30  Patient On (Oxygen Delivery Method): room air        CONSTITUTIONAL: NAD,  EYES: PERRLA; conjunctiva and sclera clear  ENMT: Moist oral mucosa, no pharyngeal injection or exudates;   NECK: Supple, no palpable masses;  RESPIRATORY: Normal respiratory effort; lungs are clear to auscultation bilaterally  CARDIOVASCULAR: Regular rate and rhythm, normal S1 and S2, no murmur/rub/gallop; 2+ lower extremity edema; Peripheral pulses are 2+ bilaterally  ABDOMEN: Nontender to palpation, normoactive bowel sounds, no rebound/guarding; b/l PCN in place   MUSCLOSKELETAL:   no clubbing or cyanosis of digits; no joint swelling or tenderness to palpation  PSYCH: A+O to person, place,  NEUROLOGY: CN 2-12 are intact and symmetric; no gross sensory deficits;   SKIN: No rashes;       LABS:                        7.7    14.86 )-----------( 408      ( 20 Jan 2024 06:04 )             25.2     01-20    140  |  105  |  10  ----------------------------<  105<H>  3.8   |  23  |  0.39<L>    Ca    8.2<L>      20 Jan 2024 06:04  Phos  1.5     01-20  Mg     1.90     01-20    TPro  6.5  /  Alb  2.4<L>  /  TBili  <0.2  /  DBili  <0.2  /  AST  13  /  ALT  7   /  AlkPhos  86  01-20          Urinalysis Basic - ( 20 Jan 2024 06:04 )    Color: x / Appearance: x / SG: x / pH: x  Gluc: 105 mg/dL / Ketone: x  / Bili: x / Urobili: x   Blood: x / Protein: x / Nitrite: x   Leuk Esterase: x / RBC: x / WBC x   Sq Epi: x / Non Sq Epi: x / Bacteria: x        Culture - Urine (collected 18 Jan 2024 13:30)  Source: .Urine Nephrostomy - Right  Final Report (19 Jan 2024 15:09):    <10,000 CFU/mL Normal Urogenital Veronica    Culture - Blood (collected 18 Jan 2024 00:32)  Source: .Blood Blood-Peripheral  Preliminary Report (20 Jan 2024 10:01):    No growth at 48 Hours    Culture - Blood (collected 18 Jan 2024 00:32)  Source: .Blood Blood  Preliminary Report (20 Jan 2024 10:01):    No growth at 48 Hours        RADIOLOGY & ADDITIONAL TESTS:  Results Reviewed:   Imaging Personally Reviewed:  Electrocardiogram Personally Reviewed:    COORDINATION OF CARE:  Care Discussed with Consultants/Other Providers [Y/N]:  Prior or Outpatient Records Reviewed [Y/N]:

## 2024-01-20 NOTE — PROGRESS NOTE ADULT - PROBLEM SELECTOR PLAN 2
new fever 1/18 and 1/19 with slightly uptrending WBC, ddx. inc DVT vs. malignancy vs. infection   -s/p zosyn x 5 day earlier this admission  -CT c/a/p 1/17 no e/o new source of infection   -f/u blood and urine cx 1/17 NGTD  -empiric abx zosyn started 1/17-

## 2024-01-21 NOTE — PROGRESS NOTE ADULT - PROBLEM SELECTOR PLAN 10
Extensively discussed with patient -would like trial of intubation, would not want to be long-term on ventilator. Patient is ok with dialysis if needed. Patient would not like feeding tube if needed. Daughters are her healthcare proxy.  [facility]

## 2024-01-21 NOTE — PROGRESS NOTE ADULT - SUBJECTIVE AND OBJECTIVE BOX
[facility] Division of [facility] Medicine  Haseeb Edwards MD  Pager [phone]      Patient is a 73y old  Female who presents with a chief complaint of dislodged nephrostomy tube (20 Jan 2024 15:28)      SUBJECTIVE / OVERNIGHT EVENTS:    no fever o/n. no new complaint    ADDITIONAL REVIEW OF SYSTEMS:    RESPIRATORY: No cough, wheezing, chills or hemoptysis; No shortness of breath  CARDIOVASCULAR: No chest pain, palpitations, dizziness, or leg swelling  GASTROINTESTINAL: No abdominal or epigastric pain. No nausea, vomiting, or hematemesis; No diarrhea or constipation. No melena or hematochezia.    MEDICATIONS  (STANDING):  bisacodyl 5 milliGRAM(s) Oral once  chlorhexidine 2% Cloths 1 Application(s) Topical daily  cholecalciferol 2000 Unit(s) Oral daily  influenza  Vaccine (HIGH DOSE) 0.7 milliLiter(s) IntraMuscular once  lactated ringers. 1000 milliLiter(s) (100 mL/Hr) IV Continuous <Continuous>  piperacillin/tazobactam IVPB.. 3.375 Gram(s) IV Intermittent every 8 hours  potassium phosphate IVPB 30 milliMole(s) IV Intermittent once  senna 2 Tablet(s) Oral at bedtime  sodium chloride 0.9%. 1000 milliLiter(s) (100 mL/Hr) IV Continuous <Continuous>  sodium chloride 0.9%. 1000 milliLiter(s) (75 mL/Hr) IV Continuous <Continuous>    MEDICATIONS  (PRN):  acetaminophen     Tablet .. 650 milliGRAM(s) Oral every 6 hours PRN Mild Pain (1 - 3)  polyethylene glycol 3350 17 Gram(s) Oral at bedtime PRN Constipation      CAPILLARY BLOOD GLUCOSE        I&O's Summary    20 Jan 2024 07:01  -  21 Jan 2024 07:00  --------------------------------------------------------  IN: 600 mL / OUT: 750 mL / NET: -150 mL    21 Jan 2024 07:01  -  21 Jan 2024 14:25  --------------------------------------------------------  IN: 360 mL / OUT: 0 mL / NET: 360 mL        PHYSICAL EXAM:  Vital Signs Last 24 Hrs  T(C): 36.5 (21 Jan 2024 11:51), Max: 36.9 (21 Jan 2024 06:00)  T(F): 97.7 (21 Jan 2024 11:51), Max: 98.5 (21 Jan 2024 06:00)  HR: 88 (21 Jan 2024 11:51) (78 - 91)  BP: 106/61 (21 Jan 2024 11:51) (106/61 - 124/62)  BP(mean): --  RR: 18 (21 Jan 2024 11:51) (17 - 18)  SpO2: 99% (21 Jan 2024 11:51) (98% - 99%)    Parameters below as of 21 Jan 2024 11:51  Patient On (Oxygen Delivery Method): room air        CONSTITUTIONAL: NAD,  EYES: PERRLA; conjunctiva and sclera clear  ENMT: Moist oral mucosa, no pharyngeal injection or exudates;   NECK: Supple, no palpable masses;  RESPIRATORY: Normal respiratory effort; lungs are clear to auscultation bilaterally  CARDIOVASCULAR: Regular rate and rhythm, normal S1 and S2, no murmur/rub/gallop; 2+ lower extremity edema; Peripheral pulses are 2+ bilaterally  ABDOMEN: Nontender to palpation, normoactive bowel sounds, no rebound/guarding; b/l PCN in place   MUSCLOSKELETAL:   no clubbing or cyanosis of digits; no joint swelling or tenderness to palpation  PSYCH: A+O to person, place,  NEUROLOGY: CN 2-12 are intact and symmetric; no gross sensory deficits;   SKIN: No rashes;     LABS:                        7.9    15.83 )-----------( 363      ( 21 Jan 2024 05:34 )             25.6     01-21    139  |  104  |  10  ----------------------------<  151<H>  3.4<L>   |  24  |  0.43<L>    Ca    8.5      21 Jan 2024 09:55  Phos  1.5     01-21  Mg     1.90     01-21    TPro  6.5  /  Alb  2.2<L>  /  TBili  <0.2  /  DBili  <0.2  /  AST  25  /  ALT  6   /  AlkPhos  86  01-21          Urinalysis Basic - ( 21 Jan 2024 09:55 )    Color: x / Appearance: x / SG: x / pH: x  Gluc: 151 mg/dL / Ketone: x  / Bili: x / Urobili: x   Blood: x / Protein: x / Nitrite: x   Leuk Esterase: x / RBC: x / WBC x   Sq Epi: x / Non Sq Epi: x / Bacteria: x          RADIOLOGY & ADDITIONAL TESTS:  Results Reviewed:   Imaging Personally Reviewed:  Electrocardiogram Personally Reviewed:    COORDINATION OF CARE:  Care Discussed with Consultants/Other Providers [Y/N]:  Prior or Outpatient Records Reviewed [Y/N]:

## 2024-01-21 NOTE — PROGRESS NOTE ADULT - PROBLEM SELECTOR PLAN 8
Stage II-III sacral ulcer on admission > As per pt, she developed it in [facility]   Nutrition consult   FU Wound care-nursing and surgery consult placed on 1/12- Not seen yet.  Turn every 4 hours, OOB to chair as tolerated   Continue to monitor

## 2024-01-21 NOTE — PROGRESS NOTE ADULT - PROBLEM SELECTOR PLAN 2
Statement Selected new fever 1/18 and 1/19 with slightly uptrending WBC, ddx. inc DVT vs. malignancy vs. infection (UTI)    -s/p zosyn x 5 day earlier this admission  -CT c/a/p 1/17 no e/o new source of infection   -f/u blood and urine cx 1/17 NGTD  -empiric abx zosyn started 1/17, plan for 5 day course, to complete today

## 2024-01-21 NOTE — PROGRESS NOTE ADULT - ASSESSMENT
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  course c/b UTI, s/p Abx. Also found to have b/l femoral DVTs, unable to tolerate AC due to vaginal bleeding, now s/p IVC filter

## 2024-01-21 NOTE — PROGRESS NOTE ADULT - PROBLEM SELECTOR PLAN 4
Severe bilateral hydronephrosis with b/l outlet obstruction s/p bilateral nephrostomy tube placement 10/31, with exchange/replacement recently at [facility] due to blocked nephrostomy tube   Right nephrostomy tube in place, left nephrostomy tube displaced/dislodged  CT abdomen, pelvis and chest 11/23 showed thrombus in right common femoral vein extending into superficial vein (not seen on 03/2023), increase in hydronephrosis despite perc nephrostomy tubes and ureteral stent, prominent soft tissue in posterior bladder extending into ureters inseparable from vaginal cuff adjacent fat increased size of right external iliac LNs.  BUN/Creatinine stable  s/p L NT replaced on 1/11 - Flush with 5 cc NS qd  Continue to monitor

## 2024-01-22 NOTE — PROGRESS NOTE ADULT - PROBLEM SELECTOR PLAN 8
- Stage II-III sacral ulcer on admission > As per pt, she developed it in [facility]   - Nutrition consulted, appreciate recs  - wound care consulted, pending recs

## 2024-01-22 NOTE — PROGRESS NOTE ADULT - SUBJECTIVE AND OBJECTIVE BOX
[facility] Division of [facility] Medicine  Carol Hoff M.D  Pager [phone]  Available via MS Teams    SUBJECTIVE / OVERNIGHT EVENTS: No acute events overnight. Patient endorsing abdominal pain only with palpation this AM. Had some nausea but no vomiting.     ADDITIONAL REVIEW OF SYSTEMS:    MEDICATIONS  (STANDING):  bisacodyl 5 milliGRAM(s) Oral once  chlorhexidine 2% Cloths 1 Application(s) Topical daily  cholecalciferol 2000 Unit(s) Oral daily  influenza  Vaccine (HIGH DOSE) 0.7 milliLiter(s) IntraMuscular once  lactated ringers. 1000 milliLiter(s) (100 mL/Hr) IV Continuous <Continuous>  piperacillin/tazobactam IVPB.. 3.375 Gram(s) IV Intermittent every 8 hours  senna 2 Tablet(s) Oral at bedtime  sodium chloride 0.9%. 1000 milliLiter(s) (100 mL/Hr) IV Continuous <Continuous>  sodium chloride 0.9%. 1000 milliLiter(s) (75 mL/Hr) IV Continuous <Continuous>    MEDICATIONS  (PRN):  acetaminophen     Tablet .. 650 milliGRAM(s) Oral every 6 hours PRN Mild Pain (1 - 3)  polyethylene glycol 3350 17 Gram(s) Oral at bedtime PRN Constipation      I&O's Summary    21 Jan 2024 07:01  -  22 Jan 2024 07:00  --------------------------------------------------------  IN: 460 mL / OUT: 805 mL / NET: -345 mL    22 Jan 2024 07:01  -  22 Jan 2024 13:54  --------------------------------------------------------  IN: 400 mL / OUT: 200 mL / NET: 200 mL        PHYSICAL EXAM:  Vital Signs Last 24 Hrs  T(C): 36.7 (22 Jan 2024 11:33), Max: 36.9 (21 Jan 2024 22:12)  T(F): 98 (22 Jan 2024 11:33), Max: 98.4 (21 Jan 2024 22:12)  HR: 64 (22 Jan 2024 11:33) (64 - 96)  BP: 125/74 (22 Jan 2024 11:33) (104/75 - 125/74)  BP(mean): --  RR: 18 (22 Jan 2024 11:33) (17 - 18)  SpO2: 100% (22 Jan 2024 11:33) (99% - 100%)    Parameters below as of 22 Jan 2024 11:33  Patient On (Oxygen Delivery Method): room air      CONSTITUTIONAL: NAD, elderly frail woman RESPIRATORY: Normal respiratory effort; lungs are clear to auscultation bilaterally  CARDIOVASCULAR: Regular rate and rhythm, normal S1 and S2, no murmur/rub/gallop; No lower extremity edema  ABDOMEN: tender to palpation, normoactive bowel sounds  MUSCULOSKELETAL: weakness noted   PSYCH: A+O to person, place, and time  NEUROLOGY: CN 2-12 are intact and symmetric; no gross sensory deficits   SKIN: No rashes; no palpable lesions    LABS:                        7.8    19.21 )-----------( 416      ( 22 Jan 2024 06:02 )             25.3     01-22    140  |  107  |  10  ----------------------------<  111<H>  3.9   |  23  |  0.36<L>    Ca    8.1<L>      22 Jan 2024 06:02  Phos  1.8     01-22  Mg     1.90     01-22    TPro  6.6  /  Alb  2.6<L>  /  TBili  0.2  /  DBili  <0.2  /  AST  10  /  ALT  5   /  AlkPhos  85  01-22          Urinalysis Basic - ( 22 Jan 2024 06:02 )    Color: x / Appearance: x / SG: x / pH: x  Gluc: 111 mg/dL / Ketone: x  / Bili: x / Urobili: x   Blood: x / Protein: x / Nitrite: x   Leuk Esterase: x / RBC: x / WBC x   Sq Epi: x / Non Sq Epi: x / Bacteria: x        SARS-CoV-2: NotDetec (17 Jan 2024 23:45)  SARS-CoV-2: NotDetec (07 Oct 2023 21:45)

## 2024-01-22 NOTE — PROGRESS NOTE ADULT - ASSESSMENT
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 Endocrinology consulted for hypercalcemia evaluation & management.     #Hypercalcemia  #Hypophosphatemia   #vitamin d deficiency   Hypercalcemia likely due to malignancy (has urothelial and colon cancer), less likely granulomatous disease given low/normal 1,25 vitamin D  Possible that there may also be a component of secondary hyperparathyroidism given PTH not suppressed as would be expected and given patient also with vitamin d deficiency & hypophosphatemia   - PTH 24, repeat 18   - 25 OH vitamin D 17, low; 1-25 OH vit D 23, normal  - TSH 1.27, normal  pthrp negative   - corrected calcium improving  - S/p 4mg IV zoledronic acid on 1/14, corrected ca > 12  PLAN:  - encourage po intake & hydration   - trend CMP for calcium and albumin q24h  - continue vitamin D3 2000 IU daily       corrected calcium has improved, please inform her outpt providers of inpt admission and need to fu outpt, including monitoring of calcium   endocrine to sign off at this time  if any issues or concerns and or if issues with hyper or hypocalcemia the remainder of this admission please feel free to reach out to endocrine      management now as per primary team      Mary Srivastava MD  Attending Physician   Department of Endocrinology, Diabetes and Metabolism     weekdays: 9am to 5pm: [facility] or [facility] and or TEAMS     Nights and weekends: [phone]  Please note that this patient may be followed by a different provider tomorrow.   If no answer or after hours, please contact [phone].  For final dc reccomendations, please call [phone] or page the endocrine fellow on call.     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 Endocrinology consulted for hypercalcemia evaluation & management.     #Hypercalcemia  #Hypophosphatemia   #vitamin d deficiency   Hypercalcemia likely due to malignancy (has urothelial and colon cancer), less likely granulomatous disease given low/normal 1,25 vitamin D  Possible that there may also be a component of secondary hyperparathyroidism given PTH not suppressed as would be expected and given patient also with vitamin d deficiency & hypophosphatemia   - PTH 24, repeat 18   - 25 OH vitamin D 17, low; 1-25 OH vit D 23, normal  - TSH 1.27, normal  pthrp negative   - corrected calcium improving  - S/p 4mg IV zoledronic acid on 1/14, corrected ca > 12  PLAN:  - encourage po intake & hydration   - trend CMP for calcium and albumin q24h- correct calcium for albumin daily to make sure it is normal  - continue vitamin D3 2000 IU daily   -monitor mag and phos-  -if phos low - replete as needed, encourage PO intake of phos containing foods   pt has poor nutrition, not eating or drinking much, reccomend nutrition consult to help optimize PO intake       corrected calcium has improved, please inform her outpt providers of inpt admission and need to fu outpt, including monitoring of calcium   endocrine to sign off at this time  if any issues or concerns and or if issues with hyper or hypocalcemia the remainder of this admission please feel free to reach out to endocrine      management now as per primary team      Mary Srivastava MD  Attending Physician   Department of Endocrinology, Diabetes and Metabolism     weekdays: 9am to 5pm: [facility] or [facility] and or TEAMS     Nights and weekends: [phone]  Please note that this patient may be followed by a different provider tomorrow.   If no answer or after hours, please contact [phone].  For final dc reccomendations, please call [phone] or page the endocrine fellow on call.     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 Endocrinology consulted for hypercalcemia evaluation & management.     #Hypercalcemia  #Hypophosphatemia   #vitamin d deficiency   Hypercalcemia likely due to malignancy (has urothelial and colon cancer), less likely granulomatous disease given low/normal 1,25 vitamin D  Possible that there may also be a component of secondary hyperparathyroidism given PTH not suppressed as would be expected and given patient also with vitamin d deficiency & hypophosphatemia   - PTH 24, repeat 18   - 25 OH vitamin D 17, low; 1-25 OH vit D 23, normal  - TSH 1.27, normal  pthrp negative   - corrected calcium improving  - S/p 4mg IV zoledronic acid on 1/14, corrected ca > 12, calcium now improving   PLAN:  - encourage po intake & hydration   - trend CMP for calcium and albumin q24h- correct calcium for albumin daily to make sure it is normal  - continue vitamin D3 2000 IU daily   -monitor mag and phos-  -if phos low - replete as needed, encourage PO intake of phos containing foods   pt has poor nutrition, not eating or drinking much, reccomend nutrition consult to help optimize PO intake       corrected calcium has improved, please inform her outpt providers of inpt admission and need to fu outpt, including monitoring of calcium   endocrine to sign off at this time  if any issues or concerns and or if issues with hyper or hypocalcemia the remainder of this admission please feel free to reach out to endocrine      management now as per primary team    pt should have outpt endocrine fu as well given potential component of hyperparathyroidism and workup for the same outpt   bone density outpt as well  If patient wishes to follow up with [facility] Endocrinology     [facility]  [address] [place] [address], [address], [place], [place] [address] [phone]     ENDOCRINE FOLLOW UP  CALL PATIENT ACCESS SERVICES  [phone]  For all [facility] Endocrine Practices phone numbers and locations throughout [place], and [place].        Mary Srivastava MD  Attending Physician   Department of Endocrinology, Diabetes and Metabolism     weekdays: 9am to 5pm: email [facility] or [facility] and or TEAMS     Nights and weekends: [phone]  Please note that this patient may be followed by a different provider tomorrow.   If no answer or after hours, please contact [phone].  For final dc reccomendations, please call [phone] or page the endocrine fellow on call.     #hx of preDM  - most recent A1c 5.4%  - a1c 5.9% in 10/2023   - monitor serum glucose

## 2024-01-22 NOTE — PROGRESS NOTE ADULT - PROBLEM SELECTOR PLAN 4
- Severe bilateral hydronephrosis with b/l outlet obstruction s/p bilateral nephrostomy tube placement 10/31, with exchange/replacement recently at [facility] due to blocked nephrostomy tube   - Right nephrostomy tube in place, left nephrostomy tube displaced/dislodged  - CT abdomen, pelvis and chest 11/23 showed thrombus in right common femoral vein extending into superficial vein (not seen on 03/2023), increase in hydronephrosis despite perc nephrostomy tubes and ureteral stent, prominent soft tissue in posterior bladder extending into ureters inseparable from vaginal cuff adjacent fat increased size of right external iliac LNs.  - BUN/Creatinine stable  - s/p L NT replaced on 1/11 - Flush with 5 cc NS qd  - Continue to monitor

## 2024-01-22 NOTE — PROGRESS NOTE ADULT - PROBLEM SELECTOR PLAN 1
- Extensive bilateral recurrent DVT, hypercoagulable state likely from malignancy   - Dopplers performed on 1/14 showing right and left Femoral Vein: acute occlusive DVT. Right Saphenofemoral Junction: age-indeterminate non-occlusive superficial vein thrombosis. Left Saphenofemoral Junction: acute occlusive superficial vein thrombosis  - Vascular consulted- No acute vascular surgery intervention at this time  unable to tolerated eliquis d/t vaginal bleed. currently on hold.    - s/p  IVC filter placement 1/18

## 2024-01-22 NOTE — PROGRESS NOTE ADULT - SUBJECTIVE AND OBJECTIVE BOX
Chief Complaint/Follow-up on: hypercalcemia    Subjective:  pt feels okay  we spoke about her calcium levels and that they have improved now         MEDICATIONS  (STANDING):  bisacodyl 5 milliGRAM(s) Oral once  chlorhexidine 2% Cloths 1 Application(s) Topical daily  cholecalciferol 2000 Unit(s) Oral daily  influenza  Vaccine (HIGH DOSE) 0.7 milliLiter(s) IntraMuscular once  lactated ringers. 1000 milliLiter(s) (100 mL/Hr) IV Continuous <Continuous>  piperacillin/tazobactam IVPB.. 3.375 Gram(s) IV Intermittent every 8 hours  potassium phosphate IVPB 15 milliMole(s) IV Intermittent once  senna 2 Tablet(s) Oral at bedtime  sodium chloride 0.9%. 1000 milliLiter(s) (100 mL/Hr) IV Continuous <Continuous>  sodium chloride 0.9%. 1000 milliLiter(s) (75 mL/Hr) IV Continuous <Continuous>    MEDICATIONS  (PRN):  acetaminophen     Tablet .. 650 milliGRAM(s) Oral every 6 hours PRN Mild Pain (1 - 3)  polyethylene glycol 3350 17 Gram(s) Oral at bedtime PRN Constipation      PHYSICAL EXAM:  VITALS: T(C): 36.7 (01-22-24 @ 11:33)  T(F): 98 (01-22-24 @ 11:33), Max: 98.4 (01-21-24 @ 22:12)  HR: 64 (01-22-24 @ 11:33) (64 - 96)  BP: 125/74 (01-22-24 @ 11:33) (104/75 - 125/74)  RR:  (17 - 18)  SpO2:  (99% - 100%)  Wt(kg): --  GENERAL: NAD, elderly thin female  EYES: No proptosis, no injection  HEENT:  Atraumatic, Normocephalic, moist mucous membranes  RESPIRATORY: Clear to auscultation bilaterally; No rales, rhonchi, wheezing, or rubs  CARDIOVASCULAR: Regular rate and rhythm; No murmurs; no peripheral edema  GI: Soft, nontender, non distended, normal bowel sounds        01-22    140  |  107  |  10  ----------------------------<  111<H>  3.9   |  23  |  0.36<L>    eGFR: 106    Ca    8.1<L>      01-22  Mg     1.90     01-22  Phos  1.8     01-22    TPro  6.6  /  Alb  2.6<L>  /  TBili  0.2  /  DBili  <0.2  /  AST  10  /  ALT  5   /  AlkPhos  85  01-22          Thyroid Function Tests:  01-14 @ 06:23 TSH 1.27 FreeT4 -- T3 -- Anti TPO -- Anti Thyroglobulin Ab -- TSI --

## 2024-01-22 NOTE — PROGRESS NOTE ADULT - PROBLEM SELECTOR PLAN 9
DVT prophylaxis: scd, IVC in place on 1/18, AC on hold given heavy vaginal bleeding and unable to tolerate

## 2024-01-22 NOTE — PROGRESS NOTE ADULT - PROBLEM SELECTOR PLAN 2
- new fever 1/18 and 1/19 with slightly up-trending WBC, ddx. inc DVT vs. malignancy vs. infection (UTI)    - s/p zosyn x 5 day earlier this admission  - CT c/a/p 1/17 no e/o new source of infection   - blood and urine cx 1/17 NGTD  - empiric abx zosyn started 1/17, plan for 5 day course, to complete today

## 2024-01-22 NOTE — PROGRESS NOTE ADULT - PROBLEM SELECTOR PLAN 3
- likely d/t malignancy    - Iron panel reviewed   - Continue to monitor CBC daily, transfuse if hgb<7

## 2024-01-23 NOTE — PROGRESS NOTE ADULT - SUBJECTIVE AND OBJECTIVE BOX
[facility] Division of Hospital Medicine  Carol Hoff M.D  Pager [phone]  Available via MS Teams    SUBJECTIVE / OVERNIGHT EVENTS: No acute events overnight. Patient feels well overall, denies any chest pain, SOB, fever or chills.     ADDITIONAL REVIEW OF SYSTEMS:    MEDICATIONS  (STANDING):  bisacodyl 5 milliGRAM(s) Oral once  chlorhexidine 2% Cloths 1 Application(s) Topical daily  cholecalciferol 2000 Unit(s) Oral daily  influenza  Vaccine (HIGH DOSE) 0.7 milliLiter(s) IntraMuscular once  piperacillin/tazobactam IVPB.. 3.375 Gram(s) IV Intermittent every 8 hours  potassium phosphate IVPB 30 milliMole(s) IV Intermittent once  senna 2 Tablet(s) Oral at bedtime    MEDICATIONS  (PRN):  acetaminophen     Tablet .. 650 milliGRAM(s) Oral every 6 hours PRN Mild Pain (1 - 3)  polyethylene glycol 3350 17 Gram(s) Oral at bedtime PRN Constipation      I&O's Summary    22 Jan 2024 07:01  -  23 Jan 2024 07:00  --------------------------------------------------------  IN: 600 mL / OUT: 500 mL / NET: 100 mL        PHYSICAL EXAM:  Vital Signs Last 24 Hrs  T(C): 36.8 (23 Jan 2024 12:00), Max: 36.9 (23 Jan 2024 06:11)  T(F): 98.2 (23 Jan 2024 12:00), Max: 98.4 (23 Jan 2024 06:11)  HR: 78 (23 Jan 2024 12:00) (60 - 78)  BP: 126/84 (23 Jan 2024 12:00) (117/75 - 128/70)  BP(mean): --  RR: 18 (23 Jan 2024 12:00) (17 - 18)  SpO2: 100% (23 Jan 2024 12:00) (99% - 100%)    Parameters below as of 23 Jan 2024 12:00  Patient On (Oxygen Delivery Method): room air      CONSTITUTIONAL: NAD, well-developed, well-groomed  RESPIRATORY: Normal respiratory effort; lungs are clear to auscultation bilaterally  CARDIOVASCULAR: Regular rate and rhythm, normal S1 and S2, no murmur/rub/gallop; No lower extremity edema   ABDOMEN: Nontender to palpation, normoactive bowel sounds, no rebound/guarding; No hepatosplenomegaly  MUSCULOSKELETAL:  Normal gait; no clubbing or cyanosis of digits; no joint swelling or tenderness to palpation  PSYCH: A+O to person, place, and time; affect appropriate  NEUROLOGY: CN 2-12 are intact and symmetric; no gross sensory deficits   SKIN: No rashes; no palpable lesions    LABS:                        8.1    18.75 )-----------( 410      ( 23 Jan 2024 05:40 )             26.7     01-23    140  |  106  |  12  ----------------------------<  120<H>  4.0   |  21<L>  |  0.37<L>    Ca    8.1<L>      23 Jan 2024 05:40  Phos  1.4     01-23  Mg     2.00     01-23    TPro  6.6  /  Alb  2.6<L>  /  TBili  0.2  /  DBili  <0.2  /  AST  10  /  ALT  5   /  AlkPhos  85  01-22          Urinalysis Basic - ( 23 Jan 2024 05:40 )    Color: x / Appearance: x / SG: x / pH: x  Gluc: 120 mg/dL / Ketone: x  / Bili: x / Urobili: x   Blood: x / Protein: x / Nitrite: x   Leuk Esterase: x / RBC: x / WBC x   Sq Epi: x / Non Sq Epi: x / Bacteria: x        SARS-CoV-2: NotDetec (17 Jan 2024 23:45)  SARS-CoV-2: NotDetec (07 Oct 2023 21:45)

## 2024-01-23 NOTE — PROGRESS NOTE ADULT - PROBLEM SELECTOR PLAN 2
- new fever 1/18 and 1/19 with slightly up-trending WBC, ddx. inc DVT vs. malignancy vs. infection (UTI)    - s/p zosyn x 5 day earlier this admission  - CT c/a/p 1/17 no e/o new source of infection   - blood and urine cx 1/17 NGTD x 5days   - completed 5 day course of zosyn

## 2024-01-23 NOTE — ADVANCED PRACTICE NURSE CONSULT - REASON FOR CONSULT
Patient seen on skin care rounds after wound care referral received for assessment of skin impairment and recommendations of topical management. As per H&P, patient is a 74 yo F hx of HLD, DVT in 12/23 (previously on eliquis), adenocarcinoma of right ascending colon (dx 10/2023), and urothelial carcinoma of bladder s/p TURBT and s/p b/l nephrostomy tubes BIBEMS for accidental left nephrostomy tube removal. Patient was discharged today from [facility] (stay was 12/26-1/11) for UTI and b/lo nephrostomy tube blockages. Chart reviewed: WBC 18.75, H/H 8.1/26.7, Platelets 410, INR 1.1, Serum albumin 2.6, A1C 5.4, BMI 28.3, Ba 16.

## 2024-01-23 NOTE — ADVANCED PRACTICE NURSE CONSULT - ASSESSMENT
General: A&Ox2, able to turn with 2x assistance, incontinent of stool. Patient has b/l nephrostomy tubes dressing clean dry and intact, with yellow urine noted in drainage bag. Skin warm, dry with increased moisture in intertriginous folds, scattered areas of hyperpigmentation and hypopigmentation. Patient at risk for moisture associated dermatitis to b/l groin. Abdomen soft but distended.     Vascular of b/l lower extremities: Bilateral lower extremities with scattered areas of hyper and hypopigmentation. Dry, flaky skin. No temperature changes noted. +3 Edema. Capillary refill less than 3 seconds. Hemosiderin staining noted to b/l lower extremities.     Sacrum to b/l buttocks: Patient at risk for moisture/incontinence associated dermatitis. Upon assessment, patient noted to have scattered areas of irregularly bordered hypopigmentation. No open ulcerations noted at this time. No suspected candidiasis. No increased warmth, no drainage, no erythema, no induration, no edema, no overt signs of infection noted at this time. Goals of care: monitor for tissue type changes and continue measures to decrease friction/shear/pressure.

## 2024-01-23 NOTE — PROGRESS NOTE ADULT - PROBLEM SELECTOR PLAN 8
- Stage II-III sacral ulcer on admission > As per pt, she developed it in    - Nutrition consulted, appreciate recs  - wound care consulted, pending recs

## 2024-01-23 NOTE — PROGRESS NOTE ADULT - PROBLEM SELECTOR PLAN 4
- Severe bilateral hydronephrosis with b/l outlet obstruction s/p bilateral nephrostomy tube placement 10/31, with exchange/replacement recently at [facility] due to blocked nephrostomy tube   - Right nephrostomy tube in place, left nephrostomy tube displaced/dislodged  - CT abdomen, pelvis and chest 11/23 showed thrombus in right common femoral vein extending into superficial vein (not seen on 03/2023), increase in hydronephrosis despite perc nephrostomy tubes and ureteral stent, prominent soft tissue in posterior bladder extending into ureters inseparable from vaginal cuff adjacent fat increased size of right external iliac LNs.  - BUN/Creatinine stable  - s/p L NT replaced on 1/11 - Flush with 5 cc NS qd  - Continue to monitor

## 2024-01-23 NOTE — PROGRESS NOTE ADULT - PROBLEM SELECTOR PLAN 10
Extensively discussed with patient -would like trial of intubation, would not want to be long-term on ventilator. Patient is ok with dialysis if needed. Patient would not like feeding tube if needed. Daughters are her healthcare proxy.  [facility]- [facility], pending CM

## 2024-01-23 NOTE — ADVANCED PRACTICE NURSE CONSULT - RECOMMEDATIONS
Topical recommendations:     Sacrum to b/l buttocks: Cleanse with skin cleanser, pat dry. Apply Skylar moisture barrier cream twice a day and PRN with incontinent episodes.     Apply Sween 24 Moisturizer to b/l UE and LE daily, avoiding in between the toes.     Continue low air loss bed therapy, continue heel elevation, continue to turn & reposition as per protocol, soft pillow between bony prominences, continue moisture management with barrier creams & single breathable pad, continue measures to decrease friction/shear/pressure. Continue with nutritional support as per dietary/orders.     Plan of care discussed with Primary RN Leda and     Please contact Wound/Ostomy Care Service Line if we can be of further assistance (ext [phone]).  Topical recommendations:     Sacrum to b/l buttocks: Cleanse with skin cleanser, pat dry. Apply Skylar moisture barrier cream twice a day and PRN with incontinent episodes.     Apply Sween 24 Moisturizer to b/l UE and LE daily, avoiding in between the toes.     Continue low air loss bed therapy, continue heel elevation, continue to turn & reposition as per protocol, soft pillow between bony prominences, continue moisture management with barrier creams & single breathable pad, continue measures to decrease friction/shear/pressure. Continue with nutritional support as per dietary/orders.     Plan of care discussed with Primary RN Leda and Blanca Christian (NP).    Please contact Wound/Ostomy Care Service Line if we can be of further assistance (ext [phone]).

## 2024-01-24 NOTE — PROGRESS NOTE ADULT - SUBJECTIVE AND OBJECTIVE BOX
[facility] Division of [facility] Medicine  aCrol Bryanreza MONTGOMERY  Pager [phone]  Available via MS Teams    SUBJECTIVE / OVERNIGHT EVENTS: No acute events overnight Patient seen this AM, siting up eating breakfast. Daughter at bedside.      ADDITIONAL REVIEW OF SYSTEMS:    MEDICATIONS  (STANDING):  bisacodyl 5 milliGRAM(s) Oral once  chlorhexidine 2% Cloths 1 Application(s) Topical daily  cholecalciferol 2000 Unit(s) Oral daily  influenza  Vaccine (HIGH DOSE) 0.7 milliLiter(s) IntraMuscular once  potassium phosphate / sodium phosphate Powder (PHOS-NaK) 1 Packet(s) Oral two times a day  senna 2 Tablet(s) Oral at bedtime    MEDICATIONS  (PRN):  acetaminophen     Tablet .. 650 milliGRAM(s) Oral every 6 hours PRN Mild Pain (1 - 3)  polyethylene glycol 3350 17 Gram(s) Oral at bedtime PRN Constipation      I&O's Summary    23 Jan 2024 07:01  -  24 Jan 2024 07:00  --------------------------------------------------------  IN: 0 mL / OUT: 900 mL / NET: -900 mL        PHYSICAL EXAM:  Vital Signs Last 24 Hrs  T(C): 36.7 (24 Jan 2024 12:38), Max: 36.9 (23 Jan 2024 18:00)  T(F): 98.1 (24 Jan 2024 12:38), Max: 98.4 (23 Jan 2024 18:00)  HR: 72 (24 Jan 2024 12:38) (68 - 93)  BP: 121/60 (24 Jan 2024 12:38) (113/70 - 125/87)  BP(mean): --  RR: 17 (24 Jan 2024 12:38) (16 - 18)  SpO2: 99% (24 Jan 2024 12:38) (99% - 100%)    Parameters below as of 24 Jan 2024 12:38  Patient On (Oxygen Delivery Method): room air      CONSTITUTIONAL: NAD, elderly female   RESPIRATORY: Normal respiratory effort; lungs are clear to auscultation bilaterally  CARDIOVASCULAR: Regular rate and rhythm, normal S1 and S2, no murmur/rub/gallop; No lower extremity edema  ABDOMEN: Nontender to palpation, normoactive bowel sounds, no rebound/guarding; No hepatosplenomegaly  MUSCULOSKELETAL:  Normal gait; no clubbing or cyanosis of digits; no joint swelling or tenderness to palpation  PSYCH: A+O to person, place, and time; affect appropriate  NEUROLOGY: CN 2-12 are intact and symmetric; no gross sensory deficits   SKIN: No rashes; no palpable lesions    LABS:                        8.0    17.06 )-----------( 408      ( 24 Jan 2024 05:20 )             26.1     01-24    140  |  106  |  9   ----------------------------<  105<H>  4.4   |  22  |  0.28<L>    Ca    8.0<L>      24 Jan 2024 05:20  Phos  1.5     01-24  Mg     2.20     01-24            Urinalysis Basic - ( 24 Jan 2024 05:20 )    Color: x / Appearance: x / SG: x / pH: x  Gluc: 105 mg/dL / Ketone: x  / Bili: x / Urobili: x   Blood: x / Protein: x / Nitrite: x   Leuk Esterase: x / RBC: x / WBC x   Sq Epi: x / Non Sq Epi: x / Bacteria: x        SARS-CoV-2: NotDetec (24 Jan 2024 11:00)  SARS-CoV-2: NotDetec (17 Jan 2024 23:45)  SARS-CoV-2: NotDetec (07 Oct 2023 21:45)

## 2024-01-24 NOTE — PROGRESS NOTE ADULT - PROBLEM SELECTOR PLAN 4
- Severe bilateral hydronephrosis with b/l outlet obstruction s/p bilateral nephrostomy tube placement 10/31, with exchange/replacement recently at [facility] due to blocked nephrostomy tube   - Right nephrostomy tube in place, left nephrostomy tube displaced/dislodged  - CT abdomen, pelvis and chest 11/23 showed thrombus in right common femoral vein extending into superficial vein (not seen on 03/2023), increase in hydronephrosis despite perc nephrostomy tubes and ureteral stent, prominent soft tissue in posterior bladder extending into ureters inseparable from vaginal cuff adjacent fat increased size of right external iliac LNs.  - BUN/Creatinine stable  - s/p L NT replaced on 1/11 - Flush with 5 cc NS qd; rehab unable to take patient due to NT tubes; need to teach daughter how to do it   - Continue to monitor

## 2024-01-24 NOTE — PROGRESS NOTE ADULT - PROBLEM SELECTOR PLAN 8
- Stage II-III sacral ulcer on admission > As per pt, she developed it in [facility]   - Nutrition consulted, appreciate recs  - wound care consulted, pending recs

## 2024-01-25 NOTE — PROGRESS NOTE ADULT - PROBLEM SELECTOR PLAN 9
DVT prophylaxis: scd, IVC in place on 1/18, AC on hold given heavy vaginal bleeding and unable to tolerate  Dispo: [facility], needs facility that can flush NT tubes, need to widen search

## 2024-01-25 NOTE — PROGRESS NOTE ADULT - PROBLEM SELECTOR PLAN 1
- Extensive bilateral recurrent DVT, hypercoagulable state likely from malignancy   - Dopplers performed on 1/14 showing right and left Femoral Vein: acute occlusive DVT. Right Saphenofemoral Junction: age-indeterminate non-occlusive superficial vein thrombosis. Left Saphenofemoral Junction: acute occlusive superficial vein thrombosis  - Vascular consulted- No acute vascular surgery intervention at this time  - unable to tolerated eliquis d/t vaginal bleed. currently on hold.    - s/p  IVC filter placement 1/18  - noted to have right lower extremity swelling, likely from extenisve DVT, continue to monitor

## 2024-01-25 NOTE — PROGRESS NOTE ADULT - PROBLEM SELECTOR PLAN 8
- Stage II-III sacral ulcer on admission > As per pt, she developed it in [facility]   - Nutrition consulted, appreciate recs  - wound care consulted, pending recs

## 2024-01-25 NOTE — PROGRESS NOTE ADULT - SUBJECTIVE AND OBJECTIVE BOX
[facility] Division of [facility] Medicine  Carol Hoff M.D  Pager [phone]  Available via MS Teams    SUBJECTIVE / OVERNIGHT EVENTS: No acute events overnight. Patient denies any chest pain, fevers, chills or headaches     ADDITIONAL REVIEW OF SYSTEMS:    MEDICATIONS  (STANDING):  bisacodyl 5 milliGRAM(s) Oral once  chlorhexidine 2% Cloths 1 Application(s) Topical daily  cholecalciferol 2000 Unit(s) Oral daily  influenza  Vaccine (HIGH DOSE) 0.7 milliLiter(s) IntraMuscular once  senna 2 Tablet(s) Oral at bedtime    MEDICATIONS  (PRN):  acetaminophen     Tablet .. 650 milliGRAM(s) Oral every 6 hours PRN Mild Pain (1 - 3)  polyethylene glycol 3350 17 Gram(s) Oral at bedtime PRN Constipation      I&O's Summary    24 Jan 2024 07:01  -  25 Jan 2024 07:00  --------------------------------------------------------  IN: 0 mL / OUT: 550 mL / NET: -550 mL    25 Jan 2024 07:01  -  25 Jan 2024 14:39  --------------------------------------------------------  IN: 657 mL / OUT: 270 mL / NET: 387 mL        PHYSICAL EXAM:  Vital Signs Last 24 Hrs  T(C): 37.1 (25 Jan 2024 13:00), Max: 37.1 (25 Jan 2024 13:00)  T(F): 98.8 (25 Jan 2024 13:00), Max: 98.8 (25 Jan 2024 13:00)  HR: 101 (25 Jan 2024 13:00) (92 - 101)  BP: 118/82 (25 Jan 2024 13:00) (116/77 - 126/89)  BP(mean): --  RR: 17 (25 Jan 2024 13:00) (16 - 18)  SpO2: 100% (25 Jan 2024 13:00) (100% - 100%)    Parameters below as of 25 Jan 2024 13:00  Patient On (Oxygen Delivery Method): room air      CONSTITUTIONAL: NAD, elderly female   RESPIRATORY: Normal respiratory effort; lungs are clear to auscultation bilaterally  CARDIOVASCULAR: Regular rate and rhythm, normal S1 and S2, no murmur/rub/gallop; No lower extremity edema  ABDOMEN: Nontender to palpation, normoactive bowel sounds, no rebound/guarding; No hepatosplenomegaly  MUSCULOSKELETAL:  no clubbing or cyanosis of digits  PSYCH: A+O to person, place, and time; affect appropriate  NEUROLOGY: CN 2-12 are intact and symmetric; no gross sensory deficits   SKIN: No rashes; no palpable lesions, + NT tubes present     LABS:                        7.9    20.07 )-----------( 397      ( 25 Jan 2024 05:08 )             25.7     01-25    139  |  105  |  12  ----------------------------<  124<H>  4.9   |  23  |  0.39<L>    Ca    9.0      25 Jan 2024 05:08  Phos  1.8     01-25  Mg     2.10     01-25    TPro  6.6  /  Alb  2.5<L>  /  TBili  <0.2  /  DBili  x   /  AST  13  /  ALT  5   /  AlkPhos  94  01-25          Urinalysis Basic - ( 25 Jan 2024 05:08 )    Color: x / Appearance: x / SG: x / pH: x  Gluc: 124 mg/dL / Ketone: x  / Bili: x / Urobili: x   Blood: x / Protein: x / Nitrite: x   Leuk Esterase: x / RBC: x / WBC x   Sq Epi: x / Non Sq Epi: x / Bacteria: x        SARS-CoV-2: NotDetec (24 Jan 2024 11:00)  SARS-CoV-2: NotDetec (17 Jan 2024 23:45)  SARS-CoV-2: NotDetec (07 Oct 2023 21:45)

## 2024-01-25 NOTE — PROGRESS NOTE ADULT - PROBLEM SELECTOR PLAN 4
- Severe bilateral hydronephrosis with b/l outlet obstruction s/p bilateral nephrostomy tube placement 10/31, with exchange/replacement recently at [facility] due to blocked nephrostomy tube   - Right nephrostomy tube in place, left nephrostomy tube displaced/dislodged  - CT abdomen, pelvis and chest 11/23 showed thrombus in right common femoral vein extending into superficial vein (not seen on 03/2023), increase in hydronephrosis despite perc nephrostomy tubes and ureteral stent, prominent soft tissue in posterior bladder extending into ureters inseparable from vaginal cuff adjacent fat increased size of right external iliac LNs.  - BUN/Creatinine stable  - s/p L NT replaced on 1/11 - Flush with 5 cc NS qd; rehab unable to take patient due to NT tubes; need to teach daughter how to do it   - Continue to monitor

## 2024-01-26 NOTE — PROGRESS NOTE ADULT - PROBLEM SELECTOR PLAN 2
- new fever 1/18 and 1/19 with slightly up-trending WBC, ddx. inc DVT vs. malignancy vs. infection (UTI)    - s/p zosyn x 5 day earlier this admission  - CT c/a/p 1/17 no e/o new source of infection   - blood and urine cx 1/17 NGTD x 5days   - completed 5 day course of zosyn. still elevated in setting of malignancy

## 2024-01-26 NOTE — PROGRESS NOTE ADULT - PROBLEM SELECTOR PLAN 1
- Extensive bilateral recurrent DVT, hypercoagulable state likely from malignancy   - Dopplers performed on 1/14 showing right and left Femoral Vein: acute occlusive DVT. Right Saphenofemoral Junction: age-indeterminate non-occlusive superficial vein thrombosis. Left Saphenofemoral Junction: acute occlusive superficial vein thrombosis  - Vascular consulted- No acute vascular surgery intervention at this time  - unable to tolerated eliquis d/t vaginal bleed. currently on hold.    - s/p IVC filter placement 1/18  - noted to have right lower extremity swelling, likely from extensive DVT, continue to monitor

## 2024-01-26 NOTE — PROGRESS NOTE ADULT - ASSESSMENT
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  course c/b UTI, s/p Abx. Also found to have b/l femoral DVTs, unable to tolerate AC due to vaginal bleeding, now s/p IVC filter. Pending rehab that will allow NT tube flushes

## 2024-01-26 NOTE — CHART NOTE - NSCHARTNOTEFT_GEN_A_CORE
Nutrition Follow-Up/Chart Note- moderate malnutrition    Source: Patient A&Ox 2-3   Chart [x ]    Diet, Regular:   Supplement Feeding Modality:  Oral  Ensure Plus High Protein Cans or Servings Per Day:  2       Frequency:  Daily (01-13-24 @ 14:35)      Hospital Course:    Per 1/26/24 internal medicine chart review, Patient is a 73 year old F with hx of b/l DVT off AC d/t vaginal bleed, invasive urothelial carcinoma both conventionally and focally sarcomatoid subtype  and high grade carcinoma invaded muscularis propria and well differentiated adeno colon cancer T4 N1 Mo, stage III, MSI intact), s/p TURBT who presents for admission with dislodged left nephrostomy tube.  course c/b UTI, s/p Abx. Also found to have b/l femoral DVTs, unable to tolerate AC due to vaginal bleeding, now s/p IVC filter. Pending rehab that will allow NT tube flushes     Nutrition Course:  Patient is being followed 2/2 Moderate protein calorie malnutrition.  Patient noted variable PO intake (25-75%) per RN intake flowsheet.  Patient endorses taking Ensure supplements well, just not able to complete them in one setting.  No reported GI issues such as nausea/vomiting/diarrhea/constipation, on bowel regimen (senna/miralax), last BM reported 1/20/24.  Patient requested miralax to be given today, RN notified.  Noted 2 + Right and Left leg edema and sacral/gluteal erythema per RN flowsheet.  Patient prefers strawberry flavor Ensure when available, will make kitchen aware.  Noted Magic Cup Reduced Sugar (provides 290kcal, 9gms protein per serving) provided at dinner time to assist oral intake 2/2 moderate malnutrition.  RD to remain available.        Anthropometrics:   Height (cm): 154.9 (01-13)=61 inch.  Weight (kg): 68 (01-11), 72.58 (12-13)  BMI (kg/m2): 28.3 (01-13)  IBW: 105 lbs +/-10%    Weight Assessment:  70.3 kg (1-24)  Weight gain 2.3kg (3.3%) in 2 weeks since admission is likely due to 2+edema.     __________________ Pertinent Medications__________________   MEDICATIONS  (STANDING):  bisacodyl 5 milliGRAM(s) Oral once  chlorhexidine 2% Cloths 1 Application(s) Topical daily  cholecalciferol 2000 Unit(s) Oral daily  influenza  Vaccine (HIGH DOSE) 0.7 milliLiter(s) IntraMuscular once  senna 2 Tablet(s) Oral at bedtime    MEDICATIONS  (PRN):  acetaminophen     Tablet .. 650 milliGRAM(s) Oral every 6 hours PRN Mild Pain (1 - 3)  polyethylene glycol 3350 17 Gram(s) Oral at bedtime PRN Constipation      __________________ Pertinent Labs__________________   01-26 Na139 mmol/L Glu 111 mg/dL<H> K+ 4.6 mmol/L Cr  0.32 mg/dL<L> BUN 13 mg/dL 01-26 Phos 2.2 mg/dL<L> 01-25 Alb 2.5 g/dL<L> 01-11 Chol 127 mg/dL LDL --    HDL 46 mg/dL<L> Trig 66 mg/dL      Estimated Needs Assessment: [ x ] No change in need assessment    Previous Nutrition Diagnosis: Moderate malnutrition    Nutrition Diagnosis is [ x ] ongoing     Education:  [ x ] Given on previous assessment by RD     Recommendations:  1. Continue present diet order as it remains appropriate at this time  2. Honor food and fluid preferences as able.    Monitoring and Evaluation:   1. Monitor PO intake, skin integrity, bowel regimen, weight trends, and nutrition pertinent labs.  2. RD to remain available for further nutritional intervention.

## 2024-01-26 NOTE — PROGRESS NOTE ADULT - SUBJECTIVE AND OBJECTIVE BOX
[facility] Division of [facility] Medicine  Carol Hoff M.D  Pager [phone]  Available via MS Teams    SUBJECTIVE / OVERNIGHT EVENTS: No acute events overnight. Patient feels well overall. Denies any fevers, chills or headaches.     ADDITIONAL REVIEW OF SYSTEMS:    MEDICATIONS  (STANDING):  bisacodyl 5 milliGRAM(s) Oral once  chlorhexidine 2% Cloths 1 Application(s) Topical daily  cholecalciferol 2000 Unit(s) Oral daily  influenza  Vaccine (HIGH DOSE) 0.7 milliLiter(s) IntraMuscular once  senna 2 Tablet(s) Oral at bedtime    MEDICATIONS  (PRN):  acetaminophen     Tablet .. 650 milliGRAM(s) Oral every 6 hours PRN Mild Pain (1 - 3)  polyethylene glycol 3350 17 Gram(s) Oral at bedtime PRN Constipation      I&O's Summary    25 Jan 2024 07:01  -  26 Jan 2024 07:00  --------------------------------------------------------  IN: 897 mL / OUT: 620 mL / NET: 277 mL    26 Jan 2024 07:01  -  26 Jan 2024 14:05  --------------------------------------------------------  IN: 380 mL / OUT: 0 mL / NET: 380 mL        PHYSICAL EXAM:  Vital Signs Last 24 Hrs  T(C): 36.6 (26 Jan 2024 06:00), Max: 37.2 (25 Jan 2024 22:15)  T(F): 97.9 (26 Jan 2024 06:00), Max: 98.9 (25 Jan 2024 22:15)  HR: 92 (26 Jan 2024 06:00) (92 - 102)  BP: 125/84 (26 Jan 2024 06:00) (111/76 - 125/84)  BP(mean): --  RR: 16 (26 Jan 2024 06:00) (16 - 18)  SpO2: 100% (26 Jan 2024 06:00) (99% - 100%)    Parameters below as of 26 Jan 2024 06:00  Patient On (Oxygen Delivery Method): room air      CONSTITUTIONAL: NAD, well-developed, well-groomed  RESPIRATORY: Normal respiratory effort; lungs are clear to auscultation bilaterally  CARDIOVASCULAR: Regular rate and rhythm, normal S1 and S2, no murmur/rub/gallop; stable lower extremity edema   ABDOMEN: Nontender to palpation, normoactive bowel sounds, no rebound/guarding; No hepatosplenomegaly  MUSCULOSKELETAL:  Normal gait; no clubbing or cyanosis of digits; no joint swelling or tenderness to palpation  PSYCH: A+O to person, place, and time; affect appropriate  NEUROLOGY: CN 2-12 are intact and symmetric; no gross sensory deficits   SKIN: No rashes; no palpable lesions    LABS:                        7.7    19.05 )-----------( 350      ( 26 Jan 2024 04:40 )             25.6     01-26    139  |  106  |  13  ----------------------------<  111<H>  4.6   |  24  |  0.32<L>    Ca    8.9      26 Jan 2024 04:40  Phos  2.2     01-26  Mg     1.90     01-26    TPro  6.6  /  Alb  2.5<L>  /  TBili  <0.2  /  DBili  x   /  AST  13  /  ALT  5   /  AlkPhos  94  01-25          Urinalysis Basic - ( 26 Jan 2024 04:40 )    Color: x / Appearance: x / SG: x / pH: x  Gluc: 111 mg/dL / Ketone: x  / Bili: x / Urobili: x   Blood: x / Protein: x / Nitrite: x   Leuk Esterase: x / RBC: x / WBC x   Sq Epi: x / Non Sq Epi: x / Bacteria: x        SARS-CoV-2: NotDetec (24 Jan 2024 11:00)  SARS-CoV-2: NotDetec (17 Jan 2024 23:45)  SARS-CoV-2: NotDetec (07 Oct 2023 21:45)

## 2024-01-26 NOTE — PROGRESS NOTE ADULT - PROBLEM SELECTOR PLAN 4
- Severe bilateral hydronephrosis with b/l outlet obstruction s/p bilateral nephrostomy tube placement 10/31, with exchange/replacement recently at [facility] due to blocked nephrostomy tube   - Right nephrostomy tube in place, left nephrostomy tube displaced/dislodged  - CT abdomen, pelvis and chest 11/23 showed thrombus in right common femoral vein extending into superficial vein (not seen on 03/2023), increase in hydronephrosis despite perc nephrostomy tubes and ureteral stent, prominent soft tissue in posterior bladder extending into ureters inseparable from vaginal cuff adjacent fat increased size of right external iliac LNs.  - BUN/Creatinine stable  - s/p L NT replaced on 1/11 - Flush with 5 cc NS qd; rehab unable to take patient due to NT tubes; need to teach daughter how to do it   - Continue to monitor

## 2024-01-26 NOTE — PROGRESS NOTE ADULT - PROBLEM SELECTOR PLAN 3
Psychiatry Outpatient Progress Note    Due to COVID-19 precautions, this visit was performed via live interactive two-way Video visit with patient's verbal consent.  Clinician Location:Home.  Patient Location: Home.  Verified patient identity:  [x] Yes    Reason for Visit or Chief Complaint:     Follow up    History:     Patient report:     ADHD  OCD      Denies side effects from Adderall other than loss of appetite.     Mood:  He notes that his mood has been good.  He rates his mood at 7-8/10 (10 best).  He notes that he is sleeping at 11 pm - 7 am.  Appetite is a little suppressed.  Energy is \"average\" but sufficient for him.    Denies feeling hopeless, helpless or worthless.        Denies any suicidal ideation, intent and plan.  Denies any homicidal ideation, intent and plan.        OCD:  Obsessive Compulsive Symptoms: For at least 1 hour a day, the patient has had: obsessions about worry about how straight or even objects are Symptoms cause marked anxiety or distress, and attempts to ignore, suppress or neutralize it by performing a compulsion. Compulsions of excessive cleaning home and rearranging objects to ensure a specific order and/or symmetry are performed in response to an obsession and aimed at preventing or reducing anxiety or distress.     He was always doing the dishes.  He notes that it has gotten better.  He controls his compulsions.   In front of him, he organized the things very parallel with straight lines in his home.     ADHD:     For reference from first appointment:  ----  Without meds, specifically at work, in the morning, he would blank stare, and he would not be able to focus.  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  He notes that in school, it was challenging to sustain attention like a book.  He has to do audio books because he cannot read.  He notes that lectures, it was challenging to pay attention in  lectures.  Music study helped.  He was having to fidget a lot.  He notes that details can be difficult to follow.  He can obsess over things if he cannot focus.  Without medication, he finds it difficult to pay attention in conversation.  He can meet deadlines with out any issue.  He is very organized.  He does forget things, but does not lose things.  He does fidget.  He does interrupt people in conversation.       These issues have occurred in more than one setting.  He noted these symptoms in high school.  He noted these symptoms in sophomore or ambreen year.   High school was great, he had a solid 3.5 gpa in high school and college.   He went to [employer].  -----     ADHD symptoms are now well controlled.  He notes he can sustain attention, can pay attention to details well.  He is not losing things or forgetting things.  He denies issues with paying attention in conversation often.  He denies fidgeting much.  He is not blurting out things in conversation too much.        Substance use: Denies all  Occupational and social functioning: good  Sleep:fair  Appetite:fair and adequate     Patient denies chest pain, psychosis and seizure  Review of Systems  A complete review of systems was conducted and is negative apart from as follows or as mentioned above.     negative    Medications:  Current Outpatient Medications   Medication Sig   • hydroCORTisone (CORTIZONE) 2.5 % lotion Apply once a day to sides of nose face as needed for scaling.   • erythromycin (EMGEL) 2 % gel Apply each night to acne areas of the face.   • clobetasol (Temovate) 0.05 % topical solution Apply once to twice daily as needed for scaly, itchy rash of the scalp.   • benzoyl peroxide 5 % gel Apply each night to acne areas of face before going to bed.   • amphetamine-dextroamphetamine XR (Adderall XR) 15 MG 24 hr capsule Take 1 capsule by mouth daily.   • amphetamine-dextroamphetamine (Adderall) 5 MG tablet Take 1 tablet at approximately  3:00 p.m.   • minocycline (MINOCIN) 100 MG capsule Take 1 capsule by mouth twice a day in the middle of a meal   • Spacer/Aero-Holding Chambers (BREATHERITE CONCHITA SPACER ADULT) Misc TO BE USED WITH INHALER FOR MAXIMUM DRUG ADMINISTRATION TO THE LUNGS     No current facility-administered medications for this visit.         Laboratory Tests or other studies:     none today          Medical Decision Making    Diagnoses:  No diagnosis found.    Mental Status Examination:     Appearance  unremarkable     Hygiene  unremarkable     Interpersonal behavior  appropriate     Speech Rate  normal     Speech clarity  clear     Speech Volume  normal     Speech Latency  normal      Mood  \"good\"        Affect  euthymic        Thought process  linear, logical and well organized        Thought Content  unremarkable        Perception/hallucinations  none reported or observed  denies auditory and visual hallucinations and does not appear to be responding to internal stimuli      Orientation, oriented to  self, place and time     Attention and concentration  no impairment noted in course of interview      Memory  no impairment noted in course of interview as evidenced by recall of recent and distant events      Insight  good, as evidenced by awareness of illness      Judgment  fair, some limitations noted such as impulsivity or marked ambivalence      Suicidal thoughts  denies      Homicidal/Assaultive Ideation   denies      Gait  not assessed      Assessment/Medical Decision Making and Narrative Plan:  Assessment:     ADHD  OCD     Plan:  Continue Adderall XR 15 mg po daily.  Patient is about [facility] through this supply despite it being sent on 3/21/2023.  He notes that he started the prescription.  He started news script.  Notably, he does not take it on weekends.  Patient also Adderall IR 5 mg po daily.  He usually takes at 3:00 pm or 4:00 pm.  He takes this one time a week.  Patient declined SSRI for OCD at this time.     >Patient  informed to stay in contact with staff and provider and to use [facility] if having issues with meds.  Patient has been informed to use medications only as prescribed, and not to change medication doses on his own.  Patient agrees.  >Patient is aware that if patient changes his/her own medication on his/her own, then this would be grounds for termination of the appointment.  Patient agrees.  >indications, benefits, drug interactions and possible side effects of medications as well as alternatives were discussed.    > Prognosis/course of disease with and without treatment were also discussed.    > Risk of alcohol and/or illicit drug use in combination with prescribed medications were discussed; recommendation to remain to be clean and sober.    > Patient is aware, and stated understanding, that operating machinery or vehicles is not recommended unless fully alert and cognitive and motor functions are completely intact.    > Patient is on psychiatric meds that carry some risks, therefore needs ongoing monitoring and detailed education. - Benefits and risks were discussed.    > Encouraged follow up with case management services, as needed.    > Encouraged follow up with psychotherapy, as needed.    > Support and therapeutic milieu were provided.    > Patient has other medical issues and taking physical health medications, therefore requires ongoing monitoring and education.    > Patient was instructed on issues related to healthy diet, weight and sleep hygiene.    > Continue medical follow-up with the patient's primary care physician, as needed.    > Continue medical follow-up with patient's specialists and other healthcare providers, as needed.    > The patient agrees to seek immediate medical attention by going to the nearest emergency room, if becoming suicidal or homicidal, exhibiting high risk behaviors, having manic or psychotic symptoms, adverse physical symptoms or any other symptoms that may be dangerous to  self or others. The patient also knows to call, or come into the office sooner, if needed.  > Patient is to call [phone] to schedule next appointment    > Patient agrees to return to the clinic in 1 month or sooner if needed.  We are following patient closely to assure that patient is tolerating meds well and is not destabilizing further.  Patient appears to be safe at home at this time, and there does not appear to be any cause for concern regarding any imminent threats to patient's safety as patient denies any active suicidal ideation, intent, plan and attempt at this time and since last appointment (if applicable), and patient denies any active homicidal ideation, intent, plan and attempt at this time and since last appointment (if applicable).  > Patient was made aware of IOP and PHP and this was offered, if applicable  > The patient was given the opportunity to ask questions, which were answered. Patient expressed understanding and agreement with the aforementioned plans.      Mariam Clark, DO  10/31/2023                 - likely d/t malignancy , stable   - Iron panel reviewed   - Continue to monitor CBC daily, transfuse if hgb<7

## 2024-01-26 NOTE — PROGRESS NOTE ADULT - PROBLEM SELECTOR PLAN 8
- Stage II-III sacral ulcer on admission > As per pt, she developed it in [facility]   - Nutrition consulted, appreciate recs  - wound care consulted, pending recs

## 2024-01-27 NOTE — PROGRESS NOTE ADULT - SUBJECTIVE AND OBJECTIVE BOX
[facility] Division of [facility] Medicine  Carol Hoff M.D  Pager [phone]  Available via MS Teams    SUBJECTIVE / OVERNIGHT EVENTS: No acute events overnight. Patient feels well overall.     ADDITIONAL REVIEW OF SYSTEMS:    MEDICATIONS  (STANDING):  bisacodyl 5 milliGRAM(s) Oral once  chlorhexidine 2% Cloths 1 Application(s) Topical daily  cholecalciferol 2000 Unit(s) Oral daily  influenza  Vaccine (HIGH DOSE) 0.7 milliLiter(s) IntraMuscular once  senna 2 Tablet(s) Oral at bedtime    MEDICATIONS  (PRN):  acetaminophen     Tablet .. 650 milliGRAM(s) Oral every 6 hours PRN Mild Pain (1 - 3)  polyethylene glycol 3350 17 Gram(s) Oral at bedtime PRN Constipation      I&O's Summary    26 Jan 2024 07:01  -  27 Jan 2024 07:00  --------------------------------------------------------  IN: 700 mL / OUT: 720 mL / NET: -20 mL    27 Jan 2024 07:01  -  27 Jan 2024 13:12  --------------------------------------------------------  IN: 357 mL / OUT: 0 mL / NET: 357 mL        PHYSICAL EXAM:  Vital Signs Last 24 Hrs  T(C): 36.8 (27 Jan 2024 13:00), Max: 37 (27 Jan 2024 05:25)  T(F): 98.2 (27 Jan 2024 13:00), Max: 98.6 (27 Jan 2024 05:25)  HR: 95 (27 Jan 2024 13:00) (94 - 97)  BP: 117/71 (27 Jan 2024 13:00) (115/85 - 122/79)  BP(mean): --  RR: 18 (27 Jan 2024 13:00) (18 - 18)  SpO2: 99% (27 Jan 2024 13:00) (98% - 100%)    Parameters below as of 27 Jan 2024 13:00  Patient On (Oxygen Delivery Method): room air      CONSTITUTIONAL: NAD, well-developed, well-groomed  RESPIRATORY: Normal respiratory effort; lungs are clear to auscultation bilaterally  CARDIOVASCULAR: Regular rate and rhythm, normal S1 and S2, no murmur/rub/gallop; No lower extremity edema; Peripheral pulses are 2+ bilaterally  ABDOMEN: Nontender to palpation, normoactive bowel sounds, no rebound/guarding; No hepatosplenomegaly  PSYCH: A+O to person, place,   NEUROLOGY: CN 2-12 are intact and symmetric; no gross sensory deficits   SKIN: No rashes; no palpable lesions    LABS:                        7.9    20.71 )-----------( 384      ( 27 Jan 2024 04:25 )             26.0     01-27    141  |  106  |  15  ----------------------------<  102<H>  4.2   |  24  |  0.31<L>    Ca    9.2      27 Jan 2024 04:25  Phos  2.3     01-27  Mg     1.90     01-27            Urinalysis Basic - ( 27 Jan 2024 04:25 )    Color: x / Appearance: x / SG: x / pH: x  Gluc: 102 mg/dL / Ketone: x  / Bili: x / Urobili: x   Blood: x / Protein: x / Nitrite: x   Leuk Esterase: x / RBC: x / WBC x   Sq Epi: x / Non Sq Epi: x / Bacteria: x        SARS-CoV-2: NotDetec (24 Jan 2024 11:00)  SARS-CoV-2: NotDetec (17 Jan 2024 23:45)  SARS-CoV-2: NotDetec (07 Oct 2023 21:45)

## 2024-01-27 NOTE — PROGRESS NOTE ADULT - PROBLEM SELECTOR PLAN 2
- new fever 1/18 and 1/19 with slightly up-trending WBC, ddx. inc DVT vs. malignancy vs. infection (UTI)    - s/p zosyn x 5 day earlier this admission  - CT c/a/p 1/17 no e/o new source of infection   - blood and urine cx 1/17 NGTD x 5days   - completed 5 day course of zosyn. Still elevated in setting of malignancy, no need for further workup unless fevers noted

## 2024-01-27 NOTE — PROGRESS NOTE ADULT - PROBLEM SELECTOR PLAN 3
- likely d/t malignancy , stable   - Iron panel reviewed   - Continue to monitor CBC daily, transfuse if hgb<7

## 2024-01-27 NOTE — PROGRESS NOTE ADULT - PROBLEM SELECTOR PLAN 8
- Stage II-III sacral ulcer on admission > As per pt, she developed it in [facility]   - Nutrition consulted, appreciate recs  - wound care consulted, pending recs

## 2024-01-27 NOTE — PROGRESS NOTE ADULT - PROBLEM SELECTOR PLAN 4
- Severe bilateral hydronephrosis with b/l outlet obstruction s/p bilateral nephrostomy tube placement 10/31, with exchange/replacement recently at [facility] due to blocked nephrostomy tube   - Right nephrostomy tube in place, left nephrostomy tube displaced/dislodged  - CT abdomen, pelvis and chest 11/23 showed thrombus in right common femoral vein extending into superficial vein (not seen on 03/2023), increase in hydronephrosis despite perc nephrostomy tubes and ureteral stent, prominent soft tissue in posterior bladder extending into ureters inseparable from vaginal cuff adjacent fat increased size of right external iliac LNs.  - BUN/Creatinine stable  - s/p L NT replaced on 1/11 - Flush with 5 cc NS qd; rehab unable to take patient due to NT tubes; widenened search for rehabs   - Continue to monitor

## 2024-01-28 NOTE — PROGRESS NOTE ADULT - PROBLEM SELECTOR PROBLEM 10
Goals of care, counseling/discussion
Need for prophylactic measure
Goals of care, counseling/discussion
Need for prophylactic measure
Need for prophylactic measure
Goals of care, counseling/discussion
Goals of care, counseling/discussion
Need for prophylactic measure
Need for prophylactic measure
Goals of care, counseling/discussion
Goals of care, counseling/discussion
Need for prophylactic measure
Need for prophylactic measure

## 2024-01-28 NOTE — PROGRESS NOTE ADULT - PROBLEM SELECTOR PLAN 4
- Severe bilateral hydronephrosis with b/l outlet obstruction s/p bilateral nephrostomy tube placement 10/31, with exchange/replacement recently at [facility] due to blocked nephrostomy tube   - Right nephrostomy tube in place, left nephrostomy tube displaced/dislodged  - CT abdomen, pelvis and chest 11/23 showed thrombus in right common femoral vein extending into superficial vein (not seen on 03/2023), increase in hydronephrosis despite perc nephrostomy tubes and ureteral stent, prominent soft tissue in posterior bladder extending into ureters inseparable from vaginal cuff adjacent fat increased size of right external iliac LNs.  - BUN/Creatinine stable  - s/p L NT replaced on 1/11 - Flush with 5 cc NS qd; rehab unable to take patient due to NT tubes; widenened search for rehabs   - Continue to monitor

## 2024-01-28 NOTE — CHART NOTE - NSCHARTNOTEFT_GEN_A_CORE
Left and Right NT flushed 5cc NS without difficulty. Some leaking noted from tubing on Right (no leaking noted around insertion site). Tube seems to be draining well, urine is clear, discussed with IR can consider CT if concern that tube is malpositioned. Discussed with Dr. Hoff will hold off on CT for now given that NT is draining and flushing well, will cont to monitor.

## 2024-01-28 NOTE — CHART NOTE - NSCHARTNOTESELECT_GEN_ALL_CORE
Event Note
endocrinology/Event Note
Fever/Event Note
Interventional Radiology/Event Note
Nutrition Services
Pre IR/Event Note
endocrinology/Event Note
Event Note

## 2024-01-28 NOTE — PROGRESS NOTE ADULT - NUTRITIONAL ASSESSMENT
This patient has been assessed with a concern for Malnutrition and has been determined to have a diagnosis/diagnoses of Moderate protein-calorie malnutrition.    This patient is being managed with:   Diet Regular-  Supplement Feeding Modality:  Oral  Ensure Plus High Protein Cans or Servings Per Day:  2       Frequency:  Daily  Entered: Jan 13 2024  2:34PM  

## 2024-01-28 NOTE — PROGRESS NOTE ADULT - PROBLEM SELECTOR PLAN 8
- Stage II-III sacral ulcer on admission > As per pt, she developed it in [facility]   - Nutrition consulted, appreciate recs  - wound care consulted, pending recs

## 2024-01-28 NOTE — PROGRESS NOTE ADULT - SUBJECTIVE AND OBJECTIVE BOX
[facility] Division of [facility] Medicine  Carol Linaresmolly MONTGOMERY  Pager [phone]  Available via MS Teams    SUBJECTIVE / OVERNIGHT EVENTS: No acute events overnight.     ADDITIONAL REVIEW OF SYSTEMS:    MEDICATIONS  (STANDING):  bisacodyl 5 milliGRAM(s) Oral once  chlorhexidine 2% Cloths 1 Application(s) Topical daily  cholecalciferol 2000 Unit(s) Oral daily  influenza  Vaccine (HIGH DOSE) 0.7 milliLiter(s) IntraMuscular once  senna 2 Tablet(s) Oral at bedtime    MEDICATIONS  (PRN):  acetaminophen     Tablet .. 650 milliGRAM(s) Oral every 6 hours PRN Mild Pain (1 - 3)  polyethylene glycol 3350 17 Gram(s) Oral at bedtime PRN Constipation      I&O's Summary    27 Jan 2024 07:01  -  28 Jan 2024 07:00  --------------------------------------------------------  IN: 1097 mL / OUT: 1070 mL / NET: 27 mL        PHYSICAL EXAM:  Vital Signs Last 24 Hrs  T(C): 37.1 (28 Jan 2024 06:15), Max: 37.1 (28 Jan 2024 06:15)  T(F): 98.7 (28 Jan 2024 06:15), Max: 98.7 (28 Jan 2024 06:15)  HR: 99 (28 Jan 2024 06:15) (97 - 100)  BP: 117/76 (28 Jan 2024 06:15) (117/71 - 119/70)  BP(mean): --  RR: 18 (28 Jan 2024 06:15) (17 - 18)  SpO2: 97% (28 Jan 2024 06:15) (97% - 100%)    Parameters below as of 28 Jan 2024 06:15  Patient On (Oxygen Delivery Method): room air      CONSTITUTIONAL: NAD, well-developed, well-groomed  RESPIRATORY: Normal respiratory effort; lungs are clear to auscultation bilaterally  CARDIOVASCULAR: Regular rate and rhythm, normal S1 and S2, no murmur/rub/gallop; No lower extremity edema  ABDOMEN: Nontender to palpation, normoactive bowel sounds, no rebound/guarding; No hepatosplenomegaly  MUSCULOSKELETAL:  Normal gait; no clubbing or cyanosis of digits; no joint swelling or tenderness to palpation  PSYCH: A+O to person, place, and time; affect appropriate  NEUROLOGY: CN 2-12 are intact and symmetric; no gross sensory deficits   SKIN: No rashes; no palpable lesions    LABS:                        7.8    19.75 )-----------( 364      ( 28 Jan 2024 04:30 )             25.7     01-28    137  |  103  |  14  ----------------------------<  114<H>  4.3   |  24  |  0.32<L>    Ca    9.3      28 Jan 2024 04:30  Phos  2.7     01-28  Mg     1.80     01-28            Urinalysis Basic - ( 28 Jan 2024 04:30 )    Color: x / Appearance: x / SG: x / pH: x  Gluc: 114 mg/dL / Ketone: x  / Bili: x / Urobili: x   Blood: x / Protein: x / Nitrite: x   Leuk Esterase: x / RBC: x / WBC x   Sq Epi: x / Non Sq Epi: x / Bacteria: x        SARS-CoV-2: NotDetec (24 Jan 2024 11:00)  SARS-CoV-2: NotDetec (17 Jan 2024 23:45)  SARS-CoV-2: NotDetec (07 Oct 2023 21:45)

## 2024-01-29 NOTE — PROVIDER CONTACT NOTE (OTHER) - ACTION/TREATMENT ORDERED:
No new orders at this time. Care continues.
ACP Afsaneh Pavon made aware. STAT IV tylenol and labs ordered. Care plan ongoing.
Provider notified came to bedside to assess. Eliquis to be stop.
ACP made aware. No new orders at this time. Care continues.
ACP made aware. Care continues.
ACP made aware. Care continues.
Provider made aware. no new orders

## 2024-01-29 NOTE — PROGRESS NOTE ADULT - PROBLEM SELECTOR PLAN 8
- per wound care: Sacrum to b/l buttocks: Cleanse with skin cleanser, pat dry. Apply Skylar moisture barrier cream twice a day and PRN with incontinent episodes.   - Nutrition consulted, appreciate recs, moderate protein malnutrition

## 2024-01-29 NOTE — PROVIDER CONTACT NOTE (OTHER) - BACKGROUND
74 yo F presented with displacement of nephrostomy catheter with PMH of HLD, colon adenocarcinoma, urothelial carcinoma pf bladder, and DVT.
Admitting diagnosis of displacement of nephrostomy catheter
Pt admitted for displacement of nephrostomy catheter

## 2024-01-29 NOTE — PROGRESS NOTE ADULT - PROBLEM SELECTOR PLAN 3
Presumed d/t malignancy , stable   - folate and B12 levels wnl 10/2023, TSH 1.24 1/27/24, SPEP c/w acute phase reactant   - iron studies mixed, AOCD and NIKO (trans sat 11%, ferritin in 400s)  - trend Hb and transfuse for <7

## 2024-01-29 NOTE — PROGRESS NOTE ADULT - SUBJECTIVE AND OBJECTIVE BOX
Patient is a 73y old  Female who presents with a chief complaint of dislodged nephrostomy tube (28 Jan 2024 13:13)    SUBJECTIVE / OVERNIGHT EVENTS:    No CP, SOB, f/c/n/v  states feels the NT are not in place bc they bother her (clear yellow UOP b/l)  no other complaints, "why is the rehab taking so long"    MEDICATIONS  (STANDING):  bisacodyl 5 milliGRAM(s) Oral once  chlorhexidine 2% Cloths 1 Application(s) Topical daily  cholecalciferol 2000 Unit(s) Oral daily  influenza  Vaccine (HIGH DOSE) 0.7 milliLiter(s) IntraMuscular once  senna 2 Tablet(s) Oral at bedtime    MEDICATIONS  (PRN):  acetaminophen     Tablet .. 650 milliGRAM(s) Oral every 6 hours PRN Mild Pain (1 - 3)  polyethylene glycol 3350 17 Gram(s) Oral at bedtime PRN Constipation    T(C): 37.4 (01-29-24 @ 05:30), Max: 37.4 (01-28-24 @ 22:30)  HR: 88 (01-29-24 @ 05:30) (88 - 111)  BP: 109/76 (01-29-24 @ 05:30) (108/70 - 109/76)  RR: 18 (01-29-24 @ 05:30) (17 - 18)  SpO2: 100% (01-29-24 @ 05:30) (100% - 100%)      I&O's Summary    28 Jan 2024 07:01  -  29 Jan 2024 07:00  --------------------------------------------------------  IN: 0 mL / OUT: 1440 mL / NET: -1440 mL    PHYSICAL EXAM:  GENERAL: NAD   CHEST/LUNG: Clear to auscultation bilaterally; No wheeze  HEART: Regular rate and rhythm; No murmurs, rubs, or gallops  ABDOMEN: Soft, Nontender, Nondistended; Bowel sounds present  EXTREMITIES:   warm and well perfused, No clubbing, cyanosis, or edema  PSYCH: AAOx3  NEUROLOGY: non-focal  SKIN: No rashes or lesions  + NT clear urine     LABS:                        7.7    18.53 )-----------( 366      ( 29 Jan 2024 03:55 )             25.4     01-29    142  |  106  |  16  ----------------------------<  103<H>  3.7   |  25  |  0.42<L>    Ca    8.9      29 Jan 2024 03:55  Phos  2.1     01-29  Mg     1.80     01-29    Care Discussed with Consultants/Other Providers:

## 2024-01-29 NOTE — PROGRESS NOTE ADULT - PROBLEM SELECTOR PLAN 9
SCD, IVC in place on 1/18, AC on hold given heavy vaginal bleeding and unable to tolerate  Trial of critical care, HD, no feeding tube  [facility] pending place that can flush NT

## 2024-01-29 NOTE — PROGRESS NOTE ADULT - ASSESSMENT
73F invasive urothelial carcinoma, well differentiated adeno colon cancer, s/p TURBT, b/l DVT off AC d/t vaginal bleed,  who presents for admission with dislodged left nephrostomy tube.  Course c/b UTI completed abx. Also found to have b/l femoral DVTs, unable to tolerate AC due to vaginal bleeding, now s/p IVC filter. Pending rehab that will allow NT tube flushes.

## 2024-01-29 NOTE — PROVIDER CONTACT NOTE (OTHER) - RECOMMENDATIONS
ACP made aware.
Provider made aware. no new orders
ACP made aware. Xray order placed.
ACP Afsaneh Van aware
ACP Afsaneh Pavon made aware.
ACP made aware.

## 2024-01-29 NOTE — PROVIDER CONTACT NOTE (OTHER) - SITUATION
pt complaining of pain at the lower RQ, Pt feels there is a stabbing pain from the tube.
Burst of PATs to 150s.
Pt R nenephrostomy tube is leaking
PT expressed concern about bleeding from rectum
Pt R nephrostomy tube is leaking & abdomen seems distended. Pt  Pt also complaining of R sided pain on LRQ area.
Pt has a Temp of 101.2
Pt with noted rectal temp of 101.1

## 2024-01-29 NOTE — PROVIDER CONTACT NOTE (OTHER) - ASSESSMENT
pt complaining of pain at the lower RQ, Pt feels there is a stabbing pain from the tube. Assessed tube and nephrostomy bag. Bag is draining and dressing is intact.
PT expressed concern about bleeding from rectum  Upon further assessment with PA, bleeding coming from vagina. Pt is passing clots. Large clot passed while PA at bedside.
Pt is resting in bed. No s/s of distress noted. Breathing is unlabored and respirations are equal.
Pt R nephrostomy tube is leaking & abdomen is distended. Pt also complaining of R sided pain on LRQ area.
When cleaning pt, the R nephrostomy side has leakage from the white locking device. Pt nephrostomy bag is still filling with output of 100mL since 2200 but urine is on bed and seems to be leaking from the white locking device part.
A&Ox4. Received watching television at this time, comfortable. BP: 109/66. HR: 98. O2 [id number]%. T: 98.3. not in distress. asymptomatic.
Pt with rectal temp 101.1

## 2024-01-29 NOTE — PROVIDER CONTACT NOTE (OTHER) - NAME OF MD/NP/PA/DO NOTIFIED:
OSVALDO Trejo
ACP Afsaneh Pavon
ACP Afsaneh Van
OSVALDO Trejo
DARIO Irvin

## 2024-01-29 NOTE — PROVIDER CONTACT NOTE (OTHER) - DATE AND TIME:
19-Jan-2024 04:21
28-Jan-2024 02:44
16-Jan-2024 16:19
29-Jan-2024 01:43
28-Jan-2024 19:52
17-Jan-2024 22:42
27-Jan-2024 02:21

## 2024-01-29 NOTE — PROGRESS NOTE ADULT - PROBLEM SELECTOR PLAN 5
Elevated Ca, normal/suppressed PTH, PTHrP <2, vitamin 1, 25 wnl, low vitamin D  - s/p Zoledronic acid x1 on 1/14  - on cholecalciferol 1000 units

## 2024-01-29 NOTE — PROGRESS NOTE ADULT - PROBLEM SELECTOR PLAN 1
Bilateral recurrent DVT, hypercoagulable state likely from malignancy   - LE US 1/14 showing right and left Femoral Vein: acute occlusive DVT. Right Saphenofemoral Junction: age-indeterminate non-occlusive superficial vein thrombosis. Left Saphenofemoral Junction: acute occlusive superficial vein thrombosis  - Vascular consulted-  no acute vascular surgery intervention at this time  - unable to tolerate Eliquis d/t vaginal bleed, thus off ac   - s/p IR placed IVC filter placement 1/18

## 2024-01-29 NOTE — PROGRESS NOTE ADULT - PROBLEM SELECTOR PLAN 2
Chronic at this point, ranges from 13-21  - CT C/A/P on 1/17 no e/o new source of infection   - blood cultures on 1/11 and 1/18 NG  - urine culture 1/18 NG  - s/p course zosyn for possible UTI

## 2024-01-29 NOTE — PROGRESS NOTE ADULT - PROBLEM SELECTOR PLAN 4
- Severe bilateral hydronephrosis with b/l outlet obstruction s/p bilateral nephrostomy tube placement 10/31, with exchange/replacement recently at [facility] due to blocked nephrostomy tube   - Right nephrostomy tube in place, left nephrostomy tube displaced/dislodged  - CT abdomen, pelvis and chest 11/23 showed thrombus in right common femoral vein extending into superficial vein (not seen on 03/2023), increase in hydronephrosis despite perc nephrostomy tubes and ureteral stent, prominent soft tissue in posterior bladder extending into ureters inseparable from vaginal cuff adjacent fat increased size of right external iliac LNs.  - s/p L NT replaced on 1/11 - Flush with 5 cc NS qd; rehab unable to take patient due to NT tubes; widened search for rehabs   - Continue to monitor

## 2024-01-30 NOTE — PROGRESS NOTE ADULT - PROBLEM SELECTOR PLAN 7
Suspect secondary to decrease PO intake   - s/p daily repletion    - continue to monitor phos daily  - hypokalemia repleted

## 2024-01-30 NOTE — PROGRESS NOTE ADULT - PROBLEM SELECTOR PLAN 9
SCD, IVC in place on 1/18, AC on hold given heavy vaginal bleeding and unable to tolerate  Trial of critical care, HD, no feeding tube  [facility] pending place that can flush NT vs home hospice if family able to assist with care

## 2024-01-30 NOTE — PROGRESS NOTE ADULT - PROBLEM SELECTOR PLAN 3
Presumed d/t malignancy , stable   - folate and B12 levels wnl 10/2023, TSH 1.24 1/27/24, SPEP c/w acute phase reactant   - iron studies mixed, AOCD and NIKO (trans sat 11%, ferritin in 400s)--started PO iron/vitamin C  - trend Hb and transfuse for <7

## 2024-01-30 NOTE — PROGRESS NOTE ADULT - SUBJECTIVE AND OBJECTIVE BOX
Patient is a 73y old  Female who presents with a chief complaint of dislodged nephrostomy tube (28 Jan 2024 13:13)    SUBJECTIVE / OVERNIGHT EVENTS:    sitting in chair, reports required 2 person assist  eating breakfast, no pain, no nausea/vomiting/diarrhea/CP/SOB, reports her abdomen hurts when pressed but this is chronic since here, states unchanged; NT are not bothering her today  reports she wants hospice, inquired her understanding about this, explained that hospice focus is on comfort and quality and forego things like chemo.      MEDICATIONS  (STANDING):  ascorbic acid 500 milliGRAM(s) Oral daily  chlorhexidine 2% Cloths 1 Application(s) Topical daily  cholecalciferol 2000 Unit(s) Oral daily  ferrous    sulfate 325 milliGRAM(s) Oral daily  influenza  Vaccine (HIGH DOSE) 0.7 milliLiter(s) IntraMuscular once  multivitamin 1 Tablet(s) Oral daily  senna 2 Tablet(s) Oral at bedtime    MEDICATIONS  (PRN):  acetaminophen     Tablet .. 650 milliGRAM(s) Oral every 6 hours PRN Mild Pain (1 - 3)  polyethylene glycol 3350 17 Gram(s) Oral at bedtime PRN Constipation    T(C): 36.9 (01-30-24 @ 13:17), Max: 36.9 (01-30-24 @ 13:17)  HR: 70 (01-30-24 @ 13:17) (70 - 96)  BP: 96/62 (01-30-24 @ 13:17) (96/62 - 104/60)  RR: 18 (01-30-24 @ 13:17) (18 - 20)  SpO2: 100% (01-30-24 @ 13:17) (100% - 100%)    CAPILLARY BLOOD GLUCOSE  POCT Blood Glucose.: 122 mg/dL (29 Jan 2024 21:57)    I&O's Summary    29 Jan 2024 07:01  -  30 Jan 2024 07:00  --------------------------------------------------------  IN: 960 mL / OUT: 185 mL / NET: 775 mL    PHYSICAL EXAM:  GENERAL: NAD   CHEST/LUNG: Clear to auscultation bilaterally; No wheeze  HEART: Regular rate and rhythm; No murmurs, rubs, or gallops  ABDOMEN: Soft, Nontender, Nondistended; Bowel sounds present  EXTREMITIES:   warm and well perfused, No clubbing, cyanosis, or edema  PSYCH: AAOx3  NEUROLOGY: non-focal  SKIN: No rashes or lesions  + NT clear urine     LABS:                        7.4    14.23 )-----------( 334      ( 30 Jan 2024 04:38 )             24.6     01-30    138  |  104  |  14  ----------------------------<  101<H>  3.3<L>   |  23  |  0.35<L>    Ca    8.5      30 Jan 2024 04:38  Phos  2.0     01-30  Mg     1.80     01-30      Urinalysis Basic - ( 30 Jan 2024 04:38 )  Color: x / Appearance: x / SG: x / pH: x  Gluc: 101 mg/dL / Ketone: x  / Bili: x / Urobili: x   Blood: x / Protein: x / Nitrite: x   Leuk Esterase: x / RBC: x / WBC x   Sq Epi: x / Non Sq Epi: x / Bacteria: x    Care Discussed with Consultants/Other Providers:

## 2024-01-30 NOTE — PROGRESS NOTE ADULT - PROBLEM SELECTOR PLAN 4
- Severe bilateral hydronephrosis with b/l outlet obstruction s/p bilateral nephrostomy tube placement 10/31, with exchange/replacement recently at [facility] due to blocked nephrostomy tube   - Right nephrostomy tube in place, left nephrostomy tube displaced/dislodged  - CT abdomen, pelvis and chest 11/23 showed thrombus in right common femoral vein extending into superficial vein (not seen on 03/2023), increase in hydronephrosis despite perc nephrostomy tubes and ureteral stent, prominent soft tissue in posterior bladder extending into ureters inseparable from vaginal cuff adjacent fat increased size of right external iliac LNs.  - s/p L NT replaced on 1/11 - Flush with 5 cc NS qd; rehab unable to take patient due to NT tubes; widened search for rehabs   - Continue to monitor

## 2024-01-31 NOTE — PROGRESS NOTE ADULT - PROBLEM SELECTOR PROBLEM 3
DVT, lower extremity
Vitamin D deficiency
Microcytic anemia
Abdominal pain
Microcytic anemia
Microcytic anemia
Abdominal pain
Vitamin D deficiency
Microcytic anemia
Abdominal pain
Microcytic anemia
Hyponatremia
Microcytic anemia
DVT, lower extremity
DVT, lower extremity
Microcytic anemia
Leukocytosis
Hyponatremia
Hyponatremia

## 2024-01-31 NOTE — PROGRESS NOTE ADULT - SUBJECTIVE AND OBJECTIVE BOX
Patient is a 73y old  Female who presents with a chief complaint of dislodged nephrostomy tube (28 Jan 2024 13:13)    SUBJECTIVE / OVERNIGHT EVENTS:    no CP, SOB, f/c/n/v  reports wants hospice but not in home, states family works etc and thus no one would be available to care for her s/p 4 hours of aides    MEDICATIONS  (STANDING):  ascorbic acid 500 milliGRAM(s) Oral daily  chlorhexidine 2% Cloths 1 Application(s) Topical daily  cholecalciferol 2000 Unit(s) Oral daily  ferrous    sulfate 325 milliGRAM(s) Oral daily  influenza  Vaccine (HIGH DOSE) 0.7 milliLiter(s) IntraMuscular once  multivitamin 1 Tablet(s) Oral daily  senna 2 Tablet(s) Oral at bedtime    MEDICATIONS  (PRN):  acetaminophen     Tablet .. 650 milliGRAM(s) Oral every 6 hours PRN Mild Pain (1 - 3)  polyethylene glycol 3350 17 Gram(s) Oral at bedtime PRN Constipation    T(C): 37.4 (01-31-24 @ 11:53), Max: 37.4 (01-31-24 @ 11:53)  HR: 94 (01-31-24 @ 11:53) (86 - 99)  BP: 113/84 (01-31-24 @ 11:53) (103/71 - 113/84)  RR: 18 (01-31-24 @ 11:53) (18 - 18)  SpO2: 100% (01-31-24 @ 11:53) (100% - 100%)    I&O's Summary    30 Jan 2024 07:01  -  31 Jan 2024 07:00  --------------------------------------------------------  IN: 100 mL / OUT: 500 mL / NET: -400 mL    31 Jan 2024 07:01  -  31 Jan 2024 14:38  --------------------------------------------------------  IN: 0 mL / OUT: 140 mL / NET: -140 mL    PHYSICAL EXAM:  GENERAL: NAD   CHEST/LUNG: Clear to auscultation bilaterally; No wheeze  HEART: Regular rate and rhythm; No murmurs, rubs, or gallops  ABDOMEN: Soft, mild distention, no r/g, +BS  EXTREMITIES:   warm and well perfused, RLE edema stable  PSYCH: AAOx3  NEUROLOGY: non-focal  SKIN: No rashes or lesions  + NT clear urine       LABS:                        8.3    15.60 )-----------( 354      ( 31 Jan 2024 06:31 )             26.7     01-31    140  |  105  |  16  ----------------------------<  114<H>  4.1   |  25  |  0.34<L>    Ca    9.2      31 Jan 2024 06:31  Phos  2.2     01-31  Mg     1.90     01-31    Care Discussed with Consultants/Other Providers:

## 2024-01-31 NOTE — PROGRESS NOTE ADULT - TIME BILLING
Time spent includes direct patient care  (interview and examination of patient), discussion with other providers, support staff and/or patient's family members, review of medical records, ordering diagnostic tests and analyzing results, and documentation.
Time spent on review of vitals, physical exam, documentation, and discussion of plan of care with patient and patient family.
Time spent includes direct patient care  (interview and examination of patient), discussion with other providers, support staff and/or patient's family members, review of medical records, ordering diagnostic tests and analyzing results, and documentation.
Time spent on review of vitals, physical exam, documentation, and discussion of plan of care with patient and patient family.
Time spent includes direct patient care  (interview and examination of patient), discussion with other providers, support staff and/or patient's family members, review of medical records, ordering diagnostic tests and analyzing results, and documentation.
Time-based billing (NON-critical care).     53 minutes spent on total encounter. The necessity of the time spent during the encounter on this date of service was due to:     Time spent on review of vitals, physical exam, documentation, and discussion of plan of care with patient and patient family.
Time spent on review of vitals, physical exam, documentation, and discussion of plan of care with patient and patient family.
The necessity of the time spent during the encounter on this date of service was due to:   - Direct patient care ( interview and independently obtaining a review of systems and performing a physical exam)and documentation  - Ordering, reviewing, and interpreting labs, testing, and imaging   - Reviewing prior hospitalization and where necessary, outpatient records.  - Discussion with consultants, other providers, support staff  - Counselling and educating patient and family regarding interpretation of aforementioned items and plan of care.
The necessity of the time spent during the encounter on this date of service was due to:   - Direct patient care ( interview and independently obtaining a review of systems and performing a physical exam)and documentation  - Ordering, reviewing, and interpreting labs, testing, and imaging   - Reviewing prior hospitalization and where necessary, outpatient records.  - Discussion with consultants, other providers, support staff  - Counselling and educating patient and family regarding interpretation of aforementioned items and plan of care.

## 2024-01-31 NOTE — PROGRESS NOTE ADULT - PROBLEM SELECTOR PROBLEM 5
Hypercalcemia
Colon adenocarcinoma
Hypercalcemia
Hyponatremia
Hyponatremia
Hypercalcemia
Hypercalcemia
Hyponatremia
Hypercalcemia
Hypercalcemia

## 2024-01-31 NOTE — PROGRESS NOTE ADULT - PROBLEM SELECTOR PROBLEM 2
Dehydration
Hypophosphatemia
Hypophosphatemia
Dehydration
Dehydration
Leukocytosis
Hypercalcemia
Leukocytosis
Leukocytosis
Hypercalcemia
Leukocytosis
Hypercalcemia
Leukocytosis
Hypercalcemia

## 2024-01-31 NOTE — PROGRESS NOTE ADULT - PROBLEM SELECTOR PROBLEM 6
Carcinoma of bladder
Sacral ulcer
Sacral ulcer
Carcinoma of bladder
Colon adenocarcinoma
Carcinoma of bladder
Hypophosphatemia
Sacral ulcer

## 2024-01-31 NOTE — PROGRESS NOTE ADULT - PROBLEM SELECTOR PROBLEM 8
Sacral ulcer
DVT, lower extremity
Sacral ulcer
Microcytic anemia
Sacral ulcer
DVT, lower extremity
DVT, lower extremity
Sacral ulcer

## 2024-01-31 NOTE — DISCHARGE NOTE NURSING/CASE MANAGEMENT/SOCIAL WORK - NSDCCRNAME_GEN_ALL_CORE_FT
1 - [facility], [address] [place] [address] , [place]  2 - ambulance arranged by [facility]

## 2024-01-31 NOTE — PROGRESS NOTE ADULT - PROBLEM SELECTOR PLAN 9
SCD, IVC in place on 1/18, AC on hold given heavy vaginal bleeding and unable to tolerate  Trial of critical care, HD, no feeding tube  LAZARUS, dc time 40 min

## 2024-01-31 NOTE — DISCHARGE NOTE NURSING/CASE MANAGEMENT/SOCIAL WORK - PATIENT PORTAL LINK FT
You can access the FollowMyHealth Patient Portal offered by [facility] by registering at the following website: [email or web address]. By joining [facility]’s FollowMyHealth portal, you will also be able to view your health information using other applications (apps) compatible with our system.

## 2024-01-31 NOTE — PROGRESS NOTE ADULT - PROBLEM SELECTOR PROBLEM 1
DVT, lower extremity
Hypercalcemia
DVT, lower extremity
DVT, lower extremity
Hypercalcemia
Hypercalcemia
Nephrostomy tube displaced
DVT, lower extremity
Hypercalcemia
DVT, lower extremity
Hypercalcemia
Nephrostomy tube displaced
Nephrostomy tube displaced
DVT, lower extremity
Nephrostomy tube displaced

## 2024-01-31 NOTE — PROGRESS NOTE ADULT - PROBLEM SELECTOR PROBLEM 7
Hypophosphatemia
Microcytic anemia
Hypophosphatemia
Sacral ulcer
Hypophosphatemia
Microcytic anemia
Hypophosphatemia
Microcytic anemia

## 2024-01-31 NOTE — PROGRESS NOTE ADULT - PROBLEM SELECTOR PROBLEM 4
Nephrostomy tube displaced
Leukocytosis
Hypophosphatemia
Nephrostomy tube displaced
Leukocytosis
Nephrostomy tube displaced
Hypophosphatemia
Nephrostomy tube displaced
Nephrostomy tube displaced
Hyponatremia
Hypophosphatemia
Leukocytosis

## 2024-01-31 NOTE — PROGRESS NOTE ADULT - PROBLEM SELECTOR PLAN 4
- Severe bilateral hydronephrosis with b/l outlet obstruction s/p bilateral nephrostomy tube placement 10/31, with exchange/replacement recently at [facility] due to blocked nephrostomy tube   - Right nephrostomy tube in place, left nephrostomy tube displaced/dislodged  - CT abdomen, pelvis and chest 11/23 showed thrombus in right common femoral vein extending into superficial vein (not seen on 03/2023), increase in hydronephrosis despite perc nephrostomy tubes and ureteral stent, prominent soft tissue in posterior bladder extending into ureters inseparable from vaginal cuff adjacent fat increased size of right external iliac LNs.  - s/p L NT replaced on 1/11 - Flush with 5 cc NS qd; rehab unable to take patient due to NT tubes; widened search for rehabs   - Continue to monitor

## 2024-01-31 NOTE — PROGRESS NOTE ADULT - PROBLEM SELECTOR PLAN 6
- Currently poor functional status, not on chemotherapy, awaiting treatment plan for urothelial cancer prior to hemicolectomy   - Sees Dr Taveras and Dr Whitney as outpt   - On Tylenol and oxycodone for pain PRN  - Pt sees Dr. Chand from Oncology
- Currently poor functional status, not on chemotherapy, awaiting treatment plan for urothelial cancer prior to hemicolectomy   - Sees Dr Taveras and Dr Whitney as outpt   - On Tylenol and oxycodone for pain PRN  - Pt sees Dr. Chand from Oncology
Currently poor functional status, not on chemotherapy, awaiting treatment plan for urothelial cancer prior to hemicolectomy   Sees Dr Taveras and Dr Whitney as outpt   On Tylenol and oxycodone for pain PRN  Pt sees  Dr Chand from Oncology
- Currently poor functional status, not on chemotherapy, awaiting treatment plan for urothelial cancer prior to hemicolectomy   - Sees Dr Taveras and Dr Whitney as outpt   - On Tylenol and oxycodone for pain PRN  - Pt sees Dr. Chand from Oncology
Currently poor functional status, not on chemotherapy, awaiting treatment plan for urothelial cancer prior to hemicolectomy   Sees Dr Taveras and Dr Whitney as outpt   On Tylenol and oxycodone for pain PRN  Pt sees  Dr Chand from Oncology
- Currently poor functional status, not on chemotherapy, awaiting treatment plan for urothelial cancer prior to hemicolectomy   - Sees Dr Taveras and Dr Whitney as outpt; sees Dr. Chand from Oncology
Currently poor functional status, not on chemotherapy, awaiting treatment plan for urothelial cancer prior to hemicolectomy   Sees Dr Taveras and Dr Whitney as outpt   On Tylenol and oxycodone for pain PRN  Pt sees  Dr Chand from Oncology
Currently poor functional status, not on chemotherapy, awaiting treatment plan for urothelial cancer prior to hemicolectomy   Sees Dr Taveras and Dr Whitney as outpt   On Tylenol and oxycodone for pain PRN  Pt sees  Dr Chand from Oncology
Stage II-III sacral ulcer on admission   Nutrition consult   Wound care-nursing and surgery consult placed   Turn every 4 hours   Continue to monitor
- Currently poor functional status, not on chemotherapy, awaiting treatment plan for urothelial cancer prior to hemicolectomy   - Sees Dr Taveras and Dr Whitney as outpt   - On Tylenol and oxycodone for pain PRN  - Pt sees Dr. Chand from Oncology
- Currently poor functional status, not on chemotherapy, awaiting treatment plan for urothelial cancer prior to hemicolectomy   - Sees Dr Taveras and Dr Whitney as outpt; sees Dr. Chand from Oncology
- Currently poor functional status, not on chemotherapy, awaiting treatment plan for urothelial cancer prior to hemicolectomy   - Sees Dr Taveras and Dr Whitney as outpt   - On Tylenol and oxycodone for pain PRN  - Pt sees Dr. Chand from Oncology
- Currently poor functional status, not on chemotherapy, awaiting treatment plan for urothelial cancer prior to hemicolectomy   - Sees Dr Taveras and Dr Whitney as outpt   - On Tylenol and oxycodone for pain PRN  - Pt sees Dr. Chand from Oncology
Currently poor functional status, not on chemotherapy, awaiting treatment plan for urothelial cancer prior to hemicolectomy   Sees Dr Taveras and Dr Whitney as outpt   On Tylenol and oxycodone for pain PRN  Pt sees  Dr Chand from Oncology
- Currently poor functional status, not on chemotherapy, awaiting treatment plan for urothelial cancer prior to hemicolectomy   - Sees Dr Taveras and Dr Whitney as outpt; sees Dr. Chand from Oncology
Currently poor functional status, not on chemotherapy, awaiting treatment plan for urothelial cancer prior to hemicolectomy   Sees Dr Taveras and Dr Whitney as outpt   On Tylenol and oxycodone for pain PRN  Pt sees  Dr Chand from Oncology
- Currently poor functional status, not on chemotherapy, awaiting treatment plan for urothelial cancer prior to hemicolectomy   - Sees Dr Taveras and Dr Whitney as outpt   - On Tylenol and oxycodone for pain PRN  - Pt sees Dr. Chand from Oncology
Stage II-III sacral ulcer on admission   Nutrition consult   Wound care-nursing and surgery consult placed   Turn every 4 hours   Continue to monitor
Currently poor functional status, not on chemotherapy, awaiting treatment plan for urothelial cancer prior to hemicolectomy   Sees Dr Taveras and Dr Whitney as outpt   On Tylenol and oxycodone for pain PRN  Spoke with Daughter Sera on 1.13- As per daughter, Dr Taveras wants pt optimized better for the surgery  Pt sees  Dr Chand from Oncology
Phos 2.3, suspect secondary to decrease PO intake   s/p Repletion    continue to monitor phos daily
Stage II-III sacral ulcer on admission   Nutrition consult   Wound care-nursing and surgery consult placed   Turn every 4 hours   Continue to monitor

## 2024-01-31 NOTE — PROGRESS NOTE ADULT - PROBLEM SELECTOR PROBLEM 9
Need for prophylactic measure
Colon adenocarcinoma
Need for prophylactic measure
Microcytic anemia
Need for prophylactic measure
Microcytic anemia
Colon adenocarcinoma
Microcytic anemia
Colon adenocarcinoma
DVT, lower extremity

## 2024-01-31 NOTE — PROGRESS NOTE ADULT - PROVIDER SPECIALTY LIST ADULT
Endocrinology
Endocrinology
Hospitalist
Internal Medicine
Endocrinology
Hospitalist
Internal Medicine
Hospitalist
Internal Medicine
Internal Medicine
Hospitalist
Internal Medicine
Hospitalist
Internal Medicine
Hospitalist
Hospitalist
Internal Medicine
Hospitalist

## 2024-01-31 NOTE — PROGRESS NOTE ADULT - PROBLEM/PLAN-1
Patient: Sierra Saba    Procedure Summary       Date: 07/10/22 Room / Location:  [facility] OR 10 /  [facility]    Anesthesia Start: 1455 Anesthesia Stop: 1641    Procedure: HIP TROCHANTERIC NAILING WITH INTRAMEDULLARY HIP SCREW (Left Hip) Diagnosis:     Surgeons: Flip Ying MD Provider: Nate Downing MD    Anesthesia Type: general ASA Status: 4            Anesthesia Type: general    Vitals  Vitals Value Taken Time   /78 07/10/22 1720   Temp 97.8 °F (36.6 °C) 07/10/22 1725   Pulse 108 07/10/22 1725   Resp 20 07/10/22 1725   SpO2 96 % 07/10/22 1730           Post Anesthesia Care and Evaluation    Patient location during evaluation: PACU  Patient participation: complete - patient participated  Level of consciousness: awake and alert  Pain management: adequate    Airway patency: patent  Anesthetic complications: No anesthetic complications  PONV Status: none  Cardiovascular status: hemodynamically stable and acceptable  Respiratory status: nonlabored ventilation, acceptable and nasal cannula  Hydration status: acceptable      
DISPLAY PLAN FREE TEXT

## 2024-02-08 NOTE — ED PROVIDER NOTE - ATTENDING CONTRIBUTION TO CARE
Attending MD Lanie Dodd:  I personally have seen and examined this patient.  Resident note reviewed and agree on plan of care and except where noted.  See HPI, PE, and MDM for details.

## 2024-02-08 NOTE — CHART NOTE - NSCHARTNOTEFT_GEN_A_CORE
BOWEN received notification of Code ACLS.  Patient is a 73 year old female presented to the ED via EMS with EMS staff conducting CPR.  Despite all efforts from the medical team the patient .  Patient's daughters arrived to the ED and met with ED medical team.  They were informed of the patient's passing.  Family was provided the opportunity to view the patient's body privately.  Support was provided.  No concerns were voiced.  BOWENSW offered her condolences to family.  No concerns voiced. [facility] provided daughters with the contact information for the hospital. [facility] remains available if requested.

## 2024-02-08 NOTE — ED PROVIDER NOTE - CLINICAL SUMMARY MEDICAL DECISION MAKING FREE TEXT BOX
Attending Lanie Dodd: 73-year-old female with multiple medical issues including history of adeno colon cancer status post tripped which was complicated by bilateral DVTs off anticoagulation history of prior nephrostomy tubes presenting after witnessed cardiac arrest.  Patient was reportedly with her family member who noted she went to cardiac arrest.  EMS called unclear whether family started CPR but EMS arrived shortly after and started CPR.  CPR for approximately 20 minutes initially had an ET tube placed but it was dislodged and a Eder airway placed.  Patient given multiple rounds of epi and bicarb brought to the ED.  No reports of any falls or trauma.  Per EMS patient was a full code.  Unclear if patient's end-tidal was during transport.  PEA for the transport.  Upon arrival airway replaced with ET tube.  Bilateral breath sounds and end-tidal with color change.  Patient with fixed pupils.  Multiple rounds of CPR with epinephrine.  Arterial line as well as central venous access obtained with close monitoring.  Patient was not responsive to multiple measures.  Clotted blood seen throughout the heart.  Very poor prognosis after multiple rounds of CPR patient pronounced at 1835.  Daughter is at the ED and were notified.

## 2024-02-08 NOTE — ED PROVIDER NOTE - OBJECTIVE STATEMENT
No
Attending Lanie Dodd: 72 yo female presenting after witnessed arrest. pt reportedly was with family and had cardiac arrest. ems called. per ems asystole. upon arrival. given epi, intital tube came out and placed brenna airway. multipel rounds of epi prior to arrival. no reorts of any trauma

## 2024-02-08 NOTE — ED PROCEDURE NOTE - CPROC ED ARTER LINE DETAIL1
Positive blood return was obtained via the catheter./Connected to a pressurized flush line./Line was sutured in place./Ultrasound guidance was used.

## 2024-02-08 NOTE — ED PROCEDURE NOTE - CPROC ED INDICATIONS1
arterial puncture to obtain ABG's/cannulation purposes/critical patient/monitoring purposes
cardiac arrest
emergency venous access

## 2024-02-08 NOTE — ED PROVIDER NOTE - PHYSICAL EXAMINATION
Attending Lanie Dodd: geN ill appaering, heent; atraumatic, pupils fixed, et tube in place. cv: no cardiac rhythm. lungs; bl breath sounds with bagging, abd: soft, ext cool, neuro: GCS 3t

## 2024-02-08 NOTE — ED ADULT NURSE NOTE - OBJECTIVE STATEMENT
Received pt from [facility] after witnessed cardiac arrest at 1813. CPR begun at 1725 per [facility]. Pt arrived with 7.5 ET at 22lip that was dislodged as per EMS. CPR in progress with Gui device upon arrival. See Cardiac Arrest Flowsheet

## 2024-02-08 NOTE — ED PROVIDER NOTE - HOW PATIENT ADDRESSED, PROFILE
Mary Cephalexin Pregnancy And Lactation Text: This medication is Pregnancy Category B and considered safe during pregnancy.  It is also excreted in breast milk but can be used safely for shorter doses.

## 2024-02-09 PROBLEM — E78.5 HYPERLIPIDEMIA, UNSPECIFIED: Chronic | Status: ACTIVE | Noted: 2024-01-01

## 2024-02-09 PROBLEM — C18.9 MALIGNANT NEOPLASM OF COLON, UNSPECIFIED: Chronic | Status: ACTIVE | Noted: 2024-01-01

## 2024-02-09 PROBLEM — C67.9 MALIGNANT NEOPLASM OF BLADDER, UNSPECIFIED: Chronic | Status: ACTIVE | Noted: 2024-01-01

## 2024-05-13 NOTE — DIETITIAN INITIAL EVALUATION ADULT - PROBLEM SELECTOR PLAN 3
Walk in -Na 132 from 139, suspect pre-renal in setting of infection   -will give one bolus of 500cc  of NaCl now   -serum osm, urine osm, and urine Na ordered  -will not correct more than 6-8 meq in the next 24 hours  -continue to trend BMP daily

## 2024-05-28 NOTE — PROCEDURAL SAFETY CHECKLIST WITH OR WITHOUT SEDATION - NSTIMEOUTDATE_GEN_ALL_CORE
AFTER VISIT SUMMARY (AVS):    At today's visit we thoroughly discussed current symptoms, available treatment options, and the plan.    We decided not to make changes to primidone dose.  I refilled the prescription.    Regarding your right leg numbness and tingling, we decided to do EMG.    Next follow-up appointment is in the next 8-12 weeks or earlier if needed.    Please do not hesitate to call me with any questions or concerns.    Thanks.   
08-Oct-2023 20:00

## 2025-01-14 NOTE — PATIENT PROFILE ADULT - ARRIVAL FROM
Detail Level: Zone Detail Level: Generalized Detail Level: Detailed Prescription Strength Graduated Compression Stockings Recommendations: The patient was counseled that prescription strength graduated compression stockings should be worn for all waking hours. They will follow up with a venous specialist to monitor graduated compression stocking usage and their symptoms. Home

## 2025-05-28 NOTE — PROGRESS NOTE ADULT - SUBJECTIVE AND OBJECTIVE BOX
Correct. She is outside the age range so can discontinue screening.    Still awaiting  pathlogy  +AdenoCA of Colon  surgery on board      MEDICATIONS  (STANDING):  chlorhexidine 2% Cloths 1 Application(s) Topical <User Schedule>  heparin   Injectable 5000 Unit(s) SubCutaneous every 8 hours  influenza  Vaccine (HIGH DOSE) 0.7 milliLiter(s) IntraMuscular once  polyethylene glycol 3350 17 Gram(s) Oral daily    MEDICATIONS  (PRN):  acetaminophen     Tablet .. 650 milliGRAM(s) Oral every 6 hours PRN Temp greater or equal to 38C (100.4F)  bisacodyl 5 milliGRAM(s) Oral every 12 hours PRN Constipation  hydrALAZINE Injectable 10 milliGRAM(s) IV Push every 4 hours PRN SBP > 180  oxyCODONE    IR 5 milliGRAM(s) Oral every 6 hours PRN Moderate Pain (4 - 6)      Allergies    No Known Allergies    Intolerances        Vital Signs Last 24 Hrs  T(C): 36.7 (19 Oct 2023 05:16), Max: 36.8 (18 Oct 2023 23:49)  T(F): 98.1 (19 Oct 2023 05:16), Max: 98.3 (18 Oct 2023 23:49)  HR: 73 (19 Oct 2023 05:16) (64 - 74)  BP: 114/70 (19 Oct 2023 05:16) (98/60 - 114/70)  BP(mean): --  RR: 17 (19 Oct 2023 05:16) (16 - 18)  SpO2: 99% (19 Oct 2023 05:16) (98% - 99%)    Parameters below as of 19 Oct 2023 05:16  Patient On (Oxygen Delivery Method): room air        PHYSICAL EXAM:  General: NAD.  CVS: S1, S2  Chest: air entry bilaterally present  Abd: BS present, soft, non-tender      LABS:                        7.9    11.02 )-----------( 297      ( 18 Oct 2023 06:50 )             25.1     10-18    139  |  107  |  16  ----------------------------<  97  3.7   |  31  |  0.87    Ca    8.6      18 Oct 2023 06:50        awaiting R hemicolectomy  as per surgery

## (undated) DEVICE — PACK CYSTO

## (undated) DEVICE — ELCTR ROLLER BALL BLK 24-26FR

## (undated) DEVICE — FOLEY HOLDER STATLOCK 2 WAY ADULT

## (undated) DEVICE — Device

## (undated) DEVICE — FOLEY CATH 3-WAY 24FR 30CC LATEX LUBRICATH

## (undated) DEVICE — FOLEY CATH 2-WAY 20F 5CC LATEX LUBRICATH

## (undated) DEVICE — FOLEY CATH 3-WAY 18FR 30CC LATEX LUBRICATH

## (undated) DEVICE — GOWN TRIMAX LG

## (undated) DEVICE — FOLEY CATH 3-WAY 24FR 5CC LATEX LUBRICATH

## (undated) DEVICE — CABLE DAC ACTIVE CORD

## (undated) DEVICE — FOLEY CATH 3-WAY 22FR 30CC LATEX LUBRICATH

## (undated) DEVICE — FOLEY CATH 2-WAY 18FR 5CC LATEX HYDROGEL

## (undated) DEVICE — ELCTR VAPORIZT GRV BLK

## (undated) DEVICE — FOLEY CATH 2-WAY 16FR 5CC LATEX LUBRICATH

## (undated) DEVICE — FOLEY CATH 2-WAY 24FR 5CC LATEX HYDROGEL

## (undated) DEVICE — BAG URINE W METER 2L

## (undated) DEVICE — SOL IRR BAG NS 0.9% 3000ML

## (undated) DEVICE — ELCTR BUGBEE FULGATING 5FR X 58CM

## (undated) DEVICE — SYR LUER LOK 30CC

## (undated) DEVICE — TUBING RANGER FLUID IRRIGATION SET DISP

## (undated) DEVICE — WARMING BLANKET UPPER ADULT

## (undated) DEVICE — SOL IRR BAG H2O 3000ML

## (undated) DEVICE — POSITIONER FOAM EGG CRATE ULNAR 2PCS (PINK)

## (undated) DEVICE — ELCTR CUTTING LOOP RIGHT ANGLE 24FR

## (undated) DEVICE — ELCTR GROOVED VAPOR

## (undated) DEVICE — ELCTR BUTTON

## (undated) DEVICE — TUBING HYBRID CO2

## (undated) DEVICE — GLV 6.5 PROTEXIS (WHITE)

## (undated) DEVICE — KIT ENDO PROCEDURE CUST W/VLV

## (undated) DEVICE — FOLEY CATH 3-WAY 26FR 30CC LATEX LUBRICATH

## (undated) DEVICE — FOLEY CATH 3-WAY 20FR 30CC LATEX LUBRICATH

## (undated) DEVICE — FOLEY CATH 2-WAY 22FR 5CC LATEX COUNCIL RED

## (undated) DEVICE — GLV 7.5 PROTEXIS (WHITE)

## (undated) DEVICE — IRRIGATION TRAY W PISTON SYRINGE 60ML

## (undated) DEVICE — FOLEY CATH 3-WAY 20FR 5CC LATEX LUBRICATH

## (undated) DEVICE — VENODYNE/SCD SLEEVE CALF MEDIUM